# Patient Record
Sex: FEMALE | Race: BLACK OR AFRICAN AMERICAN | NOT HISPANIC OR LATINO | URBAN - METROPOLITAN AREA
[De-identification: names, ages, dates, MRNs, and addresses within clinical notes are randomized per-mention and may not be internally consistent; named-entity substitution may affect disease eponyms.]

---

## 2017-04-04 ENCOUNTER — EMERGENCY (EMERGENCY)
Facility: HOSPITAL | Age: 32
LOS: 1 days | Discharge: HOME | End: 2017-04-04
Attending: EMERGENCY MEDICINE | Admitting: EMERGENCY MEDICINE
Payer: COMMERCIAL

## 2017-04-04 VITALS
HEART RATE: 65 BPM | OXYGEN SATURATION: 100 % | TEMPERATURE: 98 F | RESPIRATION RATE: 16 BRPM | DIASTOLIC BLOOD PRESSURE: 73 MMHG | SYSTOLIC BLOOD PRESSURE: 116 MMHG

## 2017-04-04 VITALS
OXYGEN SATURATION: 99 % | RESPIRATION RATE: 20 BRPM | SYSTOLIC BLOOD PRESSURE: 112 MMHG | TEMPERATURE: 99 F | DIASTOLIC BLOOD PRESSURE: 78 MMHG | HEART RATE: 92 BPM

## 2017-04-04 DIAGNOSIS — K59.00 CONSTIPATION, UNSPECIFIED: ICD-10-CM

## 2017-04-04 LAB
ALBUMIN SERPL ELPH-MCNC: 4.5 G/DL — SIGNIFICANT CHANGE UP (ref 3.3–5)
ALP SERPL-CCNC: 51 U/L — SIGNIFICANT CHANGE UP (ref 40–120)
ALT FLD-CCNC: 13 U/L RC — SIGNIFICANT CHANGE UP (ref 10–45)
ANION GAP SERPL CALC-SCNC: 16 MMOL/L — SIGNIFICANT CHANGE UP (ref 5–17)
ANISOCYTOSIS BLD QL: SLIGHT — SIGNIFICANT CHANGE UP
APPEARANCE UR: ABNORMAL
APTT BLD: 94.5 SEC — HIGH (ref 27.5–37.4)
AST SERPL-CCNC: 20 U/L — SIGNIFICANT CHANGE UP (ref 10–40)
BASOPHILS # BLD AUTO: 0.1 K/UL — SIGNIFICANT CHANGE UP (ref 0–0.2)
BASOPHILS NFR BLD AUTO: 1 % — SIGNIFICANT CHANGE UP (ref 0–2)
BILIRUB SERPL-MCNC: 0.3 MG/DL — SIGNIFICANT CHANGE UP (ref 0.2–1.2)
BILIRUB UR-MCNC: ABNORMAL
BUN SERPL-MCNC: 7 MG/DL — SIGNIFICANT CHANGE UP (ref 7–23)
CALCIUM SERPL-MCNC: 9.8 MG/DL — SIGNIFICANT CHANGE UP (ref 8.4–10.5)
CHLORIDE SERPL-SCNC: 103 MMOL/L — SIGNIFICANT CHANGE UP (ref 96–108)
CO2 SERPL-SCNC: 23 MMOL/L — SIGNIFICANT CHANGE UP (ref 22–31)
COLOR SPEC: YELLOW — SIGNIFICANT CHANGE UP
CREAT SERPL-MCNC: 0.73 MG/DL — SIGNIFICANT CHANGE UP (ref 0.5–1.3)
DIFF PNL FLD: ABNORMAL
ELLIPTOCYTES BLD QL SMEAR: SLIGHT — SIGNIFICANT CHANGE UP
EOSINOPHIL # BLD AUTO: 0.4 K/UL — SIGNIFICANT CHANGE UP (ref 0–0.5)
EOSINOPHIL NFR BLD AUTO: 3 % — SIGNIFICANT CHANGE UP (ref 0–6)
EPI CELLS # UR: SIGNIFICANT CHANGE UP /HPF
GLUCOSE SERPL-MCNC: 112 MG/DL — HIGH (ref 70–99)
GLUCOSE UR QL: NEGATIVE — SIGNIFICANT CHANGE UP
HCT VFR BLD CALC: 38.6 % — SIGNIFICANT CHANGE UP (ref 34.5–45)
HGB BLD-MCNC: 12.8 G/DL — SIGNIFICANT CHANGE UP (ref 11.5–15.5)
INR BLD: 1.1 RATIO — SIGNIFICANT CHANGE UP (ref 0.88–1.16)
KETONES UR-MCNC: ABNORMAL
LEUKOCYTE ESTERASE UR-ACNC: ABNORMAL
LIDOCAIN IGE QN: 17 U/L — SIGNIFICANT CHANGE UP (ref 7–60)
LYMPHOCYTES # BLD AUTO: 3.6 K/UL — HIGH (ref 1–3.3)
LYMPHOCYTES # BLD AUTO: 48 % — HIGH (ref 13–44)
MCHC RBC-ENTMCNC: 26.8 PG — LOW (ref 27–34)
MCHC RBC-ENTMCNC: 33.2 GM/DL — SIGNIFICANT CHANGE UP (ref 32–36)
MCV RBC AUTO: 80.9 FL — SIGNIFICANT CHANGE UP (ref 80–100)
MONOCYTES # BLD AUTO: 0.6 K/UL — SIGNIFICANT CHANGE UP (ref 0–0.9)
MONOCYTES NFR BLD AUTO: 5 % — SIGNIFICANT CHANGE UP (ref 2–14)
NEUTROPHILS # BLD AUTO: 3.2 K/UL — SIGNIFICANT CHANGE UP (ref 1.8–7.4)
NEUTROPHILS NFR BLD AUTO: 43 % — SIGNIFICANT CHANGE UP (ref 43–77)
NITRITE UR-MCNC: NEGATIVE — SIGNIFICANT CHANGE UP
PH UR: 6.5 — SIGNIFICANT CHANGE UP (ref 4.8–8)
PLAT MORPH BLD: NORMAL — SIGNIFICANT CHANGE UP
PLATELET # BLD AUTO: 211 K/UL — SIGNIFICANT CHANGE UP (ref 150–400)
POIKILOCYTOSIS BLD QL AUTO: SLIGHT — SIGNIFICANT CHANGE UP
POLYCHROMASIA BLD QL SMEAR: SLIGHT — SIGNIFICANT CHANGE UP
POTASSIUM SERPL-MCNC: 3.8 MMOL/L — SIGNIFICANT CHANGE UP (ref 3.5–5.3)
POTASSIUM SERPL-SCNC: 3.8 MMOL/L — SIGNIFICANT CHANGE UP (ref 3.5–5.3)
PROT SERPL-MCNC: 7.8 G/DL — SIGNIFICANT CHANGE UP (ref 6–8.3)
PROT UR-MCNC: 100 MG/DL
PROTHROM AB SERPL-ACNC: 12 SEC — SIGNIFICANT CHANGE UP (ref 9.8–12.7)
RBC # BLD: 4.77 M/UL — SIGNIFICANT CHANGE UP (ref 3.8–5.2)
RBC # FLD: 16.2 % — HIGH (ref 10.3–14.5)
RBC BLD AUTO: ABNORMAL
RBC CASTS # UR COMP ASSIST: ABNORMAL /HPF (ref 0–2)
SCHISTOCYTES BLD QL AUTO: SLIGHT — SIGNIFICANT CHANGE UP
SODIUM SERPL-SCNC: 142 MMOL/L — SIGNIFICANT CHANGE UP (ref 135–145)
SP GR SPEC: >1.03 — HIGH (ref 1.01–1.02)
TARGETS BLD QL SMEAR: SLIGHT — SIGNIFICANT CHANGE UP
UROBILINOGEN FLD QL: 4
WBC # BLD: 7.9 K/UL — SIGNIFICANT CHANGE UP (ref 3.8–10.5)
WBC # FLD AUTO: 7.9 K/UL — SIGNIFICANT CHANGE UP (ref 3.8–10.5)

## 2017-04-04 PROCEDURE — 71046 X-RAY EXAM CHEST 2 VIEWS: CPT

## 2017-04-04 PROCEDURE — 85610 PROTHROMBIN TIME: CPT

## 2017-04-04 PROCEDURE — 99284 EMERGENCY DEPT VISIT MOD MDM: CPT

## 2017-04-04 PROCEDURE — 80053 COMPREHEN METABOLIC PANEL: CPT

## 2017-04-04 PROCEDURE — 96374 THER/PROPH/DIAG INJ IV PUSH: CPT

## 2017-04-04 PROCEDURE — 81001 URINALYSIS AUTO W/SCOPE: CPT

## 2017-04-04 PROCEDURE — 83690 ASSAY OF LIPASE: CPT

## 2017-04-04 PROCEDURE — 96375 TX/PRO/DX INJ NEW DRUG ADDON: CPT

## 2017-04-04 PROCEDURE — 85027 COMPLETE CBC AUTOMATED: CPT

## 2017-04-04 PROCEDURE — 71020: CPT | Mod: 26

## 2017-04-04 PROCEDURE — 99284 EMERGENCY DEPT VISIT MOD MDM: CPT | Mod: 25

## 2017-04-04 PROCEDURE — 85730 THROMBOPLASTIN TIME PARTIAL: CPT

## 2017-04-04 RX ORDER — ONDANSETRON 8 MG/1
4 TABLET, FILM COATED ORAL ONCE
Qty: 0 | Refills: 0 | Status: COMPLETED | OUTPATIENT
Start: 2017-04-04 | End: 2017-04-04

## 2017-04-04 RX ORDER — FAMOTIDINE 10 MG/ML
1 INJECTION INTRAVENOUS
Qty: 20 | Refills: 0 | OUTPATIENT
Start: 2017-04-04

## 2017-04-04 RX ORDER — KETOROLAC TROMETHAMINE 30 MG/ML
30 SYRINGE (ML) INJECTION ONCE
Qty: 0 | Refills: 0 | Status: DISCONTINUED | OUTPATIENT
Start: 2017-04-04 | End: 2017-04-04

## 2017-04-04 RX ORDER — MORPHINE SULFATE 50 MG/1
6 CAPSULE, EXTENDED RELEASE ORAL ONCE
Qty: 0 | Refills: 0 | Status: DISCONTINUED | OUTPATIENT
Start: 2017-04-04 | End: 2017-04-04

## 2017-04-04 RX ORDER — SODIUM CHLORIDE 9 MG/ML
1000 INJECTION INTRAMUSCULAR; INTRAVENOUS; SUBCUTANEOUS ONCE
Qty: 0 | Refills: 0 | Status: COMPLETED | OUTPATIENT
Start: 2017-04-04 | End: 2017-04-04

## 2017-04-04 RX ORDER — TRAMADOL HYDROCHLORIDE 50 MG/1
1 TABLET ORAL
Qty: 16 | Refills: 0 | OUTPATIENT
Start: 2017-04-04

## 2017-04-04 RX ADMIN — MORPHINE SULFATE 6 MILLIGRAM(S): 50 CAPSULE, EXTENDED RELEASE ORAL at 14:00

## 2017-04-04 RX ADMIN — MORPHINE SULFATE 6 MILLIGRAM(S): 50 CAPSULE, EXTENDED RELEASE ORAL at 13:44

## 2017-04-04 RX ADMIN — ONDANSETRON 4 MILLIGRAM(S): 8 TABLET, FILM COATED ORAL at 12:45

## 2017-04-04 RX ADMIN — SODIUM CHLORIDE 1000 MILLILITER(S): 9 INJECTION INTRAMUSCULAR; INTRAVENOUS; SUBCUTANEOUS at 12:27

## 2017-04-04 RX ADMIN — Medication 30 MILLIGRAM(S): at 16:37

## 2017-04-04 NOTE — ED PROVIDER NOTE - MEDICAL DECISION MAKING DETAILS
31 year old female presents with right sided pain. Given history of pancreatitis, pancreatitis is likely, although the lack of abdominal pain and tenderness makes it less likely. Plan: IVF, Zofran for nausea, evaluate for causes in the lung/abdomen/urine. Reassess.

## 2017-04-04 NOTE — ED ADULT NURSE REASSESSMENT NOTE - NS ED NURSE REASSESS COMMENT FT1
MD at bedside discussing urine pregnancy results with patient.  MD said patient may receive morphine despite urine pregnancy results.  Xray called and made aware that patient can come for xray.  MD said he needs the xray and patient needs to go for xray.

## 2017-04-04 NOTE — ED ADULT NURSE NOTE - OBJECTIVE STATEMENT
31 year old female alert and oriented x 4 came to the ED c/o right sided back/flank pain for 2 weeks.  Patient came to the ED today because the pain is not getting better and is interfering with her being able to work.  Patient said she has not been able to eat or drink without vomiting since yesterday and has been having trouble sleeping.  Patient c/o not having a BM for 3 days and says she has tried heat on her back, Tylenol and Motrin, but they have not relieved her pain.  Last dose of medications was last night.  Patient reports pain is 10/10 when it comes in waves and said its a throbbing sensation.  Patient denies; chest pain, shortness of breath, abdominal pain, fevers, chills, dysuria, frequency or burning on urination, blood in urine, stool or vomit.  L/s diminished b/l, S1, S2 heard, abdomen is soft non tender, right sided CVA tenderness.  LMP 3/4/17.  IV established in the right hand #20 and safety ensured. 31 year old female alert and oriented x 4 came to the ED c/o right sided back/flank pain for 2 weeks.  Patient came to the ED today because the pain is not getting better and is interfering with her being able to work.  Patient reports a history of Pancreatitis in November.  Patient said she has not been able to eat or drink without vomiting since yesterday and has been having trouble sleeping.  Patient c/o not having a BM for 3 days and says she has tried heat on her back, Tylenol and Motrin, but they have not relieved her pain.  Last dose of medications was last night.  Patient reports pain is 10/10 when it comes in waves and said its a throbbing sensation.  Patient denies; chest pain, shortness of breath, abdominal pain, fevers, chills, dysuria, frequency or burning on urination, blood in urine, stool or vomit.  L/s diminished b/l, S1, S2 heard, abdomen is soft non tender, right sided CVA tenderness.  LMP 3/4/17.  IV established in the right hand #20 and safety ensured.

## 2017-04-04 NOTE — ED PROVIDER NOTE - OBJECTIVE STATEMENT
31 year old female with past medical history of Tuberculosis, Anemia, Pancreatitis in 11/2016 presents to the Emergency Department complaining of intermittent right sided pain associated with constipation, nausea, nonbilious/nonbloody vomiting x2 weeks. Pain worsens when lying onto her right side. No relief with Motrin or Tylenol. Daily smoker, but does not drink alcohol. Patient states current symptoms are similar to when she was diagnosed with Pancreatitis this past November. Patient is unsure why she was diagnosed with Pancreatitis or what caused it. Currently menstruating.   Denies fever, chills, urinary symptoms, or any other complaints.

## 2017-04-04 NOTE — ED PROVIDER NOTE - NS ED MD SCRIBE ATTENDING SCRIBE SECTIONS
HISTORY OF PRESENT ILLNESS/VITAL SIGNS( Pullset)/PHYSICAL EXAM/DISPOSITION/HIV/PAST MEDICAL/SURGICAL/SOCIAL HISTORY/REVIEW OF SYSTEMS

## 2017-04-04 NOTE — ED PROVIDER NOTE - CARE PLAN
Principal Discharge DX:	Acute gastritis without hemorrhage, unspecified gastritis type  Secondary Diagnosis:	Chest wall pain

## 2017-05-24 ENCOUNTER — EMERGENCY (EMERGENCY)
Facility: HOSPITAL | Age: 32
LOS: 1 days | Discharge: HOME | End: 2017-05-24
Attending: EMERGENCY MEDICINE | Admitting: EMERGENCY MEDICINE
Payer: MEDICAID

## 2017-05-24 VITALS
SYSTOLIC BLOOD PRESSURE: 129 MMHG | RESPIRATION RATE: 17 BRPM | OXYGEN SATURATION: 100 % | TEMPERATURE: 98 F | DIASTOLIC BLOOD PRESSURE: 77 MMHG | HEART RATE: 90 BPM

## 2017-05-24 VITALS
TEMPERATURE: 98 F | RESPIRATION RATE: 19 BRPM | OXYGEN SATURATION: 98 % | HEART RATE: 111 BPM | SYSTOLIC BLOOD PRESSURE: 112 MMHG | DIASTOLIC BLOOD PRESSURE: 83 MMHG

## 2017-05-24 DIAGNOSIS — R06.02 SHORTNESS OF BREATH: ICD-10-CM

## 2017-05-24 DIAGNOSIS — R63.4 ABNORMAL WEIGHT LOSS: ICD-10-CM

## 2017-05-24 DIAGNOSIS — R61 GENERALIZED HYPERHIDROSIS: ICD-10-CM

## 2017-05-24 DIAGNOSIS — Z79.899 OTHER LONG TERM (CURRENT) DRUG THERAPY: ICD-10-CM

## 2017-05-24 DIAGNOSIS — R50.9 FEVER, UNSPECIFIED: ICD-10-CM

## 2017-05-24 DIAGNOSIS — F17.200 NICOTINE DEPENDENCE, UNSPECIFIED, UNCOMPLICATED: ICD-10-CM

## 2017-05-24 DIAGNOSIS — R53.1 WEAKNESS: ICD-10-CM

## 2017-05-24 DIAGNOSIS — R10.84 GENERALIZED ABDOMINAL PAIN: ICD-10-CM

## 2017-05-24 DIAGNOSIS — Z79.891 LONG TERM (CURRENT) USE OF OPIATE ANALGESIC: ICD-10-CM

## 2017-05-24 LAB
ALBUMIN SERPL ELPH-MCNC: 4.9 G/DL — SIGNIFICANT CHANGE UP (ref 3.3–5)
ALP SERPL-CCNC: 61 U/L — SIGNIFICANT CHANGE UP (ref 40–120)
ALT FLD-CCNC: 19 U/L RC — SIGNIFICANT CHANGE UP (ref 10–45)
ANION GAP SERPL CALC-SCNC: 17 MMOL/L — SIGNIFICANT CHANGE UP (ref 5–17)
AST SERPL-CCNC: 21 U/L — SIGNIFICANT CHANGE UP (ref 10–40)
BASE EXCESS BLDV CALC-SCNC: 5 MMOL/L — HIGH (ref -2–2)
BILIRUB SERPL-MCNC: 0.5 MG/DL — SIGNIFICANT CHANGE UP (ref 0.2–1.2)
BUN SERPL-MCNC: 12 MG/DL — SIGNIFICANT CHANGE UP (ref 7–23)
CA-I SERPL-SCNC: 1.26 MMOL/L — SIGNIFICANT CHANGE UP (ref 1.12–1.3)
CALCIUM SERPL-MCNC: 10.4 MG/DL — SIGNIFICANT CHANGE UP (ref 8.4–10.5)
CHLORIDE BLDV-SCNC: 93 MMOL/L — LOW (ref 96–108)
CHLORIDE SERPL-SCNC: 92 MMOL/L — LOW (ref 96–108)
CO2 BLDV-SCNC: 31 MMOL/L — HIGH (ref 22–30)
CO2 SERPL-SCNC: 26 MMOL/L — SIGNIFICANT CHANGE UP (ref 22–31)
CREAT SERPL-MCNC: 0.86 MG/DL — SIGNIFICANT CHANGE UP (ref 0.5–1.3)
GAS PNL BLDV: 135 MMOL/L — LOW (ref 136–145)
GAS PNL BLDV: SIGNIFICANT CHANGE UP
GLUCOSE BLDV-MCNC: 113 MG/DL — HIGH (ref 70–99)
GLUCOSE SERPL-MCNC: 113 MG/DL — HIGH (ref 70–99)
HCG SERPL-ACNC: <2 MIU/ML — SIGNIFICANT CHANGE UP
HCO3 BLDV-SCNC: 30 MMOL/L — HIGH (ref 21–29)
HCT VFR BLD CALC: 42.5 % — SIGNIFICANT CHANGE UP (ref 34.5–45)
HCT VFR BLDA CALC: 42 % — SIGNIFICANT CHANGE UP (ref 39–50)
HGB BLD CALC-MCNC: 13.7 G/DL — SIGNIFICANT CHANGE UP (ref 11.5–15.5)
HGB BLD-MCNC: 13.6 G/DL — SIGNIFICANT CHANGE UP (ref 11.5–15.5)
LACTATE BLDV-MCNC: 1.4 MMOL/L — SIGNIFICANT CHANGE UP (ref 0.7–2)
LIDOCAIN IGE QN: 80 U/L — HIGH (ref 7–60)
MCHC RBC-ENTMCNC: 26.2 PG — LOW (ref 27–34)
MCHC RBC-ENTMCNC: 32.1 GM/DL — SIGNIFICANT CHANGE UP (ref 32–36)
MCV RBC AUTO: 81.5 FL — SIGNIFICANT CHANGE UP (ref 80–100)
PCO2 BLDV: 47 MMHG — SIGNIFICANT CHANGE UP (ref 35–50)
PH BLDV: 7.42 — SIGNIFICANT CHANGE UP (ref 7.35–7.45)
PLATELET # BLD AUTO: 458 K/UL — HIGH (ref 150–400)
PO2 BLDV: 22 MMHG — LOW (ref 25–45)
POTASSIUM BLDV-SCNC: 2.7 MMOL/L — CRITICAL LOW (ref 3.5–5)
POTASSIUM SERPL-MCNC: 3 MMOL/L — LOW (ref 3.5–5.3)
POTASSIUM SERPL-SCNC: 3 MMOL/L — LOW (ref 3.5–5.3)
PROT SERPL-MCNC: 8.6 G/DL — HIGH (ref 6–8.3)
RBC # BLD: 5.21 M/UL — HIGH (ref 3.8–5.2)
RBC # FLD: 14.1 % — SIGNIFICANT CHANGE UP (ref 10.3–14.5)
SAO2 % BLDV: 28 % — LOW (ref 67–88)
SODIUM SERPL-SCNC: 135 MMOL/L — SIGNIFICANT CHANGE UP (ref 135–145)
WBC # BLD: 11.5 K/UL — HIGH (ref 3.8–10.5)
WBC # FLD AUTO: 11.5 K/UL — HIGH (ref 3.8–10.5)

## 2017-05-24 PROCEDURE — 76705 ECHO EXAM OF ABDOMEN: CPT | Mod: 26,RT

## 2017-05-24 PROCEDURE — 71010: CPT | Mod: 26

## 2017-05-24 PROCEDURE — 99285 EMERGENCY DEPT VISIT HI MDM: CPT | Mod: 25

## 2017-05-24 PROCEDURE — 74177 CT ABD & PELVIS W/CONTRAST: CPT | Mod: 26

## 2017-05-24 RX ORDER — SODIUM CHLORIDE 9 MG/ML
1000 INJECTION INTRAMUSCULAR; INTRAVENOUS; SUBCUTANEOUS ONCE
Qty: 0 | Refills: 0 | Status: COMPLETED | OUTPATIENT
Start: 2017-05-24 | End: 2017-05-24

## 2017-05-24 RX ORDER — ONDANSETRON 8 MG/1
4 TABLET, FILM COATED ORAL ONCE
Qty: 0 | Refills: 0 | Status: COMPLETED | OUTPATIENT
Start: 2017-05-24 | End: 2017-05-24

## 2017-05-24 RX ORDER — POTASSIUM CHLORIDE 20 MEQ
60 PACKET (EA) ORAL ONCE
Qty: 0 | Refills: 0 | Status: COMPLETED | OUTPATIENT
Start: 2017-05-24 | End: 2017-05-24

## 2017-05-24 RX ORDER — MORPHINE SULFATE 50 MG/1
4 CAPSULE, EXTENDED RELEASE ORAL ONCE
Qty: 0 | Refills: 0 | Status: DISCONTINUED | OUTPATIENT
Start: 2017-05-24 | End: 2017-05-24

## 2017-05-24 RX ORDER — POTASSIUM CHLORIDE 20 MEQ
60 PACKET (EA) ORAL ONCE
Qty: 0 | Refills: 0 | Status: DISCONTINUED | OUTPATIENT
Start: 2017-05-24 | End: 2017-05-24

## 2017-05-24 RX ADMIN — MORPHINE SULFATE 4 MILLIGRAM(S): 50 CAPSULE, EXTENDED RELEASE ORAL at 20:30

## 2017-05-24 RX ADMIN — SODIUM CHLORIDE 1000 MILLILITER(S): 9 INJECTION INTRAMUSCULAR; INTRAVENOUS; SUBCUTANEOUS at 20:30

## 2017-05-24 RX ADMIN — ONDANSETRON 4 MILLIGRAM(S): 8 TABLET, FILM COATED ORAL at 20:31

## 2017-05-24 RX ADMIN — SODIUM CHLORIDE 1000 MILLILITER(S): 9 INJECTION INTRAMUSCULAR; INTRAVENOUS; SUBCUTANEOUS at 22:39

## 2017-05-24 NOTE — ED ADULT NURSE NOTE - OBJECTIVE STATEMENT
31 y/o female patient presents ambulatory to ED with complaint of abdominal pain x 1 week. Patient has had increased anxiety and worsening stress in personal life. Per patient symptoms started with upper abdominal pain with radiation to the back, SOB, decreased PO intake with weight loss, intermittent nausea and vomiting, generalized weakness. Patient states she had an  1 month ago. Patient denies CP, no diarrhea, afebrile in ED, no headache, no lightheadedness/dizziness, no numbness or tingling, no syncope, no cough, no URI. Patient has HX of TB - as per patient last treatment 13 yrs ago. 33 y/o female patient presents ambulatory to ED with complaint of abdominal pain x 1 week. Patient has had increased anxiety and worsening stress in personal life. Per patient symptoms started with upper abdominal pain with radiation to the back, SOB, decreased PO intake with weight loss, intermittent nausea and vomiting, generalized weakness. Per patient fever and chills at home. Patient states she had an  1 month ago. Patient denies CP, no diarrhea, afebrile in ED, no headache, no lightheadedness/dizziness, no numbness or tingling, no syncope, no cough, no URI. Patient has HX of TB - as per patient last treatment 13 yrs ago.

## 2017-05-25 PROCEDURE — 82330 ASSAY OF CALCIUM: CPT

## 2017-05-25 PROCEDURE — 85014 HEMATOCRIT: CPT

## 2017-05-25 PROCEDURE — 84132 ASSAY OF SERUM POTASSIUM: CPT

## 2017-05-25 PROCEDURE — 85027 COMPLETE CBC AUTOMATED: CPT

## 2017-05-25 PROCEDURE — 82803 BLOOD GASES ANY COMBINATION: CPT

## 2017-05-25 PROCEDURE — 76705 ECHO EXAM OF ABDOMEN: CPT

## 2017-05-25 PROCEDURE — 84702 CHORIONIC GONADOTROPIN TEST: CPT

## 2017-05-25 PROCEDURE — 82435 ASSAY OF BLOOD CHLORIDE: CPT

## 2017-05-25 PROCEDURE — 96375 TX/PRO/DX INJ NEW DRUG ADDON: CPT

## 2017-05-25 PROCEDURE — 82947 ASSAY GLUCOSE BLOOD QUANT: CPT

## 2017-05-25 PROCEDURE — 80053 COMPREHEN METABOLIC PANEL: CPT

## 2017-05-25 PROCEDURE — 83605 ASSAY OF LACTIC ACID: CPT

## 2017-05-25 PROCEDURE — 74177 CT ABD & PELVIS W/CONTRAST: CPT

## 2017-05-25 PROCEDURE — 71045 X-RAY EXAM CHEST 1 VIEW: CPT

## 2017-05-25 PROCEDURE — 84295 ASSAY OF SERUM SODIUM: CPT

## 2017-05-25 PROCEDURE — 99284 EMERGENCY DEPT VISIT MOD MDM: CPT | Mod: 25

## 2017-05-25 PROCEDURE — 83690 ASSAY OF LIPASE: CPT

## 2017-05-25 PROCEDURE — 96374 THER/PROPH/DIAG INJ IV PUSH: CPT | Mod: XU

## 2017-05-25 RX ORDER — OXYCODONE HYDROCHLORIDE 5 MG/1
5 TABLET ORAL ONCE
Qty: 0 | Refills: 0 | Status: DISCONTINUED | OUTPATIENT
Start: 2017-05-25 | End: 2017-05-25

## 2017-05-25 RX ORDER — ACETAMINOPHEN 500 MG
650 TABLET ORAL ONCE
Qty: 0 | Refills: 0 | Status: COMPLETED | OUTPATIENT
Start: 2017-05-25 | End: 2017-05-25

## 2017-05-25 RX ADMIN — Medication 60 MILLIEQUIVALENT(S): at 00:20

## 2017-05-25 RX ADMIN — OXYCODONE HYDROCHLORIDE 5 MILLIGRAM(S): 5 TABLET ORAL at 00:20

## 2017-05-25 RX ADMIN — Medication 650 MILLIGRAM(S): at 00:20

## 2017-05-25 NOTE — ED PROVIDER NOTE - ATTENDING CONTRIBUTION TO CARE
31 yo female with PMH of tb (treated 13 years ago with no further issues), pancreatitis, anemia with no transfusions with abdominal pain, n/v. No fevers. Mild sweats and chills. No  sx. No GI bleeding or heavy vaginal bleeding. NO URI SX at all. She claims no BM or flatus for weeks. No hx of SBO. On exam, AVSS, cta bl, s1, s,2 rrr, osft nd abdomen,+ BS,  + ttp to epigastrum and ruq with no Turner's no cvat no r/g/r, no le edema, no rash. No pallor, MMM, no icterus.

## 2017-05-25 NOTE — ED PROVIDER NOTE - PLAN OF CARE
Your imaging and lab work did not show any sign of infection. Please follow up with your PMD within a few days of discharge. If your symptoms worsen seek immediate medical care.

## 2017-05-25 NOTE — ED PROVIDER NOTE - OBJECTIVE STATEMENT
31 year old female with past medical history of Tuberculosis, Anemia, Pancreatitis in 11/2016 presents with multiple complaints. Pt states that she hasn't had a bowel movement in a month and has decreased PO intake. She also has pain all over including worsening abdominal pain. She states she has fevers and night sweats with associated weight loss. She denies hemoptysis or cough. She has RUQ pain with pain radiating to the back.

## 2017-05-25 NOTE — ED PROVIDER NOTE - PROGRESS NOTE DETAILS
RUQ and CT A/P negative for pathology. Pt able to take po fluids and medications. Pt to follow up with PMD.

## 2017-05-25 NOTE — ED PROVIDER NOTE - CARE PLAN
Principal Discharge DX:	Abdominal pain  Secondary Diagnosis:	Dehydration Principal Discharge DX:	Abdominal pain  Instructions for follow-up, activity and diet:	Your imaging and lab work did not show any sign of infection. Please follow up with your PMD within a few days of discharge. If your symptoms worsen seek immediate medical care.  Secondary Diagnosis:	Dehydration

## 2017-10-11 ENCOUNTER — EMERGENCY (EMERGENCY)
Facility: HOSPITAL | Age: 32
LOS: 1 days | Discharge: HOME | End: 2017-10-11
Attending: EMERGENCY MEDICINE | Admitting: EMERGENCY MEDICINE
Payer: MEDICAID

## 2017-10-11 VITALS
OXYGEN SATURATION: 100 % | SYSTOLIC BLOOD PRESSURE: 108 MMHG | DIASTOLIC BLOOD PRESSURE: 76 MMHG | RESPIRATION RATE: 16 BRPM | HEART RATE: 79 BPM | TEMPERATURE: 98 F

## 2017-10-11 VITALS
HEART RATE: 94 BPM | DIASTOLIC BLOOD PRESSURE: 66 MMHG | RESPIRATION RATE: 18 BRPM | OXYGEN SATURATION: 100 % | SYSTOLIC BLOOD PRESSURE: 124 MMHG

## 2017-10-11 LAB
ALBUMIN SERPL ELPH-MCNC: 5.5 G/DL — HIGH (ref 3.3–5)
ALP SERPL-CCNC: 69 U/L — SIGNIFICANT CHANGE UP (ref 40–120)
ALT FLD-CCNC: 11 U/L RC — SIGNIFICANT CHANGE UP (ref 10–45)
ANION GAP SERPL CALC-SCNC: 17 MMOL/L — SIGNIFICANT CHANGE UP (ref 5–17)
ANISOCYTOSIS BLD QL: SIGNIFICANT CHANGE UP
APPEARANCE UR: CLEAR — SIGNIFICANT CHANGE UP
AST SERPL-CCNC: 19 U/L — SIGNIFICANT CHANGE UP (ref 10–40)
BACTERIA # UR AUTO: ABNORMAL /HPF
BASE EXCESS BLDV CALC-SCNC: 1.7 MMOL/L — SIGNIFICANT CHANGE UP (ref -2–2)
BASOPHILS # BLD AUTO: 0.1 K/UL — SIGNIFICANT CHANGE UP (ref 0–0.2)
BASOPHILS NFR BLD AUTO: 1.2 % — SIGNIFICANT CHANGE UP (ref 0–2)
BILIRUB SERPL-MCNC: 0.5 MG/DL — SIGNIFICANT CHANGE UP (ref 0.2–1.2)
BILIRUB UR-MCNC: NEGATIVE — SIGNIFICANT CHANGE UP
BUN SERPL-MCNC: 10 MG/DL — SIGNIFICANT CHANGE UP (ref 7–23)
CA-I SERPL-SCNC: 1.29 MMOL/L — SIGNIFICANT CHANGE UP (ref 1.12–1.3)
CALCIUM SERPL-MCNC: 10.9 MG/DL — HIGH (ref 8.4–10.5)
CHLORIDE BLDV-SCNC: 101 MMOL/L — SIGNIFICANT CHANGE UP (ref 96–108)
CHLORIDE SERPL-SCNC: 98 MMOL/L — SIGNIFICANT CHANGE UP (ref 96–108)
CO2 BLDV-SCNC: 28 MMOL/L — SIGNIFICANT CHANGE UP (ref 22–30)
CO2 SERPL-SCNC: 24 MMOL/L — SIGNIFICANT CHANGE UP (ref 22–31)
COLOR SPEC: YELLOW — SIGNIFICANT CHANGE UP
CREAT SERPL-MCNC: 0.84 MG/DL — SIGNIFICANT CHANGE UP (ref 0.5–1.3)
DIFF PNL FLD: ABNORMAL
ELLIPTOCYTES BLD QL SMEAR: SLIGHT — SIGNIFICANT CHANGE UP
EOSINOPHIL # BLD AUTO: 0 K/UL — SIGNIFICANT CHANGE UP (ref 0–0.5)
EOSINOPHIL NFR BLD AUTO: 0.5 % — SIGNIFICANT CHANGE UP (ref 0–6)
EPI CELLS # UR: SIGNIFICANT CHANGE UP /HPF
GAS PNL BLDV: 139 MMOL/L — SIGNIFICANT CHANGE UP (ref 136–145)
GAS PNL BLDV: SIGNIFICANT CHANGE UP
GLUCOSE BLDV-MCNC: 115 MG/DL — HIGH (ref 70–99)
GLUCOSE SERPL-MCNC: 110 MG/DL — HIGH (ref 70–99)
GLUCOSE UR QL: NEGATIVE — SIGNIFICANT CHANGE UP
HCO3 BLDV-SCNC: 26 MMOL/L — SIGNIFICANT CHANGE UP (ref 21–29)
HCT VFR BLD CALC: 38.4 % — SIGNIFICANT CHANGE UP (ref 34.5–45)
HCT VFR BLDA CALC: 38 % — LOW (ref 39–50)
HGB BLD CALC-MCNC: 12.3 G/DL — SIGNIFICANT CHANGE UP (ref 11.5–15.5)
HGB BLD-MCNC: 12.1 G/DL — SIGNIFICANT CHANGE UP (ref 11.5–15.5)
KETONES UR-MCNC: NEGATIVE — SIGNIFICANT CHANGE UP
LACTATE BLDV-MCNC: 1.5 MMOL/L — SIGNIFICANT CHANGE UP (ref 0.7–2)
LEUKOCYTE ESTERASE UR-ACNC: NEGATIVE — SIGNIFICANT CHANGE UP
LIDOCAIN IGE QN: 90 U/L — HIGH (ref 7–60)
LYMPHOCYTES # BLD AUTO: 2.8 K/UL — SIGNIFICANT CHANGE UP (ref 1–3.3)
LYMPHOCYTES # BLD AUTO: 34.4 % — SIGNIFICANT CHANGE UP (ref 13–44)
MCHC RBC-ENTMCNC: 22.4 PG — LOW (ref 27–34)
MCHC RBC-ENTMCNC: 31.6 GM/DL — LOW (ref 32–36)
MCV RBC AUTO: 70.9 FL — LOW (ref 80–100)
MONOCYTES # BLD AUTO: 0.8 K/UL — SIGNIFICANT CHANGE UP (ref 0–0.9)
MONOCYTES NFR BLD AUTO: 9.8 % — SIGNIFICANT CHANGE UP (ref 2–14)
NEUTROPHILS # BLD AUTO: 4.4 K/UL — SIGNIFICANT CHANGE UP (ref 1.8–7.4)
NEUTROPHILS NFR BLD AUTO: 54.1 % — SIGNIFICANT CHANGE UP (ref 43–77)
NITRITE UR-MCNC: NEGATIVE — SIGNIFICANT CHANGE UP
PCO2 BLDV: 44 MMHG — SIGNIFICANT CHANGE UP (ref 35–50)
PH BLDV: 7.4 — SIGNIFICANT CHANGE UP (ref 7.35–7.45)
PH UR: 6 — SIGNIFICANT CHANGE UP (ref 5–8)
PLAT MORPH BLD: NORMAL — SIGNIFICANT CHANGE UP
PLATELET # BLD AUTO: 472 K/UL — HIGH (ref 150–400)
PO2 BLDV: 43 MMHG — SIGNIFICANT CHANGE UP (ref 25–45)
POIKILOCYTOSIS BLD QL AUTO: SLIGHT — SIGNIFICANT CHANGE UP
POTASSIUM BLDV-SCNC: 3.6 MMOL/L — SIGNIFICANT CHANGE UP (ref 3.5–5)
POTASSIUM SERPL-MCNC: 3.4 MMOL/L — LOW (ref 3.5–5.3)
POTASSIUM SERPL-SCNC: 3.4 MMOL/L — LOW (ref 3.5–5.3)
PROT SERPL-MCNC: 9.4 G/DL — HIGH (ref 6–8.3)
PROT UR-MCNC: 100 MG/DL
RBC # BLD: 5.42 M/UL — HIGH (ref 3.8–5.2)
RBC # FLD: 19 % — HIGH (ref 10.3–14.5)
RBC BLD AUTO: ABNORMAL
RBC CASTS # UR COMP ASSIST: ABNORMAL /HPF (ref 0–2)
SAO2 % BLDV: 72 % — SIGNIFICANT CHANGE UP (ref 67–88)
SCHISTOCYTES BLD QL AUTO: SLIGHT — SIGNIFICANT CHANGE UP
SODIUM SERPL-SCNC: 139 MMOL/L — SIGNIFICANT CHANGE UP (ref 135–145)
SP GR SPEC: 1.02 — SIGNIFICANT CHANGE UP (ref 1.01–1.02)
UROBILINOGEN FLD QL: NEGATIVE — SIGNIFICANT CHANGE UP
WBC # BLD: 8.1 K/UL — SIGNIFICANT CHANGE UP (ref 3.8–10.5)
WBC # FLD AUTO: 8.1 K/UL — SIGNIFICANT CHANGE UP (ref 3.8–10.5)
WBC UR QL: SIGNIFICANT CHANGE UP /HPF (ref 0–5)

## 2017-10-11 PROCEDURE — 85014 HEMATOCRIT: CPT

## 2017-10-11 PROCEDURE — 96374 THER/PROPH/DIAG INJ IV PUSH: CPT

## 2017-10-11 PROCEDURE — 85027 COMPLETE CBC AUTOMATED: CPT

## 2017-10-11 PROCEDURE — 99285 EMERGENCY DEPT VISIT HI MDM: CPT

## 2017-10-11 PROCEDURE — 80053 COMPREHEN METABOLIC PANEL: CPT

## 2017-10-11 PROCEDURE — 84295 ASSAY OF SERUM SODIUM: CPT

## 2017-10-11 PROCEDURE — 82803 BLOOD GASES ANY COMBINATION: CPT

## 2017-10-11 PROCEDURE — 82947 ASSAY GLUCOSE BLOOD QUANT: CPT

## 2017-10-11 PROCEDURE — 83605 ASSAY OF LACTIC ACID: CPT

## 2017-10-11 PROCEDURE — 99284 EMERGENCY DEPT VISIT MOD MDM: CPT | Mod: 25

## 2017-10-11 PROCEDURE — 96375 TX/PRO/DX INJ NEW DRUG ADDON: CPT

## 2017-10-11 PROCEDURE — 84132 ASSAY OF SERUM POTASSIUM: CPT

## 2017-10-11 PROCEDURE — 76705 ECHO EXAM OF ABDOMEN: CPT | Mod: 26

## 2017-10-11 PROCEDURE — 81001 URINALYSIS AUTO W/SCOPE: CPT

## 2017-10-11 PROCEDURE — 76705 ECHO EXAM OF ABDOMEN: CPT

## 2017-10-11 PROCEDURE — 83690 ASSAY OF LIPASE: CPT

## 2017-10-11 PROCEDURE — 87086 URINE CULTURE/COLONY COUNT: CPT

## 2017-10-11 PROCEDURE — 82330 ASSAY OF CALCIUM: CPT

## 2017-10-11 PROCEDURE — 82435 ASSAY OF BLOOD CHLORIDE: CPT

## 2017-10-11 RX ORDER — FAMOTIDINE 10 MG/ML
20 INJECTION INTRAVENOUS ONCE
Qty: 0 | Refills: 0 | Status: COMPLETED | OUTPATIENT
Start: 2017-10-11 | End: 2017-10-11

## 2017-10-11 RX ORDER — ACETAMINOPHEN 500 MG
975 TABLET ORAL ONCE
Qty: 0 | Refills: 0 | Status: COMPLETED | OUTPATIENT
Start: 2017-10-11 | End: 2017-10-11

## 2017-10-11 RX ORDER — ONDANSETRON 8 MG/1
4 TABLET, FILM COATED ORAL ONCE
Qty: 0 | Refills: 0 | Status: COMPLETED | OUTPATIENT
Start: 2017-10-11 | End: 2017-10-11

## 2017-10-11 RX ORDER — POTASSIUM CHLORIDE 20 MEQ
40 PACKET (EA) ORAL ONCE
Qty: 0 | Refills: 0 | Status: COMPLETED | OUTPATIENT
Start: 2017-10-11 | End: 2017-10-11

## 2017-10-11 RX ORDER — SODIUM CHLORIDE 9 MG/ML
1000 INJECTION INTRAMUSCULAR; INTRAVENOUS; SUBCUTANEOUS ONCE
Qty: 0 | Refills: 0 | Status: COMPLETED | OUTPATIENT
Start: 2017-10-11 | End: 2017-10-11

## 2017-10-11 RX ORDER — MORPHINE SULFATE 50 MG/1
4 CAPSULE, EXTENDED RELEASE ORAL ONCE
Qty: 0 | Refills: 0 | Status: DISCONTINUED | OUTPATIENT
Start: 2017-10-11 | End: 2017-10-11

## 2017-10-11 RX ORDER — METOCLOPRAMIDE HCL 10 MG
10 TABLET ORAL ONCE
Qty: 0 | Refills: 0 | Status: COMPLETED | OUTPATIENT
Start: 2017-10-11 | End: 2017-10-11

## 2017-10-11 RX ADMIN — FAMOTIDINE 20 MILLIGRAM(S): 10 INJECTION INTRAVENOUS at 13:50

## 2017-10-11 RX ADMIN — ONDANSETRON 4 MILLIGRAM(S): 8 TABLET, FILM COATED ORAL at 12:03

## 2017-10-11 RX ADMIN — MORPHINE SULFATE 4 MILLIGRAM(S): 50 CAPSULE, EXTENDED RELEASE ORAL at 12:18

## 2017-10-11 RX ADMIN — SODIUM CHLORIDE 1000 MILLILITER(S): 9 INJECTION INTRAMUSCULAR; INTRAVENOUS; SUBCUTANEOUS at 12:02

## 2017-10-11 RX ADMIN — Medication 40 MILLIEQUIVALENT(S): at 12:12

## 2017-10-11 RX ADMIN — Medication 975 MILLIGRAM(S): at 14:57

## 2017-10-11 RX ADMIN — SODIUM CHLORIDE 1000 MILLILITER(S): 9 INJECTION INTRAMUSCULAR; INTRAVENOUS; SUBCUTANEOUS at 13:50

## 2017-10-11 RX ADMIN — Medication 10 MILLIGRAM(S): at 12:12

## 2017-10-11 RX ADMIN — MORPHINE SULFATE 4 MILLIGRAM(S): 50 CAPSULE, EXTENDED RELEASE ORAL at 12:03

## 2017-10-11 NOTE — ED PROVIDER NOTE - OBJECTIVE STATEMENT
33 yo F with pmhx TB, Anemia, and pancreatitis BIBA presenting with nv and abdominal pain x three days. patient states it began after her children got sick. Patient states she started with n/v. Patient states she hasn't been able to tolerate PO for the past three days. patient states shortly after she began with epigastric pain that radiates to the right side of her back. Pain is 10/10 and constant. Patient denies cp, sob, tingling, numbness, dysuria, hematuria, vaginal discharge, vaginal bleeding, fever, cough, ha, and neck pain    lmp about three weeks ago  patient requesting chlamydia testing because friend recently diagnosed with chlamydia.

## 2017-10-11 NOTE — ED PROVIDER NOTE - PROGRESS NOTE DETAILS
patient states feeling slightly better. Abd is soft ND NT. Discussed following up with primary doctor and taking zofran. patient hasn't vomited since arrival

## 2017-10-11 NOTE — ED PROVIDER NOTE - PLAN OF CARE
rest and hydration. zofran 4mg every 8 hours for nausea. follow up with primary doctor in 1-2 days. return to the ER immediately for worsening pain, fever, vomiting, or weakness

## 2017-10-11 NOTE — ED PROVIDER NOTE - CARE PLAN
Principal Discharge DX:	Gastroenteritis  Instructions for follow-up, activity and diet:	rest and hydration. zofran 4mg every 8 hours for nausea. follow up with primary doctor in 1-2 days. return to the ER immediately for worsening pain, fever, vomiting, or weakness

## 2017-10-11 NOTE — ED PROVIDER NOTE - MEDICAL DECISION MAKING DETAILS
33 yo F with pmhx TB, Anemia, and pancreatitis BIBA presenting with nv and abdominal pain x three days. IVF/BW/UA/urinepregnancy/morphine/zofran/US/reasses

## 2017-10-11 NOTE — ED PROVIDER NOTE - ATTENDING CONTRIBUTION TO CARE
I have seen and evaluated this patient with the [facility] practice clinician.   I agree with the findings  unless other wise stated. I have amended notes where needed.  After my face to face bedside evaluation, I am noting: I had a detailed discussion with the patient and/or guardian regarding the historical points, exam findings, and any diagnostic results supporting the discharge/admit diagnosis.

## 2017-10-11 NOTE — ED ADULT NURSE NOTE - OBJECTIVE STATEMENT
31 yo female presents to the ED with complaints of N/V and abdominal pain with no po intake x3 days. Patient is A&O x3. Patient denies complains of chest pain. Lungs are clear and equal bilaterally. Patient denies any SOB. Abdomen is soft, non-distended, non tender. Abdominal pain is exacerbated by laying down and relieved when sitting up as per patient. Skin is warm and dry. VSS/ NAD. Safety measures implemented. Will continue to monitor.

## 2017-10-12 LAB
C TRACH RRNA SPEC QL NAA+PROBE: SIGNIFICANT CHANGE UP
CULTURE RESULTS: SIGNIFICANT CHANGE UP
N GONORRHOEA RRNA SPEC QL NAA+PROBE: SIGNIFICANT CHANGE UP
SPECIMEN SOURCE: SIGNIFICANT CHANGE UP
SPECIMEN SOURCE: SIGNIFICANT CHANGE UP

## 2017-10-13 NOTE — ED POST DISCHARGE NOTE - RESULT SUMMARY
Contaminated UCx, spoke with patient about results and need for repeat culture. Patient will f/u with her PMD. Mainor Turner PA-C.

## 2017-12-31 ENCOUNTER — EMERGENCY (EMERGENCY)
Facility: HOSPITAL | Age: 32
LOS: 1 days | Discharge: HOME | End: 2017-12-31
Attending: EMERGENCY MEDICINE | Admitting: EMERGENCY MEDICINE
Payer: MEDICAID

## 2017-12-31 VITALS
OXYGEN SATURATION: 100 % | TEMPERATURE: 99 F | DIASTOLIC BLOOD PRESSURE: 90 MMHG | RESPIRATION RATE: 16 BRPM | SYSTOLIC BLOOD PRESSURE: 150 MMHG | HEART RATE: 90 BPM

## 2017-12-31 VITALS
HEART RATE: 63 BPM | OXYGEN SATURATION: 100 % | SYSTOLIC BLOOD PRESSURE: 120 MMHG | RESPIRATION RATE: 17 BRPM | TEMPERATURE: 99 F | DIASTOLIC BLOOD PRESSURE: 84 MMHG

## 2017-12-31 LAB
ALBUMIN SERPL ELPH-MCNC: 5.1 G/DL — HIGH (ref 3.3–5)
ALP SERPL-CCNC: 60 U/L — SIGNIFICANT CHANGE UP (ref 40–120)
ALT FLD-CCNC: 10 U/L RC — SIGNIFICANT CHANGE UP (ref 10–45)
ANION GAP SERPL CALC-SCNC: 15 MMOL/L — SIGNIFICANT CHANGE UP (ref 5–17)
ANISOCYTOSIS BLD QL: SLIGHT — SIGNIFICANT CHANGE UP
APPEARANCE UR: CLEAR — SIGNIFICANT CHANGE UP
AST SERPL-CCNC: 13 U/L — SIGNIFICANT CHANGE UP (ref 10–40)
BACTERIA # UR AUTO: ABNORMAL /HPF
BASE EXCESS BLDV CALC-SCNC: 7 MMOL/L — HIGH (ref -2–2)
BASOPHILS # BLD AUTO: 0.1 K/UL — SIGNIFICANT CHANGE UP (ref 0–0.2)
BASOPHILS NFR BLD AUTO: 0.9 % — SIGNIFICANT CHANGE UP (ref 0–2)
BILIRUB SERPL-MCNC: 0.5 MG/DL — SIGNIFICANT CHANGE UP (ref 0.2–1.2)
BILIRUB UR-MCNC: NEGATIVE — SIGNIFICANT CHANGE UP
BUN SERPL-MCNC: 15 MG/DL — SIGNIFICANT CHANGE UP (ref 7–23)
CA-I SERPL-SCNC: 1.17 MMOL/L — SIGNIFICANT CHANGE UP (ref 1.12–1.3)
CALCIUM SERPL-MCNC: 10.6 MG/DL — HIGH (ref 8.4–10.5)
CHLORIDE BLDV-SCNC: 94 MMOL/L — LOW (ref 96–108)
CHLORIDE SERPL-SCNC: 93 MMOL/L — LOW (ref 96–108)
CO2 BLDV-SCNC: 34 MMOL/L — HIGH (ref 22–30)
CO2 SERPL-SCNC: 29 MMOL/L — SIGNIFICANT CHANGE UP (ref 22–31)
COLOR SPEC: ABNORMAL
COMMENT - URINE: SIGNIFICANT CHANGE UP
CREAT SERPL-MCNC: 0.75 MG/DL — SIGNIFICANT CHANGE UP (ref 0.5–1.3)
DIFF PNL FLD: ABNORMAL
ELLIPTOCYTES BLD QL SMEAR: SLIGHT — SIGNIFICANT CHANGE UP
EOSINOPHIL # BLD AUTO: 0 K/UL — SIGNIFICANT CHANGE UP (ref 0–0.5)
EOSINOPHIL NFR BLD AUTO: 0.2 % — SIGNIFICANT CHANGE UP (ref 0–6)
EPI CELLS # UR: SIGNIFICANT CHANGE UP /HPF
GAS PNL BLDV: 135 MMOL/L — LOW (ref 136–145)
GAS PNL BLDV: SIGNIFICANT CHANGE UP
GLUCOSE BLDV-MCNC: 107 MG/DL — HIGH (ref 70–99)
GLUCOSE SERPL-MCNC: 103 MG/DL — HIGH (ref 70–99)
GLUCOSE UR QL: NEGATIVE — SIGNIFICANT CHANGE UP
HCG SERPL-ACNC: <2 MIU/ML — LOW (ref 5–24)
HCG UR QL: NEGATIVE — SIGNIFICANT CHANGE UP
HCO3 BLDV-SCNC: 32 MMOL/L — HIGH (ref 21–29)
HCT VFR BLD CALC: 37 % — SIGNIFICANT CHANGE UP (ref 34.5–45)
HCT VFR BLDA CALC: 37 % — LOW (ref 39–50)
HGB BLD CALC-MCNC: 12 G/DL — SIGNIFICANT CHANGE UP (ref 11.5–15.5)
HGB BLD-MCNC: 12 G/DL — SIGNIFICANT CHANGE UP (ref 11.5–15.5)
HYPOCHROMIA BLD QL: SLIGHT — SIGNIFICANT CHANGE UP
KETONES UR-MCNC: ABNORMAL
LACTATE BLDV-MCNC: 2 MMOL/L — SIGNIFICANT CHANGE UP (ref 0.7–2)
LEUKOCYTE ESTERASE UR-ACNC: NEGATIVE — SIGNIFICANT CHANGE UP
LYMPHOCYTES # BLD AUTO: 3 K/UL — SIGNIFICANT CHANGE UP (ref 1–3.3)
LYMPHOCYTES # BLD AUTO: 30.5 % — SIGNIFICANT CHANGE UP (ref 13–44)
MACROCYTES BLD QL: SLIGHT — SIGNIFICANT CHANGE UP
MCHC RBC-ENTMCNC: 23.6 PG — LOW (ref 27–34)
MCHC RBC-ENTMCNC: 32.4 GM/DL — SIGNIFICANT CHANGE UP (ref 32–36)
MCV RBC AUTO: 72.9 FL — LOW (ref 80–100)
MICROCYTES BLD QL: SIGNIFICANT CHANGE UP
MONOCYTES # BLD AUTO: 1 K/UL — HIGH (ref 0–0.9)
MONOCYTES NFR BLD AUTO: 10.3 % — SIGNIFICANT CHANGE UP (ref 2–14)
NEUTROPHILS # BLD AUTO: 5.7 K/UL — SIGNIFICANT CHANGE UP (ref 1.8–7.4)
NEUTROPHILS NFR BLD AUTO: 58.1 % — SIGNIFICANT CHANGE UP (ref 43–77)
NITRITE UR-MCNC: NEGATIVE — SIGNIFICANT CHANGE UP
OVALOCYTES BLD QL SMEAR: SLIGHT — SIGNIFICANT CHANGE UP
PCO2 BLDV: 49 MMHG — SIGNIFICANT CHANGE UP (ref 35–50)
PH BLDV: 7.43 — SIGNIFICANT CHANGE UP (ref 7.35–7.45)
PH UR: 6 — SIGNIFICANT CHANGE UP (ref 5–8)
PLAT MORPH BLD: NORMAL — SIGNIFICANT CHANGE UP
PLATELET # BLD AUTO: 567 K/UL — HIGH (ref 150–400)
PO2 BLDV: 28 MMHG — SIGNIFICANT CHANGE UP (ref 25–45)
POIKILOCYTOSIS BLD QL AUTO: SLIGHT — SIGNIFICANT CHANGE UP
POLYCHROMASIA BLD QL SMEAR: SLIGHT — SIGNIFICANT CHANGE UP
POTASSIUM BLDV-SCNC: 5.6 MMOL/L — HIGH (ref 3.5–5)
POTASSIUM SERPL-MCNC: 3.7 MMOL/L — SIGNIFICANT CHANGE UP (ref 3.5–5.3)
POTASSIUM SERPL-SCNC: 3.7 MMOL/L — SIGNIFICANT CHANGE UP (ref 3.5–5.3)
PROT SERPL-MCNC: 9.1 G/DL — HIGH (ref 6–8.3)
PROT UR-MCNC: 150 MG/DL
RBC # BLD: 5.07 M/UL — SIGNIFICANT CHANGE UP (ref 3.8–5.2)
RBC # FLD: 17.4 % — HIGH (ref 10.3–14.5)
RBC BLD AUTO: ABNORMAL
RBC CASTS # UR COMP ASSIST: ABNORMAL /HPF (ref 0–2)
SAO2 % BLDV: 41 % — LOW (ref 67–88)
SCHISTOCYTES BLD QL AUTO: SLIGHT — SIGNIFICANT CHANGE UP
SODIUM SERPL-SCNC: 137 MMOL/L — SIGNIFICANT CHANGE UP (ref 135–145)
SP GR SPEC: >1.03 — HIGH (ref 1.01–1.02)
TARGETS BLD QL SMEAR: SLIGHT — SIGNIFICANT CHANGE UP
UROBILINOGEN FLD QL: NEGATIVE — SIGNIFICANT CHANGE UP
WBC # BLD: 9.7 K/UL — SIGNIFICANT CHANGE UP (ref 3.8–10.5)
WBC # FLD AUTO: 9.7 K/UL — SIGNIFICANT CHANGE UP (ref 3.8–10.5)
WBC UR QL: SIGNIFICANT CHANGE UP /HPF (ref 0–5)

## 2017-12-31 PROCEDURE — 74177 CT ABD & PELVIS W/CONTRAST: CPT

## 2017-12-31 PROCEDURE — 80053 COMPREHEN METABOLIC PANEL: CPT

## 2017-12-31 PROCEDURE — 85014 HEMATOCRIT: CPT

## 2017-12-31 PROCEDURE — 82435 ASSAY OF BLOOD CHLORIDE: CPT

## 2017-12-31 PROCEDURE — 82803 BLOOD GASES ANY COMBINATION: CPT

## 2017-12-31 PROCEDURE — 96375 TX/PRO/DX INJ NEW DRUG ADDON: CPT

## 2017-12-31 PROCEDURE — 82330 ASSAY OF CALCIUM: CPT

## 2017-12-31 PROCEDURE — 87086 URINE CULTURE/COLONY COUNT: CPT

## 2017-12-31 PROCEDURE — 85027 COMPLETE CBC AUTOMATED: CPT

## 2017-12-31 PROCEDURE — 84702 CHORIONIC GONADOTROPIN TEST: CPT

## 2017-12-31 PROCEDURE — 84295 ASSAY OF SERUM SODIUM: CPT

## 2017-12-31 PROCEDURE — 76705 ECHO EXAM OF ABDOMEN: CPT | Mod: 26,RT

## 2017-12-31 PROCEDURE — 81001 URINALYSIS AUTO W/SCOPE: CPT

## 2017-12-31 PROCEDURE — 81025 URINE PREGNANCY TEST: CPT

## 2017-12-31 PROCEDURE — 99284 EMERGENCY DEPT VISIT MOD MDM: CPT | Mod: 25

## 2017-12-31 PROCEDURE — 83690 ASSAY OF LIPASE: CPT

## 2017-12-31 PROCEDURE — 84132 ASSAY OF SERUM POTASSIUM: CPT

## 2017-12-31 PROCEDURE — 83605 ASSAY OF LACTIC ACID: CPT

## 2017-12-31 PROCEDURE — 82947 ASSAY GLUCOSE BLOOD QUANT: CPT

## 2017-12-31 PROCEDURE — 99285 EMERGENCY DEPT VISIT HI MDM: CPT

## 2017-12-31 PROCEDURE — 74177 CT ABD & PELVIS W/CONTRAST: CPT | Mod: 26

## 2017-12-31 PROCEDURE — 96374 THER/PROPH/DIAG INJ IV PUSH: CPT | Mod: XU

## 2017-12-31 PROCEDURE — 76705 ECHO EXAM OF ABDOMEN: CPT

## 2017-12-31 PROCEDURE — 96376 TX/PRO/DX INJ SAME DRUG ADON: CPT

## 2017-12-31 RX ORDER — FAMOTIDINE 10 MG/ML
1 INJECTION INTRAVENOUS
Qty: 18 | Refills: 0 | OUTPATIENT
Start: 2017-12-31 | End: 2018-01-08

## 2017-12-31 RX ORDER — ACETAMINOPHEN 500 MG
1000 TABLET ORAL ONCE
Qty: 0 | Refills: 0 | Status: COMPLETED | OUTPATIENT
Start: 2017-12-31 | End: 2017-12-31

## 2017-12-31 RX ORDER — ONDANSETRON 8 MG/1
4 TABLET, FILM COATED ORAL ONCE
Qty: 0 | Refills: 0 | Status: COMPLETED | OUTPATIENT
Start: 2017-12-31 | End: 2017-12-31

## 2017-12-31 RX ORDER — FAMOTIDINE 10 MG/ML
20 INJECTION INTRAVENOUS ONCE
Qty: 0 | Refills: 0 | Status: COMPLETED | OUTPATIENT
Start: 2017-12-31 | End: 2017-12-31

## 2017-12-31 RX ORDER — SODIUM CHLORIDE 9 MG/ML
1000 INJECTION INTRAMUSCULAR; INTRAVENOUS; SUBCUTANEOUS ONCE
Qty: 0 | Refills: 0 | Status: COMPLETED | OUTPATIENT
Start: 2017-12-31 | End: 2017-12-31

## 2017-12-31 RX ORDER — METOCLOPRAMIDE HCL 10 MG
1 TABLET ORAL
Qty: 120 | Refills: 0
Start: 2017-12-31 | End: 2018-01-29

## 2017-12-31 RX ORDER — KETOROLAC TROMETHAMINE 30 MG/ML
15 SYRINGE (ML) INJECTION ONCE
Qty: 0 | Refills: 0 | Status: DISCONTINUED | OUTPATIENT
Start: 2017-12-31 | End: 2017-12-31

## 2017-12-31 RX ORDER — METOCLOPRAMIDE HCL 10 MG
10 TABLET ORAL ONCE
Qty: 0 | Refills: 0 | Status: COMPLETED | OUTPATIENT
Start: 2017-12-31 | End: 2017-12-31

## 2017-12-31 RX ORDER — PANTOPRAZOLE SODIUM 20 MG/1
1 TABLET, DELAYED RELEASE ORAL
Qty: 30 | Refills: 0 | OUTPATIENT
Start: 2017-12-31 | End: 2018-01-29

## 2017-12-31 RX ADMIN — Medication 30 MILLILITER(S): at 21:50

## 2017-12-31 RX ADMIN — SODIUM CHLORIDE 1000 MILLILITER(S): 9 INJECTION INTRAMUSCULAR; INTRAVENOUS; SUBCUTANEOUS at 20:41

## 2017-12-31 RX ADMIN — Medication 1000 MILLIGRAM(S): at 21:50

## 2017-12-31 RX ADMIN — ONDANSETRON 4 MILLIGRAM(S): 8 TABLET, FILM COATED ORAL at 14:58

## 2017-12-31 RX ADMIN — Medication 400 MILLIGRAM(S): at 20:41

## 2017-12-31 RX ADMIN — Medication 10 MILLIGRAM(S): at 16:43

## 2017-12-31 RX ADMIN — Medication 400 MILLIGRAM(S): at 14:57

## 2017-12-31 RX ADMIN — SODIUM CHLORIDE 2000 MILLILITER(S): 9 INJECTION INTRAMUSCULAR; INTRAVENOUS; SUBCUTANEOUS at 14:58

## 2017-12-31 RX ADMIN — Medication 1000 MILLIGRAM(S): at 14:58

## 2017-12-31 RX ADMIN — Medication 15 MILLIGRAM(S): at 16:43

## 2017-12-31 RX ADMIN — FAMOTIDINE 20 MILLIGRAM(S): 10 INJECTION INTRAVENOUS at 14:57

## 2017-12-31 NOTE — ED ADULT NURSE REASSESSMENT NOTE - NS ED NURSE REASSESS COMMENT FT1
Report received from BILLY Yadav. Patient resting in bed c/o abdominal pain. MD Rausch made aware. Patient pending CT scan. VSS. Safety and comfort maintained.

## 2017-12-31 NOTE — ED PROVIDER NOTE - PROGRESS NOTE DETAILS
Attending Shalom: pt signed out to me. CT scan negative for acute pathology. abd soft on exam. no rebound or guarding. pt described some burning to abdomen. does not have a GI doctor. will give GI information. plan to d/c

## 2017-12-31 NOTE — ED ADULT NURSE NOTE - OBJECTIVE STATEMENT
32 yr old female came in with abd pain. on assessment a and o x 3 lungs clear abd soft non tender no swelling in extremities no n/v/d/ no fevers no urinary symptoms. no other complaints.

## 2017-12-31 NOTE — ED PROVIDER NOTE - MEDICAL DECISION MAKING DETAILS
Abdominal pain, nausea, vomiting.  Will treat symptoms, give IVF.  Will get RUQ US to evaluate gallbladder.  will reassess.

## 2017-12-31 NOTE — ED PROVIDER NOTE - OBJECTIVE STATEMENT
Attendinyo female presents with diffuse abdominal pain, nausea, and vomiting for a few days.  Nothing seems to make symptoms better or worse.  no diarrhea.  No fever.  No chills.

## 2018-01-01 LAB
CULTURE RESULTS: NO GROWTH — SIGNIFICANT CHANGE UP
SPECIMEN SOURCE: SIGNIFICANT CHANGE UP

## 2018-05-02 PROBLEM — Z00.00 ENCOUNTER FOR PREVENTIVE HEALTH EXAMINATION: Status: ACTIVE | Noted: 2018-05-02

## 2018-05-04 ENCOUNTER — APPOINTMENT (OUTPATIENT)
Dept: ULTRASOUND IMAGING | Facility: CLINIC | Age: 33
End: 2018-05-04
Payer: MEDICAID

## 2018-05-04 ENCOUNTER — OUTPATIENT (OUTPATIENT)
Dept: OUTPATIENT SERVICES | Facility: HOSPITAL | Age: 33
LOS: 1 days | End: 2018-05-04
Payer: MEDICAID

## 2018-05-04 DIAGNOSIS — Z00.8 ENCOUNTER FOR OTHER GENERAL EXAMINATION: ICD-10-CM

## 2018-05-04 PROCEDURE — 76856 US EXAM PELVIC COMPLETE: CPT | Mod: 26

## 2018-05-04 PROCEDURE — 76856 US EXAM PELVIC COMPLETE: CPT

## 2018-05-04 PROCEDURE — 76830 TRANSVAGINAL US NON-OB: CPT

## 2018-05-04 PROCEDURE — 76830 TRANSVAGINAL US NON-OB: CPT | Mod: 26

## 2018-05-22 ENCOUNTER — INPATIENT (INPATIENT)
Facility: HOSPITAL | Age: 33
LOS: 3 days | Discharge: HOME | DRG: 384 | End: 2018-05-26
Attending: INTERNAL MEDICINE | Admitting: INTERNAL MEDICINE
Payer: MEDICAID

## 2018-05-22 VITALS
TEMPERATURE: 99 F | SYSTOLIC BLOOD PRESSURE: 131 MMHG | RESPIRATION RATE: 22 BRPM | HEART RATE: 83 BPM | OXYGEN SATURATION: 98 % | DIASTOLIC BLOOD PRESSURE: 85 MMHG

## 2018-05-22 PROCEDURE — 99285 EMERGENCY DEPT VISIT HI MDM: CPT | Mod: 25

## 2018-05-22 RX ORDER — SODIUM CHLORIDE 9 MG/ML
1000 INJECTION INTRAMUSCULAR; INTRAVENOUS; SUBCUTANEOUS
Qty: 0 | Refills: 0 | Status: DISCONTINUED | OUTPATIENT
Start: 2018-05-22 | End: 2018-05-26

## 2018-05-22 RX ORDER — SODIUM CHLORIDE 9 MG/ML
1000 INJECTION INTRAMUSCULAR; INTRAVENOUS; SUBCUTANEOUS ONCE
Qty: 0 | Refills: 0 | Status: COMPLETED | OUTPATIENT
Start: 2018-05-22 | End: 2018-05-22

## 2018-05-22 RX ORDER — SODIUM CHLORIDE 9 MG/ML
3 INJECTION INTRAMUSCULAR; INTRAVENOUS; SUBCUTANEOUS ONCE
Qty: 0 | Refills: 0 | Status: COMPLETED | OUTPATIENT
Start: 2018-05-22 | End: 2018-05-22

## 2018-05-22 RX ORDER — PANTOPRAZOLE SODIUM 20 MG/1
40 TABLET, DELAYED RELEASE ORAL ONCE
Qty: 0 | Refills: 0 | Status: COMPLETED | OUTPATIENT
Start: 2018-05-22 | End: 2018-05-22

## 2018-05-22 RX ORDER — MORPHINE SULFATE 50 MG/1
4 CAPSULE, EXTENDED RELEASE ORAL ONCE
Qty: 0 | Refills: 0 | Status: DISCONTINUED | OUTPATIENT
Start: 2018-05-22 | End: 2018-05-22

## 2018-05-22 RX ORDER — ONDANSETRON 8 MG/1
4 TABLET, FILM COATED ORAL ONCE
Qty: 0 | Refills: 0 | Status: COMPLETED | OUTPATIENT
Start: 2018-05-22 | End: 2018-05-22

## 2018-05-22 RX ADMIN — ONDANSETRON 4 MILLIGRAM(S): 8 TABLET, FILM COATED ORAL at 23:50

## 2018-05-22 RX ADMIN — SODIUM CHLORIDE 1000 MILLILITER(S): 9 INJECTION INTRAMUSCULAR; INTRAVENOUS; SUBCUTANEOUS at 23:45

## 2018-05-22 RX ADMIN — PANTOPRAZOLE SODIUM 40 MILLIGRAM(S): 20 TABLET, DELAYED RELEASE ORAL at 23:51

## 2018-05-22 RX ADMIN — MORPHINE SULFATE 4 MILLIGRAM(S): 50 CAPSULE, EXTENDED RELEASE ORAL at 23:52

## 2018-05-22 RX ADMIN — SODIUM CHLORIDE 3 MILLILITER(S): 9 INJECTION INTRAMUSCULAR; INTRAVENOUS; SUBCUTANEOUS at 23:45

## 2018-05-22 NOTE — ED PROVIDER NOTE - PROGRESS NOTE DETAILS
patient still with intractable pain and unable to tolerate PO. Discussed with PMD dr Willard who will admit the patient.  Bernadette Dunn, PGY3

## 2018-05-22 NOTE — ED ADULT NURSE NOTE - OBJECTIVE STATEMENT
34 YO female with PMH anemia, chronic pancreatitis, via EMS presenting with complaints of abdominal pain, nausea, and vomiting. Pt reports worsening 10/10 abdominal pain, described as diffuse through the abdomen, and radiating to the back, described as aching, and burning, worsened with palpation, relieved with heat and medication. Pt denies chest pain, shortness of breath, visual disturbances, numbness/tingling, fever, chills, diaphoresis, headache, diarrhea, or urinary symptoms.   Pt Axox4, gross neuro intact, PERRL 3 mm. Lungs clear throughout bilateral. S1S2 heard. Abdomen soft, non-tender, non-distended. Skin warm, dry, and intact. Safety and comfort measures maintained

## 2018-05-22 NOTE — ED PROVIDER NOTE - OBJECTIVE STATEMENT
Nausea started 2 weeks ago, associated with epigastric pain radiating to back.  Associated with vomiting, was vomiting about once per hour but now hasn't been eating so not vomited for 24 hours.  Says has been constipated for past few weeks, last BM 3 days ago, liquid.  No fever, +chills.  Has been to doctor multiple times this week for "gastritis problems."  Had US about 2 weeks ago, negative.  Is scheduled for endoscopy tomorrow.  Was prescribed anxiety medication, and taking tylenol and motrin, pepcid, and zofran.  Was given anxiety medication as father recently passed away.  Denies similar previous symptoms.  Says has had a lot of weight loss, about 20-30 pounds Nausea started 2 weeks ago, associated with epigastric pain radiating to back.  Associated with vomiting, was vomiting about once per hour but now hasn't been eating so not vomited for 24 hours.  Says has been constipated for past few weeks, last BM 3 days ago, liquid.  No fever, +chills.  Has been to doctor multiple times this week for "gastritis problems."  Had US about 2 weeks ago, negative.  Is scheduled for endoscopy tomorrow.  Taking tylenol, was told to stop motrin, taking pepcid, and zofran.  Was given anxiety medication as father recently passed away.  Denies similar previous symptoms.  Says has had a lot of recent weight loss, about 20-30 pounds

## 2018-05-22 NOTE — ED PROVIDER NOTE - ATTENDING CONTRIBUTION TO CARE
****ATTENDING**** 32yo f hx TB in past, anxiety presents with nausea and vomiting x 2 weeks. States she has constant n/v nbnb for 2 weeks, saw Dr. Willard and Dr. Anaya and scheduled to get endoscopy. Now patient reports diffuse abd pain, worse on the right side. No fever or chills. Decreased flatulence. Unable to tolerate po. Denies dysuria, vaginal discharge. sexually active uses condoms. No recent travel or sick contact. No abd surgery.   On exam, Patient is awake,alert,oriented x 3.  Patient's chest is clear to ausculation, +s1s2. Abdomen is soft nd/+RUQ and RLQ tenderness +BS. Extremity with no swelling or calf tenderness.   Check Labs, CT A/P to eval for gallstone pancreatitis, sbo. Denies alcohol intake.

## 2018-05-22 NOTE — ED PROVIDER NOTE - CARE PLAN
Principal Discharge DX:	Abdominal pain  Secondary Diagnosis:	Vomiting  Secondary Diagnosis:	Hypokalemia

## 2018-05-23 DIAGNOSIS — R10.13 EPIGASTRIC PAIN: ICD-10-CM

## 2018-05-23 DIAGNOSIS — F39 UNSPECIFIED MOOD [AFFECTIVE] DISORDER: ICD-10-CM

## 2018-05-23 DIAGNOSIS — E87.6 HYPOKALEMIA: ICD-10-CM

## 2018-05-23 DIAGNOSIS — D50.9 IRON DEFICIENCY ANEMIA, UNSPECIFIED: ICD-10-CM

## 2018-05-23 DIAGNOSIS — G43.A1 CYCLICAL VOMITING, IN MIGRAINE, INTRACTABLE: ICD-10-CM

## 2018-05-23 DIAGNOSIS — R11.10 VOMITING, UNSPECIFIED: ICD-10-CM

## 2018-05-23 DIAGNOSIS — R10.9 UNSPECIFIED ABDOMINAL PAIN: ICD-10-CM

## 2018-05-23 LAB
ALBUMIN SERPL ELPH-MCNC: 4.9 G/DL — SIGNIFICANT CHANGE UP (ref 3.3–5)
ALP SERPL-CCNC: 57 U/L — SIGNIFICANT CHANGE UP (ref 40–120)
ALT FLD-CCNC: 15 U/L — SIGNIFICANT CHANGE UP (ref 10–45)
ANION GAP SERPL CALC-SCNC: 10 MMOL/L — SIGNIFICANT CHANGE UP (ref 5–17)
ANION GAP SERPL CALC-SCNC: 17 MMOL/L — SIGNIFICANT CHANGE UP (ref 5–17)
ANISOCYTOSIS BLD QL: SLIGHT — SIGNIFICANT CHANGE UP
APPEARANCE UR: CLEAR — SIGNIFICANT CHANGE UP
AST SERPL-CCNC: 23 U/L — SIGNIFICANT CHANGE UP (ref 10–40)
BASOPHILS # BLD AUTO: 0.1 K/UL — SIGNIFICANT CHANGE UP (ref 0–0.2)
BILIRUB SERPL-MCNC: 0.5 MG/DL — SIGNIFICANT CHANGE UP (ref 0.2–1.2)
BILIRUB UR-MCNC: NEGATIVE — SIGNIFICANT CHANGE UP
BUN SERPL-MCNC: 7 MG/DL — SIGNIFICANT CHANGE UP (ref 7–23)
BUN SERPL-MCNC: 9 MG/DL — SIGNIFICANT CHANGE UP (ref 7–23)
CALCIUM SERPL-MCNC: 10.2 MG/DL — SIGNIFICANT CHANGE UP (ref 8.4–10.5)
CALCIUM SERPL-MCNC: 8.6 MG/DL — SIGNIFICANT CHANGE UP (ref 8.4–10.5)
CHLORIDE SERPL-SCNC: 104 MMOL/L — SIGNIFICANT CHANGE UP (ref 96–108)
CHLORIDE SERPL-SCNC: 94 MMOL/L — LOW (ref 96–108)
CO2 SERPL-SCNC: 25 MMOL/L — SIGNIFICANT CHANGE UP (ref 22–31)
CO2 SERPL-SCNC: 27 MMOL/L — SIGNIFICANT CHANGE UP (ref 22–31)
COLOR SPEC: YELLOW — SIGNIFICANT CHANGE UP
COMMENT - URINE: SIGNIFICANT CHANGE UP
CREAT SERPL-MCNC: 0.79 MG/DL — SIGNIFICANT CHANGE UP (ref 0.5–1.3)
CREAT SERPL-MCNC: 0.83 MG/DL — SIGNIFICANT CHANGE UP (ref 0.5–1.3)
DIFF PNL FLD: ABNORMAL
ELLIPTOCYTES BLD QL SMEAR: SLIGHT — SIGNIFICANT CHANGE UP
EOSINOPHIL # BLD AUTO: 0 K/UL — SIGNIFICANT CHANGE UP (ref 0–0.5)
EPI CELLS # UR: SIGNIFICANT CHANGE UP /HPF
GLUCOSE SERPL-MCNC: 102 MG/DL — HIGH (ref 70–99)
GLUCOSE SERPL-MCNC: 82 MG/DL — SIGNIFICANT CHANGE UP (ref 70–99)
GLUCOSE UR QL: NEGATIVE — SIGNIFICANT CHANGE UP
HCG SERPL-ACNC: <2 MIU/ML — LOW (ref 5–24)
HCT VFR BLD CALC: 36.2 % — SIGNIFICANT CHANGE UP (ref 34.5–45)
HGB BLD-MCNC: 11.3 G/DL — LOW (ref 11.5–15.5)
HYPOCHROMIA BLD QL: SLIGHT — SIGNIFICANT CHANGE UP
KETONES UR-MCNC: ABNORMAL
LEUKOCYTE ESTERASE UR-ACNC: NEGATIVE — SIGNIFICANT CHANGE UP
LG PLATELETS BLD QL AUTO: SLIGHT — SIGNIFICANT CHANGE UP
LIDOCAIN IGE QN: 28 U/L — SIGNIFICANT CHANGE UP (ref 7–60)
LYMPHOCYTES # BLD AUTO: 3.4 K/UL — HIGH (ref 1–3.3)
LYMPHOCYTES # BLD AUTO: 35 % — SIGNIFICANT CHANGE UP (ref 13–44)
MACROCYTES BLD QL: SLIGHT — SIGNIFICANT CHANGE UP
MCHC RBC-ENTMCNC: 22.4 PG — LOW (ref 27–34)
MCHC RBC-ENTMCNC: 31.2 GM/DL — LOW (ref 32–36)
MCV RBC AUTO: 71.8 FL — LOW (ref 80–100)
MICROCYTES BLD QL: SIGNIFICANT CHANGE UP
MONOCYTES # BLD AUTO: 1 K/UL — HIGH (ref 0–0.9)
MONOCYTES NFR BLD AUTO: 7 % — SIGNIFICANT CHANGE UP (ref 2–14)
NEUTROPHILS # BLD AUTO: 5 K/UL — SIGNIFICANT CHANGE UP (ref 1.8–7.4)
NEUTROPHILS NFR BLD AUTO: 58 % — SIGNIFICANT CHANGE UP (ref 43–77)
NITRITE UR-MCNC: NEGATIVE — SIGNIFICANT CHANGE UP
PH UR: 7 — SIGNIFICANT CHANGE UP (ref 5–8)
PLAT MORPH BLD: NORMAL — SIGNIFICANT CHANGE UP
PLATELET # BLD AUTO: 446 K/UL — HIGH (ref 150–400)
POTASSIUM SERPL-MCNC: 2.6 MMOL/L — CRITICAL LOW (ref 3.5–5.3)
POTASSIUM SERPL-MCNC: 3.3 MMOL/L — LOW (ref 3.5–5.3)
POTASSIUM SERPL-SCNC: 2.6 MMOL/L — CRITICAL LOW (ref 3.5–5.3)
POTASSIUM SERPL-SCNC: 3.3 MMOL/L — LOW (ref 3.5–5.3)
PROT SERPL-MCNC: 8.4 G/DL — HIGH (ref 6–8.3)
PROT UR-MCNC: 150 MG/DL
RBC # BLD: 5.04 M/UL — SIGNIFICANT CHANGE UP (ref 3.8–5.2)
RBC # FLD: 18.6 % — HIGH (ref 10.3–14.5)
RBC BLD AUTO: ABNORMAL
RBC CASTS # UR COMP ASSIST: ABNORMAL /HPF (ref 0–2)
SCHISTOCYTES BLD QL AUTO: SLIGHT — SIGNIFICANT CHANGE UP
SODIUM SERPL-SCNC: 138 MMOL/L — SIGNIFICANT CHANGE UP (ref 135–145)
SODIUM SERPL-SCNC: 139 MMOL/L — SIGNIFICANT CHANGE UP (ref 135–145)
SP GR SPEC: >1.03 — HIGH (ref 1.01–1.02)
TARGETS BLD QL SMEAR: SLIGHT — SIGNIFICANT CHANGE UP
UROBILINOGEN FLD QL: NEGATIVE — SIGNIFICANT CHANGE UP
WBC # BLD: 9.5 K/UL — SIGNIFICANT CHANGE UP (ref 3.8–10.5)
WBC # FLD AUTO: 9.5 K/UL — SIGNIFICANT CHANGE UP (ref 3.8–10.5)
WBC UR QL: SIGNIFICANT CHANGE UP /HPF (ref 0–5)

## 2018-05-23 PROCEDURE — 99223 1ST HOSP IP/OBS HIGH 75: CPT

## 2018-05-23 PROCEDURE — 74177 CT ABD & PELVIS W/CONTRAST: CPT | Mod: 26

## 2018-05-23 RX ORDER — POTASSIUM CHLORIDE 20 MEQ
10 PACKET (EA) ORAL
Qty: 0 | Refills: 0 | Status: COMPLETED | OUTPATIENT
Start: 2018-05-23 | End: 2018-05-23

## 2018-05-23 RX ORDER — AMITRIPTYLINE HCL 25 MG
10 TABLET ORAL AT BEDTIME
Qty: 0 | Refills: 0 | Status: DISCONTINUED | OUTPATIENT
Start: 2018-05-23 | End: 2018-05-26

## 2018-05-23 RX ORDER — MORPHINE SULFATE 50 MG/1
2 CAPSULE, EXTENDED RELEASE ORAL EVERY 4 HOURS
Qty: 0 | Refills: 0 | Status: DISCONTINUED | OUTPATIENT
Start: 2018-05-23 | End: 2018-05-26

## 2018-05-23 RX ORDER — MORPHINE SULFATE 50 MG/1
4 CAPSULE, EXTENDED RELEASE ORAL ONCE
Qty: 0 | Refills: 0 | Status: DISCONTINUED | OUTPATIENT
Start: 2018-05-23 | End: 2018-05-23

## 2018-05-23 RX ORDER — POTASSIUM CHLORIDE 20 MEQ
10 PACKET (EA) ORAL ONCE
Qty: 0 | Refills: 0 | Status: COMPLETED | OUTPATIENT
Start: 2018-05-23 | End: 2018-05-23

## 2018-05-23 RX ORDER — CLONAZEPAM 1 MG
0.5 TABLET ORAL DAILY
Qty: 0 | Refills: 0 | Status: DISCONTINUED | OUTPATIENT
Start: 2018-05-23 | End: 2018-05-26

## 2018-05-23 RX ORDER — PANTOPRAZOLE SODIUM 20 MG/1
40 TABLET, DELAYED RELEASE ORAL DAILY
Qty: 0 | Refills: 0 | Status: DISCONTINUED | OUTPATIENT
Start: 2018-05-23 | End: 2018-05-24

## 2018-05-23 RX ORDER — ONDANSETRON 8 MG/1
4 TABLET, FILM COATED ORAL EVERY 6 HOURS
Qty: 0 | Refills: 0 | Status: DISCONTINUED | OUTPATIENT
Start: 2018-05-23 | End: 2018-05-26

## 2018-05-23 RX ORDER — POTASSIUM CHLORIDE 20 MEQ
40 PACKET (EA) ORAL ONCE
Qty: 0 | Refills: 0 | Status: COMPLETED | OUTPATIENT
Start: 2018-05-23 | End: 2018-05-23

## 2018-05-23 RX ORDER — SODIUM CHLORIDE 9 MG/ML
2000 INJECTION, SOLUTION INTRAVENOUS ONCE
Qty: 0 | Refills: 0 | Status: COMPLETED | OUTPATIENT
Start: 2018-05-23 | End: 2018-05-23

## 2018-05-23 RX ORDER — INFLUENZA VIRUS VACCINE 15; 15; 15; 15 UG/.5ML; UG/.5ML; UG/.5ML; UG/.5ML
0.5 SUSPENSION INTRAMUSCULAR ONCE
Qty: 0 | Refills: 0 | Status: COMPLETED | OUTPATIENT
Start: 2018-05-23 | End: 2018-05-23

## 2018-05-23 RX ADMIN — SODIUM CHLORIDE 2000 MILLILITER(S): 9 INJECTION, SOLUTION INTRAVENOUS at 00:40

## 2018-05-23 RX ADMIN — MORPHINE SULFATE 2 MILLIGRAM(S): 50 CAPSULE, EXTENDED RELEASE ORAL at 23:41

## 2018-05-23 RX ADMIN — MORPHINE SULFATE 4 MILLIGRAM(S): 50 CAPSULE, EXTENDED RELEASE ORAL at 00:22

## 2018-05-23 RX ADMIN — ONDANSETRON 4 MILLIGRAM(S): 8 TABLET, FILM COATED ORAL at 18:07

## 2018-05-23 RX ADMIN — MORPHINE SULFATE 4 MILLIGRAM(S): 50 CAPSULE, EXTENDED RELEASE ORAL at 08:57

## 2018-05-23 RX ADMIN — Medication 100 MILLIEQUIVALENT(S): at 08:57

## 2018-05-23 RX ADMIN — MORPHINE SULFATE 2 MILLIGRAM(S): 50 CAPSULE, EXTENDED RELEASE ORAL at 14:22

## 2018-05-23 RX ADMIN — Medication 10 MILLIGRAM(S): at 21:07

## 2018-05-23 RX ADMIN — MORPHINE SULFATE 2 MILLIGRAM(S): 50 CAPSULE, EXTENDED RELEASE ORAL at 23:32

## 2018-05-23 RX ADMIN — MORPHINE SULFATE 2 MILLIGRAM(S): 50 CAPSULE, EXTENDED RELEASE ORAL at 20:00

## 2018-05-23 RX ADMIN — Medication 40 MILLIEQUIVALENT(S): at 01:37

## 2018-05-23 RX ADMIN — SODIUM CHLORIDE 125 MILLILITER(S): 9 INJECTION INTRAMUSCULAR; INTRAVENOUS; SUBCUTANEOUS at 08:15

## 2018-05-23 RX ADMIN — Medication 100 MILLIEQUIVALENT(S): at 10:28

## 2018-05-23 RX ADMIN — SODIUM CHLORIDE 125 MILLILITER(S): 9 INJECTION INTRAMUSCULAR; INTRAVENOUS; SUBCUTANEOUS at 19:32

## 2018-05-23 RX ADMIN — MORPHINE SULFATE 2 MILLIGRAM(S): 50 CAPSULE, EXTENDED RELEASE ORAL at 19:32

## 2018-05-23 RX ADMIN — Medication 0.5 MILLIGRAM(S): at 14:24

## 2018-05-23 RX ADMIN — ONDANSETRON 4 MILLIGRAM(S): 8 TABLET, FILM COATED ORAL at 23:07

## 2018-05-23 RX ADMIN — Medication 100 MILLIEQUIVALENT(S): at 11:59

## 2018-05-23 RX ADMIN — MORPHINE SULFATE 4 MILLIGRAM(S): 50 CAPSULE, EXTENDED RELEASE ORAL at 09:13

## 2018-05-23 RX ADMIN — MORPHINE SULFATE 4 MILLIGRAM(S): 50 CAPSULE, EXTENDED RELEASE ORAL at 05:21

## 2018-05-23 RX ADMIN — SODIUM CHLORIDE 125 MILLILITER(S): 9 INJECTION INTRAMUSCULAR; INTRAVENOUS; SUBCUTANEOUS at 04:52

## 2018-05-23 RX ADMIN — MORPHINE SULFATE 4 MILLIGRAM(S): 50 CAPSULE, EXTENDED RELEASE ORAL at 04:51

## 2018-05-23 NOTE — ED ADULT NURSE REASSESSMENT NOTE - NS ED NURSE REASSESS COMMENT FT1
Received report from Jose Armando CERVANTES. Pt. A&Ox3, presents to ED c/o of ABD pain and N/V. Pt reports has had N/V for 72 hrs, reports unable to keep anything down and has not had a BM in a couple of days due to decreased PO intake. Pt. rates ABD pain and back pain at this time a 6/10. ABD appears non-distended and soft upon palpation. Pt. received potassium as per MD order for low potassium level. Repeat potassium drawn and sent to lab as per MD order. EKG not formally done - tech at bedside performing one currently. Pt. placed on CM- NSR to the 80s. Pt. awaiting bed assignment. Safety and comfort provided. Received report from Jose Armando CERVANTES. Pt. A&Ox3, presents to ED c/o of ABD pain and N/V. Pt reports has had N/V for 72 hrs, reports unable to keep anything down and has not had a BM in a couple of days due to decreased PO intake. Pt. rates ABD pain and back pain at this time a 6/10. ABD appears non-distended and soft upon palpation. Pt. received potassium as per MD order for low potassium level. Repeat potassium drawn and sent to lab as per MD order. EKG not formally done - tech at bedside performing one currently. Pt. placed on CM- NSR to the 80s. LMP ~3 weeks ago. Pt. awaiting bed assignment. Safety and comfort provided.

## 2018-05-23 NOTE — H&P ADULT - HISTORY OF PRESENT ILLNESS
32yo Female with PMH of TB as a child, chronic pancreatitis, anemia (microcytic), current smoker, presents from home with severe abdominal pain, and vomiting. The patient states that she's had milder issues with her bowels in the past, but since her father passed away in late January, her levels of anxiety and stomach issues worsened. Over the last month she said she lost 20 lbs of weight, unable to tolerate anything besides some liquids, has not had a BM in 2.5 weeks (not eating solids). Because of the extensive vomiting, the patient states that she's developed severe abdominal pain that radiates to her back. Her PMD recently who started her on amitriptyline and clonazepam, and she was due to an outpatient EGD today. In the ED, vitals were: T 98.9, HR 83, /85, RR 22, 98% RA. She was given 3 liter of IVF (LR), morphine 4mg IV x3, KCl IV and PO supplementation, zofran 4mg IV, protonix 40mg IV.  CT abd was normal Admitted to medicine for further care.

## 2018-05-23 NOTE — H&P ADULT - ASSESSMENT
34yo Female with PMH of TB as a child, chronic pancreatitis, anemia (microcytic), current smoker, presents from home with severe abdominal pain, and vomiting.

## 2018-05-23 NOTE — H&P ADULT - PROBLEM SELECTOR PLAN 3
Low K on admission due to low PO intake  s/p IV and PO supplementation  Check BMP in AM - supplement as needed

## 2018-05-23 NOTE — H&P ADULT - NSHPLABSRESULTS_GEN_ALL_CORE
Labs reviewed: No leukocytosis, microcytic anemia Hg 11.3 represents hemoconcentrated sample (MCV 71.8, RDW elevated 18.6).     CT Abd with IV contrast personally reviewed: No acute findings

## 2018-05-23 NOTE — H&P ADULT - NSHPSOCIALHISTORY_GEN_ALL_CORE
Not , has 4 children (older is 15 yo). Mother helps with babysitting. Mother does not live with them.   Current Smoker (not ready to quit) - 0.25 ppd for 8 years.   Denies alcohol use.   Drug use: admits to using marijuana.

## 2018-05-23 NOTE — H&P ADULT - PROBLEM SELECTOR PLAN 1
Unclear etiology. She did state that she uses marijuana - consider cannabinoid hyperemesis syndrome vs worsening GI symptoms due ot her father's passing recently.   GI consult - needs inpatient EGD.   Zofran IV for nausea.   PPI IV  Diet: clears for now.  IV hydration ordered - NS at 125 cc/hr

## 2018-05-23 NOTE — ED ADULT NURSE REASSESSMENT NOTE - NS ED NURSE REASSESS COMMENT FT1
Patient reported 10/10 abdominal pain after receiving first dose of IV morphine. Pt received second dosage of 4mg IV morphine, pt now reporting 8/10 pain with movement and 6/10 pain with rest. Pt provided hot packets and blankets.

## 2018-05-24 ENCOUNTER — TRANSCRIPTION ENCOUNTER (OUTPATIENT)
Age: 33
End: 2018-05-24

## 2018-05-24 ENCOUNTER — RESULT REVIEW (OUTPATIENT)
Age: 33
End: 2018-05-24

## 2018-05-24 LAB
AMPHET UR-MCNC: NEGATIVE — SIGNIFICANT CHANGE UP
ANION GAP SERPL CALC-SCNC: 6 MMOL/L — SIGNIFICANT CHANGE UP (ref 5–17)
ANISOCYTOSIS BLD QL: SIGNIFICANT CHANGE UP
BARBITURATES UR SCN-MCNC: NEGATIVE — SIGNIFICANT CHANGE UP
BASOPHILS # BLD AUTO: 0 K/UL — SIGNIFICANT CHANGE UP (ref 0–0.2)
BASOPHILS NFR BLD AUTO: 0 % — SIGNIFICANT CHANGE UP (ref 0–2)
BENZODIAZ UR-MCNC: NEGATIVE — SIGNIFICANT CHANGE UP
BLD GP AB SCN SERPL QL: NEGATIVE — SIGNIFICANT CHANGE UP
BUN SERPL-MCNC: 4 MG/DL — LOW (ref 7–23)
CALCIUM SERPL-MCNC: 8.3 MG/DL — LOW (ref 8.4–10.5)
CHLORIDE SERPL-SCNC: 107 MMOL/L — SIGNIFICANT CHANGE UP (ref 96–108)
CO2 SERPL-SCNC: 26 MMOL/L — SIGNIFICANT CHANGE UP (ref 22–31)
COCAINE METAB.OTHER UR-MCNC: NEGATIVE — SIGNIFICANT CHANGE UP
CREAT SERPL-MCNC: 0.73 MG/DL — SIGNIFICANT CHANGE UP (ref 0.5–1.3)
ELLIPTOCYTES BLD QL SMEAR: SLIGHT — SIGNIFICANT CHANGE UP
EOSINOPHIL # BLD AUTO: 0.11 K/UL — SIGNIFICANT CHANGE UP (ref 0–0.5)
EOSINOPHIL NFR BLD AUTO: 1.7 % — SIGNIFICANT CHANGE UP (ref 0–6)
FERRITIN SERPL-MCNC: 10 NG/ML — LOW (ref 15–150)
GIANT PLATELETS BLD QL SMEAR: PRESENT — SIGNIFICANT CHANGE UP
GLUCOSE SERPL-MCNC: 82 MG/DL — SIGNIFICANT CHANGE UP (ref 70–99)
HCT VFR BLD CALC: 26.4 % — LOW (ref 34.5–45)
HCT VFR BLD CALC: 30.5 % — LOW (ref 34.5–45)
HGB BLD-MCNC: 8.2 G/DL — LOW (ref 11.5–15.5)
HGB BLD-MCNC: 9.5 G/DL — LOW (ref 11.5–15.5)
HIV 1+2 AB+HIV1 P24 AG SERPL QL IA: SIGNIFICANT CHANGE UP
HYPOCHROMIA BLD QL: SIGNIFICANT CHANGE UP
IRON SATN MFR SERPL: 29 % — SIGNIFICANT CHANGE UP (ref 14–50)
IRON SATN MFR SERPL: 66 UG/DL — SIGNIFICANT CHANGE UP (ref 30–160)
LYMPHOCYTES # BLD AUTO: 3.65 K/UL — HIGH (ref 1–3.3)
LYMPHOCYTES # BLD AUTO: 55.7 % — HIGH (ref 13–44)
MACROCYTES BLD QL: SLIGHT — SIGNIFICANT CHANGE UP
MAGNESIUM SERPL-MCNC: 1.6 MG/DL — SIGNIFICANT CHANGE UP (ref 1.6–2.6)
MANUAL SMEAR VERIFICATION: SIGNIFICANT CHANGE UP
MCHC RBC-ENTMCNC: 21.9 PG — LOW (ref 27–34)
MCHC RBC-ENTMCNC: 22.9 PG — LOW (ref 27–34)
MCHC RBC-ENTMCNC: 31.1 GM/DL — LOW (ref 32–36)
MCHC RBC-ENTMCNC: 31.2 GM/DL — LOW (ref 32–36)
MCV RBC AUTO: 70.4 FL — LOW (ref 80–100)
MCV RBC AUTO: 73.3 FL — LOW (ref 80–100)
METHADONE UR-MCNC: NEGATIVE — SIGNIFICANT CHANGE UP
MICROCYTES BLD QL: SIGNIFICANT CHANGE UP
MONOCYTES # BLD AUTO: 0.63 K/UL — SIGNIFICANT CHANGE UP (ref 0–0.9)
MONOCYTES NFR BLD AUTO: 9.6 % — SIGNIFICANT CHANGE UP (ref 2–14)
NEUTROPHILS # BLD AUTO: 2.05 K/UL — SIGNIFICANT CHANGE UP (ref 1.8–7.4)
NEUTROPHILS NFR BLD AUTO: 31.3 % — LOW (ref 43–77)
OPIATES UR-MCNC: POSITIVE
OXYCODONE UR-MCNC: NEGATIVE — SIGNIFICANT CHANGE UP
PCP SPEC-MCNC: SIGNIFICANT CHANGE UP
PCP UR-MCNC: NEGATIVE — SIGNIFICANT CHANGE UP
PHOSPHATE SERPL-MCNC: 2.3 MG/DL — LOW (ref 2.5–4.5)
PLAT MORPH BLD: NORMAL — SIGNIFICANT CHANGE UP
PLATELET # BLD AUTO: 320 K/UL — SIGNIFICANT CHANGE UP (ref 150–400)
PLATELET # BLD AUTO: 355 K/UL — SIGNIFICANT CHANGE UP (ref 150–400)
POIKILOCYTOSIS BLD QL AUTO: SLIGHT — SIGNIFICANT CHANGE UP
POTASSIUM SERPL-MCNC: 3.4 MMOL/L — LOW (ref 3.5–5.3)
POTASSIUM SERPL-SCNC: 3.4 MMOL/L — LOW (ref 3.5–5.3)
RBC # BLD: 3.75 M/UL — LOW (ref 3.8–5.2)
RBC # BLD: 4.16 M/UL — SIGNIFICANT CHANGE UP (ref 3.8–5.2)
RBC # FLD: 18.7 % — HIGH (ref 10.3–14.5)
RBC # FLD: 19.3 % — HIGH (ref 10.3–14.5)
RBC BLD AUTO: ABNORMAL
RH IG SCN BLD-IMP: POSITIVE — SIGNIFICANT CHANGE UP
SMUDGE CELLS # BLD: PRESENT — SIGNIFICANT CHANGE UP
SODIUM SERPL-SCNC: 139 MMOL/L — SIGNIFICANT CHANGE UP (ref 135–145)
THC UR QL: POSITIVE
TIBC SERPL-MCNC: 224 UG/DL — SIGNIFICANT CHANGE UP (ref 220–430)
TSH SERPL-MCNC: 0.6 UIU/ML — SIGNIFICANT CHANGE UP (ref 0.27–4.2)
UIBC SERPL-MCNC: 158 UG/DL — SIGNIFICANT CHANGE UP (ref 110–370)
VARIANT LYMPHS # BLD: 1.7 % — SIGNIFICANT CHANGE UP (ref 0–6)
WBC # BLD: 6.56 K/UL — SIGNIFICANT CHANGE UP (ref 3.8–10.5)
WBC # BLD: 7.9 K/UL — SIGNIFICANT CHANGE UP (ref 3.8–10.5)
WBC # FLD AUTO: 6.56 K/UL — SIGNIFICANT CHANGE UP (ref 3.8–10.5)
WBC # FLD AUTO: 7.9 K/UL — SIGNIFICANT CHANGE UP (ref 3.8–10.5)

## 2018-05-24 PROCEDURE — 88305 TISSUE EXAM BY PATHOLOGIST: CPT | Mod: 26

## 2018-05-24 PROCEDURE — 88312 SPECIAL STAINS GROUP 1: CPT | Mod: 26

## 2018-05-24 RX ORDER — PANTOPRAZOLE SODIUM 20 MG/1
40 TABLET, DELAYED RELEASE ORAL EVERY 12 HOURS
Qty: 0 | Refills: 0 | Status: DISCONTINUED | OUTPATIENT
Start: 2018-05-24 | End: 2018-05-26

## 2018-05-24 RX ORDER — SODIUM,POTASSIUM PHOSPHATES 278-250MG
1 POWDER IN PACKET (EA) ORAL EVERY 4 HOURS
Qty: 0 | Refills: 0 | Status: COMPLETED | OUTPATIENT
Start: 2018-05-24 | End: 2018-05-24

## 2018-05-24 RX ORDER — MAGNESIUM OXIDE 400 MG ORAL TABLET 241.3 MG
400 TABLET ORAL EVERY 4 HOURS
Qty: 0 | Refills: 0 | Status: COMPLETED | OUTPATIENT
Start: 2018-05-24 | End: 2018-05-24

## 2018-05-24 RX ORDER — POTASSIUM CHLORIDE 20 MEQ
20 PACKET (EA) ORAL ONCE
Qty: 0 | Refills: 0 | Status: COMPLETED | OUTPATIENT
Start: 2018-05-24 | End: 2018-05-24

## 2018-05-24 RX ADMIN — MORPHINE SULFATE 2 MILLIGRAM(S): 50 CAPSULE, EXTENDED RELEASE ORAL at 14:22

## 2018-05-24 RX ADMIN — SODIUM CHLORIDE 125 MILLILITER(S): 9 INJECTION INTRAMUSCULAR; INTRAVENOUS; SUBCUTANEOUS at 08:43

## 2018-05-24 RX ADMIN — ONDANSETRON 4 MILLIGRAM(S): 8 TABLET, FILM COATED ORAL at 11:40

## 2018-05-24 RX ADMIN — MORPHINE SULFATE 2 MILLIGRAM(S): 50 CAPSULE, EXTENDED RELEASE ORAL at 04:36

## 2018-05-24 RX ADMIN — Medication 0.5 MILLIGRAM(S): at 11:39

## 2018-05-24 RX ADMIN — MORPHINE SULFATE 2 MILLIGRAM(S): 50 CAPSULE, EXTENDED RELEASE ORAL at 13:41

## 2018-05-24 RX ADMIN — Medication 1 TABLET(S): at 10:34

## 2018-05-24 RX ADMIN — Medication 10 MILLIGRAM(S): at 21:27

## 2018-05-24 RX ADMIN — ONDANSETRON 4 MILLIGRAM(S): 8 TABLET, FILM COATED ORAL at 04:36

## 2018-05-24 RX ADMIN — Medication 20 MILLIEQUIVALENT(S): at 08:41

## 2018-05-24 RX ADMIN — MORPHINE SULFATE 2 MILLIGRAM(S): 50 CAPSULE, EXTENDED RELEASE ORAL at 09:15

## 2018-05-24 RX ADMIN — MAGNESIUM OXIDE 400 MG ORAL TABLET 400 MILLIGRAM(S): 241.3 TABLET ORAL at 14:49

## 2018-05-24 RX ADMIN — MORPHINE SULFATE 2 MILLIGRAM(S): 50 CAPSULE, EXTENDED RELEASE ORAL at 04:43

## 2018-05-24 RX ADMIN — ONDANSETRON 4 MILLIGRAM(S): 8 TABLET, FILM COATED ORAL at 23:10

## 2018-05-24 RX ADMIN — MAGNESIUM OXIDE 400 MG ORAL TABLET 400 MILLIGRAM(S): 241.3 TABLET ORAL at 10:34

## 2018-05-24 RX ADMIN — MORPHINE SULFATE 2 MILLIGRAM(S): 50 CAPSULE, EXTENDED RELEASE ORAL at 08:41

## 2018-05-24 RX ADMIN — MORPHINE SULFATE 2 MILLIGRAM(S): 50 CAPSULE, EXTENDED RELEASE ORAL at 20:49

## 2018-05-24 RX ADMIN — PANTOPRAZOLE SODIUM 40 MILLIGRAM(S): 20 TABLET, DELAYED RELEASE ORAL at 18:53

## 2018-05-24 RX ADMIN — MORPHINE SULFATE 2 MILLIGRAM(S): 50 CAPSULE, EXTENDED RELEASE ORAL at 20:19

## 2018-05-24 RX ADMIN — Medication 1 TABLET(S): at 14:49

## 2018-05-24 NOTE — DISCHARGE NOTE ADULT - NS AS DC FOLLOWUP STROKE INST
Smoking Cessation/Influenza vaccination (VIS Pub Date: August 7, 2015) Influenza vaccination (VIS Pub Date: August 7, 2015)/Smoking Cessation

## 2018-05-24 NOTE — CONSULT NOTE ADULT - SUBJECTIVE AND OBJECTIVE BOX
SUBJECTIVE:  33yFemale admitted 2 nights ago for persistent vomiting ("can't hold anything down") leading to small amounts of hematemesis ("after there was nothing left in the stomach") and abdominal pain. The patient was seen at the office for her initial consultation on May 1. She has been having GI issues for the past 3 years, but especially over the past 6 months, and particularly since January when her father  (she had been his primary caretaker while he was ill). She has several episodes per week of diffuse abdominal pain which radiates around to her back, acid reflux, then vomiting. These symptoms occur soon after eating, even after small amounts of bland foods, and despite her use of a PPI and ondansetron. She has sitophobia and has lost around 25-30 pounds. She suspects she has lactose intolerance because milk and ice cream lead to abdominal pain, vomiting, and diarrhea. Her BMs lately have been small volume and loose, ranging in color from green to black, but not accompanied by BRB. Prior to this admission, she had presented to the [facility] ED 8 other times for her GI issues; she only was admitted once, in 2016, for minimal pancreatitis (lipase 182). Of 5 sonograms since , 4 were normal and 1 revealed GB sludge. Of 3 CTs prior to this admission, one (2017) revealed an ovarian cyst (documented resolution found on 18 pelvic sonogram) and one (2016) revealed the minimal pancreatitis. The day after her office consultation, she presented for an upper endoscopy and colonoscopy, but she was noncompliant with the preparation instructions. The EGD was attempted but terminated when she was found to have solid food and liquid in the stomach; the colonoscopy was canceled. She was re-scheduled for the EGD/colonoscopy at our office yesterday, but she did not come for the appointment. When reached by phone, she was found to be in the [facility] ED. Since receiving IV opiates and Zofran, her pain has been controlled and she has not had any additional vomiting.    ______________________________________________________________________  PMH/PSH:  PAST MEDICAL & SURGICAL HISTORY:  Iron Deficiency Anemia ("life-long")--did not show up for her first scheduled IV Injectafer infusion on   History of acute pancreatitis 2016  TB (pulmonary tuberculosis)--PPD+ after exposure to family member with TB, was treated for 9 months  B12 deficiency treated during her last pregnancy  Depression/anxiety    No significant past surgical history    ______________________________________________________________________  MEDS:  MEDICATIONS  (STANDING):  amitriptyline 10 milliGRAM(s) Oral at bedtime  clonazePAM Tablet 0.5 milliGRAM(s) Oral daily  influenza   Vaccine 0.5 milliLiter(s) IntraMuscular once  magnesium oxide 400 milliGRAM(s) Oral every 4 hours  ondansetron Injectable 4 milliGRAM(s) IV Push every 6 hours  pantoprazole  Injectable 40 milliGRAM(s) IV Push daily  potassium acid phosphate/sodium acid phosphate tablet (K-PHOS No. 2) 1 Tablet(s) Oral every 4 hours  potassium chloride    Tablet ER 20 milliEquivalent(s) Oral once  sodium chloride 0.9%. 1000 milliLiter(s) (125 mL/Hr) IV Continuous <Continuous>    MEDICATIONS  (PRN):  morphine  - Injectable 2 milliGRAM(s) IV Push every 4 hours PRN Severe Pain (7 - 10)    ______________________________________________________________________  ALL:   Allergies    No Known Allergies      ______________________________________________________________________  SH: Single, 4 children, part-time  at [employer], smokes tobacco daily, uses marijuana, no significant alcohol use  ______________________________________________________________________  FH:  FAMILY HISTORY:  Father: father  , alcohol related issues; also had CAD (stents placed), CHF, PPM/AICD, CVA, renal and liver failure  Mother: alcohol and substance abuse, depression  Also: DM, seizures    ______________________________________________________________________  ROS:  REVIEW OF SYSTEMS      General: Generalized body pains with vomiting	    Ophthalmologic: Wears glasses  	  ______________________________________________________________________  PHYSICAL EXAM:  T(C): 37 (18 @ 06:03), Max: 37 (18 @ 13:03)  HR: 69 (18 @ 06:03)  BP: 131/82 (18 @ 06:03)  RR: 18 (18 @ 06:03)  SpO2: 99% (18 @ 06:03)  Wt(kg): --  PHYSICAL EXAM:      Constitutional: Comfortable-appearing    HEENT: Anicteric    Neck: Supple    Respiratory: Clear to A    Cardiovascular: RRR, 1/6 systolic murmur    Gastrointestinal: Nondistended, soft, tender in all quadrants but especially RUQ/epigastrium    Rectal: Deferred    Extremities: No C/C/E    Neurological: Grossly non-focal    ______________________________________________________________________  LABS:                        11.3   9.5   )-----------( 446      ( 23 May 2018 00:21 )             36.2     05-24    139  |  107  |  4<L>  ----------------------------<  82  3.4<L>   |  26  |  0.73    Ca    8.3<L>      24 May 2018 07:03  Phos  2.3     -  Mg     1.6         TPro  8.4<H>  /  Alb  4.9  /  TBili  0.5  /  DBili  x   /  AST  23  /  ALT  15  /  AlkPhos  57      LIVER FUNCTIONS - ( 23 May 2018 00:21 )  Alb: 4.9 g/dL / Pro: 8.4 g/dL / ALK PHOS: 57 U/L / ALT: 15 U/L / AST: 23 U/L / GGT: x             ______________________________________________________________________  IMAGING:  EXAM:  CT ABDOMEN AND PELVIS IC                            PROCEDURE DATE:  2018            INTERPRETATION:  CLINICAL INFORMATION: Nausea and vomiting x2 weeks.   Abdominal pain.    COMPARISON: CT abdomen/pelvis of 2017.    PROCEDURE:   CT of the Abdomen and Pelvis was performed with intravenous contrast.   Intravenous contrast: 90 ml Omnipaque 350. 10 ml discarded.  Oral contrast: positive contrast was administered.  Sagittal and coronal reformats were performed.    FINDINGS:    LOWER CHEST: Within normal limits.    LIVER: Low-attenuation lesion within the right hepatic lobe too small to   accurately characterize, stable since 2016.  BILE DUCTS: Normal caliber.  GALLBLADDER: Within normal limits.  SPLEEN: Within normal limits.  PANCREAS: Within normal limits.  ADRENALS: Within normal limits.  KIDNEYS/URETERS: Within normal limits.    BLADDER: Within normal limits.  REPRODUCTIVE ORGANS: The uterus and adnexa are within normal limits.   Suggestion of a left corpus luteum.    BOWEL: No bowel obstruction. Appendix normal.  PERITONEUM: No ascites.  VESSELS:  Within normal limits.  RETROPERITONEUM: No lymphadenopathy.    ABDOMINAL WALL: Within normal limits.  BONES: Within normal limits.    IMPRESSION: No bowel obstruction or evidence of acute inflammation.                        DO KNOTT M.D., ATTENDING RADIOLOGIST  This document has been electronically signed. May 23 2018  2:25AM            ______________________________________________________________________  ASSESSMENT: Exacerbation of chronic abdominal pain and vomiting of unclear etiology, given the predominantly normal imaging studies over the past few years, but she has never been adequately endoscoped (see above regarding terminated EGD on 18) to check for gastritis, PUD, and neoplasia. Other possible causes include cannabinoid hyperemesis syndrome and gastroparesis (though these conditions are generally not associated with as much abdominal pain as the patient has been having). Will defer the planned colonoscopy for now because it is unlikely the patient would be able to tolerate the bowel preparation.    PLAN:  EGD late this afternoon (patient already NPO since MN except for meds)  Continue IVF, IV pantoprazole, and prn IV Zofran and analgesic          Rory Anaya M.D.  (O) [phone]  (C) [phone]

## 2018-05-24 NOTE — DISCHARGE NOTE ADULT - MEDICATION SUMMARY - MEDICATIONS TO TAKE
I will START or STAY ON the medications listed below when I get home from the hospital:    clonazePAM 0.5 mg oral tablet  -- 0.5 tab(s) by mouth once a day (in the morning)  -- Indication: For Depression    amitriptyline 10 mg oral tablet  -- 1 tab(s) by mouth once a day (at bedtime)  -- Indication: For Depression    Zofran ODT 4 mg oral tablet, disintegrating  -- 1 tab(s) by mouth 3 times a day, As Needed -for nausea   -- Indication: For nausea    Fish Oil oral capsule  -- 1 cap(s) by mouth once a day  -- Indication: For supplement    multivitamin  -- 1 tab(s) by mouth once a day  -- Indication: For supplement I will START or STAY ON the medications listed below when I get home from the hospital:    clonazePAM 0.5 mg oral tablet  -- 0.5 tab(s) by mouth once a day (in the morning) MDD:.5  -- Indication: For Depression    amitriptyline 10 mg oral tablet  -- 1 tab(s) by mouth once a day (at bedtime) MDD:1  -- Indication: For Depression    Zofran ODT 4 mg oral tablet, disintegrating  -- 1 tab(s) by mouth 3 times a day, As Needed -for nausea   -- Indication: For nausea    Fish Oil oral capsule  -- 1 cap(s) by mouth once a day  -- Indication: For supplement    multivitamin  -- 1 tab(s) by mouth once a day  -- Indication: For supplement

## 2018-05-24 NOTE — DISCHARGE NOTE ADULT - PATIENT PORTAL LINK FT
You can access the [facility] Patient Portal, offered by [facility], by registering with the following website: [email or web address]

## 2018-05-24 NOTE — DISCHARGE NOTE ADULT - CARE PLAN
Principal Discharge DX:	Abdominal pain  Goal:	Less or free of pain  Assessment and plan of treatment:	Continue with pantoprazole as prescribed by your doctor.  Avoid spicy and fried foods.  Follow-up with your primary care physician within 1 to 2 weeks. Call for appointment.  Secondary Diagnosis:	Intractable cyclical vomiting with nausea  Assessment and plan of treatment:	Follow-up with your primary care physician within 1 to 2 weeks.  Secondary Diagnosis:	Duodenal ulcer  Assessment and plan of treatment:	Continue with pantoprazole as prescribed by your doctor.  Avoid spicy and fried foods.  Follow-up with your primary care physician and gastroenterologist within 1 to 2 weeks. Call for appointment.  Secondary Diagnosis:	Microcytic anemia  Assessment and plan of treatment:	Symptoms to report, bleeding, palpitations, fatigue, pale skin, cold skin, dizziness. Take medications as ordered by PCP  Secondary Diagnosis:	Hypokalemia  Assessment and plan of treatment:	Follow-up with your primary care physician within 1 week.  Secondary Diagnosis:	Depression  Assessment and plan of treatment:	Continue with medications as prescribed by your doctor.   Follow-up with your primary care physician within 1 to 2 weeks. Call for appointment.

## 2018-05-24 NOTE — DISCHARGE NOTE ADULT - PLAN OF CARE
Less or free of pain Continue with pantoprazole as prescribed by your doctor.  Avoid spicy and fried foods.  Follow-up with your primary care physician within 1 to 2 weeks. Call for appointment. Follow-up with your primary care physician within 1 to 2 weeks. Continue with pantoprazole as prescribed by your doctor.  Avoid spicy and fried foods.  Follow-up with your primary care physician and gastroenterologist within 1 to 2 weeks. Call for appointment. Symptoms to report, bleeding, palpitations, fatigue, pale skin, cold skin, dizziness. Take medications as ordered by PCP Follow-up with your primary care physician within 1 week. Continue with medications as prescribed by your doctor.   Follow-up with your primary care physician within 1 to 2 weeks. Call for appointment.

## 2018-05-24 NOTE — DISCHARGE NOTE ADULT - CARE PROVIDER_API CALL
Casa Willard), Medicine  [address]  [address]  [place] [address]  Phone: [phone]  Fax: [phone]    Rory Anaya), Gastroenterology; Internal Medicine  [address]  [place] [address]  Phone: [phone]  Fax: [phone]

## 2018-05-24 NOTE — DISCHARGE NOTE ADULT - ADDITIONAL INSTRUCTIONS
Follow-up with your primary care physician and gastroenterologist within 1 to 2 weeks. Call for appointment.

## 2018-05-24 NOTE — DISCHARGE NOTE ADULT - MEDICATION SUMMARY - MEDICATIONS TO STOP TAKING
I will STOP taking the medications listed below when I get home from the hospital:    Aleve 220 mg oral tablet  -- as needed

## 2018-05-24 NOTE — DISCHARGE NOTE ADULT - HOSPITAL COURSE
32yo Female with PMH of TB as a child, chronic pancreatitis, anemia (microcytic), current smoker, presents from home with severe abdominal pain, and vomiting. 32yo Female with PMH of TB as a child, chronic pancreatitis, anemia (microcytic), current smoker, depression who presented  from home with severe abdominal pain, and vomiting, 20 pound weight loss. Pt improved after IVF, pain control, antiemetics. GI consulted . EGD showed duodenal ulcer. Pt will continue with Protonix . Hospital course complicated by hypokalemia which improved after repletion and resolution of vomiting. Psyche consulted per pt request for depression. 34yo Female with PMH of TB as a child, chronic pancreatitis, anemia (microcytic), current smoker, depression who presented  from home with severe abdominal pain, and vomiting, 20 pound weight loss. Pt improved after IVF, pain control, antiemetics. GI consulted . EGD showed duodenal ulcer. Pt will continue with Protonix . Hospital course complicated by hypokalemia which improved after repletion and resolution of vomiting. Psych consulted per pt request for depression.

## 2018-05-25 DIAGNOSIS — F32.9 MAJOR DEPRESSIVE DISORDER, SINGLE EPISODE, UNSPECIFIED: ICD-10-CM

## 2018-05-25 DIAGNOSIS — Z63.6 DEPENDENT RELATIVE NEEDING CARE AT HOME: ICD-10-CM

## 2018-05-25 DIAGNOSIS — F43.20 ADJUSTMENT DISORDER, UNSPECIFIED: ICD-10-CM

## 2018-05-25 DIAGNOSIS — K26.9 DUODENAL ULCER, UNSPECIFIED AS ACUTE OR CHRONIC, WITHOUT HEMORRHAGE OR PERFORATION: ICD-10-CM

## 2018-05-25 LAB
ANION GAP SERPL CALC-SCNC: 11 MMOL/L — SIGNIFICANT CHANGE UP (ref 5–17)
BUN SERPL-MCNC: <4 MG/DL — LOW (ref 7–23)
CALCIUM SERPL-MCNC: 8.5 MG/DL — SIGNIFICANT CHANGE UP (ref 8.4–10.5)
CHLORIDE SERPL-SCNC: 103 MMOL/L — SIGNIFICANT CHANGE UP (ref 96–108)
CO2 SERPL-SCNC: 25 MMOL/L — SIGNIFICANT CHANGE UP (ref 22–31)
CREAT SERPL-MCNC: 0.73 MG/DL — SIGNIFICANT CHANGE UP (ref 0.5–1.3)
GLUCOSE SERPL-MCNC: 78 MG/DL — SIGNIFICANT CHANGE UP (ref 70–99)
HCT VFR BLD CALC: 29.3 % — LOW (ref 34.5–45)
HGB BLD-MCNC: 9 G/DL — LOW (ref 11.5–15.5)
MAGNESIUM SERPL-MCNC: 1.8 MG/DL — SIGNIFICANT CHANGE UP (ref 1.6–2.6)
MCHC RBC-ENTMCNC: 22.3 PG — LOW (ref 27–34)
MCHC RBC-ENTMCNC: 30.6 GM/DL — LOW (ref 32–36)
MCV RBC AUTO: 73 FL — LOW (ref 80–100)
PLATELET # BLD AUTO: 318 K/UL — SIGNIFICANT CHANGE UP (ref 150–400)
POTASSIUM SERPL-MCNC: 3.3 MMOL/L — LOW (ref 3.5–5.3)
POTASSIUM SERPL-SCNC: 3.3 MMOL/L — LOW (ref 3.5–5.3)
RBC # BLD: 4.02 M/UL — SIGNIFICANT CHANGE UP (ref 3.8–5.2)
RBC # FLD: 18.8 % — HIGH (ref 10.3–14.5)
SODIUM SERPL-SCNC: 139 MMOL/L — SIGNIFICANT CHANGE UP (ref 135–145)
SURGICAL PATHOLOGY STUDY: SIGNIFICANT CHANGE UP
WBC # BLD: 7.6 K/UL — SIGNIFICANT CHANGE UP (ref 3.8–10.5)
WBC # FLD AUTO: 7.6 K/UL — SIGNIFICANT CHANGE UP (ref 3.8–10.5)

## 2018-05-25 PROCEDURE — 99222 1ST HOSP IP/OBS MODERATE 55: CPT

## 2018-05-25 RX ORDER — IRON SUCROSE 20 MG/ML
200 INJECTION, SOLUTION INTRAVENOUS ONCE
Qty: 0 | Refills: 0 | Status: COMPLETED | OUTPATIENT
Start: 2018-05-25 | End: 2018-05-25

## 2018-05-25 RX ORDER — SENNA PLUS 8.6 MG/1
2 TABLET ORAL AT BEDTIME
Qty: 0 | Refills: 0 | Status: DISCONTINUED | OUTPATIENT
Start: 2018-05-25 | End: 2018-05-26

## 2018-05-25 RX ORDER — POLYETHYLENE GLYCOL 3350 17 G/17G
17 POWDER, FOR SOLUTION ORAL DAILY
Qty: 0 | Refills: 0 | Status: DISCONTINUED | OUTPATIENT
Start: 2018-05-25 | End: 2018-05-26

## 2018-05-25 RX ORDER — POTASSIUM CHLORIDE 20 MEQ
40 PACKET (EA) ORAL ONCE
Qty: 0 | Refills: 0 | Status: COMPLETED | OUTPATIENT
Start: 2018-05-25 | End: 2018-05-25

## 2018-05-25 RX ORDER — DOCUSATE SODIUM 100 MG
100 CAPSULE ORAL
Qty: 0 | Refills: 0 | Status: DISCONTINUED | OUTPATIENT
Start: 2018-05-25 | End: 2018-05-26

## 2018-05-25 RX ADMIN — MORPHINE SULFATE 2 MILLIGRAM(S): 50 CAPSULE, EXTENDED RELEASE ORAL at 23:30

## 2018-05-25 RX ADMIN — Medication 100 MILLIGRAM(S): at 17:29

## 2018-05-25 RX ADMIN — MORPHINE SULFATE 2 MILLIGRAM(S): 50 CAPSULE, EXTENDED RELEASE ORAL at 05:31

## 2018-05-25 RX ADMIN — PANTOPRAZOLE SODIUM 40 MILLIGRAM(S): 20 TABLET, DELAYED RELEASE ORAL at 05:01

## 2018-05-25 RX ADMIN — SODIUM CHLORIDE 75 MILLILITER(S): 9 INJECTION INTRAMUSCULAR; INTRAVENOUS; SUBCUTANEOUS at 19:26

## 2018-05-25 RX ADMIN — Medication 40 MILLIEQUIVALENT(S): at 10:50

## 2018-05-25 RX ADMIN — MORPHINE SULFATE 2 MILLIGRAM(S): 50 CAPSULE, EXTENDED RELEASE ORAL at 05:01

## 2018-05-25 RX ADMIN — MORPHINE SULFATE 2 MILLIGRAM(S): 50 CAPSULE, EXTENDED RELEASE ORAL at 09:47

## 2018-05-25 RX ADMIN — SODIUM CHLORIDE 125 MILLILITER(S): 9 INJECTION INTRAMUSCULAR; INTRAVENOUS; SUBCUTANEOUS at 09:19

## 2018-05-25 RX ADMIN — MORPHINE SULFATE 2 MILLIGRAM(S): 50 CAPSULE, EXTENDED RELEASE ORAL at 14:44

## 2018-05-25 RX ADMIN — MORPHINE SULFATE 2 MILLIGRAM(S): 50 CAPSULE, EXTENDED RELEASE ORAL at 01:22

## 2018-05-25 RX ADMIN — MORPHINE SULFATE 2 MILLIGRAM(S): 50 CAPSULE, EXTENDED RELEASE ORAL at 09:17

## 2018-05-25 RX ADMIN — MORPHINE SULFATE 2 MILLIGRAM(S): 50 CAPSULE, EXTENDED RELEASE ORAL at 19:26

## 2018-05-25 RX ADMIN — Medication 10 MILLIGRAM(S): at 21:15

## 2018-05-25 RX ADMIN — ONDANSETRON 4 MILLIGRAM(S): 8 TABLET, FILM COATED ORAL at 17:30

## 2018-05-25 RX ADMIN — ONDANSETRON 4 MILLIGRAM(S): 8 TABLET, FILM COATED ORAL at 23:29

## 2018-05-25 RX ADMIN — SENNA PLUS 2 TABLET(S): 8.6 TABLET ORAL at 21:15

## 2018-05-25 RX ADMIN — MORPHINE SULFATE 2 MILLIGRAM(S): 50 CAPSULE, EXTENDED RELEASE ORAL at 15:14

## 2018-05-25 RX ADMIN — Medication 0.5 MILLIGRAM(S): at 12:18

## 2018-05-25 RX ADMIN — ONDANSETRON 4 MILLIGRAM(S): 8 TABLET, FILM COATED ORAL at 12:20

## 2018-05-25 RX ADMIN — POLYETHYLENE GLYCOL 3350 17 GRAM(S): 17 POWDER, FOR SOLUTION ORAL at 14:55

## 2018-05-25 RX ADMIN — MORPHINE SULFATE 2 MILLIGRAM(S): 50 CAPSULE, EXTENDED RELEASE ORAL at 19:51

## 2018-05-25 RX ADMIN — SODIUM CHLORIDE 75 MILLILITER(S): 9 INJECTION INTRAMUSCULAR; INTRAVENOUS; SUBCUTANEOUS at 10:50

## 2018-05-25 RX ADMIN — ONDANSETRON 4 MILLIGRAM(S): 8 TABLET, FILM COATED ORAL at 05:01

## 2018-05-25 RX ADMIN — PANTOPRAZOLE SODIUM 40 MILLIGRAM(S): 20 TABLET, DELAYED RELEASE ORAL at 17:30

## 2018-05-25 RX ADMIN — MORPHINE SULFATE 2 MILLIGRAM(S): 50 CAPSULE, EXTENDED RELEASE ORAL at 23:45

## 2018-05-25 RX ADMIN — IRON SUCROSE 110 MILLIGRAM(S): 20 INJECTION, SOLUTION INTRAVENOUS at 09:19

## 2018-05-25 RX ADMIN — MORPHINE SULFATE 2 MILLIGRAM(S): 50 CAPSULE, EXTENDED RELEASE ORAL at 00:59

## 2018-05-25 SDOH — SOCIAL STABILITY - SOCIAL INSECURITY: DEPENDENT RELATIVE NEEDING CARE AT HOME: Z63.6
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
## 2018-05-25 NOTE — BEHAVIORAL HEALTH ASSESSMENT NOTE - NSBHCHARTREVIEWLAB_PSY_A_CORE FT
CBC Full  -  ( 25 May 2018 06:22 )  WBC Count : 7.6 K/uL  Hemoglobin : 9.0 g/dL  Hematocrit : 29.3 %  Platelet Count - Automated : 318 K/uL  Mean Cell Volume : 73.0 fl  Mean Cell Hemoglobin : 22.3 pg  Mean Cell Hemoglobin Concentration : 30.6 gm/dL    05-25    139  |  103  |  <4<L>  ----------------------------<  78  3.3<L>   |  25  |  0.73    Ca    8.5      25 May 2018 06:22  Phos  2.3     05-24  Mg     1.8     05-25    Thyroid Stimulating Hormone, Serum: 0.60 uIU/mL (05.24.18 @ 09:49)    THC, Urine Qualitative: Positive: TEST REPEATED. (05.24.18 @ 06:14)  Opiate, Urine: Positive: TEST REPEATED. (05.24.18 @ 06:14)

## 2018-05-25 NOTE — BEHAVIORAL HEALTH ASSESSMENT NOTE - CASE SUMMARY
34 y/o [facility] woman, hx of depression, no psychiatric hospitalizations, PMHx of TB as a child, chronic pancreatitis, microcytic anemia admitted for cyclical vomiting and abdominal pain found to have duodenal ulcer. Psychiatry consulted to address depression at pt's request. pt recently lost her father, pt was mostly taking care of him at home. Pt reports depressed mood, fair sleep, apppetite, no si/hi, no blade, psychosis, anxiety. pt started on elavil and klonopin by pmd. pt interested in therapy. pt given list of clinics for med mgt, therapy. cont current meds. no 1:1 neeeded.

## 2018-05-25 NOTE — PROGRESS NOTE ADULT - PROBLEM SELECTOR PLAN 1
pt  tolrating  po liquids . upper endoscopy  with  esophagitis  and  non bleeding  DU  Hb  9.0  IN  iron infusion  was  given. will continue  on protonix  40  mg  bid  x 3 months , with GI  follow up .

## 2018-05-25 NOTE — BEHAVIORAL HEALTH ASSESSMENT NOTE - NSBHCHARTREVIEWVS_PSY_A_CORE FT
Vital Signs Last 24 Hrs  T(C): 37.2 (25 May 2018 12:29), Max: 37.3 (24 May 2018 20:40)  T(F): 98.9 (25 May 2018 12:29), Max: 99.1 (24 May 2018 20:40)  HR: 84 (25 May 2018 12:29) (67 - 85)  BP: 111/75 (25 May 2018 12:29) (111/71 - 135/71)  BP(mean): --  RR: 18 (25 May 2018 12:29) (18 - 18)  SpO2: 100% (25 May 2018 12:29) (98% - 100%)

## 2018-05-25 NOTE — PROGRESS NOTE ADULT - PROBLEM SELECTOR PLAN 2
pt  tolerating  po liquids . upper endoscopy  with  esophagitis  and  non bleeding  DU  Hb  9.0  IN  iron infusion  was  given will  continue on iron supplementation  as out patient
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
## 2018-05-25 NOTE — BEHAVIORAL HEALTH ASSESSMENT NOTE - NSBHCONSULTRECOMMENDOTHER_PSY_A_CORE FT
-Continue with Amitriptyline 10mg and Klonopin 0.5mg BID  -Smoking cessation counseling, nicotine patch or gum   - No inpatient admission warranted as pt is not currently risk to herself  - Will provide number for where pt can follow up after discharge

## 2018-05-26 VITALS
SYSTOLIC BLOOD PRESSURE: 109 MMHG | RESPIRATION RATE: 18 BRPM | DIASTOLIC BLOOD PRESSURE: 76 MMHG | TEMPERATURE: 98 F | OXYGEN SATURATION: 97 % | HEART RATE: 66 BPM

## 2018-05-26 LAB
ANION GAP SERPL CALC-SCNC: 10 MMOL/L — SIGNIFICANT CHANGE UP (ref 5–17)
BUN SERPL-MCNC: 5 MG/DL — LOW (ref 7–23)
CALCIUM SERPL-MCNC: 9 MG/DL — SIGNIFICANT CHANGE UP (ref 8.4–10.5)
CHLORIDE SERPL-SCNC: 103 MMOL/L — SIGNIFICANT CHANGE UP (ref 96–108)
CO2 SERPL-SCNC: 25 MMOL/L — SIGNIFICANT CHANGE UP (ref 22–31)
CREAT SERPL-MCNC: 0.75 MG/DL — SIGNIFICANT CHANGE UP (ref 0.5–1.3)
GLUCOSE SERPL-MCNC: 81 MG/DL — SIGNIFICANT CHANGE UP (ref 70–99)
HCT VFR BLD CALC: 29 % — LOW (ref 34.5–45)
HGB BLD-MCNC: 8.7 G/DL — LOW (ref 11.5–15.5)
MCHC RBC-ENTMCNC: 21 PG — LOW (ref 27–34)
MCHC RBC-ENTMCNC: 30 GM/DL — LOW (ref 32–36)
MCV RBC AUTO: 70 FL — LOW (ref 80–100)
PLATELET # BLD AUTO: 334 K/UL — SIGNIFICANT CHANGE UP (ref 150–400)
POTASSIUM SERPL-MCNC: 4.1 MMOL/L — SIGNIFICANT CHANGE UP (ref 3.5–5.3)
POTASSIUM SERPL-SCNC: 4.1 MMOL/L — SIGNIFICANT CHANGE UP (ref 3.5–5.3)
RBC # BLD: 4.14 M/UL — SIGNIFICANT CHANGE UP (ref 3.8–5.2)
RBC # FLD: 19.7 % — HIGH (ref 10.3–14.5)
SODIUM SERPL-SCNC: 138 MMOL/L — SIGNIFICANT CHANGE UP (ref 135–145)
WBC # BLD: 7.62 K/UL — SIGNIFICANT CHANGE UP (ref 3.8–10.5)
WBC # FLD AUTO: 7.62 K/UL — SIGNIFICANT CHANGE UP (ref 3.8–10.5)

## 2018-05-26 PROCEDURE — 99285 EMERGENCY DEPT VISIT HI MDM: CPT | Mod: 25

## 2018-05-26 PROCEDURE — 87389 HIV-1 AG W/HIV-1&-2 AB AG IA: CPT

## 2018-05-26 PROCEDURE — 84100 ASSAY OF PHOSPHORUS: CPT

## 2018-05-26 PROCEDURE — 74177 CT ABD & PELVIS W/CONTRAST: CPT

## 2018-05-26 PROCEDURE — 88305 TISSUE EXAM BY PATHOLOGIST: CPT

## 2018-05-26 PROCEDURE — 80048 BASIC METABOLIC PNL TOTAL CA: CPT

## 2018-05-26 PROCEDURE — 83735 ASSAY OF MAGNESIUM: CPT

## 2018-05-26 PROCEDURE — 96374 THER/PROPH/DIAG INJ IV PUSH: CPT | Mod: XU

## 2018-05-26 PROCEDURE — 83690 ASSAY OF LIPASE: CPT

## 2018-05-26 PROCEDURE — 81001 URINALYSIS AUTO W/SCOPE: CPT

## 2018-05-26 PROCEDURE — 86900 BLOOD TYPING SEROLOGIC ABO: CPT

## 2018-05-26 PROCEDURE — 84702 CHORIONIC GONADOTROPIN TEST: CPT

## 2018-05-26 PROCEDURE — 80307 DRUG TEST PRSMV CHEM ANLYZR: CPT

## 2018-05-26 PROCEDURE — 86850 RBC ANTIBODY SCREEN: CPT

## 2018-05-26 PROCEDURE — 83550 IRON BINDING TEST: CPT

## 2018-05-26 PROCEDURE — 96375 TX/PRO/DX INJ NEW DRUG ADDON: CPT

## 2018-05-26 PROCEDURE — 84443 ASSAY THYROID STIM HORMONE: CPT

## 2018-05-26 PROCEDURE — 80053 COMPREHEN METABOLIC PANEL: CPT

## 2018-05-26 PROCEDURE — 82728 ASSAY OF FERRITIN: CPT

## 2018-05-26 PROCEDURE — 86901 BLOOD TYPING SEROLOGIC RH(D): CPT

## 2018-05-26 PROCEDURE — 88312 SPECIAL STAINS GROUP 1: CPT

## 2018-05-26 PROCEDURE — 85027 COMPLETE CBC AUTOMATED: CPT

## 2018-05-26 RX ORDER — CLONAZEPAM 1 MG
1 TABLET ORAL
Qty: 7 | Refills: 0 | OUTPATIENT
Start: 2018-05-26 | End: 2018-06-01

## 2018-05-26 RX ORDER — CLONAZEPAM 1 MG
0.5 TABLET ORAL
Qty: 0 | Refills: 0 | COMMUNITY

## 2018-05-26 RX ORDER — AMITRIPTYLINE HCL 25 MG
1 TABLET ORAL
Qty: 0 | Refills: 0 | COMMUNITY

## 2018-05-26 RX ORDER — CLONAZEPAM 1 MG
0.5 TABLET ORAL
Qty: 3.5 | Refills: 0 | OUTPATIENT
Start: 2018-05-26 | End: 2018-06-01

## 2018-05-26 RX ORDER — AMITRIPTYLINE HCL 25 MG
1 TABLET ORAL
Qty: 7 | Refills: 0 | OUTPATIENT
Start: 2018-05-26 | End: 2018-06-01

## 2018-05-26 RX ADMIN — ONDANSETRON 4 MILLIGRAM(S): 8 TABLET, FILM COATED ORAL at 05:44

## 2018-05-26 RX ADMIN — Medication 100 MILLIGRAM(S): at 05:44

## 2018-05-26 RX ADMIN — PANTOPRAZOLE SODIUM 40 MILLIGRAM(S): 20 TABLET, DELAYED RELEASE ORAL at 05:44

## 2018-05-26 NOTE — PROGRESS NOTE ADULT - PROBLEM SELECTOR PLAN 1
positive  Hpilory  , will be    Bioxin 500 mg bid  x 10  days  with  , amoxal 500 mg b id  and  prevacid 40 mg  bid   x 10 days .discharged  on

## 2018-09-01 ENCOUNTER — OUTPATIENT (OUTPATIENT)
Dept: OUTPATIENT SERVICES | Facility: HOSPITAL | Age: 33
LOS: 1 days | End: 2018-09-01
Payer: MEDICAID

## 2018-09-01 PROCEDURE — G9001: CPT

## 2018-09-17 ENCOUNTER — EMERGENCY (EMERGENCY)
Facility: HOSPITAL | Age: 33
LOS: 1 days | Discharge: HOME | End: 2018-09-17
Attending: EMERGENCY MEDICINE
Payer: MEDICAID

## 2018-09-17 VITALS
OXYGEN SATURATION: 100 % | HEART RATE: 80 BPM | RESPIRATION RATE: 18 BRPM | DIASTOLIC BLOOD PRESSURE: 80 MMHG | SYSTOLIC BLOOD PRESSURE: 150 MMHG

## 2018-09-17 PROCEDURE — 99284 EMERGENCY DEPT VISIT MOD MDM: CPT | Mod: 25

## 2018-09-17 NOTE — ED ADULT NURSE NOTE - NSIMPLEMENTINTERV_GEN_ALL_ED
Implemented All Universal Safety Interventions:  [place] to call system. Call bell, personal items and telephone within reach. Instruct patient to call for assistance. Room bathroom lighting operational. Non-slip footwear when patient is off stretcher. Physically safe environment: no spills, clutter or unnecessary equipment. Stretcher in lowest position, wheels locked, appropriate side rails in place.

## 2018-09-17 NOTE — ED ADULT NURSE NOTE - OBJECTIVE STATEMENT
32 y/o F patient presents to ED from home via EMS c/o generalized constant abdominal pain and N/V x1 day. Patient reports she had multiple episodes of vomiting today. As per patient she was not able to tolerate food and only drank gatorade and ginger ale. Patient A&Ox3. lungs CTA. skin warm and intact. abdominal tenderness noted in epigastric area. ambulatory. Patient denies HA, dizziness, SOB, chest pain, bowel/bladder changes, fevers/chills, numbness/tingling. VSS. Safety and comfort measures provided and maintained. MD bedside.

## 2018-09-18 ENCOUNTER — INPATIENT (INPATIENT)
Facility: HOSPITAL | Age: 33
LOS: 3 days | Discharge: HOME | DRG: 392 | End: 2018-09-22
Attending: INTERNAL MEDICINE | Admitting: STUDENT IN AN ORGANIZED HEALTH CARE EDUCATION/TRAINING PROGRAM
Payer: MEDICAID

## 2018-09-18 VITALS
OXYGEN SATURATION: 97 % | SYSTOLIC BLOOD PRESSURE: 144 MMHG | DIASTOLIC BLOOD PRESSURE: 88 MMHG | RESPIRATION RATE: 16 BRPM | HEART RATE: 82 BPM | TEMPERATURE: 99 F

## 2018-09-18 VITALS
RESPIRATION RATE: 18 BRPM | HEART RATE: 84 BPM | DIASTOLIC BLOOD PRESSURE: 71 MMHG | SYSTOLIC BLOOD PRESSURE: 106 MMHG | TEMPERATURE: 100 F | OXYGEN SATURATION: 96 %

## 2018-09-18 DIAGNOSIS — K29.70 GASTRITIS, UNSPECIFIED, WITHOUT BLEEDING: ICD-10-CM

## 2018-09-18 PROBLEM — D64.9 ANEMIA, UNSPECIFIED: Chronic | Status: ACTIVE | Noted: 2017-04-04

## 2018-09-18 PROBLEM — K86.1 OTHER CHRONIC PANCREATITIS: Chronic | Status: ACTIVE | Noted: 2017-04-04

## 2018-09-18 LAB
ALBUMIN SERPL ELPH-MCNC: 4.6 G/DL — SIGNIFICANT CHANGE UP (ref 3.3–5)
ALBUMIN SERPL ELPH-MCNC: 5.2 G/DL — HIGH (ref 3.3–5)
ALP SERPL-CCNC: 54 U/L — SIGNIFICANT CHANGE UP (ref 40–120)
ALP SERPL-CCNC: 62 U/L — SIGNIFICANT CHANGE UP (ref 40–120)
ALT FLD-CCNC: 12 U/L — SIGNIFICANT CHANGE UP (ref 10–45)
ALT FLD-CCNC: 12 U/L — SIGNIFICANT CHANGE UP (ref 10–45)
ANION GAP SERPL CALC-SCNC: 15 MMOL/L — SIGNIFICANT CHANGE UP (ref 5–17)
ANION GAP SERPL CALC-SCNC: 19 MMOL/L — HIGH (ref 5–17)
APPEARANCE UR: CLEAR — SIGNIFICANT CHANGE UP
AST SERPL-CCNC: 16 U/L — SIGNIFICANT CHANGE UP (ref 10–40)
AST SERPL-CCNC: 20 U/L — SIGNIFICANT CHANGE UP (ref 10–40)
BACTERIA # UR AUTO: NEGATIVE — SIGNIFICANT CHANGE UP
BASOPHILS # BLD AUTO: 0.1 K/UL — SIGNIFICANT CHANGE UP (ref 0–0.2)
BASOPHILS # BLD AUTO: 0.1 K/UL — SIGNIFICANT CHANGE UP (ref 0–0.2)
BASOPHILS NFR BLD AUTO: 0.8 % — SIGNIFICANT CHANGE UP (ref 0–2)
BASOPHILS NFR BLD AUTO: 1.2 % — SIGNIFICANT CHANGE UP (ref 0–2)
BILIRUB SERPL-MCNC: 0.4 MG/DL — SIGNIFICANT CHANGE UP (ref 0.2–1.2)
BILIRUB SERPL-MCNC: 0.6 MG/DL — SIGNIFICANT CHANGE UP (ref 0.2–1.2)
BILIRUB UR-MCNC: ABNORMAL
BUN SERPL-MCNC: 13 MG/DL — SIGNIFICANT CHANGE UP (ref 7–23)
BUN SERPL-MCNC: 9 MG/DL — SIGNIFICANT CHANGE UP (ref 7–23)
CALCIUM SERPL-MCNC: 10.5 MG/DL — SIGNIFICANT CHANGE UP (ref 8.4–10.5)
CALCIUM SERPL-MCNC: 9.9 MG/DL — SIGNIFICANT CHANGE UP (ref 8.4–10.5)
CHLORIDE SERPL-SCNC: 100 MMOL/L — SIGNIFICANT CHANGE UP (ref 96–108)
CHLORIDE SERPL-SCNC: 98 MMOL/L — SIGNIFICANT CHANGE UP (ref 96–108)
CO2 SERPL-SCNC: 21 MMOL/L — LOW (ref 22–31)
CO2 SERPL-SCNC: 23 MMOL/L — SIGNIFICANT CHANGE UP (ref 22–31)
COLOR SPEC: YELLOW — SIGNIFICANT CHANGE UP
CREAT SERPL-MCNC: 0.74 MG/DL — SIGNIFICANT CHANGE UP (ref 0.5–1.3)
CREAT SERPL-MCNC: 0.77 MG/DL — SIGNIFICANT CHANGE UP (ref 0.5–1.3)
DIFF PNL FLD: ABNORMAL
EOSINOPHIL # BLD AUTO: 0 K/UL — SIGNIFICANT CHANGE UP (ref 0–0.5)
EOSINOPHIL # BLD AUTO: 0 K/UL — SIGNIFICANT CHANGE UP (ref 0–0.5)
EOSINOPHIL NFR BLD AUTO: 0.1 % — SIGNIFICANT CHANGE UP (ref 0–6)
EOSINOPHIL NFR BLD AUTO: 0.3 % — SIGNIFICANT CHANGE UP (ref 0–6)
EPI CELLS # UR: 4 /HPF — SIGNIFICANT CHANGE UP
GAS PNL BLDV: SIGNIFICANT CHANGE UP
GAS PNL BLDV: SIGNIFICANT CHANGE UP
GLUCOSE SERPL-MCNC: 100 MG/DL — HIGH (ref 70–99)
GLUCOSE SERPL-MCNC: 132 MG/DL — HIGH (ref 70–99)
GLUCOSE UR QL: NEGATIVE — SIGNIFICANT CHANGE UP
HCG UR QL: NEGATIVE — SIGNIFICANT CHANGE UP
HCT VFR BLD CALC: 35.9 % — SIGNIFICANT CHANGE UP (ref 34.5–45)
HCT VFR BLD CALC: 39.3 % — SIGNIFICANT CHANGE UP (ref 34.5–45)
HGB BLD-MCNC: 11.5 G/DL — SIGNIFICANT CHANGE UP (ref 11.5–15.5)
HGB BLD-MCNC: 12.5 G/DL — SIGNIFICANT CHANGE UP (ref 11.5–15.5)
HYALINE CASTS # UR AUTO: 10 /LPF — HIGH (ref 0–2)
KETONES UR-MCNC: ABNORMAL
LEUKOCYTE ESTERASE UR-ACNC: NEGATIVE — SIGNIFICANT CHANGE UP
LIDOCAIN IGE QN: 25 U/L — SIGNIFICANT CHANGE UP (ref 7–60)
LIDOCAIN IGE QN: 31 U/L — SIGNIFICANT CHANGE UP (ref 7–60)
LYMPHOCYTES # BLD AUTO: 1.8 K/UL — SIGNIFICANT CHANGE UP (ref 1–3.3)
LYMPHOCYTES # BLD AUTO: 19.2 % — SIGNIFICANT CHANGE UP (ref 13–44)
LYMPHOCYTES # BLD AUTO: 3.5 K/UL — HIGH (ref 1–3.3)
LYMPHOCYTES # BLD AUTO: 38.9 % — SIGNIFICANT CHANGE UP (ref 13–44)
MCHC RBC-ENTMCNC: 23.8 PG — LOW (ref 27–34)
MCHC RBC-ENTMCNC: 24.2 PG — LOW (ref 27–34)
MCHC RBC-ENTMCNC: 31.7 GM/DL — LOW (ref 32–36)
MCHC RBC-ENTMCNC: 32.1 GM/DL — SIGNIFICANT CHANGE UP (ref 32–36)
MCV RBC AUTO: 75.2 FL — LOW (ref 80–100)
MCV RBC AUTO: 75.2 FL — LOW (ref 80–100)
MONOCYTES # BLD AUTO: 0.8 K/UL — SIGNIFICANT CHANGE UP (ref 0–0.9)
MONOCYTES # BLD AUTO: 0.9 K/UL — SIGNIFICANT CHANGE UP (ref 0–0.9)
MONOCYTES NFR BLD AUTO: 8.1 % — SIGNIFICANT CHANGE UP (ref 2–14)
MONOCYTES NFR BLD AUTO: 9.7 % — SIGNIFICANT CHANGE UP (ref 2–14)
NEUTROPHILS # BLD AUTO: 4.5 K/UL — SIGNIFICANT CHANGE UP (ref 1.8–7.4)
NEUTROPHILS # BLD AUTO: 6.9 K/UL — SIGNIFICANT CHANGE UP (ref 1.8–7.4)
NEUTROPHILS NFR BLD AUTO: 49.9 % — SIGNIFICANT CHANGE UP (ref 43–77)
NEUTROPHILS NFR BLD AUTO: 71.8 % — SIGNIFICANT CHANGE UP (ref 43–77)
NITRITE UR-MCNC: NEGATIVE — SIGNIFICANT CHANGE UP
PH UR: 6.5 — SIGNIFICANT CHANGE UP (ref 5–8)
PLATELET # BLD AUTO: 417 K/UL — HIGH (ref 150–400)
PLATELET # BLD AUTO: 455 K/UL — HIGH (ref 150–400)
POTASSIUM SERPL-MCNC: 3.2 MMOL/L — LOW (ref 3.5–5.3)
POTASSIUM SERPL-MCNC: 3.4 MMOL/L — LOW (ref 3.5–5.3)
POTASSIUM SERPL-SCNC: 3.2 MMOL/L — LOW (ref 3.5–5.3)
POTASSIUM SERPL-SCNC: 3.4 MMOL/L — LOW (ref 3.5–5.3)
PROT SERPL-MCNC: 7.8 G/DL — SIGNIFICANT CHANGE UP (ref 6–8.3)
PROT SERPL-MCNC: 9 G/DL — HIGH (ref 6–8.3)
PROT UR-MCNC: ABNORMAL
RBC # BLD: 4.77 M/UL — SIGNIFICANT CHANGE UP (ref 3.8–5.2)
RBC # BLD: 5.23 M/UL — HIGH (ref 3.8–5.2)
RBC # FLD: 16.6 % — HIGH (ref 10.3–14.5)
RBC # FLD: 17.2 % — HIGH (ref 10.3–14.5)
RBC CASTS # UR COMP ASSIST: 116 /HPF — HIGH (ref 0–4)
SODIUM SERPL-SCNC: 138 MMOL/L — SIGNIFICANT CHANGE UP (ref 135–145)
SODIUM SERPL-SCNC: 138 MMOL/L — SIGNIFICANT CHANGE UP (ref 135–145)
SP GR SPEC: 1.04 — SIGNIFICANT CHANGE UP
UROBILINOGEN FLD QL: ABNORMAL
WBC # BLD: 9 K/UL — SIGNIFICANT CHANGE UP (ref 3.8–10.5)
WBC # BLD: 9.6 K/UL — SIGNIFICANT CHANGE UP (ref 3.8–10.5)
WBC # FLD AUTO: 9 K/UL — SIGNIFICANT CHANGE UP (ref 3.8–10.5)
WBC # FLD AUTO: 9.6 K/UL — SIGNIFICANT CHANGE UP (ref 3.8–10.5)
WBC UR QL: 3 /HPF — SIGNIFICANT CHANGE UP (ref 0–5)

## 2018-09-18 PROCEDURE — 82803 BLOOD GASES ANY COMBINATION: CPT

## 2018-09-18 PROCEDURE — 80053 COMPREHEN METABOLIC PANEL: CPT

## 2018-09-18 PROCEDURE — 84295 ASSAY OF SERUM SODIUM: CPT

## 2018-09-18 PROCEDURE — 85014 HEMATOCRIT: CPT

## 2018-09-18 PROCEDURE — 99223 1ST HOSP IP/OBS HIGH 75: CPT

## 2018-09-18 PROCEDURE — 83605 ASSAY OF LACTIC ACID: CPT

## 2018-09-18 PROCEDURE — 83690 ASSAY OF LIPASE: CPT

## 2018-09-18 PROCEDURE — 96375 TX/PRO/DX INJ NEW DRUG ADDON: CPT

## 2018-09-18 PROCEDURE — 85027 COMPLETE CBC AUTOMATED: CPT

## 2018-09-18 PROCEDURE — 82435 ASSAY OF BLOOD CHLORIDE: CPT

## 2018-09-18 PROCEDURE — 84132 ASSAY OF SERUM POTASSIUM: CPT

## 2018-09-18 PROCEDURE — 96374 THER/PROPH/DIAG INJ IV PUSH: CPT

## 2018-09-18 PROCEDURE — 96361 HYDRATE IV INFUSION ADD-ON: CPT

## 2018-09-18 PROCEDURE — 99285 EMERGENCY DEPT VISIT HI MDM: CPT

## 2018-09-18 PROCEDURE — 99284 EMERGENCY DEPT VISIT MOD MDM: CPT | Mod: 25

## 2018-09-18 PROCEDURE — 82330 ASSAY OF CALCIUM: CPT

## 2018-09-18 PROCEDURE — 82947 ASSAY GLUCOSE BLOOD QUANT: CPT

## 2018-09-18 RX ORDER — SUCRALFATE 1 G
1 TABLET ORAL ONCE
Qty: 0 | Refills: 0 | Status: COMPLETED | OUTPATIENT
Start: 2018-09-18 | End: 2018-09-18

## 2018-09-18 RX ORDER — ACETAMINOPHEN 500 MG
650 TABLET ORAL EVERY 6 HOURS
Qty: 0 | Refills: 0 | Status: DISCONTINUED | OUTPATIENT
Start: 2018-09-18 | End: 2018-09-22

## 2018-09-18 RX ORDER — FAMOTIDINE 10 MG/ML
20 INJECTION INTRAVENOUS ONCE
Qty: 0 | Refills: 0 | Status: COMPLETED | OUTPATIENT
Start: 2018-09-18 | End: 2018-09-18

## 2018-09-18 RX ORDER — NICOTINE POLACRILEX 2 MG
1 GUM BUCCAL DAILY
Qty: 0 | Refills: 0 | Status: DISCONTINUED | OUTPATIENT
Start: 2018-09-18 | End: 2018-09-22

## 2018-09-18 RX ORDER — MORPHINE SULFATE 50 MG/1
4 CAPSULE, EXTENDED RELEASE ORAL ONCE
Qty: 0 | Refills: 0 | Status: DISCONTINUED | OUTPATIENT
Start: 2018-09-18 | End: 2018-09-18

## 2018-09-18 RX ORDER — ONDANSETRON 8 MG/1
4 TABLET, FILM COATED ORAL ONCE
Qty: 0 | Refills: 0 | Status: COMPLETED | OUTPATIENT
Start: 2018-09-18 | End: 2018-09-18

## 2018-09-18 RX ORDER — PANTOPRAZOLE SODIUM 20 MG/1
40 TABLET, DELAYED RELEASE ORAL ONCE
Qty: 0 | Refills: 0 | Status: COMPLETED | OUTPATIENT
Start: 2018-09-18 | End: 2018-09-18

## 2018-09-18 RX ORDER — MORPHINE SULFATE 50 MG/1
2 CAPSULE, EXTENDED RELEASE ORAL EVERY 4 HOURS
Qty: 0 | Refills: 0 | Status: DISCONTINUED | OUTPATIENT
Start: 2018-09-18 | End: 2018-09-21

## 2018-09-18 RX ORDER — LIDOCAINE 4 G/100G
10 CREAM TOPICAL ONCE
Qty: 0 | Refills: 0 | Status: COMPLETED | OUTPATIENT
Start: 2018-09-18 | End: 2018-09-18

## 2018-09-18 RX ORDER — ONDANSETRON 8 MG/1
4 TABLET, FILM COATED ORAL EVERY 12 HOURS
Qty: 0 | Refills: 0 | Status: DISCONTINUED | OUTPATIENT
Start: 2018-09-18 | End: 2018-09-19

## 2018-09-18 RX ORDER — SODIUM CHLORIDE 9 MG/ML
2000 INJECTION, SOLUTION INTRAVENOUS ONCE
Qty: 0 | Refills: 0 | Status: COMPLETED | OUTPATIENT
Start: 2018-09-18 | End: 2018-09-18

## 2018-09-18 RX ORDER — SODIUM CHLORIDE 9 MG/ML
1000 INJECTION INTRAMUSCULAR; INTRAVENOUS; SUBCUTANEOUS ONCE
Qty: 0 | Refills: 0 | Status: COMPLETED | OUTPATIENT
Start: 2018-09-18 | End: 2018-09-18

## 2018-09-18 RX ORDER — PANTOPRAZOLE SODIUM 20 MG/1
40 TABLET, DELAYED RELEASE ORAL EVERY 12 HOURS
Qty: 0 | Refills: 0 | Status: DISCONTINUED | OUTPATIENT
Start: 2018-09-18 | End: 2018-09-22

## 2018-09-18 RX ORDER — POTASSIUM CHLORIDE 20 MEQ
20 PACKET (EA) ORAL
Qty: 0 | Refills: 0 | Status: COMPLETED | OUTPATIENT
Start: 2018-09-18 | End: 2018-09-19

## 2018-09-18 RX ADMIN — LIDOCAINE 10 MILLILITER(S): 4 CREAM TOPICAL at 17:43

## 2018-09-18 RX ADMIN — MORPHINE SULFATE 4 MILLIGRAM(S): 50 CAPSULE, EXTENDED RELEASE ORAL at 00:56

## 2018-09-18 RX ADMIN — ONDANSETRON 4 MILLIGRAM(S): 8 TABLET, FILM COATED ORAL at 18:28

## 2018-09-18 RX ADMIN — FAMOTIDINE 20 MILLIGRAM(S): 10 INJECTION INTRAVENOUS at 17:43

## 2018-09-18 RX ADMIN — SODIUM CHLORIDE 1000 MILLILITER(S): 9 INJECTION INTRAMUSCULAR; INTRAVENOUS; SUBCUTANEOUS at 22:30

## 2018-09-18 RX ADMIN — SODIUM CHLORIDE 2000 MILLILITER(S): 9 INJECTION, SOLUTION INTRAVENOUS at 02:13

## 2018-09-18 RX ADMIN — LIDOCAINE 10 MILLILITER(S): 4 CREAM TOPICAL at 00:34

## 2018-09-18 RX ADMIN — Medication 30 MILLILITER(S): at 00:12

## 2018-09-18 RX ADMIN — FAMOTIDINE 20 MILLIGRAM(S): 10 INJECTION INTRAVENOUS at 00:12

## 2018-09-18 RX ADMIN — MORPHINE SULFATE 4 MILLIGRAM(S): 50 CAPSULE, EXTENDED RELEASE ORAL at 21:18

## 2018-09-18 RX ADMIN — ONDANSETRON 4 MILLIGRAM(S): 8 TABLET, FILM COATED ORAL at 00:12

## 2018-09-18 RX ADMIN — Medication 1 GRAM(S): at 18:28

## 2018-09-18 RX ADMIN — SODIUM CHLORIDE 1000 MILLILITER(S): 9 INJECTION INTRAMUSCULAR; INTRAVENOUS; SUBCUTANEOUS at 18:28

## 2018-09-18 RX ADMIN — MORPHINE SULFATE 4 MILLIGRAM(S): 50 CAPSULE, EXTENDED RELEASE ORAL at 20:35

## 2018-09-18 RX ADMIN — MORPHINE SULFATE 4 MILLIGRAM(S): 50 CAPSULE, EXTENDED RELEASE ORAL at 00:12

## 2018-09-18 RX ADMIN — PANTOPRAZOLE SODIUM 40 MILLIGRAM(S): 20 TABLET, DELAYED RELEASE ORAL at 17:43

## 2018-09-18 RX ADMIN — Medication 30 MILLILITER(S): at 17:43

## 2018-09-18 RX ADMIN — SODIUM CHLORIDE 2000 MILLILITER(S): 9 INJECTION, SOLUTION INTRAVENOUS at 00:17

## 2018-09-18 NOTE — ED PROVIDER NOTE - MEDICAL DECISION MAKING DETAILS
32 y/o female with Hx of gastritis and PUD presenting to ED due to epigastric pain. She came yesterday due to similar symptoms and was discharged home after being medically cleared and treated with symptomatic medications. Symptoms are the same as previous episodes of acute gastritis and PUD. Will do basic labs, symptomatic treatment and reassess.  ATTG: Dr. Jacob

## 2018-09-18 NOTE — ED ADULT NURSE NOTE - OBJECTIVE STATEMENT
33y Female PMH gastritis, anemia presents to the ED c/o abdominal pain. Pt states that 3 days ago she had sudden onset of diffuse upper abdominal pain associated with nausea and vomiting. Pt was seen in ED yesterday and states she should not have been sent home. Pt was sent home on Pantoprazole with no relief. Pt called her GI doctor, MD Jennings today and was told to come in because her pain is not getting better. 33y Female PMH gastritis, anemia presents to the ED c/o abdominal pain. Pt states that 3 days ago she had sudden onset of diffuse upper abdominal pain associated with nausea and vomiting. Pt was seen in ED yesterday and states she should not have been sent home. Pt was sent home on Pantoprazole with no relief. Pt called her GI doctor, MD Anaya today and was told to come in because her pain is not getting better and he wants her admitted. Pt reporting burning epigastric pain with radiation to the back. Pt reports the pain is constant and does not improve with Tylenol. Pt states it is similar pain to when she has gastritis and states she has only been able to tolerate liquids for the last three days. Pt was annoyed that she was discharged yesterday, stating she should have gotten an MRI or an endoscopy. Pt states she has 3 ulcers and states they are from stress and that her father passed away recently after she took care of him herself for 3 years. Pt presents a&oX4, crying, hunched over in pain, airway intact, breathing spontaneously and unlabored, abd soft, nondistended, tender to palpation in upper quadrants, +bowel sounds, skin warm dry and intact, +peripheral pulses, cap refill <2 seconds, denies CP, SOB, fever/chills, diarrhea, melena, dysuria, hematuria. MD at bedside for eval. safety maintained

## 2018-09-18 NOTE — ED PROVIDER NOTE - CARE PLAN
Principal Discharge DX:	Gastritis  Assessment and plan of treatment:	Please follow up with a gastroenterologist in regards to your symptoms.  The number for the gastroenterology clinic is [phone].   Take Tylenol 1g every six hours as needed for pain.  Please also take pepcid 40 mg once daily at night and maalox 20 mL 4x a day as needed for your symptoms, both of which can be bought over the counter.    Drink at least 2 Liters or 64 Ounces of water each day.  Return for any persistent, worsening symptoms, or ANY concerns at all.

## 2018-09-18 NOTE — ED ADULT NURSE REASSESSMENT NOTE - NS ED NURSE REASSESS COMMENT FT1
Pt is starting to calm down, resting comfortably in bed in no acute distress. Pt trying to sleep at this time. Fluids running. safety maintained

## 2018-09-18 NOTE — H&P ADULT - ATTENDING COMMENTS
NIGHT HOSPITALIST:   Patient aware of course and agrees with plan/care as above.   Emotional support provided to patient.   Care reviewed with covering NP.  Care assumed by the DAY HOSPITALIST.    Trenton Conner MD  [phone]

## 2018-09-18 NOTE — ED ADULT NURSE NOTE - NSIMPLEMENTINTERV_GEN_ALL_ED
Implemented All Universal Safety Interventions:  [place] to call system. Call bell, personal items and telephone within reach. Instruct patient to call for assistance. Room bathroom lighting operational. Non-slip footwear when patient is off stretcher. Physically safe environment: no spills, clutter or unnecessary equipment. Stretcher in lowest position, wheels locked, appropriate side rails in place.

## 2018-09-18 NOTE — ED PROVIDER NOTE - OBJECTIVE STATEMENT
32 yo female presenting with 2 day hx of severe worsening burning epigastric pain with radiation to the back.  worse with movement and vomiting, not improved with tylenol.  endorses vomiting 10 times since the onset of symptoms.  hx of multiple gastric ulcers.    pcp- janice walter

## 2018-09-18 NOTE — ED PROVIDER NOTE - ATTENDING CONTRIBUTION TO CARE
Patient with history of gastric ulcers presenting c/o 2 days of epigastric pains radiating to the back with associated nausea.  Not taking any home antiemetics.  Denying alcohol use.  No radiation of pain into chest.  On exam patient uncomfortable but non toxic, RRR S1/S2, lungs clear to ascultation bilaterally, abdomen soft, tenderness to palpation epigastrum without rebound/guarding.  Possible gastritis exacerbation also concerned for possible pancreatitis - plan for labs, GI cocktail, intravenous fluids reassess.

## 2018-09-18 NOTE — ED PROVIDER NOTE - PLAN OF CARE
Please follow up with a gastroenterologist in regards to your symptoms.  The number for the gastroenterology clinic is [phone].   Take Tylenol 1g every six hours as needed for pain.  Please also take pepcid 40 mg once daily at night and maalox 20 mL 4x a day as needed for your symptoms, both of which can be bought over the counter.    Drink at least 2 Liters or 64 Ounces of water each day.  Return for any persistent, worsening symptoms, or ANY concerns at all.

## 2018-09-18 NOTE — H&P ADULT - ASSESSMENT
NIGHT HOSPITALIST: NIGHT HOSPITALIST:  Presentation of suspected PUD exacerbation--appreciate GI evaluation by Dr. Anaya.  For planned EGD as above.   Will request social work evaluation.   Patient agrees to psychiatry evaluation in the AM.  Emotional support provided to patient.   Patient agrees to remain abstinent from tobacco and agrees to the low dose Nicoderm patch.

## 2018-09-18 NOTE — H&P ADULT - HISTORY OF PRESENT ILLNESS
NIGHT HOSPITALIST:  Patient UNKNOWN to me previously, assigned to me at this point via the ER and by Dr. Willard to admit this 34 y/o F--patient followed by Dr. Willard and by Dr. Anaya in the office--patient with a history of known distal oesophagitis, non bleeding DU with no stigmata of bleeding following EGD in May 2018--referred to the ER by GI following 3 days of persistent epigastric pain with burning, nausea.    Patient reports that her symptoms have worsened since his father recent passing away, with patient with resentment toward her two siblings with patient predominantly caring for her father (who apparently succumbed to ethanol related issues) by herself  for the past 3 years.    Patient is the sole support of her 4 children and is currently estranged from her significant other but denies domestic violence issues.   No chest pain/pressure.   No dyspnea.   No red blood per rectum or melena.  No back pain, no tearing back pain.   No fever, no chills, no rigors.  Patient with unspecified weight loss.  NO HA, no focal weakness.  No dysuria, no haematuria.  Denies suicidal or homicidal ideation.  Remaining review of systems not contributory.

## 2018-09-18 NOTE — ED PROVIDER NOTE - NS ED ROS FT
Constitutional: no fever  Eyes: no conjunctivitis  Ears: no ear pain   Nose: no nasal congestion, Mouth/Throat: no throat pain, Neck: no stiffness  Cardiovascular: no chest pain  Chest: no cough  Gastrointestinal: + abdominal pain, + vomiting and no diarrhea  MSK: no joint pain  : no dysuria  Skin: no rash  Neuro: no LOC

## 2018-09-18 NOTE — ED PROVIDER NOTE - MEDICAL DECISION MAKING DETAILS
32 yo female with epigastric pain; rule out pancreatitis vs gastritis; will obtain labs fluids pain control --> re eval

## 2018-09-18 NOTE — H&P ADULT - NSHPPHYSICALEXAM_GEN_ALL_CORE
Physical exam with a middle aged, chronically ill appearing F, mild diffuse sarcopenia. Physical exam with a middle aged, chronically ill appearing F, mild diffuse sarcopenia.  TM 99.3F.  HR  66   RR 14.   BP  102/60  100% on RA.   HEENT< PERRL< EOMI, neck supple, chest clear, cor s1 s2, declined breast exam.   Abdomen soft normal bowel sounds, mild epigastric tenderness but no rebound.  Ext with mild sarcopenia but no oedema.   NO cyanosis.  Skin dry.   Affect mildly anhedonic, occasional tearful.   No SI/HI.   Neurologic exam AXOx3.  Speech fluent.   Cognition intact.   UE/LE 5/5.

## 2018-09-18 NOTE — ED PROVIDER NOTE - OBJECTIVE STATEMENT
34 yo female presenting with 3 day hx of severe worsening burning epigastric pain with radiation to the back.  pain is constant and not improved with tylenol.  hx of multiple gastric ulcers. patient was seen by her GI doctor Dr Anaya earlier today and sent in for possible MRI. patient reports symptoms are the same as her typical gastritis symptoms. no fever/chills. no diarrhea. no dark stools or brbpr. pain is constant and she states she has not been taking her ppi's because she is too uncomfortable. one episode of vomiting since yesterday.

## 2018-09-18 NOTE — H&P ADULT - PROBLEM SELECTOR PLAN 2
Chest radiograph ordered.    Would clarify patient's reported (?)LTBI but clinically patient with no acute pulmonary symptoms--would clarify history with Dr. Willard.   Agrees to Barbara cary.

## 2018-09-18 NOTE — ED PROVIDER NOTE - PHYSICAL EXAMINATION
gen: uncomfortable  Mentation: AAO x 3  psych: mood appropriate  ENT: airway patent  Eyes: conjunctivae clear bilaterally  Cardio: RRR, no m/r/g  Resp: normal BS b/l  GI: s/epigastric tenderness/nd   Neuro: AAO x 3, sensation and motor function intact, CN 2-12 intact  Skin: No evidence of rash  MSK: normal movement of all extremities

## 2018-09-18 NOTE — H&P ADULT - NSHPLABSRESULTS_GEN_ALL_CORE
WBC 9.0.  Hgb 11.5.   Platelets of 417K.  K+ 3.2>>supplementation ordered (not done in the ER).  Cr 0.7.  Random glucose of 100.  U/A with 30 protein.  urine pregnancy negative.  EKG tracing reviewed with NSR at 65 with QTc 432.  Chest radiograph ordered.    EGD from May 2018 with distal oesophagitis.  One nonbleeding DU with no bleeding stigmata. WBC 9.0.  Hgb 11.5.   Platelets of 417K.  K+ 3.2>>supplementation ordered (not done in the ER).  Cr 0.7.  Random glucose of 100.  U/A with 30 protein.  urine pregnancy negative.  EKG tracing reviewed with NSR at 65 with QTc 432.  Chest radiograph ordered>>and reviewed with  no infiltrate or effusion.    EGD from May 2018 with distal oesophagitis.  One nonbleeding DU with no bleeding stigmata.

## 2018-09-19 DIAGNOSIS — F43.22 ADJUSTMENT DISORDER WITH ANXIETY: ICD-10-CM

## 2018-09-19 DIAGNOSIS — F43.20 ADJUSTMENT DISORDER, UNSPECIFIED: ICD-10-CM

## 2018-09-19 DIAGNOSIS — E87.6 HYPOKALEMIA: ICD-10-CM

## 2018-09-19 DIAGNOSIS — K26.9 DUODENAL ULCER, UNSPECIFIED AS ACUTE OR CHRONIC, WITHOUT HEMORRHAGE OR PERFORATION: ICD-10-CM

## 2018-09-19 DIAGNOSIS — F17.200 NICOTINE DEPENDENCE, UNSPECIFIED, UNCOMPLICATED: ICD-10-CM

## 2018-09-19 LAB
ANION GAP SERPL CALC-SCNC: 11 MMOL/L — SIGNIFICANT CHANGE UP (ref 5–17)
APTT BLD: 67.5 SEC — HIGH (ref 27.5–37.4)
BASOPHILS # BLD AUTO: 0.03 K/UL — SIGNIFICANT CHANGE UP (ref 0–0.2)
BASOPHILS NFR BLD AUTO: 0.5 % — SIGNIFICANT CHANGE UP (ref 0–2)
BLD GP AB SCN SERPL QL: NEGATIVE — SIGNIFICANT CHANGE UP
BUN SERPL-MCNC: 8 MG/DL — SIGNIFICANT CHANGE UP (ref 7–23)
CALCIUM SERPL-MCNC: 9.4 MG/DL — SIGNIFICANT CHANGE UP (ref 8.4–10.5)
CHLORIDE SERPL-SCNC: 101 MMOL/L — SIGNIFICANT CHANGE UP (ref 96–108)
CO2 SERPL-SCNC: 24 MMOL/L — SIGNIFICANT CHANGE UP (ref 22–31)
CREAT ?TM UR-MCNC: 529 MG/DL — SIGNIFICANT CHANGE UP
CREAT SERPL-MCNC: 0.84 MG/DL — SIGNIFICANT CHANGE UP (ref 0.5–1.3)
EOSINOPHIL # BLD AUTO: 0.04 K/UL — SIGNIFICANT CHANGE UP (ref 0–0.5)
EOSINOPHIL NFR BLD AUTO: 0.7 % — SIGNIFICANT CHANGE UP (ref 0–6)
GLUCOSE SERPL-MCNC: 92 MG/DL — SIGNIFICANT CHANGE UP (ref 70–99)
HCT VFR BLD CALC: 30.1 % — LOW (ref 34.5–45)
HGB BLD-MCNC: 9.7 G/DL — LOW (ref 11.5–15.5)
IMM GRANULOCYTES NFR BLD AUTO: 0.2 % — SIGNIFICANT CHANGE UP (ref 0–1.5)
INR BLD: 1.06 RATIO — SIGNIFICANT CHANGE UP (ref 0.88–1.16)
LYMPHOCYTES # BLD AUTO: 2.57 K/UL — SIGNIFICANT CHANGE UP (ref 1–3.3)
LYMPHOCYTES # BLD AUTO: 41.9 % — SIGNIFICANT CHANGE UP (ref 13–44)
MCHC RBC-ENTMCNC: 24.1 PG — LOW (ref 27–34)
MCHC RBC-ENTMCNC: 32.2 GM/DL — SIGNIFICANT CHANGE UP (ref 32–36)
MCV RBC AUTO: 74.7 FL — LOW (ref 80–100)
MONOCYTES # BLD AUTO: 0.56 K/UL — SIGNIFICANT CHANGE UP (ref 0–0.9)
MONOCYTES NFR BLD AUTO: 9.1 % — SIGNIFICANT CHANGE UP (ref 2–14)
NEUTROPHILS # BLD AUTO: 2.93 K/UL — SIGNIFICANT CHANGE UP (ref 1.8–7.4)
NEUTROPHILS NFR BLD AUTO: 47.6 % — SIGNIFICANT CHANGE UP (ref 43–77)
PLATELET # BLD AUTO: 352 K/UL — SIGNIFICANT CHANGE UP (ref 150–400)
POTASSIUM SERPL-MCNC: 3.4 MMOL/L — LOW (ref 3.5–5.3)
POTASSIUM SERPL-SCNC: 3.4 MMOL/L — LOW (ref 3.5–5.3)
PROT ?TM UR-MCNC: 71 MG/DL — HIGH (ref 0–12)
PROT/CREAT UR-RTO: 0.1 RATIO — SIGNIFICANT CHANGE UP (ref 0–0.2)
PROTHROM AB SERPL-ACNC: 12 SEC — SIGNIFICANT CHANGE UP (ref 10–13.1)
RBC # BLD: 4.03 M/UL — SIGNIFICANT CHANGE UP (ref 3.8–5.2)
RBC # FLD: 17 % — HIGH (ref 10.3–14.5)
RH IG SCN BLD-IMP: POSITIVE — SIGNIFICANT CHANGE UP
SODIUM SERPL-SCNC: 136 MMOL/L — SIGNIFICANT CHANGE UP (ref 135–145)
WBC # BLD: 6.14 K/UL — SIGNIFICANT CHANGE UP (ref 3.8–10.5)
WBC # FLD AUTO: 6.14 K/UL — SIGNIFICANT CHANGE UP (ref 3.8–10.5)

## 2018-09-19 PROCEDURE — 99233 SBSQ HOSP IP/OBS HIGH 50: CPT

## 2018-09-19 PROCEDURE — 99222 1ST HOSP IP/OBS MODERATE 55: CPT

## 2018-09-19 PROCEDURE — 93010 ELECTROCARDIOGRAM REPORT: CPT

## 2018-09-19 PROCEDURE — 71045 X-RAY EXAM CHEST 1 VIEW: CPT | Mod: 26

## 2018-09-19 RX ORDER — ONDANSETRON 8 MG/1
4 TABLET, FILM COATED ORAL EVERY 6 HOURS
Qty: 0 | Refills: 0 | Status: DISCONTINUED | OUTPATIENT
Start: 2018-09-19 | End: 2018-09-21

## 2018-09-19 RX ORDER — SERTRALINE 25 MG/1
50 TABLET, FILM COATED ORAL DAILY
Qty: 0 | Refills: 0 | Status: DISCONTINUED | OUTPATIENT
Start: 2018-09-19 | End: 2018-09-22

## 2018-09-19 RX ORDER — DEXTROSE MONOHYDRATE, SODIUM CHLORIDE, AND POTASSIUM CHLORIDE 50; .745; 4.5 G/1000ML; G/1000ML; G/1000ML
1000 INJECTION, SOLUTION INTRAVENOUS
Qty: 0 | Refills: 0 | Status: DISCONTINUED | OUTPATIENT
Start: 2018-09-19 | End: 2018-09-21

## 2018-09-19 RX ADMIN — SERTRALINE 50 MILLIGRAM(S): 25 TABLET, FILM COATED ORAL at 16:57

## 2018-09-19 RX ADMIN — MORPHINE SULFATE 2 MILLIGRAM(S): 50 CAPSULE, EXTENDED RELEASE ORAL at 16:57

## 2018-09-19 RX ADMIN — Medication 650 MILLIGRAM(S): at 03:45

## 2018-09-19 RX ADMIN — Medication 650 MILLIGRAM(S): at 02:46

## 2018-09-19 RX ADMIN — Medication 20 MILLIEQUIVALENT(S): at 02:46

## 2018-09-19 RX ADMIN — PANTOPRAZOLE SODIUM 40 MILLIGRAM(S): 20 TABLET, DELAYED RELEASE ORAL at 05:22

## 2018-09-19 RX ADMIN — Medication 0.5 MILLIGRAM(S): at 20:15

## 2018-09-19 RX ADMIN — PANTOPRAZOLE SODIUM 40 MILLIGRAM(S): 20 TABLET, DELAYED RELEASE ORAL at 16:57

## 2018-09-19 RX ADMIN — MORPHINE SULFATE 2 MILLIGRAM(S): 50 CAPSULE, EXTENDED RELEASE ORAL at 21:50

## 2018-09-19 RX ADMIN — MORPHINE SULFATE 2 MILLIGRAM(S): 50 CAPSULE, EXTENDED RELEASE ORAL at 10:16

## 2018-09-19 RX ADMIN — Medication 1 PATCH: at 12:06

## 2018-09-19 RX ADMIN — MORPHINE SULFATE 2 MILLIGRAM(S): 50 CAPSULE, EXTENDED RELEASE ORAL at 04:20

## 2018-09-19 RX ADMIN — MORPHINE SULFATE 2 MILLIGRAM(S): 50 CAPSULE, EXTENDED RELEASE ORAL at 17:15

## 2018-09-19 RX ADMIN — Medication 20 MILLIEQUIVALENT(S): at 00:07

## 2018-09-19 RX ADMIN — MORPHINE SULFATE 2 MILLIGRAM(S): 50 CAPSULE, EXTENDED RELEASE ORAL at 10:00

## 2018-09-19 RX ADMIN — Medication 1 TABLET(S): at 16:47

## 2018-09-19 RX ADMIN — MORPHINE SULFATE 2 MILLIGRAM(S): 50 CAPSULE, EXTENDED RELEASE ORAL at 00:07

## 2018-09-19 RX ADMIN — ONDANSETRON 4 MILLIGRAM(S): 8 TABLET, FILM COATED ORAL at 16:48

## 2018-09-19 RX ADMIN — MORPHINE SULFATE 2 MILLIGRAM(S): 50 CAPSULE, EXTENDED RELEASE ORAL at 21:35

## 2018-09-19 RX ADMIN — Medication 30 MILLILITER(S): at 03:59

## 2018-09-19 RX ADMIN — MORPHINE SULFATE 2 MILLIGRAM(S): 50 CAPSULE, EXTENDED RELEASE ORAL at 04:04

## 2018-09-19 RX ADMIN — ONDANSETRON 4 MILLIGRAM(S): 8 TABLET, FILM COATED ORAL at 02:52

## 2018-09-19 RX ADMIN — DEXTROSE MONOHYDRATE, SODIUM CHLORIDE, AND POTASSIUM CHLORIDE 75 MILLILITER(S): 50; .745; 4.5 INJECTION, SOLUTION INTRAVENOUS at 12:09

## 2018-09-19 NOTE — BEHAVIORAL HEALTH ASSESSMENT NOTE - RISK ASSESSMENT
Risk factors:  recent loss, not receiving treatment, acute and chronic medical conditions    Protective factors: no current SIIP/HIIP, no h/o SA/SIB, no h/o psych admissions, no access to weapons, no active substance abuse, good physical health, no psychosis, engaged in work or school, dependent children, domiciled, social supports, positive therapeutic relationship, engaged in treatment, help-seeking behaviors    Overall, pt is a low risk of harm to self/others and does not require psychiatric admission for safety and stabilization.

## 2018-09-19 NOTE — DIETITIAN INITIAL EVALUATION ADULT. - ENERGY NEEDS
Pt seen for nutrition consult for peptic ulcer disease. Pt with PMH including distal esophagitis, history of duodenal ulcer now admitted with 3 days abdominal pain and nausea, plan for EGD tomorrow.     Ht: 5'4" , Wt: 145 lbs, BMI: 24.9 kg/m2, IBW: 120 lbs +/- 10%, %IBW: 121%  No edema, Skin intact

## 2018-09-19 NOTE — CONSULT NOTE ADULT - SUBJECTIVE AND OBJECTIVE BOX
SUBJECTIVE:  33yFemale, patient of my partner Dr. Rory Anaya.  She called him complaining that she was nauseated, vomiting, could not eat (stated she was only drinking liquids) and was losing weight.  History of duodenal ulcer.  The patient was advised to come to the hospital for further management and for a follow up EGD.  The patient came in last night.  Per patient, she drank some liquids this morning, and she threw it up.  Not complaining of pain right now.  Denies melena, BRBPR, diarrhea, constipation.    ______________________________________________________________________  PMH/PSH:  PAST MEDICAL & SURGICAL HISTORY:  Anemia  Pancreatitis, chronic  TB (pulmonary tuberculosis)  No significant past surgical history    ______________________________________________________________________  MEDS:  MEDICATIONS  (STANDING):  nicotine -   7 mG/24Hr(s) Patch 1 patch Transdermal daily  pantoprazole  Injectable 40 milliGRAM(s) IV Push every 12 hours    MEDICATIONS  (PRN):  acetaminophen   Tablet .. 650 milliGRAM(s) Oral every 6 hours PRN Mild Pain (1 - 3)  morphine  - Injectable 2 milliGRAM(s) IV Push every 4 hours PRN Moderate Pain (4 - 6)  ondansetron Injectable 4 milliGRAM(s) IV Push every 12 hours PRN Nausea    ______________________________________________________________________  ALL: No Known Allergies  ______________________________________________________________________  SH:  Estranged from her , has 4 children.  ______________________________________________________________________  FH:  Family history of alcohol abuse (Father): father  , alcohol related issues    ______________________________________________________________________  ROS:  REVIEW OF SYSTEMS  General:	Weight loss.  States that she used to be a size 16, but recently had to buy size 10 clothes.  Ophthalmologic:  No eye problems.  Respiratory and Thorax:  Denies cough, SOB  Cardiovascular:	 No CP  Gastrointestinal:	  Above  Genitourinary:	No urinary complaints  Musculoskeletal:	  No joint complaints.  States that her leg muscles are "cramping up", and thinks that the pneumatic venodynes help her.  Neurological:	No weakness, no HA.  Psychiatric:	No complaints.  ______________________________________________________________________  PHYSICAL EXAM:  T(C): 36.9 (18 @ 07:41), Max: 37.4 (18 @ 22:56)  HR: 76 (18 @ 07:41)  BP: 98/68 (18 @ 07:41)  RR: 18 (18 @ 07:41)  SpO2: 100% (18 @ 07:41)  Wt(kg): --  PHYSICAL EXAM:  Constitutional:  Sleeping but easily aroused.  No complaints.  HEENT:  Anicteric.  Respiratory:  Clear  Cardiovascular:  Regular  Gastrointestinal:  Soft and NT  Extremities:  Wearing venodynes  Neurological:  Non-focal.    ______________________________________________________________________  LABS:                        11.5   9.0   )-----------( 417      ( 18 Sep 2018 17:47 )             35.9     -    136  |  101  |  8   ----------------------------<  92  3.4<L>   |  24  |  0.84    Ca    9.4      19 Sep 2018 06:57    TPro  7.8  /  Alb  4.6  /  TBili  0.6  /  DBili  x   /  AST  20  /  ALT  12  /  AlkPhos  54      LIVER FUNCTIONS - ( 18 Sep 2018 17:47 )  Alb: 4.6 g/dL / Pro: 7.8 g/dL / ALK PHOS: 54 U/L / ALT: 12 U/L / AST: 20 U/L / GGT: x             ______________________________________________________________________  IMAGING:    ______________________________________________________________________  ASSESSMENT:  34 yo woman with history of DU, admitted with epig pain, N/V, poor PO intake and weight loss.      PLAN:  - EGD tomorrow at 9:30 am with Dr. Anaya to reassess the DU, check for other pathology in the esophagus, stomach, duodenum.  - Continue PPI treatment.  - Continue clear liquid diet today, as tolerated.  NPO after midnight for the EGD.        Fiorella Suarez MD  (O) [phone]

## 2018-09-19 NOTE — DIETITIAN INITIAL EVALUATION ADULT. - NS AS NUTRI INTERV ED CONTENT
Reviewed peptic ulcer nutrition therapy specifically low fat, avoidance of gastric irritants and caffeine, smaller/more frequent meals

## 2018-09-19 NOTE — DIETITIAN INITIAL EVALUATION ADULT. - PHYSICAL APPEARANCE
Nutrition focused physical exam conducted with verbal consent from patient, pt appears well developed with no overt muscle mass or body fat depletion at this time, pt has noticed more loose skin at the arms since initial weight loss
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
## 2018-09-19 NOTE — BEHAVIORAL HEALTH ASSESSMENT NOTE - NSBHSUICPROTECTFACT_PSY_A_CORE
Identifies reasons for living/Future oriented/Supportive social network or family/Responsibility to family and others/High spirituality/Positive therapeutic relationships

## 2018-09-19 NOTE — DIETITIAN INITIAL EVALUATION ADULT. - OTHER INFO
Pt reports history of 20 lb wt loss just prior to diagnosis of duodenal ulcer from UBW range of 165-170 lbs, pt stated her weight has been holding steady ~145 lbs since ~5/2018  (noted previous admission weight 144.6 lbs 5/23/2018) and stated she feels she may have gained weight recently, no objective weight available at this time, pt with stated weight of 145 lbs-obtain weight as able. Pt with no food allergies, takes fish oil and multivitamin at home. Pt with N+V, last vomited 6am this morning, pt with no complaints of nausea at time of visit, pt with last documented BM 9/15. Pt with partial upper dentures which fit her well, no difficulty chewing/swallowing. In house pt was able to tolerate fruit ice this morning as well as sips of ginger ale, pt looking forward to fruit ice for lunch, pt accepted ensure clear.

## 2018-09-19 NOTE — BEHAVIORAL HEALTH ASSESSMENT NOTE - NSBHCHARTREVIEWLAB_PSY_A_CORE FT
9.7    6.14  )-----------( 352      ( 19 Sep 2018 07:59 )             30.1         136  |  101  |  8   ----------------------------<  92  3.4<L>   |  24  |  0.84    Ca    9.4      19 Sep 2018 06:57    TPro  7.8  /  Alb  4.6  /  TBili  0.6  /  DBili  x   /  AST  20  /  ALT  12  /  AlkPhos  54      Urinalysis Basic - ( 18 Sep 2018 19:30 )    Color: Yellow / Appearance: Clear / S.038 / pH: x  Gluc: x / Ketone: Moderate  / Bili: Small / Urobili: 2 mg/dL   Blood: x / Protein: 30 mg/dL / Nitrite: Negative   Leuk Esterase: Negative / RBC: 116 /hpf / WBC 3 /hpf   Sq Epi: x / Non Sq Epi: 4 /hpf / Bacteria: Negative

## 2018-09-19 NOTE — PROVIDER CONTACT NOTE (OTHER) - ACTION/TREATMENT ORDERED:
VIK Roper made aware, pt not receiving any IV medications except IVP zofran and morphine and already has one working IV.

## 2018-09-19 NOTE — DIETITIAN INITIAL EVALUATION ADULT. - ADHERENCE
Pt diligently monitors her intake of fat, salt, and gastric irritants since onset of ulcers, stated she will consume mostly bland foods low in fat and sodium  with no excessively acidic foods including tomato sauce and orange juice, pt will also avoid caffeine

## 2018-09-19 NOTE — DIETITIAN INITIAL EVALUATION ADULT. - NS AS NUTRI INTERV MEALS SNACK
1. Advance diet as tolerated to goal of regular diet, 2. While pt remains on clears consider adding ensure clear 2 x daily (240 Kcal, 8g protein per 8 oz serving), 3. Consider adding multivitamin 1 daily, 4. Continue to encourage po intake and obtain/honor food preferences as able, 5. monitor diet advancement, tolerance, wt trends, labs, skin integrity, 6. RD to remain available as needed

## 2018-09-19 NOTE — BEHAVIORAL HEALTH ASSESSMENT NOTE - NSBHCHARTREVIEWVS_PSY_A_CORE FT
Vital Signs Last 24 Hrs  T(C): 36.9 (19 Sep 2018 07:41), Max: 37.4 (18 Sep 2018 22:56)  T(F): 98.5 (19 Sep 2018 07:41), Max: 99.3 (18 Sep 2018 22:56)  HR: 78 (19 Sep 2018 16:12) (50 - 82)  BP: 127/80 (19 Sep 2018 16:12) (98/68 - 144/88)  BP(mean): --  RR: 18 (19 Sep 2018 16:12) (16 - 18)  SpO2: 99% (19 Sep 2018 16:12) (97% - 100%)
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
## 2018-09-19 NOTE — BEHAVIORAL HEALTH ASSESSMENT NOTE - SUMMARY
32 y/o AA female single, with a boyfriend, employed as a  at [employer], with 4 dependents (currently being cared for by patient's mother), domiciled in an apt in [place], with no past psychiatric history, inpatient hospitalizations, substance abuse, suicide attempts or SIB. with PMHx known distal oesophagitis, non bleeding DU with no stigmata of bleeding following EGD in May 2018--referred to the ER by GI following 3 days of persistent epigastric pain with burning, nausea.  Patient reports that her symptoms have worsened since his father recent passing away, with patient with resentment toward her two siblings with patient predominantly caring for her father (who apparently succumbed to ethanol related issues) by herself  for the past 3 years.  Patient is the sole support of her 4 children and is currently estranged from her significant other but denies domestic violence issues.   Psychiatry consulted to assess patient for anxiety.  Patient seen and evaluated, reports anxiety related to her medical conditions and grieving over the death of her father whom she was close to.  Reports family psychosocial stressors.  Reports resentment towards her siblings for not helping with the care of her father.  Reports poor sleep and poor appetite.  Denies S/H/I/I/P, A/V/H, or thoughts of paranoia.  Reports she has her children and spirituality to live for.  Recommend: Discontinuing amitriptyline and start Zoloft 50mg po daily.  PRN: Ativan 0.5mg po prn q8 for anxiety and melatonin 3mg po prn qhs insomnia.

## 2018-09-19 NOTE — BEHAVIORAL HEALTH ASSESSMENT NOTE - CASE SUMMARY
32 y/o AA female single, with a boyfriend, employed as a  at [employer], with 4 dependents (currently being cared for by patient's mother), domiciled in an apt in [place], with no past psychiatric history, inpatient hospitalizations, substance abuse, suicide attempts or SIB. with PMHx known distal oesophagitis, non bleeding DU with no stigmata of bleeding following EGD in May 2018--referred to the ER by GI following 3 days of persistent epigastric pain with burning, nausea.  Patient reports that her symptoms have worsened since his father recent passing away, with patient with resentment toward her two siblings with patient predominantly caring for her father (who apparently succumbed to ethanol related issues) by herself  for the past 3 years.  Patient is the sole support of her 4 children and is currently estranged from her significant other but denies domestic violence issues.   Psychiatry consulted to assess patient for anxiety.  Patient seen and evaluated, awake and alert, reports feeling more anxious, experiencing panic attacks especially at night with symptoms of SOB, sweating, and nervousness which lasts a few minutes. pt denies depressed mood, no si/hi, c/o poor sleep, no psychosis, or blade. pt was taking amitriptyline 10mg prn. rec d/c amitryptiline, start zoloft 50mg po daily, pt can f/u at [facility]

## 2018-09-20 ENCOUNTER — RESULT REVIEW (OUTPATIENT)
Age: 33
End: 2018-09-20

## 2018-09-20 LAB
ANION GAP SERPL CALC-SCNC: 10 MMOL/L — SIGNIFICANT CHANGE UP (ref 5–17)
BUN SERPL-MCNC: 6 MG/DL — LOW (ref 7–23)
CALCIUM SERPL-MCNC: 9.4 MG/DL — SIGNIFICANT CHANGE UP (ref 8.4–10.5)
CHLORIDE SERPL-SCNC: 103 MMOL/L — SIGNIFICANT CHANGE UP (ref 96–108)
CO2 SERPL-SCNC: 24 MMOL/L — SIGNIFICANT CHANGE UP (ref 22–31)
CREAT SERPL-MCNC: 0.77 MG/DL — SIGNIFICANT CHANGE UP (ref 0.5–1.3)
GLUCOSE SERPL-MCNC: 89 MG/DL — SIGNIFICANT CHANGE UP (ref 70–99)
HCT VFR BLD CALC: 32.2 % — LOW (ref 34.5–45)
HGB BLD-MCNC: 10.2 G/DL — LOW (ref 11.5–15.5)
MCHC RBC-ENTMCNC: 24.1 PG — LOW (ref 27–34)
MCHC RBC-ENTMCNC: 31.7 GM/DL — LOW (ref 32–36)
MCV RBC AUTO: 75.9 FL — LOW (ref 80–100)
PLATELET # BLD AUTO: 382 K/UL — SIGNIFICANT CHANGE UP (ref 150–400)
POTASSIUM SERPL-MCNC: 3.9 MMOL/L — SIGNIFICANT CHANGE UP (ref 3.5–5.3)
POTASSIUM SERPL-SCNC: 3.9 MMOL/L — SIGNIFICANT CHANGE UP (ref 3.5–5.3)
RBC # BLD: 4.24 M/UL — SIGNIFICANT CHANGE UP (ref 3.8–5.2)
RBC # FLD: 17.1 % — HIGH (ref 10.3–14.5)
SODIUM SERPL-SCNC: 137 MMOL/L — SIGNIFICANT CHANGE UP (ref 135–145)
WBC # BLD: 10.39 K/UL — SIGNIFICANT CHANGE UP (ref 3.8–10.5)
WBC # FLD AUTO: 10.39 K/UL — SIGNIFICANT CHANGE UP (ref 3.8–10.5)

## 2018-09-20 PROCEDURE — 99233 SBSQ HOSP IP/OBS HIGH 50: CPT

## 2018-09-20 PROCEDURE — 88305 TISSUE EXAM BY PATHOLOGIST: CPT | Mod: 26

## 2018-09-20 PROCEDURE — 76770 US EXAM ABDO BACK WALL COMP: CPT | Mod: 26

## 2018-09-20 PROCEDURE — 88312 SPECIAL STAINS GROUP 1: CPT | Mod: 26

## 2018-09-20 RX ORDER — POLYETHYLENE GLYCOL 3350 17 G/17G
17 POWDER, FOR SOLUTION ORAL DAILY
Qty: 0 | Refills: 0 | Status: DISCONTINUED | OUTPATIENT
Start: 2018-09-20 | End: 2018-09-22

## 2018-09-20 RX ORDER — CHLORHEXIDINE GLUCONATE 213 G/1000ML
1 SOLUTION TOPICAL
Qty: 0 | Refills: 0 | Status: DISCONTINUED | OUTPATIENT
Start: 2018-09-20 | End: 2018-09-22

## 2018-09-20 RX ADMIN — MORPHINE SULFATE 2 MILLIGRAM(S): 50 CAPSULE, EXTENDED RELEASE ORAL at 12:10

## 2018-09-20 RX ADMIN — MORPHINE SULFATE 2 MILLIGRAM(S): 50 CAPSULE, EXTENDED RELEASE ORAL at 01:55

## 2018-09-20 RX ADMIN — MORPHINE SULFATE 2 MILLIGRAM(S): 50 CAPSULE, EXTENDED RELEASE ORAL at 17:51

## 2018-09-20 RX ADMIN — MORPHINE SULFATE 2 MILLIGRAM(S): 50 CAPSULE, EXTENDED RELEASE ORAL at 22:40

## 2018-09-20 RX ADMIN — Medication 1 PATCH: at 11:55

## 2018-09-20 RX ADMIN — POLYETHYLENE GLYCOL 3350 17 GRAM(S): 17 POWDER, FOR SOLUTION ORAL at 11:55

## 2018-09-20 RX ADMIN — CHLORHEXIDINE GLUCONATE 1 APPLICATION(S): 213 SOLUTION TOPICAL at 11:56

## 2018-09-20 RX ADMIN — PANTOPRAZOLE SODIUM 40 MILLIGRAM(S): 20 TABLET, DELAYED RELEASE ORAL at 06:16

## 2018-09-20 RX ADMIN — SERTRALINE 50 MILLIGRAM(S): 25 TABLET, FILM COATED ORAL at 11:55

## 2018-09-20 RX ADMIN — MORPHINE SULFATE 2 MILLIGRAM(S): 50 CAPSULE, EXTENDED RELEASE ORAL at 18:10

## 2018-09-20 RX ADMIN — PANTOPRAZOLE SODIUM 40 MILLIGRAM(S): 20 TABLET, DELAYED RELEASE ORAL at 17:51

## 2018-09-20 RX ADMIN — Medication 1 TABLET(S): at 11:55

## 2018-09-20 RX ADMIN — Medication 1 PATCH: at 12:00

## 2018-09-20 RX ADMIN — MORPHINE SULFATE 2 MILLIGRAM(S): 50 CAPSULE, EXTENDED RELEASE ORAL at 02:10

## 2018-09-20 RX ADMIN — MORPHINE SULFATE 2 MILLIGRAM(S): 50 CAPSULE, EXTENDED RELEASE ORAL at 11:55

## 2018-09-20 RX ADMIN — MORPHINE SULFATE 2 MILLIGRAM(S): 50 CAPSULE, EXTENDED RELEASE ORAL at 22:55

## 2018-09-21 LAB
ANION GAP SERPL CALC-SCNC: 10 MMOL/L — SIGNIFICANT CHANGE UP (ref 5–17)
BUN SERPL-MCNC: 5 MG/DL — LOW (ref 7–23)
CALCIUM SERPL-MCNC: 9 MG/DL — SIGNIFICANT CHANGE UP (ref 8.4–10.5)
CHLORIDE SERPL-SCNC: 104 MMOL/L — SIGNIFICANT CHANGE UP (ref 96–108)
CO2 SERPL-SCNC: 23 MMOL/L — SIGNIFICANT CHANGE UP (ref 22–31)
CREAT SERPL-MCNC: 0.74 MG/DL — SIGNIFICANT CHANGE UP (ref 0.5–1.3)
GLUCOSE SERPL-MCNC: 78 MG/DL — SIGNIFICANT CHANGE UP (ref 70–99)
HCT VFR BLD CALC: 29.9 % — LOW (ref 34.5–45)
HGB BLD-MCNC: 9.8 G/DL — LOW (ref 11.5–15.5)
MCHC RBC-ENTMCNC: 24.3 PG — LOW (ref 27–34)
MCHC RBC-ENTMCNC: 32.8 GM/DL — SIGNIFICANT CHANGE UP (ref 32–36)
MCV RBC AUTO: 74.2 FL — LOW (ref 80–100)
PLATELET # BLD AUTO: 330 K/UL — SIGNIFICANT CHANGE UP (ref 150–400)
POTASSIUM SERPL-MCNC: 3.9 MMOL/L — SIGNIFICANT CHANGE UP (ref 3.5–5.3)
POTASSIUM SERPL-SCNC: 3.9 MMOL/L — SIGNIFICANT CHANGE UP (ref 3.5–5.3)
RBC # BLD: 4.03 M/UL — SIGNIFICANT CHANGE UP (ref 3.8–5.2)
RBC # FLD: 17.2 % — HIGH (ref 10.3–14.5)
SODIUM SERPL-SCNC: 137 MMOL/L — SIGNIFICANT CHANGE UP (ref 135–145)
WBC # BLD: 8.76 K/UL — SIGNIFICANT CHANGE UP (ref 3.8–10.5)
WBC # FLD AUTO: 8.76 K/UL — SIGNIFICANT CHANGE UP (ref 3.8–10.5)

## 2018-09-21 PROCEDURE — 99232 SBSQ HOSP IP/OBS MODERATE 35: CPT

## 2018-09-21 RX ORDER — OXYCODONE AND ACETAMINOPHEN 5; 325 MG/1; MG/1
1 TABLET ORAL EVERY 6 HOURS
Qty: 0 | Refills: 0 | Status: DISCONTINUED | OUTPATIENT
Start: 2018-09-21 | End: 2018-09-22

## 2018-09-21 RX ORDER — MULTIVIT WITH MIN/MFOLATE/K2 340-15/3 G
150 POWDER (GRAM) ORAL ONCE
Qty: 0 | Refills: 0 | Status: COMPLETED | OUTPATIENT
Start: 2018-09-21 | End: 2018-09-21

## 2018-09-21 RX ORDER — SODIUM CHLORIDE 9 MG/ML
1000 INJECTION INTRAMUSCULAR; INTRAVENOUS; SUBCUTANEOUS
Qty: 0 | Refills: 0 | Status: DISCONTINUED | OUTPATIENT
Start: 2018-09-21 | End: 2018-09-22

## 2018-09-21 RX ORDER — ONDANSETRON 8 MG/1
4 TABLET, FILM COATED ORAL EVERY 8 HOURS
Qty: 0 | Refills: 0 | Status: DISCONTINUED | OUTPATIENT
Start: 2018-09-21 | End: 2018-09-22

## 2018-09-21 RX ADMIN — OXYCODONE AND ACETAMINOPHEN 1 TABLET(S): 5; 325 TABLET ORAL at 19:58

## 2018-09-21 RX ADMIN — SERTRALINE 50 MILLIGRAM(S): 25 TABLET, FILM COATED ORAL at 12:06

## 2018-09-21 RX ADMIN — MORPHINE SULFATE 2 MILLIGRAM(S): 50 CAPSULE, EXTENDED RELEASE ORAL at 03:05

## 2018-09-21 RX ADMIN — MORPHINE SULFATE 2 MILLIGRAM(S): 50 CAPSULE, EXTENDED RELEASE ORAL at 10:46

## 2018-09-21 RX ADMIN — CHLORHEXIDINE GLUCONATE 1 APPLICATION(S): 213 SOLUTION TOPICAL at 12:12

## 2018-09-21 RX ADMIN — Medication 150 MILLILITER(S): at 08:55

## 2018-09-21 RX ADMIN — PANTOPRAZOLE SODIUM 40 MILLIGRAM(S): 20 TABLET, DELAYED RELEASE ORAL at 05:30

## 2018-09-21 RX ADMIN — Medication 1 PATCH: at 12:05

## 2018-09-21 RX ADMIN — ONDANSETRON 4 MILLIGRAM(S): 8 TABLET, FILM COATED ORAL at 22:53

## 2018-09-21 RX ADMIN — DEXTROSE MONOHYDRATE, SODIUM CHLORIDE, AND POTASSIUM CHLORIDE 75 MILLILITER(S): 50; .745; 4.5 INJECTION, SOLUTION INTRAVENOUS at 12:10

## 2018-09-21 RX ADMIN — ONDANSETRON 4 MILLIGRAM(S): 8 TABLET, FILM COATED ORAL at 15:42

## 2018-09-21 RX ADMIN — DEXTROSE MONOHYDRATE, SODIUM CHLORIDE, AND POTASSIUM CHLORIDE 75 MILLILITER(S): 50; .745; 4.5 INJECTION, SOLUTION INTRAVENOUS at 05:32

## 2018-09-21 RX ADMIN — OXYCODONE AND ACETAMINOPHEN 1 TABLET(S): 5; 325 TABLET ORAL at 19:02

## 2018-09-21 RX ADMIN — MORPHINE SULFATE 2 MILLIGRAM(S): 50 CAPSULE, EXTENDED RELEASE ORAL at 02:50

## 2018-09-21 RX ADMIN — MORPHINE SULFATE 2 MILLIGRAM(S): 50 CAPSULE, EXTENDED RELEASE ORAL at 10:31

## 2018-09-21 RX ADMIN — PANTOPRAZOLE SODIUM 40 MILLIGRAM(S): 20 TABLET, DELAYED RELEASE ORAL at 18:11

## 2018-09-21 RX ADMIN — Medication 1 PATCH: at 12:08

## 2018-09-21 RX ADMIN — Medication 1 TABLET(S): at 12:05

## 2018-09-21 RX ADMIN — POLYETHYLENE GLYCOL 3350 17 GRAM(S): 17 POWDER, FOR SOLUTION ORAL at 12:06

## 2018-09-21 NOTE — PROGRESS NOTE BEHAVIORAL HEALTH - NSBHFUPINTERVALHXFT_PSY_A_CORE
Pt reported some anxiety yesterday, panic attacks. Pt denies depressed mood, no si/hi. Pt denies psychosis, blade. pt reported fair sleep, appetite.

## 2018-09-21 NOTE — PROGRESS NOTE ADULT - ASSESSMENT
33F w h/o duodenal ulcer, chronic pancreatitis aw abd pain/n/v.    1. Abdominal pain- h/o duodenal ulcer as above. Seen by GI, for EGD tomorrow. Continue clears today. Pain control.    2. N/vomiting- Continue antiemetics, fluids.     2. Depression- Pt requesting psych eval- to be called.
33F w h/o duodenal ulcer, chronic pancreatitis aw abd pain/n/v. EGD today with duodenal inflammation.     1. Abdominal pain- Duodenal inflammation as above. Continue IV protonix. Pain control.     2. N/vomiting- Continue antiemetics, fluids. Advance diet before dinner if symptoms improve.     2. Depression- Seen by psych, started on Zoloft.    Suspect underlying psychosocial issues likely exacerbating symptoms. Will attempt to wean off iv pain meds.
33F w h/o duodenal ulcer, chronic pancreatitis aw abd pain/n/v. EGD with duodenal inflammation.     1. Abdominal pain- Duodenal inflammation as above. On IV protonix- transition to po today if diet tolerated.     Transition pain meds to po.     2. N/vomiting- Improved.    3. Constipation- Dulcolax suppository ordered.    2. Depression- Continue Zoloft.    D/c planning.

## 2018-09-21 NOTE — PROGRESS NOTE BEHAVIORAL HEALTH - SUMMARY
34 y/o AA female single, with a boyfriend, employed as a  at [employer], with 4 dependents (currently being cared for by patient's mother), domiciled in an apt in [place], with no past psychiatric history, inpatient hospitalizations, substance abuse, suicide attempts or SIB. with PMHx known distal oesophagitis, non bleeding DU with no stigmata of bleeding following EGD in May 2018--referred to the ER by GI following 3 days of persistent epigastric pain with burning, nausea.  Patient reports that her symptoms have worsened since his father recent passing away, with patient with resentment toward her two siblings with patient predominantly caring for her father (who apparently succumbed to ethanol related issues) by herself  for the past 3 years.  Patient is the sole support of her 4 children and is currently estranged from her significant other but denies domestic violence issues.   Psychiatry consulted to assess patient for anxiety.  Patient seen and evaluated, reports anxiety related to her medical conditions and grieving over the death of her father whom she was close to.  Reports family psychosocial stressors.  Reports resentment towards her siblings for not helping with the care of her father.  Reports poor sleep and poor appetite.  Denies S/H/I/I/P, A/V/H, or thoughts of paranoia.  Reports she has her children and spirituality to live for.  Recommend: Discontinuing amitriptyline and start Zoloft 50mg po daily.  PRN: Ativan 0.5mg po prn q8 for anxiety and melatonin 3mg po prn qhs insomnia.

## 2018-09-21 NOTE — PROGRESS NOTE BEHAVIORAL HEALTH - NSBHCHARTREVIEWLAB_PSY_A_CORE FT
9.8    8.76  )-----------( 330      ( 21 Sep 2018 09:17 )             29.9     09-21    137  |  104  |  5<L>  ----------------------------<  78  3.9   |  23  |  0.74    Ca    9.0      21 Sep 2018 07:32

## 2018-09-22 ENCOUNTER — TRANSCRIPTION ENCOUNTER (OUTPATIENT)
Age: 33
End: 2018-09-22

## 2018-09-22 VITALS
SYSTOLIC BLOOD PRESSURE: 116 MMHG | OXYGEN SATURATION: 100 % | HEART RATE: 68 BPM | DIASTOLIC BLOOD PRESSURE: 77 MMHG | TEMPERATURE: 98 F | RESPIRATION RATE: 18 BRPM

## 2018-09-22 LAB
ANION GAP SERPL CALC-SCNC: 8 MMOL/L — SIGNIFICANT CHANGE UP (ref 5–17)
BUN SERPL-MCNC: 4 MG/DL — LOW (ref 7–23)
CALCIUM SERPL-MCNC: 9 MG/DL — SIGNIFICANT CHANGE UP (ref 8.4–10.5)
CHLORIDE SERPL-SCNC: 102 MMOL/L — SIGNIFICANT CHANGE UP (ref 96–108)
CO2 SERPL-SCNC: 25 MMOL/L — SIGNIFICANT CHANGE UP (ref 22–31)
CREAT SERPL-MCNC: 0.84 MG/DL — SIGNIFICANT CHANGE UP (ref 0.5–1.3)
GLUCOSE SERPL-MCNC: 88 MG/DL — SIGNIFICANT CHANGE UP (ref 70–99)
HCT VFR BLD CALC: 30.4 % — LOW (ref 34.5–45)
HGB BLD-MCNC: 9.9 G/DL — LOW (ref 11.5–15.5)
MCHC RBC-ENTMCNC: 24.6 PG — LOW (ref 27–34)
MCHC RBC-ENTMCNC: 32.4 GM/DL — SIGNIFICANT CHANGE UP (ref 32–36)
MCV RBC AUTO: 76 FL — LOW (ref 80–100)
PLATELET # BLD AUTO: 344 K/UL — SIGNIFICANT CHANGE UP (ref 150–400)
POTASSIUM SERPL-MCNC: 3.8 MMOL/L — SIGNIFICANT CHANGE UP (ref 3.5–5.3)
POTASSIUM SERPL-SCNC: 3.8 MMOL/L — SIGNIFICANT CHANGE UP (ref 3.5–5.3)
RBC # BLD: 4.01 M/UL — SIGNIFICANT CHANGE UP (ref 3.8–5.2)
RBC # FLD: 16.6 % — HIGH (ref 10.3–14.5)
SODIUM SERPL-SCNC: 135 MMOL/L — SIGNIFICANT CHANGE UP (ref 135–145)
WBC # BLD: 7 K/UL — SIGNIFICANT CHANGE UP (ref 3.8–10.5)
WBC # FLD AUTO: 7 K/UL — SIGNIFICANT CHANGE UP (ref 3.8–10.5)

## 2018-09-22 PROCEDURE — 85610 PROTHROMBIN TIME: CPT

## 2018-09-22 PROCEDURE — 86901 BLOOD TYPING SEROLOGIC RH(D): CPT

## 2018-09-22 PROCEDURE — 71045 X-RAY EXAM CHEST 1 VIEW: CPT

## 2018-09-22 PROCEDURE — 99285 EMERGENCY DEPT VISIT HI MDM: CPT | Mod: 25

## 2018-09-22 PROCEDURE — 85730 THROMBOPLASTIN TIME PARTIAL: CPT

## 2018-09-22 PROCEDURE — 81025 URINE PREGNANCY TEST: CPT

## 2018-09-22 PROCEDURE — 76770 US EXAM ABDO BACK WALL COMP: CPT

## 2018-09-22 PROCEDURE — 86850 RBC ANTIBODY SCREEN: CPT

## 2018-09-22 PROCEDURE — 96375 TX/PRO/DX INJ NEW DRUG ADDON: CPT

## 2018-09-22 PROCEDURE — 83690 ASSAY OF LIPASE: CPT

## 2018-09-22 PROCEDURE — 80048 BASIC METABOLIC PNL TOTAL CA: CPT

## 2018-09-22 PROCEDURE — 99239 HOSP IP/OBS DSCHRG MGMT >30: CPT

## 2018-09-22 PROCEDURE — 88312 SPECIAL STAINS GROUP 1: CPT

## 2018-09-22 PROCEDURE — 80053 COMPREHEN METABOLIC PANEL: CPT

## 2018-09-22 PROCEDURE — 96374 THER/PROPH/DIAG INJ IV PUSH: CPT

## 2018-09-22 PROCEDURE — 86900 BLOOD TYPING SEROLOGIC ABO: CPT

## 2018-09-22 PROCEDURE — 88305 TISSUE EXAM BY PATHOLOGIST: CPT

## 2018-09-22 PROCEDURE — 85027 COMPLETE CBC AUTOMATED: CPT

## 2018-09-22 PROCEDURE — 81001 URINALYSIS AUTO W/SCOPE: CPT

## 2018-09-22 PROCEDURE — 93005 ELECTROCARDIOGRAM TRACING: CPT

## 2018-09-22 PROCEDURE — 84156 ASSAY OF PROTEIN URINE: CPT

## 2018-09-22 RX ORDER — NICOTINE POLACRILEX 2 MG
1 GUM BUCCAL
Qty: 30 | Refills: 0
Start: 2018-09-22 | End: 2018-10-21

## 2018-09-22 RX ORDER — SERTRALINE 25 MG/1
1 TABLET, FILM COATED ORAL
Qty: 30 | Refills: 0
Start: 2018-09-22 | End: 2018-10-21

## 2018-09-22 RX ORDER — SERTRALINE 25 MG/1
1 TABLET, FILM COATED ORAL
Qty: 0 | Refills: 0 | COMMUNITY
Start: 2018-09-22

## 2018-09-22 RX ORDER — POLYETHYLENE GLYCOL 3350 17 G/17G
17 POWDER, FOR SOLUTION ORAL
Qty: 0 | Refills: 0 | COMMUNITY
Start: 2018-09-22

## 2018-09-22 RX ORDER — ONDANSETRON 8 MG/1
1 TABLET, FILM COATED ORAL
Qty: 21 | Refills: 0
Start: 2018-09-22 | End: 2018-09-28

## 2018-09-22 RX ORDER — PANTOPRAZOLE SODIUM 20 MG/1
1 TABLET, DELAYED RELEASE ORAL
Qty: 30 | Refills: 0
Start: 2018-09-22 | End: 2018-10-21

## 2018-09-22 RX ORDER — ONDANSETRON 8 MG/1
1 TABLET, FILM COATED ORAL
Qty: 12 | Refills: 0 | OUTPATIENT

## 2018-09-22 RX ORDER — POLYETHYLENE GLYCOL 3350 17 G/17G
17 POWDER, FOR SOLUTION ORAL
Qty: 510 | Refills: 0
Start: 2018-09-22 | End: 2018-10-21

## 2018-09-22 RX ORDER — PANTOPRAZOLE SODIUM 20 MG/1
40 TABLET, DELAYED RELEASE ORAL
Qty: 0 | Refills: 0 | Status: DISCONTINUED | OUTPATIENT
Start: 2018-09-22 | End: 2018-09-22

## 2018-09-22 RX ADMIN — OXYCODONE AND ACETAMINOPHEN 1 TABLET(S): 5; 325 TABLET ORAL at 03:10

## 2018-09-22 RX ADMIN — Medication 1 TABLET(S): at 12:12

## 2018-09-22 RX ADMIN — SERTRALINE 50 MILLIGRAM(S): 25 TABLET, FILM COATED ORAL at 12:12

## 2018-09-22 RX ADMIN — ONDANSETRON 4 MILLIGRAM(S): 8 TABLET, FILM COATED ORAL at 06:25

## 2018-09-22 RX ADMIN — Medication 1 PATCH: at 12:12

## 2018-09-22 RX ADMIN — OXYCODONE AND ACETAMINOPHEN 1 TABLET(S): 5; 325 TABLET ORAL at 02:33

## 2018-09-22 RX ADMIN — PANTOPRAZOLE SODIUM 40 MILLIGRAM(S): 20 TABLET, DELAYED RELEASE ORAL at 06:25

## 2018-09-22 NOTE — DISCHARGE NOTE ADULT - CARE PLAN
Principal Discharge DX:	Duodenitis  Goal:	resolution of inflammation  Assessment and plan of treatment:	Please follow-up with Gastroenterology in 1-2 weeks---call for appointment  Follow-up with your PCP next week -call for appointment  Secondary Diagnosis:	H. pylori infection  Assessment and plan of treatment:	continue medication as prescribed  Will need H. pylori breath test at least 1 month after completing the antibiotics  Please follow-up with Gastroenterology in 1-2 weeks---call for appointment  Secondary Diagnosis:	Constipation  Assessment and plan of treatment:	continue medication as prescribed

## 2018-09-22 NOTE — DISCHARGE NOTE ADULT - PATIENT PORTAL LINK FT
You can access the [facility] Patient Portal, offered by [facility], by registering with the following website: [email or web address]

## 2018-09-22 NOTE — DISCHARGE NOTE ADULT - MEDICATION SUMMARY - MEDICATIONS TO TAKE
I will START or STAY ON the medications listed below when I get home from the hospital:    oxyCODONE-acetaminophen 5 mg-325 mg oral tablet  -- 1 tab(s) by mouth every 6 hours, As needed, Severe Pain (7 - 10) MDD:4 tabs daily ( 1 tab q6hrs)  -- Indication: For pain     sertraline 50 mg oral tablet  -- 1 tab(s) by mouth once a day  -- Indication: For Depression     Zofran ODT 4 mg oral tablet, disintegrating  -- 1 tab(s) by mouth 3 times a day, As Needed -for nausea   -- Indication: For n/v    polyethylene glycol 3350 oral powder for reconstitution  -- 17 gram(s) by mouth once a day, As Needed for constipation  -- Indication: For constipation     Fish Oil oral capsule  -- 1 cap(s) by mouth once a day  -- Indication: For supplement     nicotine 7 mg/24 hr transdermal film, extended release  -- 1 patch by transdermal patch once a day   -- Indication: For smoker     multivitamin  -- 1 tab(s) by mouth once a day  -- Indication: For supplement I will START or STAY ON the medications listed below when I get home from the hospital:    oxyCODONE-acetaminophen 5 mg-325 mg oral tablet  -- 1 tab(s) by mouth every 6 hours, As needed, Severe Pain (7 - 10) MDD:4 tabs daily ( 1 tab q6hrs)  -- Indication: For pain     sertraline 50 mg oral tablet  -- 1 tab(s) by mouth once a day  -- Indication: For Depression     Zofran ODT 4 mg oral tablet, disintegrating  -- 1 tab(s) by mouth 3 times a day, As Needed -for nausea   -- Indication: For n/v    polyethylene glycol 3350 oral powder for reconstitution  -- 17 gram(s) by mouth once a day, As Needed for constipation  -- Indication: For constipation     Fish Oil oral capsule  -- 1 cap(s) by mouth once a day  -- Indication: For supplement     pantoprazole 40 mg oral delayed release tablet  -- 1 tab(s) by mouth once a day (before a meal)  -- Indication: For Duodenitis    nicotine 7 mg/24 hr transdermal film, extended release  -- 1 patch by transdermal patch once a day   -- Indication: For smoker     multivitamin  -- 1 tab(s) by mouth once a day  -- Indication: For supplement

## 2018-09-22 NOTE — DISCHARGE NOTE ADULT - PLAN OF CARE
resolution of inflammation Please follow-up with Gastroenterology in 1-2 weeks---call for appointment  Follow-up with your PCP next week -call for appointment continue medication as prescribed  Will need H. pylori breath test at least 1 month after completing the antibiotics  Please follow-up with Gastroenterology in 1-2 weeks---call for appointment continue medication as prescribed

## 2018-09-22 NOTE — PROGRESS NOTE ADULT - PROVIDER SPECIALTY LIST ADULT
Gastroenterology
Hospitalist
Hospitalist
Gastroenterology
Hospitalist

## 2018-09-22 NOTE — DISCHARGE NOTE ADULT - HOSPITAL COURSE
33yFemale with duodenitis associated with persistent H. pylori despite treatment earlier this year with PPI/amox/Biaxin. symptoms improved.  Patient now medically cleared for discharge home with outpatient GI follow.    Gastroenterology will send prescription to patient's pharmacy for: tetracycline 500 mg 4x day, Flagyl 500 mg 4x day, Pepto Bismol 2 tabs 4x day, in addition to her existing pantoprazole 40 mg qAM and Prilosec OTC 20 mg q PM, x 14 days  Miralax 17 gm daily at bedtime to prevent recurrent constipation 32 yo f w h/o duodenal ulcer, H pylori with questionable compliance to treatment pw n/vomiting, severe abdominal pain.     Started on pain control, antiemetics, iv fluids, iv protonix.    Seen by GI Dr Anaya and Bear. EGD revealed duodenitis. Bx with active H pylori gastritis.     Symptoms improved, weaned off iv pain meds. Severe constipation treated.    H. Pylori regimen sent to pharmacy by GI.     Counseled extensively on smoking cessation, nicotine patch placed.    Discharged on above meds with f/u.     Diagnoses: Acute duodenitis; H Pylori gastritis; Nausea and vomiting; Abdominal pain; Hypokalemia; Dehydration; Tobacco abuse; Depression 34 yo f w h/o duodenal ulcer, H pylori with questionable compliance to treatment pw n/vomiting, severe abdominal pain.     Started on pain control, antiemetics, iv fluids, iv protonix.    Seen by GI Dr Anaya. EGD revealed duodenitis. Bx with active H pylori gastritis.     Symptoms improved, weaned off iv pain meds. Severe constipation treated.    H. Pylori regimen sent to pharmacy by GI.     Counseled extensively on smoking cessation, nicotine patch placed.    Discharged on above meds with f/u.     Diagnoses: Acute duodenitis; H Pylori gastritis; Nausea and vomiting; Abdominal pain; Hypokalemia; Dehydration; Tobacco abuse; Depression

## 2018-09-22 NOTE — CHART NOTE - NSCHARTNOTEFT_GEN_A_CORE
Pt feels well. Tolerating diet.     D/c home today.    1. Duodenitis- H Pylori positive. Meds sent to pharmacy to Dr Rory Anaya.    2. N/vomiting- improved    3. Abdominal pain- improved- prn percocet.    4. Tobacco abuse- requesting Nicotine patch prescription    5. Anxiety/depression- Zoloft.    D/c time 40 mins.

## 2018-09-22 NOTE — PROGRESS NOTE ADULT - SUBJECTIVE AND OBJECTIVE BOX
SUBJECTIVE:  33yFemale presents with severe abdominal pain and vomiting. The patient is known to me from a May 2018 office consultation for pain, vomiting, constipation, and weight loss. She had been evaluated at various EDs and had non-diagnostic abdominal CT scans and sonograms. She was subsequently hospitalized and underwent an EGD 18 which revealed a 1 cm clean-based duodenal bulb ulcer just distal to the pylorus. Pathology revealed H. pylori gastritis. She was prescribed a 10 day course of amoxicillin and Biaxin in addition to her PPI (pantoprazole in the AM, Prilosec OTC in the PM because her insurance does not cover BID Rx PPI). She never returned to the office for follow-up so H. pylori eradication has not been documented yet. Since she was last seen, she says she had been feeling reasonably well on an bland diet and BID PPI. However, 3 days ago, she began having constant epigastric pain radiating to the back. She denies using any NSAIDs. Two days ago, she began vomiting--at times either mucus, dark material, liquids which she tried to drink, and then, yesterday at the ED, red liquid which she is sure was blood because she "knows the taste." (ED staff felt that this was simply the red Gatorade which she drank, and she was discharged home.) She vomited at home earlier today, though without blood or coffee grounds. She has been unable to hold down anything orally aside from some water. She has not had a BM since 3 days ago, but the last BM she did have was darker in color than usual. After losing 25-30 pounds prior to her initial consultation in May, she says her weight has stabilized.   ______________________________________________________________________  PMH/PSH:  PAST MEDICAL & SURGICAL HISTORY:  Anemia (iron deficiency throughout her life and B12 deficiency treated during pregnancy)  TB (pulmonary tuberculosis) c.  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; she was treated with 12 pills daily for 9 months  Anxiety/depression      No significant past surgical history    ______________________________________________________________________  MEDS:  MEDICATIONS  (STANDING): Protonix 40 mg qAM, Prilosec OTC 20 mg qPM, amitriptyline daily        ______________________________________________________________________  ALL:   Allergies    No Known Allergies    _____________________________________________________________________  SH: Single, 4 children, former part-time  at [employer] (did not go back to work after her May hospitalization)  ______________________________________________________________________  FH:  FAMILY HISTORY:  Family history of alcohol abuse (Father, Mother): father  , alcohol related issues, also had CAD with stents, CHF, AICD/PPM, liver and kidney failure  Diabetes, seizure disorder (Brother)    ______________________________________________________________________  ROS:  REVIEW OF SYSTEMS    Psychiatric: Upset because 3 days ago was her father's birthday (but he  in January of this year)	      ______________________________________________________________________  PHYSICAL EXAM:  T(C): 37 (18 @ 16:59), Max: 37.5 (18 @ 02:55)  HR: 78 (18 @ 19:18)  BP: 131/81 (18 @ 19:18)  RR: 17 (18 @ 19:18)  SpO2: 100% (18 @ 19:18)  Wt(kg): --  PHYSICAL EXAM:      Constitutional: Very uncomfortable, distraught, awaiting dose of analgesic    HEENT: Anicteric    Respiratory: Clear to A    Cardiovascular: RRR, no m/g/r    Gastrointestinal: Nondistended, NABS, focal epigastric tenderness, no palpable HSM or mass    Rectal: Deferred (patient is ED hallway)    Extremities: No c/c/e    Neurological: Grossly nonfocal    ______________________________________________________________________  LABS:                        11.5   9.0   )-----------( 417      ( 18 Sep 2018 17:47 )             35.9         138  |  100  |  9   ----------------------------<  100<H>  3.2<L>   |  23  |  0.77    Ca    9.9      18 Sep 2018 17:47    TPro  7.8  /  Alb  4.6  /  TBili  0.6  /  DBili  x   /  AST  20  /  ALT  12  /  AlkPhos  54      LIVER FUNCTIONS - ( 18 Sep 2018 17:47 )  Alb: 4.6 g/dL / Pro: 7.8 g/dL / ALK PHOS: 54 U/L / ALT: 12 U/L / AST: 20 U/L / GGT: x       Lipase 31          ______________________________________________________________________  IMAGING: N/A    ______________________________________________________________________  ASSESSMENT: Recurrence of severe epigastric pain and vomiting which had characterized her post-pyloric duodenal ulcer in May. Clearly cannot function at home due to pain and inability to tolerate oral intake except water, thus leading to her return to the ED today. Unclear if patient actually had hematemesis yesterday; it is unlikely she had much, if any, given the normal BUN and decent H/H despite her chronic anemia.    PLAN:  Should admit to [facility] medical service  Pantoprazole 40 mg IV q12h  Clear liquid diet  NPO after MN tomorrow for EGD Thursday at 9:30 am  Consider CT if EGD is non-diagnostic      Rory Anaya M.D.  (O) [phone]  (C) [phone]
GI Procedure Note:    After obtaining informed consent from the patient, and upper endoscopy was performed    Findings = see report for details.    The patient tolerated the procedure well.    Imp: Focal duodenitis but the prior duodenal bulb ulcer has healed.  Plan: Check path to ensure eradication of H. pylori          Check CT enterography next to rule out a more distal source of obstruction causing pain and vomiting          Continue clears pending above          Continue IV pantoprazole BID          Add Miralax given ongoing constipation
Patient is a 33y old  Female who presents with a chief complaint of Epigastric pain, N/V, poor PO for the past 3 days (20 Sep 2018 09:31)    HPI: S/p EGD today- duodenal inflammation. C/o persistent pain and nausea.    Vital Signs Last 24 Hrs  T(C): 37.1 (20 Sep 2018 13:00), Max: 37.2 (19 Sep 2018 22:04)  T(F): 98.7 (20 Sep 2018 13:00), Max: 98.9 (19 Sep 2018 22:04)  HR: 69 (20 Sep 2018 13:00) (69 - 80)  BP: 104/70 (20 Sep 2018 13:00) (104/70 - 123/79)  BP(mean): --  RR: 16 (20 Sep 2018 13:00) (16 - 18)  SpO2: 99% (20 Sep 2018 13:00) (98% - 99%)                          10.2   10.39 )-----------( 382      ( 20 Sep 2018 16:34 )             32.2     09-20    137  |  103  |  6<L>  ----------------------------<  89  3.9   |  24  |  0.77    Ca    9.4      20 Sep 2018 13:53    TPro  7.8  /  Alb  4.6  /  TBili  0.6  /  DBili  x   /  AST  20  /  ALT  12  /  AlkPhos  54  09-18    MEDICATIONS  (STANDING):  chlorhexidine 4% Liquid 1 Application(s) Topical <User Schedule>  multivitamin 1 Tablet(s) Oral daily  nicotine -   7 mG/24Hr(s) Patch 1 patch Transdermal daily  pantoprazole  Injectable 40 milliGRAM(s) IV Push every 12 hours  polyethylene glycol 3350 17 Gram(s) Oral daily  sertraline 50 milliGRAM(s) Oral daily  sodium chloride 0.9% with potassium chloride 20 mEq/L 1000 milliLiter(s) (75 mL/Hr) IV Continuous <Continuous>    MEDICATIONS  (PRN):  acetaminophen   Tablet .. 650 milliGRAM(s) Oral every 6 hours PRN Mild Pain (1 - 3)  LORazepam     Tablet 0.5 milliGRAM(s) Oral every 8 hours PRN Anxiety  morphine  - Injectable 2 milliGRAM(s) IV Push every 4 hours PRN Moderate Pain (4 - 6)  ondansetron Injectable 4 milliGRAM(s) IV Push every 6 hours PRN Nausea and/or Vomiting
Patient is a 33y old  Female who presents with a chief complaint of Epigastric pain, N/V, poor PO for the past 3 days (21 Sep 2018 07:41)    HPI: Ate a small amount of soft food today. No BM for 6 days. Mild nausea.    Vital Signs Last 24 Hrs  T(C): 36.6 (21 Sep 2018 08:20), Max: 37.1 (20 Sep 2018 19:08)  T(F): 97.9 (21 Sep 2018 08:20), Max: 98.8 (20 Sep 2018 22:05)  HR: 77 (21 Sep 2018 05:27) (76 - 79)  BP: 113/74 (21 Sep 2018 05:27) (113/74 - 129/77)  BP(mean): --  RR: 18 (21 Sep 2018 08:20) (18 - 18)  SpO2: 100% (21 Sep 2018 08:20) (98% - 100%)                          9.8    8.76  )-----------( 330      ( 21 Sep 2018 09:17 )             29.9     09-21    137  |  104  |  5<L>  ----------------------------<  78  3.9   |  23  |  0.74    Ca    9.0      21 Sep 2018 07:32      MEDICATIONS  (STANDING):  chlorhexidine 4% Liquid 1 Application(s) Topical <User Schedule>  multivitamin 1 Tablet(s) Oral daily  nicotine -   7 mG/24Hr(s) Patch 1 patch Transdermal daily  pantoprazole  Injectable 40 milliGRAM(s) IV Push every 12 hours  polyethylene glycol 3350 17 Gram(s) Oral daily  sertraline 50 milliGRAM(s) Oral daily  sodium chloride 0.9% with potassium chloride 20 mEq/L 1000 milliLiter(s) (75 mL/Hr) IV Continuous <Continuous>    MEDICATIONS  (PRN):  acetaminophen   Tablet .. 650 milliGRAM(s) Oral every 6 hours PRN Mild Pain (1 - 3)  LORazepam     Tablet 0.5 milliGRAM(s) Oral every 8 hours PRN Anxiety  morphine  - Injectable 2 milliGRAM(s) IV Push every 4 hours PRN Moderate Pain (4 - 6)  ondansetron Injectable 4 milliGRAM(s) IV Push every 6 hours PRN Nausea and/or Vomiting
SUBJECTIVE:  Feels much better after having a BM yesterday after the mag citrate. Able to eat now and would like to go home.  _____________________________________________________  OBJECTIVE:    T(C): 36.7 (09-22-18 @ 07:37), Max: 36.9 (09-21-18 @ 15:25)  HR: 68 (09-22-18 @ 07:37)  BP: 116/77 (09-22-18 @ 07:37)  RR: 18 (09-22-18 @ 07:37)  SpO2: 100% (09-22-18 @ 07:37)  Wt(kg): --    General = Comfortable-appearing, no acute distress  Abdomen = Non-distended, normal active bowel sounds, soft, nontender, no palpable mass or organomegaly, no bruit  _____________________________________________________  LABS:                        9.9    7.0   )-----------( 344      ( 22 Sep 2018 09:28 )             30.4     09-22    135  |  102  |  4<L>  ----------------------------<  88  3.8   |  25  |  0.84    Ca    9.0      22 Sep 2018 09:28    Surgical Pathology Report (09.20.18 @ 09:15)    Surgical Pathology Report:   ACCESSION No:  [id number]    MARC TOWNSEND                      2        Surgical Final Report          Final Diagnosis    1. Duodenum, descending, biopsy  - Duodenal mucosa with no diagnostic histopathologic changes    2. Stomach, antrum, biopsy  - Active chronic H. pylori associated gastritis (positive on  Warthin-Starry stain)    3. Stomach, body, biopsy  - Active chronic H. pylori associated gastritis (positive on  Warthin-Starry stain)    Verified by: Shaista Cervantes M.D.  (Electronic Signature)  Reported on: 09/21/18 14:55 EDT, [address] [place] [address]  [id number]  _________________________________________________________________    Clinical History  history of H. Pylori, duodenal ulcer in 5/2018  s/p H. Pylori........    Specimen(s) Submitted  1     Desending duodenum  2     Antrum biopsy  3     Body biopsy    Gross Description  1.   The specimen is received in formalin and the specimen  container is labeled: Descending duodenum biopsy.  It consists of  two soft, tan-pinkto tissue ranging from 0.2 cm to 0.3 cm in  greatest dimension.  The specimen is inked with eosin.   Entirely  submitted.  One cassette.    2.   The specimen is received in formalin and the specimen  container is labeled: Antrum biopsy.  It consists of two soft,  tan, fragments of tissue ranging from 0.3 cm to 0.7 cm in  greatest dimension.  Special stains are requested.  Entirely  submitted.  One cassette.    3.   The specimen is received in formalin and the specimen  container is labeled: Body biopsy.  It consists of one soft, tan,  fragment of tissue measuring 0.2 cm in greatest dimension.  Special stains are requested.  Entirely submitted.  One cassette.                MARC TOWNSEND                      2        Surgical Final Report          In addition to other data that may appear on the specimen  containers, all labels have been inspected to confirm the  presence of the patient's name and date of birth.  AVT09/20/18 14:32          _____________________________________________________  ACTIVE MEDS:  MEDICATIONS  (STANDING):  chlorhexidine 4% Liquid 1 Application(s) Topical <User Schedule>  multivitamin 1 Tablet(s) Oral daily  nicotine -   7 mG/24Hr(s) Patch 1 patch Transdermal daily  ondansetron Injectable 4 milliGRAM(s) IV Push every 8 hours  pantoprazole  Injectable 40 milliGRAM(s) IV Push every 12 hours  polyethylene glycol 3350 17 Gram(s) Oral daily  sertraline 50 milliGRAM(s) Oral daily  sodium chloride 0.9%. 1000 milliLiter(s) (60 mL/Hr) IV Continuous <Continuous>    MEDICATIONS  (PRN):  acetaminophen   Tablet .. 650 milliGRAM(s) Oral every 6 hours PRN Mild Pain (1 - 3)  LORazepam     Tablet 0.5 milliGRAM(s) Oral every 8 hours PRN Anxiety  oxyCODONE    5 mG/acetaminophen 325 mG 1 Tablet(s) Oral every 6 hours PRN Severe Pain (7 - 10)    _____________________________________________________  ASSESSMENT:  33yFemale with duodenitis associated with persistent H. pylori despite treatment earlier this year with PPI/amox/Biaxin. Therefore, will need alternative treatment. OK from GI standpoint for discharge home today.    PLAN:  Will send eRx to patient's pharmacy for: tetracycline 500 mg 4x day, Flagyl 500 mg 4x day, Pepto Bismol 2 tabs 4x day, in addition to her existing pantoprazole 40 mg qAM and Prilosec OTC 20 mg q PM, x 14 days  Miralax 17 gm daily at bedtime to prevent recurrent constipation  Patient advised to RTO in 1-2 weeks  Will need H. pylori breath test at least 1 month after completing the antibiotics    Rory Anaya M.D.  (O) [phone]  (C) [phone]
SUBJECTIVE:  Upper abdominal discomfort, but no severe pain and no vomiting. Tolerated full liquid diet. Willing to try to advance diet further. Main complaint is constipation; last BM was 6 days ago, despite receiving Miralax.  _____________________________________________________  OBJECTIVE:    T(C): 37.1 (09-21-18 @ 05:27), Max: 37.1 (09-20-18 @ 13:00)  HR: 77 (09-21-18 @ 05:27)  BP: 113/74 (09-21-18 @ 05:27)  RR: 18 (09-21-18 @ 05:27)  SpO2: 98% (09-21-18 @ 05:27)  Wt(kg): --    General = Comfortable-appearing, no acute distress  Abdomen = Non-distended, normal active bowel sounds, soft, mild epigastric tenderness, no palpable mass or organomegaly, no bruit  _____________________________________________________  LABS:                        10.2   10.39 )-----------( 382      ( 20 Sep 2018 16:34 )             32.2     09-20    137  |  103  |  6<L>  ----------------------------<  89  3.9   |  24  |  0.77    Ca    9.4      20 Sep 2018 13:53          _____________________________________________________  ACTIVE MEDS:  MEDICATIONS  (STANDING):  chlorhexidine 4% Liquid 1 Application(s) Topical <User Schedule>  magnesium citrate Solution 150 milliLiter(s) Oral once  multivitamin 1 Tablet(s) Oral daily  nicotine -   7 mG/24Hr(s) Patch 1 patch Transdermal daily  pantoprazole  Injectable 40 milliGRAM(s) IV Push every 12 hours  polyethylene glycol 3350 17 Gram(s) Oral daily  sertraline 50 milliGRAM(s) Oral daily  sodium chloride 0.9% with potassium chloride 20 mEq/L 1000 milliLiter(s) (75 mL/Hr) IV Continuous <Continuous>    MEDICATIONS  (PRN):  acetaminophen   Tablet .. 650 milliGRAM(s) Oral every 6 hours PRN Mild Pain (1 - 3)  LORazepam     Tablet 0.5 milliGRAM(s) Oral every 8 hours PRN Anxiety  morphine  - Injectable 2 milliGRAM(s) IV Push every 4 hours PRN Moderate Pain (4 - 6)  ondansetron Injectable 4 milliGRAM(s) IV Push every 6 hours PRN Nausea and/or Vomiting    _____________________________________________________  ASSESSMENT:  33yFemale with duodenitis (but healed ulcer) improving with regard to pain and vomiting, but still constipated.    PLAN:  Advance to soft diet  Mag citrate today  Continue pantoprazole  Reconsider CT enterography if diet not tolerated  Await path for H. pylori    Rory Anaya M.D.  (O) [phone]  (C) [phone]
Patient is a 33y old  Female who presents with a chief complaint of Epigastric pain, N/V, poor PO for the past 3 days (19 Sep 2018 08:16)    HPI: Pt c/o epigastric pain. Vomited twice since yesterday.     Vital Signs Last 24 Hrs  T(C): 36.9 (19 Sep 2018 07:41), Max: 37.4 (18 Sep 2018 22:56)  T(F): 98.5 (19 Sep 2018 07:41), Max: 99.3 (18 Sep 2018 22:56)  HR: 76 (19 Sep 2018 07:41) (50 - 82)  BP: 98/68 (19 Sep 2018 07:41) (98/68 - 144/88)  BP(mean): --  RR: 18 (19 Sep 2018 07:41) (16 - 18)  SpO2: 100% (19 Sep 2018 07:41) (97% - 100%)                          9.7    6.14  )-----------( 352      ( 19 Sep 2018 07:59 )             30.1     09-19    136  |  101  |  8   ----------------------------<  92  3.4<L>   |  24  |  0.84    Ca    9.4      19 Sep 2018 06:57    TPro  7.8  /  Alb  4.6  /  TBili  0.6  /  DBili  x   /  AST  20  /  ALT  12  /  AlkPhos  54  09-18

## 2018-09-22 NOTE — PROGRESS NOTE ADULT - REASON FOR ADMISSION
Epigastric pain, N/V, poor PO for the past 3 days
Severe abdominal pain
Epigastric pain, N/V, poor PO for the past 3 days

## 2018-09-22 NOTE — DISCHARGE NOTE ADULT - CARE PROVIDER_API CALL
Rory Anaya), Gastroenterology; Internal Medicine  [address] [address]  Phone: [phone]  Fax: [phone]

## 2018-10-04 DIAGNOSIS — Z71.89 OTHER SPECIFIED COUNSELING: ICD-10-CM

## 2019-02-20 ENCOUNTER — LABORATORY RESULT (OUTPATIENT)
Age: 34
End: 2019-02-20

## 2019-02-21 ENCOUNTER — LABORATORY RESULT (OUTPATIENT)
Age: 34
End: 2019-02-21

## 2019-02-21 ENCOUNTER — NON-APPOINTMENT (OUTPATIENT)
Age: 34
End: 2019-02-21

## 2019-02-21 ENCOUNTER — OUTPATIENT (OUTPATIENT)
Dept: OUTPATIENT SERVICES | Facility: HOSPITAL | Age: 34
LOS: 1 days | End: 2019-02-21
Payer: MEDICAID

## 2019-02-21 ENCOUNTER — APPOINTMENT (OUTPATIENT)
Dept: OBGYN | Facility: CLINIC | Age: 34
End: 2019-02-21
Payer: MEDICAID

## 2019-02-21 VITALS
WEIGHT: 139 LBS | BODY MASS INDEX: 23.16 KG/M2 | DIASTOLIC BLOOD PRESSURE: 76 MMHG | HEIGHT: 65 IN | SYSTOLIC BLOOD PRESSURE: 112 MMHG

## 2019-02-21 DIAGNOSIS — O09.32 SUPERVISION OF PREGNANCY WITH INSUFFICIENT ANTENATAL CARE, SECOND TRIMESTER: ICD-10-CM

## 2019-02-21 DIAGNOSIS — Z64.1 PROBLEMS RELATED TO MULTIPARITY: ICD-10-CM

## 2019-02-21 DIAGNOSIS — Z87.898 PERSONAL HISTORY OF OTHER SPECIFIED CONDITIONS: ICD-10-CM

## 2019-02-21 DIAGNOSIS — Z34.80 ENCOUNTER FOR SUPERVISION OF OTHER NORMAL PREGNANCY, UNSPECIFIED TRIMESTER: ICD-10-CM

## 2019-02-21 PROCEDURE — 36415 COLL VENOUS BLD VENIPUNCTURE: CPT | Mod: NC

## 2019-02-21 PROCEDURE — 81003 URINALYSIS AUTO W/O SCOPE: CPT | Mod: NC,QW

## 2019-02-21 PROCEDURE — 90686 IIV4 VACC NO PRSV 0.5 ML IM: CPT | Mod: NC

## 2019-02-21 PROCEDURE — 88141 CYTOPATH C/V INTERPRET: CPT

## 2019-02-21 PROCEDURE — 83020 HEMOGLOBIN ELECTROPHORESIS: CPT | Mod: 26

## 2019-02-21 PROCEDURE — 90471 IMMUNIZATION ADMIN: CPT | Mod: NC

## 2019-02-21 PROCEDURE — 99213 OFFICE O/P EST LOW 20 MIN: CPT | Mod: NC,TH

## 2019-02-22 ENCOUNTER — LABORATORY RESULT (OUTPATIENT)
Age: 34
End: 2019-02-22

## 2019-02-22 ENCOUNTER — APPOINTMENT (OUTPATIENT)
Dept: OBGYN | Facility: CLINIC | Age: 34
End: 2019-02-22

## 2019-02-22 LAB
C TRACH RRNA SPEC QL NAA+PROBE: SIGNIFICANT CHANGE UP
GLUCOSE 1H P MEAL SERPL-MCNC: 106 MG/DL — SIGNIFICANT CHANGE UP (ref 70–134)
HBV SURFACE AG SER-ACNC: SIGNIFICANT CHANGE UP
HCT VFR BLD CALC: 28.4 % — LOW (ref 34.5–45)
HGB BLD-MCNC: 8.4 G/DL — LOW (ref 11.5–15.5)
HIV 1+2 AB+HIV1 P24 AG SERPL QL IA: SIGNIFICANT CHANGE UP
HPV HIGH+LOW RISK DNA PNL CVX: DETECTED
MCHC RBC-ENTMCNC: 25.9 PG — LOW (ref 27–34)
MCHC RBC-ENTMCNC: 29.6 GM/DL — LOW (ref 32–36)
MCV RBC AUTO: 87.7 FL — SIGNIFICANT CHANGE UP (ref 80–100)
N GONORRHOEA RRNA SPEC QL NAA+PROBE: SIGNIFICANT CHANGE UP
PLATELET # BLD AUTO: 374 K/UL — SIGNIFICANT CHANGE UP (ref 150–400)
RBC # BLD: 3.24 M/UL — LOW (ref 3.8–5.2)
RBC # FLD: 16 % — HIGH (ref 10.3–14.5)
RUBV IGG SER-ACNC: 5.3 INDEX — SIGNIFICANT CHANGE UP
RUBV IGG SER-IMP: POSITIVE — SIGNIFICANT CHANGE UP
SPECIMEN SOURCE: SIGNIFICANT CHANGE UP
T PALLIDUM AB TITR SER: NEGATIVE — SIGNIFICANT CHANGE UP
TSH SERPL-MCNC: 0.74 UIU/ML — SIGNIFICANT CHANGE UP (ref 0.27–4.2)
WBC # BLD: 10.23 K/UL — SIGNIFICANT CHANGE UP (ref 3.8–10.5)
WBC # FLD AUTO: 10.23 K/UL — SIGNIFICANT CHANGE UP (ref 3.8–10.5)

## 2019-02-22 PROCEDURE — 87591 N.GONORRHOEAE DNA AMP PROB: CPT

## 2019-02-22 PROCEDURE — 87491 CHLMYD TRACH DNA AMP PROBE: CPT

## 2019-02-22 PROCEDURE — 81329 SMN1 GENE DOS/DELETION ALYS: CPT

## 2019-02-22 PROCEDURE — G0452: CPT | Mod: 26

## 2019-02-22 PROCEDURE — 87389 HIV-1 AG W/HIV-1&-2 AB AG IA: CPT

## 2019-02-22 PROCEDURE — 80307 DRUG TEST PRSMV CHEM ANLYZR: CPT

## 2019-02-22 PROCEDURE — 86780 TREPONEMA PALLIDUM: CPT

## 2019-02-22 PROCEDURE — 87086 URINE CULTURE/COLONY COUNT: CPT

## 2019-02-22 PROCEDURE — 87624 HPV HI-RISK TYP POOLED RSLT: CPT

## 2019-02-22 PROCEDURE — 90656 IIV3 VACC NO PRSV 0.5 ML IM: CPT

## 2019-02-22 PROCEDURE — G0463: CPT

## 2019-02-22 PROCEDURE — 36415 COLL VENOUS BLD VENIPUNCTURE: CPT

## 2019-02-22 PROCEDURE — 81220 CFTR GENE COM VARIANTS: CPT

## 2019-02-22 PROCEDURE — 83020 HEMOGLOBIN ELECTROPHORESIS: CPT

## 2019-02-22 PROCEDURE — 86900 BLOOD TYPING SEROLOGIC ABO: CPT

## 2019-02-22 PROCEDURE — 84443 ASSAY THYROID STIM HORMONE: CPT

## 2019-02-22 PROCEDURE — 86850 RBC ANTIBODY SCREEN: CPT

## 2019-02-22 PROCEDURE — 81003 URINALYSIS AUTO W/O SCOPE: CPT

## 2019-02-22 PROCEDURE — 86480 TB TEST CELL IMMUN MEASURE: CPT

## 2019-02-22 PROCEDURE — 90471 IMMUNIZATION ADMIN: CPT

## 2019-02-22 PROCEDURE — 83655 ASSAY OF LEAD: CPT

## 2019-02-22 PROCEDURE — 85027 COMPLETE CBC AUTOMATED: CPT

## 2019-02-22 PROCEDURE — 87625 HPV TYPES 16 & 18 ONLY: CPT

## 2019-02-22 PROCEDURE — 87340 HEPATITIS B SURFACE AG IA: CPT

## 2019-02-22 PROCEDURE — 81243 FMR1 GEN ALY DETC ABNL ALLEL: CPT

## 2019-02-22 PROCEDURE — 82950 GLUCOSE TEST: CPT

## 2019-02-22 PROCEDURE — 86762 RUBELLA ANTIBODY: CPT

## 2019-02-22 PROCEDURE — 88175 CYTOPATH C/V AUTO FLUID REDO: CPT

## 2019-02-23 LAB
CULTURE RESULTS: SIGNIFICANT CHANGE UP
SPECIMEN SOURCE: SIGNIFICANT CHANGE UP

## 2019-02-24 LAB — LEAD BLD-MCNC: <1 UG/DL — SIGNIFICANT CHANGE UP (ref 0–4)

## 2019-02-25 LAB
HEMOGLOBIN INTERPRETATION: SIGNIFICANT CHANGE UP
HGB A MFR BLD: 97.3 % — SIGNIFICANT CHANGE UP (ref 95.8–98)
HGB A2 MFR BLD: 2.7 % — SIGNIFICANT CHANGE UP (ref 2–3.2)

## 2019-02-26 ENCOUNTER — ASOB RESULT (OUTPATIENT)
Age: 34
End: 2019-02-26

## 2019-02-26 ENCOUNTER — APPOINTMENT (OUTPATIENT)
Dept: ANTEPARTUM | Facility: CLINIC | Age: 34
End: 2019-02-26
Payer: MEDICAID

## 2019-02-26 ENCOUNTER — APPOINTMENT (OUTPATIENT)
Dept: MATERNAL FETAL MEDICINE | Facility: CLINIC | Age: 34
End: 2019-02-26
Payer: MEDICAID

## 2019-02-26 LAB — CYTOLOGY SPEC DOC CYTO: SIGNIFICANT CHANGE UP

## 2019-02-26 PROCEDURE — 97802 MEDICAL NUTRITION INDIV IN: CPT

## 2019-02-26 PROCEDURE — 76817 TRANSVAGINAL US OBSTETRIC: CPT

## 2019-02-26 PROCEDURE — 76805 OB US >/= 14 WKS SNGL FETUS: CPT

## 2019-02-27 LAB
FRAGILE X PROTEIN (FMRP) PNL BLD: SIGNIFICANT CHANGE UP
GAMMA INTERFERON BACKGROUND BLD IA-ACNC: 0.06 IU/ML — SIGNIFICANT CHANGE UP
M TB IFN-G BLD-IMP: POSITIVE
M TB IFN-G CD4+ BCKGRND COR BLD-ACNC: 7.93 IU/ML — SIGNIFICANT CHANGE UP
M TB IFN-G CD4+CD8+ BCKGRND COR BLD-ACNC: 7.94 IU/ML — SIGNIFICANT CHANGE UP
QUANT TB PLUS MITOGEN MINUS NIL: 7.96 IU/ML — SIGNIFICANT CHANGE UP

## 2019-02-28 LAB
AMPHET UR-MCNC: NEGATIVE — SIGNIFICANT CHANGE UP
BARBITURATES, URINE.: NEGATIVE — SIGNIFICANT CHANGE UP
BENZODIAZ UR-MCNC: NEGATIVE — SIGNIFICANT CHANGE UP
COCAINE METAB.OTHER UR-MCNC: NEGATIVE — SIGNIFICANT CHANGE UP
CREATININE, URINE THERAPEUTIC: 189.5 MG/DL — SIGNIFICANT CHANGE UP
METHADONE UR-MCNC: NEGATIVE — SIGNIFICANT CHANGE UP
METHAQUALONE UR QL: NEGATIVE — SIGNIFICANT CHANGE UP
METHAQUALONE UR-MCNC: NEGATIVE — SIGNIFICANT CHANGE UP
OPIATES UR-MCNC: NEGATIVE — SIGNIFICANT CHANGE UP
PCP UR-MCNC: NEGATIVE — SIGNIFICANT CHANGE UP
PROPOXYPH UR QL: NEGATIVE — SIGNIFICANT CHANGE UP
THC UR QL: SIGNIFICANT CHANGE UP

## 2019-03-01 DIAGNOSIS — O09.32 SUPERVISION OF PREGNANCY WITH INSUFFICIENT ANTENATAL CARE, SECOND TRIMESTER: ICD-10-CM

## 2019-03-01 DIAGNOSIS — Z87.898 PERSONAL HISTORY OF OTHER SPECIFIED CONDITIONS: ICD-10-CM

## 2019-03-01 DIAGNOSIS — Z34.90 ENCOUNTER FOR SUPERVISION OF NORMAL PREGNANCY, UNSPECIFIED, UNSPECIFIED TRIMESTER: ICD-10-CM

## 2019-03-02 LAB
CYSTIC FIBROSIS EXPANDED PANEL: SIGNIFICANT CHANGE UP
SPINAL MUSCULAR ATROPHY: NEGATIVE — SIGNIFICANT CHANGE UP

## 2019-03-07 ENCOUNTER — APPOINTMENT (OUTPATIENT)
Dept: OBGYN | Facility: CLINIC | Age: 34
End: 2019-03-07
Payer: MEDICAID

## 2019-03-07 ENCOUNTER — OUTPATIENT (OUTPATIENT)
Dept: OUTPATIENT SERVICES | Facility: HOSPITAL | Age: 34
LOS: 1 days | End: 2019-03-07
Payer: MEDICAID

## 2019-03-07 VITALS
BODY MASS INDEX: 23.06 KG/M2 | DIASTOLIC BLOOD PRESSURE: 70 MMHG | WEIGHT: 138.38 LBS | SYSTOLIC BLOOD PRESSURE: 100 MMHG | HEIGHT: 65 IN

## 2019-03-07 DIAGNOSIS — Z34.80 ENCOUNTER FOR SUPERVISION OF OTHER NORMAL PREGNANCY, UNSPECIFIED TRIMESTER: ICD-10-CM

## 2019-03-07 PROCEDURE — 99213 OFFICE O/P EST LOW 20 MIN: CPT | Mod: NC,TH

## 2019-03-07 PROCEDURE — G0463: CPT

## 2019-03-07 PROCEDURE — 81003 URINALYSIS AUTO W/O SCOPE: CPT

## 2019-03-07 PROCEDURE — 81003 URINALYSIS AUTO W/O SCOPE: CPT | Mod: NC,QW

## 2019-03-08 ENCOUNTER — APPOINTMENT (OUTPATIENT)
Dept: OBGYN | Facility: CLINIC | Age: 34
End: 2019-03-08

## 2019-03-13 DIAGNOSIS — Z34.90 ENCOUNTER FOR SUPERVISION OF NORMAL PREGNANCY, UNSPECIFIED, UNSPECIFIED TRIMESTER: ICD-10-CM

## 2019-03-28 ENCOUNTER — APPOINTMENT (OUTPATIENT)
Dept: OBGYN | Facility: CLINIC | Age: 34
End: 2019-03-28

## 2019-04-09 ENCOUNTER — APPOINTMENT (OUTPATIENT)
Dept: OBGYN | Facility: CLINIC | Age: 34
End: 2019-04-09

## 2019-04-14 ENCOUNTER — LABORATORY RESULT (OUTPATIENT)
Age: 34
End: 2019-04-14

## 2019-04-15 ENCOUNTER — MED ADMIN CHARGE (OUTPATIENT)
Age: 34
End: 2019-04-15

## 2019-04-15 ENCOUNTER — NON-APPOINTMENT (OUTPATIENT)
Age: 34
End: 2019-04-15

## 2019-04-15 ENCOUNTER — APPOINTMENT (OUTPATIENT)
Dept: OBGYN | Facility: CLINIC | Age: 34
End: 2019-04-15
Payer: MEDICAID

## 2019-04-15 ENCOUNTER — OUTPATIENT (OUTPATIENT)
Dept: OUTPATIENT SERVICES | Facility: HOSPITAL | Age: 34
LOS: 1 days | End: 2019-04-15
Payer: MEDICAID

## 2019-04-15 VITALS — SYSTOLIC BLOOD PRESSURE: 110 MMHG | DIASTOLIC BLOOD PRESSURE: 72 MMHG | WEIGHT: 141 LBS | BODY MASS INDEX: 23.46 KG/M2

## 2019-04-15 DIAGNOSIS — O09.33 SUPERVISION OF PREGNANCY WITH INSUFFICIENT ANTENATAL CARE, THIRD TRIMESTER: ICD-10-CM

## 2019-04-15 DIAGNOSIS — Z34.80 ENCOUNTER FOR SUPERVISION OF OTHER NORMAL PREGNANCY, UNSPECIFIED TRIMESTER: ICD-10-CM

## 2019-04-15 PROCEDURE — 85027 COMPLETE CBC AUTOMATED: CPT

## 2019-04-15 PROCEDURE — 81002 URINALYSIS NONAUTO W/O SCOPE: CPT

## 2019-04-15 PROCEDURE — 81002 URINALYSIS NONAUTO W/O SCOPE: CPT | Mod: NC

## 2019-04-15 PROCEDURE — G0463: CPT | Mod: 25

## 2019-04-15 PROCEDURE — 90715 TDAP VACCINE 7 YRS/> IM: CPT | Mod: NC

## 2019-04-15 PROCEDURE — 90471 IMMUNIZATION ADMIN: CPT | Mod: NC

## 2019-04-15 PROCEDURE — 90471 IMMUNIZATION ADMIN: CPT

## 2019-04-15 PROCEDURE — 90715 TDAP VACCINE 7 YRS/> IM: CPT

## 2019-04-15 PROCEDURE — 99213 OFFICE O/P EST LOW 20 MIN: CPT | Mod: NC,25,TH

## 2019-04-15 PROCEDURE — 36415 COLL VENOUS BLD VENIPUNCTURE: CPT

## 2019-04-15 PROCEDURE — 87389 HIV-1 AG W/HIV-1&-2 AB AG IA: CPT

## 2019-04-16 DIAGNOSIS — O26.843 UTERINE SIZE-DATE DISCREPANCY, THIRD TRIMESTER: ICD-10-CM

## 2019-04-16 DIAGNOSIS — O09.523 SUPERVISION OF ELDERLY MULTIGRAVIDA, THIRD TRIMESTER: ICD-10-CM

## 2019-04-16 DIAGNOSIS — O09.33 SUPERVISION OF PREGNANCY WITH INSUFFICIENT ANTENATAL CARE, THIRD TRIMESTER: ICD-10-CM

## 2019-04-16 LAB
HCT VFR BLD CALC: 27.6 % — LOW (ref 34.5–45)
HGB BLD-MCNC: 8.4 G/DL — LOW (ref 11.5–15.5)
HIV 1+2 AB+HIV1 P24 AG SERPL QL IA: SIGNIFICANT CHANGE UP
MCHC RBC-ENTMCNC: 25.5 PG — LOW (ref 27–34)
MCHC RBC-ENTMCNC: 30.4 GM/DL — LOW (ref 32–36)
MCV RBC AUTO: 83.6 FL — SIGNIFICANT CHANGE UP (ref 80–100)
PLATELET # BLD AUTO: 454 K/UL — HIGH (ref 150–400)
RBC # BLD: 3.3 M/UL — LOW (ref 3.8–5.2)
RBC # FLD: 15.4 % — HIGH (ref 10.3–14.5)
WBC # BLD: 9.64 K/UL — SIGNIFICANT CHANGE UP (ref 3.8–10.5)
WBC # FLD AUTO: 9.64 K/UL — SIGNIFICANT CHANGE UP (ref 3.8–10.5)

## 2019-04-18 ENCOUNTER — ASOB RESULT (OUTPATIENT)
Age: 34
End: 2019-04-18

## 2019-04-18 ENCOUNTER — APPOINTMENT (OUTPATIENT)
Dept: ANTEPARTUM | Facility: CLINIC | Age: 34
End: 2019-04-18
Payer: MEDICAID

## 2019-04-18 PROCEDURE — 76816 OB US FOLLOW-UP PER FETUS: CPT

## 2019-04-24 ENCOUNTER — OUTPATIENT (OUTPATIENT)
Dept: OUTPATIENT SERVICES | Facility: HOSPITAL | Age: 34
LOS: 1 days | End: 2019-04-24
Payer: MEDICAID

## 2019-04-24 ENCOUNTER — APPOINTMENT (OUTPATIENT)
Dept: OBGYN | Facility: CLINIC | Age: 34
End: 2019-04-24
Payer: MEDICAID

## 2019-04-24 DIAGNOSIS — N76.0 ACUTE VAGINITIS: ICD-10-CM

## 2019-04-24 DIAGNOSIS — R87.613 HIGH GRADE SQUAMOUS INTRAEPITHELIAL LESION ON CYTOLOGIC SMEAR OF CERVIX (HGSIL): ICD-10-CM

## 2019-04-24 DIAGNOSIS — D06.9 CARCINOMA IN SITU OF CERVIX, UNSPECIFIED: ICD-10-CM

## 2019-04-24 PROCEDURE — 57452 EXAM OF CERVIX W/SCOPE: CPT

## 2019-04-24 PROCEDURE — 57452 EXAM OF CERVIX W/SCOPE: CPT | Mod: GC

## 2019-04-24 NOTE — PROCEDURE
[HPV high risk] : PCR positive for high risk HPV [HGSIL] : high grade squamous intraepithelial lesion [Colposcopy] : colposcopy [Benefits] : benefits [Risks] : risks [Patient] : patient [Infection] : infection [Bleeding] : bleeding [Consent Obtained] : written consent was obtained prior to the procedure [No Abnormalities] : no abnormalities [SCJ Fully Visulized] : the squamocolumnar junction was fully visualized [Tolerated Well] : the patient tolerated the procedure well [ECC Done] : Endocervical curettage was not performed. [Biopsies Taken: # ___] : no biopsies were taken of the cervix [FreeTextEntry9] : 32 weeks pregnant

## 2019-04-26 DIAGNOSIS — R87.613 HIGH GRADE SQUAMOUS INTRAEPITHELIAL LESION ON CYTOLOGIC SMEAR OF CERVIX (HGSIL): ICD-10-CM

## 2019-04-29 ENCOUNTER — APPOINTMENT (OUTPATIENT)
Dept: OBGYN | Facility: CLINIC | Age: 34
End: 2019-04-29

## 2019-05-01 ENCOUNTER — OUTPATIENT (OUTPATIENT)
Dept: OUTPATIENT SERVICES | Facility: HOSPITAL | Age: 34
LOS: 1 days | End: 2019-05-01
Payer: MEDICAID

## 2019-05-07 ENCOUNTER — OUTPATIENT (OUTPATIENT)
Dept: OUTPATIENT SERVICES | Facility: HOSPITAL | Age: 34
LOS: 1 days | End: 2019-05-07
Payer: MEDICAID

## 2019-05-07 ENCOUNTER — APPOINTMENT (OUTPATIENT)
Dept: RADIOLOGY | Facility: CLINIC | Age: 34
End: 2019-05-07
Payer: MEDICAID

## 2019-05-07 DIAGNOSIS — Z00.8 ENCOUNTER FOR OTHER GENERAL EXAMINATION: ICD-10-CM

## 2019-05-07 PROCEDURE — 71045 X-RAY EXAM CHEST 1 VIEW: CPT | Mod: 26

## 2019-05-07 PROCEDURE — 71045 X-RAY EXAM CHEST 1 VIEW: CPT

## 2019-05-09 ENCOUNTER — APPOINTMENT (OUTPATIENT)
Dept: OBGYN | Facility: CLINIC | Age: 34
End: 2019-05-09

## 2019-05-13 ENCOUNTER — LABORATORY RESULT (OUTPATIENT)
Age: 34
End: 2019-05-13

## 2019-05-14 ENCOUNTER — OUTPATIENT (OUTPATIENT)
Dept: OUTPATIENT SERVICES | Facility: HOSPITAL | Age: 34
LOS: 1 days | End: 2019-05-14
Payer: MEDICAID

## 2019-05-14 ENCOUNTER — APPOINTMENT (OUTPATIENT)
Dept: OBGYN | Facility: CLINIC | Age: 34
End: 2019-05-14
Payer: MEDICAID

## 2019-05-14 ENCOUNTER — NON-APPOINTMENT (OUTPATIENT)
Age: 34
End: 2019-05-14

## 2019-05-14 VITALS — BODY MASS INDEX: 23.96 KG/M2 | WEIGHT: 144 LBS | DIASTOLIC BLOOD PRESSURE: 72 MMHG | SYSTOLIC BLOOD PRESSURE: 110 MMHG

## 2019-05-14 DIAGNOSIS — O99.019 ANEMIA COMPLICATING PREGNANCY, UNSPECIFIED TRIMESTER: ICD-10-CM

## 2019-05-14 DIAGNOSIS — N76.0 ACUTE VAGINITIS: ICD-10-CM

## 2019-05-14 DIAGNOSIS — Z34.90 ENCOUNTER FOR SUPERVISION OF NORMAL PREGNANCY, UNSPECIFIED, UNSPECIFIED TRIMESTER: ICD-10-CM

## 2019-05-14 DIAGNOSIS — O99.013 ANEMIA COMPLICATING PREGNANCY, THIRD TRIMESTER: ICD-10-CM

## 2019-05-14 DIAGNOSIS — Z34.93 ENCOUNTER FOR SUPERVISION OF NORMAL PREGNANCY, UNSPECIFIED, THIRD TRIMESTER: ICD-10-CM

## 2019-05-14 PROCEDURE — 87086 URINE CULTURE/COLONY COUNT: CPT

## 2019-05-14 PROCEDURE — 81002 URINALYSIS NONAUTO W/O SCOPE: CPT | Mod: NC

## 2019-05-14 PROCEDURE — 80307 DRUG TEST PRSMV CHEM ANLYZR: CPT

## 2019-05-14 PROCEDURE — 99213 OFFICE O/P EST LOW 20 MIN: CPT | Mod: NC,TH

## 2019-05-14 PROCEDURE — 86780 TREPONEMA PALLIDUM: CPT

## 2019-05-14 PROCEDURE — G0463: CPT

## 2019-05-14 PROCEDURE — 36415 COLL VENOUS BLD VENIPUNCTURE: CPT

## 2019-05-14 PROCEDURE — 86765 RUBEOLA ANTIBODY: CPT

## 2019-05-14 PROCEDURE — 81002 URINALYSIS NONAUTO W/O SCOPE: CPT

## 2019-05-14 PROCEDURE — 85027 COMPLETE CBC AUTOMATED: CPT

## 2019-05-15 ENCOUNTER — LABORATORY RESULT (OUTPATIENT)
Age: 34
End: 2019-05-15

## 2019-05-15 LAB
HCT VFR BLD CALC: 27.2 % — LOW (ref 34.5–45)
HGB BLD-MCNC: 8.1 G/DL — LOW (ref 11.5–15.5)
MCHC RBC-ENTMCNC: 24.6 PG — LOW (ref 27–34)
MCHC RBC-ENTMCNC: 29.8 GM/DL — LOW (ref 32–36)
MCV RBC AUTO: 82.7 FL — SIGNIFICANT CHANGE UP (ref 80–100)
MEV IGG SER-ACNC: 81.9 AU/ML — SIGNIFICANT CHANGE UP
MEV IGG+IGM SER-IMP: POSITIVE — SIGNIFICANT CHANGE UP
PLATELET # BLD AUTO: 436 K/UL — HIGH (ref 150–400)
RBC # BLD: 3.29 M/UL — LOW (ref 3.8–5.2)
RBC # FLD: 15.2 % — HIGH (ref 10.3–14.5)
T PALLIDUM AB TITR SER: NEGATIVE — SIGNIFICANT CHANGE UP
WBC # BLD: 10.91 K/UL — HIGH (ref 3.8–10.5)
WBC # FLD AUTO: 10.91 K/UL — HIGH (ref 3.8–10.5)

## 2019-05-16 LAB
CULTURE RESULTS: SIGNIFICANT CHANGE UP
SPECIMEN SOURCE: SIGNIFICANT CHANGE UP

## 2019-05-19 ENCOUNTER — TRANSCRIPTION ENCOUNTER (OUTPATIENT)
Age: 34
End: 2019-05-19

## 2019-05-20 ENCOUNTER — INPATIENT (INPATIENT)
Facility: HOSPITAL | Age: 34
LOS: 1 days | Discharge: HOME | End: 2019-05-22
Attending: OBSTETRICS & GYNECOLOGY | Admitting: OBSTETRICS & GYNECOLOGY
Payer: MEDICAID

## 2019-05-20 ENCOUNTER — RESULT REVIEW (OUTPATIENT)
Age: 34
End: 2019-05-20

## 2019-05-20 VITALS
SYSTOLIC BLOOD PRESSURE: 136 MMHG | HEART RATE: 87 BPM | RESPIRATION RATE: 22 BRPM | DIASTOLIC BLOOD PRESSURE: 74 MMHG | WEIGHT: 141.1 LBS | TEMPERATURE: 97 F

## 2019-05-20 DIAGNOSIS — Z34.80 ENCOUNTER FOR SUPERVISION OF OTHER NORMAL PREGNANCY, UNSPECIFIED TRIMESTER: ICD-10-CM

## 2019-05-20 DIAGNOSIS — Z98.890 OTHER SPECIFIED POSTPROCEDURAL STATES: ICD-10-CM

## 2019-05-20 DIAGNOSIS — Z3A.00 WEEKS OF GESTATION OF PREGNANCY NOT SPECIFIED: ICD-10-CM

## 2019-05-20 DIAGNOSIS — O26.899 OTHER SPECIFIED PREGNANCY RELATED CONDITIONS, UNSPECIFIED TRIMESTER: ICD-10-CM

## 2019-05-20 LAB
AMPHET UR-MCNC: NEGATIVE — SIGNIFICANT CHANGE UP
BARBITURATES UR SCN-MCNC: NEGATIVE — SIGNIFICANT CHANGE UP
BASOPHILS # BLD AUTO: 0.1 K/UL — SIGNIFICANT CHANGE UP (ref 0–0.2)
BASOPHILS NFR BLD AUTO: 0.5 % — SIGNIFICANT CHANGE UP (ref 0–2)
BENZODIAZ UR-MCNC: NEGATIVE — SIGNIFICANT CHANGE UP
BLD GP AB SCN SERPL QL: NEGATIVE — SIGNIFICANT CHANGE UP
COCAINE METAB.OTHER UR-MCNC: NEGATIVE — SIGNIFICANT CHANGE UP
EOSINOPHIL # BLD AUTO: 0.2 K/UL — SIGNIFICANT CHANGE UP (ref 0–0.5)
EOSINOPHIL NFR BLD AUTO: 1.3 % — SIGNIFICANT CHANGE UP (ref 0–6)
HCT VFR BLD CALC: 26.1 % — LOW (ref 34.5–45)
HGB BLD-MCNC: 9.1 G/DL — LOW (ref 11.5–15.5)
LYMPHOCYTES # BLD AUTO: 3.5 K/UL — HIGH (ref 1–3.3)
LYMPHOCYTES # BLD AUTO: 30.4 % — SIGNIFICANT CHANGE UP (ref 13–44)
MCHC RBC-ENTMCNC: 26.8 PG — LOW (ref 27–34)
MCHC RBC-ENTMCNC: 34.7 GM/DL — SIGNIFICANT CHANGE UP (ref 32–36)
MCV RBC AUTO: 77.3 FL — LOW (ref 80–100)
METHADONE UR-MCNC: NEGATIVE — SIGNIFICANT CHANGE UP
MONOCYTES # BLD AUTO: 1 K/UL — HIGH (ref 0–0.9)
MONOCYTES NFR BLD AUTO: 9.1 % — SIGNIFICANT CHANGE UP (ref 2–14)
NEUTROPHILS # BLD AUTO: 6.7 K/UL — SIGNIFICANT CHANGE UP (ref 1.8–7.4)
NEUTROPHILS NFR BLD AUTO: 58.6 % — SIGNIFICANT CHANGE UP (ref 43–77)
OPIATES UR-MCNC: NEGATIVE — SIGNIFICANT CHANGE UP
OXYCODONE UR-MCNC: NEGATIVE — SIGNIFICANT CHANGE UP
PCP SPEC-MCNC: SIGNIFICANT CHANGE UP
PCP UR-MCNC: NEGATIVE — SIGNIFICANT CHANGE UP
PLATELET # BLD AUTO: 456 K/UL — HIGH (ref 150–400)
RBC # BLD: 3.38 M/UL — LOW (ref 3.8–5.2)
RBC # FLD: 13.8 % — SIGNIFICANT CHANGE UP (ref 10.3–14.5)
RH IG SCN BLD-IMP: POSITIVE — SIGNIFICANT CHANGE UP
RH IG SCN BLD-IMP: POSITIVE — SIGNIFICANT CHANGE UP
T PALLIDUM AB TITR SER: NEGATIVE — SIGNIFICANT CHANGE UP
THC UR QL: POSITIVE
WBC # BLD: 11.4 K/UL — HIGH (ref 3.8–10.5)
WBC # FLD AUTO: 11.4 K/UL — HIGH (ref 3.8–10.5)

## 2019-05-20 PROCEDURE — 59409 OBSTETRICAL CARE: CPT | Mod: U7

## 2019-05-20 PROCEDURE — 58600 DIVISION OF FALLOPIAN TUBE: CPT

## 2019-05-20 RX ORDER — POLYETHYLENE GLYCOL 3350 17 G/17G
17 POWDER, FOR SOLUTION ORAL DAILY
Refills: 0 | Status: DISCONTINUED | OUTPATIENT
Start: 2019-05-20 | End: 2019-05-22

## 2019-05-20 RX ORDER — DIPHENHYDRAMINE HCL 50 MG
25 CAPSULE ORAL EVERY 6 HOURS
Refills: 0 | Status: DISCONTINUED | OUTPATIENT
Start: 2019-05-20 | End: 2019-05-22

## 2019-05-20 RX ORDER — OXYCODONE HYDROCHLORIDE 5 MG/1
5 TABLET ORAL
Refills: 0 | Status: DISCONTINUED | OUTPATIENT
Start: 2019-05-20 | End: 2019-05-22

## 2019-05-20 RX ORDER — PRAMOXINE HYDROCHLORIDE 150 MG/15G
1 AEROSOL, FOAM RECTAL EVERY 4 HOURS
Refills: 0 | Status: DISCONTINUED | OUTPATIENT
Start: 2019-05-20 | End: 2019-05-22

## 2019-05-20 RX ORDER — IBUPROFEN 200 MG
600 TABLET ORAL EVERY 6 HOURS
Refills: 0 | Status: COMPLETED | OUTPATIENT
Start: 2019-05-20 | End: 2020-04-17

## 2019-05-20 RX ORDER — HYDROCORTISONE 1 %
1 OINTMENT (GRAM) TOPICAL EVERY 6 HOURS
Refills: 0 | Status: DISCONTINUED | OUTPATIENT
Start: 2019-05-20 | End: 2019-05-22

## 2019-05-20 RX ORDER — OXYCODONE HYDROCHLORIDE 5 MG/1
5 TABLET ORAL ONCE
Refills: 0 | Status: DISCONTINUED | OUTPATIENT
Start: 2019-05-20 | End: 2019-05-22

## 2019-05-20 RX ORDER — DOCUSATE SODIUM 100 MG
100 CAPSULE ORAL
Refills: 0 | Status: DISCONTINUED | OUTPATIENT
Start: 2019-05-20 | End: 2019-05-22

## 2019-05-20 RX ORDER — SODIUM CHLORIDE 9 MG/ML
1000 INJECTION, SOLUTION INTRAVENOUS
Refills: 0 | Status: DISCONTINUED | OUTPATIENT
Start: 2019-05-20 | End: 2019-05-20

## 2019-05-20 RX ORDER — SIMETHICONE 80 MG/1
80 TABLET, CHEWABLE ORAL EVERY 4 HOURS
Refills: 0 | Status: DISCONTINUED | OUTPATIENT
Start: 2019-05-20 | End: 2019-05-22

## 2019-05-20 RX ORDER — AMPICILLIN TRIHYDRATE 250 MG
2 CAPSULE ORAL ONCE
Refills: 0 | Status: COMPLETED | OUTPATIENT
Start: 2019-05-20 | End: 2019-05-20

## 2019-05-20 RX ORDER — MAGNESIUM HYDROXIDE 400 MG/1
30 TABLET, CHEWABLE ORAL
Refills: 0 | Status: DISCONTINUED | OUTPATIENT
Start: 2019-05-20 | End: 2019-05-22

## 2019-05-20 RX ORDER — AMPICILLIN TRIHYDRATE 250 MG
2 CAPSULE ORAL EVERY 4 HOURS
Refills: 0 | Status: DISCONTINUED | OUTPATIENT
Start: 2019-05-20 | End: 2019-05-20

## 2019-05-20 RX ORDER — CITRIC ACID/SODIUM CITRATE 300-500 MG
15 SOLUTION, ORAL ORAL EVERY 6 HOURS
Refills: 0 | Status: DISCONTINUED | OUTPATIENT
Start: 2019-05-20 | End: 2019-05-20

## 2019-05-20 RX ORDER — LANOLIN
1 OINTMENT (GRAM) TOPICAL EVERY 6 HOURS
Refills: 0 | Status: DISCONTINUED | OUTPATIENT
Start: 2019-05-20 | End: 2019-05-22

## 2019-05-20 RX ORDER — PANTOPRAZOLE SODIUM 20 MG/1
40 TABLET, DELAYED RELEASE ORAL
Refills: 0 | Status: DISCONTINUED | OUTPATIENT
Start: 2019-05-21 | End: 2019-05-22

## 2019-05-20 RX ORDER — FAMOTIDINE 10 MG/ML
20 INJECTION INTRAVENOUS ONCE
Refills: 0 | Status: COMPLETED | OUTPATIENT
Start: 2019-05-20 | End: 2019-05-20

## 2019-05-20 RX ORDER — KETOROLAC TROMETHAMINE 30 MG/ML
30 SYRINGE (ML) INJECTION ONCE
Refills: 0 | Status: DISCONTINUED | OUTPATIENT
Start: 2019-05-20 | End: 2019-05-20

## 2019-05-20 RX ORDER — DIBUCAINE 1 %
1 OINTMENT (GRAM) RECTAL EVERY 6 HOURS
Refills: 0 | Status: DISCONTINUED | OUTPATIENT
Start: 2019-05-20 | End: 2019-05-22

## 2019-05-20 RX ORDER — FERROUS SULFATE 325(65) MG
325 TABLET ORAL THREE TIMES A DAY
Refills: 0 | Status: DISCONTINUED | OUTPATIENT
Start: 2019-05-20 | End: 2019-05-22

## 2019-05-20 RX ORDER — OXYTOCIN 10 UNIT/ML
333.33 VIAL (ML) INJECTION
Qty: 20 | Refills: 0 | Status: DISCONTINUED | OUTPATIENT
Start: 2019-05-20 | End: 2019-05-22

## 2019-05-20 RX ORDER — AER TRAVELER 0.5 G/1
1 SOLUTION RECTAL; TOPICAL EVERY 4 HOURS
Refills: 0 | Status: DISCONTINUED | OUTPATIENT
Start: 2019-05-20 | End: 2019-05-22

## 2019-05-20 RX ORDER — ASCORBIC ACID 60 MG
250 TABLET,CHEWABLE ORAL THREE TIMES A DAY
Refills: 0 | Status: DISCONTINUED | OUTPATIENT
Start: 2019-05-20 | End: 2019-05-22

## 2019-05-20 RX ORDER — SODIUM CHLORIDE 9 MG/ML
3 INJECTION INTRAMUSCULAR; INTRAVENOUS; SUBCUTANEOUS EVERY 8 HOURS
Refills: 0 | Status: DISCONTINUED | OUTPATIENT
Start: 2019-05-20 | End: 2019-05-22

## 2019-05-20 RX ORDER — ACETAMINOPHEN 500 MG
975 TABLET ORAL EVERY 6 HOURS
Refills: 0 | Status: DISCONTINUED | OUTPATIENT
Start: 2019-05-20 | End: 2019-05-22

## 2019-05-20 RX ORDER — GLYCERIN ADULT
1 SUPPOSITORY, RECTAL RECTAL AT BEDTIME
Refills: 0 | Status: DISCONTINUED | OUTPATIENT
Start: 2019-05-20 | End: 2019-05-22

## 2019-05-20 RX ORDER — AMPICILLIN TRIHYDRATE 250 MG
CAPSULE ORAL
Refills: 0 | Status: DISCONTINUED | OUTPATIENT
Start: 2019-05-20 | End: 2019-05-20

## 2019-05-20 RX ORDER — BENZOCAINE 10 %
1 GEL (GRAM) MUCOUS MEMBRANE EVERY 6 HOURS
Refills: 0 | Status: DISCONTINUED | OUTPATIENT
Start: 2019-05-20 | End: 2019-05-22

## 2019-05-20 RX ORDER — TETANUS TOXOID, REDUCED DIPHTHERIA TOXOID AND ACELLULAR PERTUSSIS VACCINE, ADSORBED 5; 2.5; 8; 8; 2.5 [IU]/.5ML; [IU]/.5ML; UG/.5ML; UG/.5ML; UG/.5ML
0.5 SUSPENSION INTRAMUSCULAR ONCE
Refills: 0 | Status: COMPLETED | OUTPATIENT
Start: 2019-05-20

## 2019-05-20 RX ORDER — CITRIC ACID/SODIUM CITRATE 300-500 MG
30 SOLUTION, ORAL ORAL ONCE
Refills: 0 | Status: COMPLETED | OUTPATIENT
Start: 2019-05-20 | End: 2019-05-20

## 2019-05-20 RX ADMIN — Medication 30 MILLILITER(S): at 15:20

## 2019-05-20 RX ADMIN — Medication 25 MILLIGRAM(S): at 21:16

## 2019-05-20 RX ADMIN — FAMOTIDINE 20 MILLIGRAM(S): 10 INJECTION INTRAVENOUS at 15:23

## 2019-05-20 RX ADMIN — Medication 30 MILLIGRAM(S): at 23:44

## 2019-05-20 RX ADMIN — Medication 216 GRAM(S): at 09:15

## 2019-05-20 RX ADMIN — Medication 30 MILLIGRAM(S): at 23:14

## 2019-05-20 NOTE — PROGRESS NOTE ADULT - SUBJECTIVE AND OBJECTIVE BOX
R1 POST-OP CHECK    POD#0 s/p postpartum BTL    S: Patient seen and evaluated at bedside in recovery.  Pt awake and alert resting comfortably in bed. Patient reports pain controlled with analgesia. Pt denies N/V, SOB, CP, palpitations, fever/chills. Tolerating clears.  Not OOB yet.    O:   T(C): 36.8 (05-20-19 @ 20:15), Max: 37 (05-20-19 @ 18:48)  HR: 58 (05-20-19 @ 21:45) (58 - 78)  BP: 132/65 (05-20-19 @ 21:45) (115/72 - 135/73)  RR: 16 (05-20-19 @ 21:45) (16 - 21)  SpO2: 97% (05-20-19 @ 21:45) (97% - 99%)  Wt(kg): --  I&O's Summary    20 May 2019 07:01  -  20 May 2019 22:23  --------------------------------------------------------  IN: 2850 mL / OUT: 470 mL / NET: 2380 mL    Gen: Resting comfortably in bed, NAD  CV: S1S2, RRR  Lungs: CTA B/L  Abd: soft, appropriately tender, tympanic to percussion in RUQ, occasional BS x 4 quadrants.    Inc: Clean/dry/intact w/ bandage in place  Ext: SCD's in place and functional, non-tender b/l, no edema

## 2019-05-20 NOTE — PRE-ANESTHESIA EVALUATION ADULT - NSANTHOSAYNRD_GEN_A_CORE
No. VENICE screening performed.  STOP BANG Legend: 0-2 = LOW Risk; 3-4 = INTERMEDIATE Risk; 5-8 = HIGH Risk
No. VENICE screening performed.  STOP BANG Legend: 0-2 = LOW Risk; 3-4 = INTERMEDIATE Risk; 5-8 = HIGH Risk

## 2019-05-20 NOTE — BRIEF OPERATIVE NOTE - OPERATION/FINDINGS
Grossly postpartum normal tubes, uterus, ovaries. R and L tubes visualized, ligated, and cut.  Job #: [id number]

## 2019-05-20 NOTE — PROVIDER CONTACT NOTE (OTHER) - ASSESSMENT
pt cooperative, pt willingly gave urine sample for drug screen. pt was open about CPS's involvement with her other children. pt also disclosed that she has been seeing a  throughout her pregnancy.

## 2019-05-20 NOTE — PRE-ANESTHESIA EVALUATION ADULT - NSDENTALSD_ENT_ALL_CORE
appears normal and intact
missing teeth/Very poor dentition, missing and chipped teeth both upper and lower

## 2019-05-20 NOTE — PROGRESS NOTE ADULT - PROBLEM SELECTOR PLAN 1
1. Neuro: Analgesia PRN. acetaminophen   Tablet .. 975 milliGRAM(s) Oral every 6 hours  diphenhydrAMINE 25 milliGRAM(s) Oral every 6 hours PRN  ibuprofen  Tablet. 600 milliGRAM(s) Oral every 6 hours  ketorolac   Injectable 30 milliGRAM(s) IV Push once  oxyCODONE    IR 5 milliGRAM(s) Oral every 3 hours PRN  oxyCODONE    IR 5 milliGRAM(s) Oral once PRN     2. CV: Hemodynamically stable. H/H in AM.   3. Pulm: Saturating well on room air.  Encourage OOB and incentive spirometer use.   4. GI: Regular diet. Anti-emetics PRN.  5. : DTV by 4a  6. Electrolytes: LR@125cc/hr  7. DVT ppx w/ SCD's while in bed. Early ambulation, initially with assistance then as tolerated.    8. Discharge from PACU to floor

## 2019-05-21 ENCOUNTER — APPOINTMENT (OUTPATIENT)
Dept: OBGYN | Facility: CLINIC | Age: 34
End: 2019-05-21

## 2019-05-21 ENCOUNTER — OTHER (OUTPATIENT)
Age: 34
End: 2019-05-21

## 2019-05-21 ENCOUNTER — TRANSCRIPTION ENCOUNTER (OUTPATIENT)
Age: 34
End: 2019-05-21

## 2019-05-21 DIAGNOSIS — Z71.89 OTHER SPECIFIED COUNSELING: ICD-10-CM

## 2019-05-21 DIAGNOSIS — F43.20 ADJUSTMENT DISORDER, UNSPECIFIED: ICD-10-CM

## 2019-05-21 LAB
AMPHET UR-MCNC: NEGATIVE — SIGNIFICANT CHANGE UP
BARBITURATES, URINE.: NEGATIVE — SIGNIFICANT CHANGE UP
BENZODIAZ UR-MCNC: NEGATIVE — SIGNIFICANT CHANGE UP
COCAINE METAB.OTHER UR-MCNC: NEGATIVE — SIGNIFICANT CHANGE UP
CREATININE, URINE THERAPEUTIC: 92.7 MG/DL — SIGNIFICANT CHANGE UP
HCT VFR BLD CALC: 20.8 % — CRITICAL LOW (ref 34.5–45)
HCT VFR BLD CALC: 21.6 % — LOW (ref 34.5–45)
HGB BLD-MCNC: 6.8 G/DL — CRITICAL LOW (ref 11.5–15.5)
HGB BLD-MCNC: 6.9 G/DL — CRITICAL LOW (ref 11.5–15.5)
METHADONE UR-MCNC: NEGATIVE — SIGNIFICANT CHANGE UP
METHAQUALONE UR QL: NEGATIVE — SIGNIFICANT CHANGE UP
METHAQUALONE UR-MCNC: NEGATIVE — SIGNIFICANT CHANGE UP
OPIATES UR-MCNC: NEGATIVE — SIGNIFICANT CHANGE UP
PCP UR-MCNC: NEGATIVE — SIGNIFICANT CHANGE UP
PROPOXYPH UR QL: NEGATIVE — SIGNIFICANT CHANGE UP
THC UR QL: SIGNIFICANT CHANGE UP

## 2019-05-21 PROCEDURE — 99222 1ST HOSP IP/OBS MODERATE 55: CPT

## 2019-05-21 RX ORDER — HYDROXYZINE HCL 10 MG
50 TABLET ORAL EVERY 6 HOURS
Refills: 0 | Status: DISCONTINUED | OUTPATIENT
Start: 2019-05-21 | End: 2019-05-22

## 2019-05-21 RX ORDER — IBUPROFEN 200 MG
600 TABLET ORAL EVERY 6 HOURS
Refills: 0 | Status: DISCONTINUED | OUTPATIENT
Start: 2019-05-21 | End: 2019-05-22

## 2019-05-21 RX ADMIN — Medication 50 MILLIGRAM(S): at 20:38

## 2019-05-21 RX ADMIN — Medication 975 MILLIGRAM(S): at 01:55

## 2019-05-21 RX ADMIN — OXYCODONE HYDROCHLORIDE 5 MILLIGRAM(S): 5 TABLET ORAL at 09:02

## 2019-05-21 RX ADMIN — Medication 975 MILLIGRAM(S): at 14:56

## 2019-05-21 RX ADMIN — Medication 975 MILLIGRAM(S): at 09:02

## 2019-05-21 RX ADMIN — OXYCODONE HYDROCHLORIDE 5 MILLIGRAM(S): 5 TABLET ORAL at 09:30

## 2019-05-21 RX ADMIN — Medication 600 MILLIGRAM(S): at 05:53

## 2019-05-21 RX ADMIN — Medication 600 MILLIGRAM(S): at 23:32

## 2019-05-21 RX ADMIN — Medication 600 MILLIGRAM(S): at 18:20

## 2019-05-21 RX ADMIN — Medication 975 MILLIGRAM(S): at 01:25

## 2019-05-21 RX ADMIN — Medication 325 MILLIGRAM(S): at 20:35

## 2019-05-21 RX ADMIN — Medication 975 MILLIGRAM(S): at 15:30

## 2019-05-21 RX ADMIN — Medication 325 MILLIGRAM(S): at 09:02

## 2019-05-21 RX ADMIN — PANTOPRAZOLE SODIUM 40 MILLIGRAM(S): 20 TABLET, DELAYED RELEASE ORAL at 09:02

## 2019-05-21 RX ADMIN — Medication 250 MILLIGRAM(S): at 20:35

## 2019-05-21 RX ADMIN — Medication 50 MILLIGRAM(S): at 14:26

## 2019-05-21 RX ADMIN — Medication 600 MILLIGRAM(S): at 12:14

## 2019-05-21 RX ADMIN — Medication 600 MILLIGRAM(S): at 12:45

## 2019-05-21 RX ADMIN — Medication 250 MILLIGRAM(S): at 09:05

## 2019-05-21 RX ADMIN — Medication 975 MILLIGRAM(S): at 20:35

## 2019-05-21 RX ADMIN — Medication 975 MILLIGRAM(S): at 21:30

## 2019-05-21 RX ADMIN — Medication 600 MILLIGRAM(S): at 06:23

## 2019-05-21 RX ADMIN — OXYCODONE HYDROCHLORIDE 5 MILLIGRAM(S): 5 TABLET ORAL at 04:19

## 2019-05-21 RX ADMIN — Medication 325 MILLIGRAM(S): at 14:56

## 2019-05-21 RX ADMIN — MAGNESIUM HYDROXIDE 30 MILLILITER(S): 400 TABLET, CHEWABLE ORAL at 10:59

## 2019-05-21 RX ADMIN — SODIUM CHLORIDE 3 MILLILITER(S): 9 INJECTION INTRAMUSCULAR; INTRAVENOUS; SUBCUTANEOUS at 14:58

## 2019-05-21 RX ADMIN — Medication 975 MILLIGRAM(S): at 09:30

## 2019-05-21 RX ADMIN — Medication 250 MILLIGRAM(S): at 14:55

## 2019-05-21 RX ADMIN — Medication 600 MILLIGRAM(S): at 17:51

## 2019-05-21 RX ADMIN — OXYCODONE HYDROCHLORIDE 5 MILLIGRAM(S): 5 TABLET ORAL at 07:00

## 2019-05-21 RX ADMIN — Medication 1 TABLET(S): at 12:14

## 2019-05-21 RX ADMIN — SODIUM CHLORIDE 3 MILLILITER(S): 9 INJECTION INTRAMUSCULAR; INTRAVENOUS; SUBCUTANEOUS at 05:45

## 2019-05-21 RX ADMIN — Medication 100 MILLIGRAM(S): at 14:56

## 2019-05-21 NOTE — DISCHARGE NOTE OB - PROVIDER TOKENS
FREE:[LAST:[[facility]],PHONE:[phone]],FAX:[(   [phone]    -],ADDRESS:[[address] [place] [address], [address], [place], [place]]]

## 2019-05-21 NOTE — DISCHARGE NOTE OB - CARE PLAN
Principal Discharge DX:	Vaginal delivery  Goal:	routine recovery  Assessment and plan of treatment:	Make your follow-up appointment with your doctor in 6 weeks for a post-partum check. No heavy lifting, driving, or strenuous activity for 6 weeks. Nothing per vagina such as tampons, intercourse, douches, or tub baths for 6 weeks or until you see your doctor. Call your doctor with any signs and symptoms of infection such as fever, chills, nausea, or vomiting. Call your doctor if you're unable to tolerate food, increase in vaginal bleeding, or have difficulty urinating. Call your doctor if you have pain that is not relieved by your prescribed medications. Notify your doctor with any other concerns.   Call [phone] if you have any of these concerns in the next 6 weeks. Principal Discharge DX:	Vaginal delivery  Goal:	routine recovery  Assessment and plan of treatment:	Make your follow-up appointment with your doctor in 6 weeks for a post-partum check. No heavy lifting, driving, or strenuous activity for 6 weeks. Nothing per vagina such as tampons, intercourse, douches, or tub baths for 6 weeks or until you see your doctor. Call your doctor with any signs and symptoms of infection such as fever, chills, nausea, or vomiting. Call your doctor if you're unable to tolerate food, increase in vaginal bleeding, or have difficulty urinating. Call your doctor if you have pain that is not relieved by your prescribed medications. Notify your doctor with any other concerns.   Call [phone] if you have any of these concerns in the next 6 weeks.  Secondary Diagnosis:	Gastritis  Goal:	close follow up  Assessment and plan of treatment:	Follow up with Dr. Anaya's office (Gastroenterology) as an outpatient. Call [phone] to set up an appointment.  Secondary Diagnosis:	Anxiety  Goal:	close follow up  Assessment and plan of treatment:	Follow up with the psychiatry team at \[facility]\"" (call [phone] to set up an appointment) or [facility]  clinic which specializes in pregnant and postpartum patients (call [phone] to set up an appointment)

## 2019-05-21 NOTE — DISCHARGE NOTE OB - HOSPITAL COURSE
Patient had an /BTL at 36w3d secondary to pre-term labor. Labor and postpartum course complicated by severe anxiety and +THC on urine toxicology. , Hct stable. On postpartum day 2, after evaluation by social work and inpatient psychiatry patient was discharged home in stable condition, voiding spontaneously and with normal vital signs.

## 2019-05-21 NOTE — BEHAVIORAL HEALTH ASSESSMENT NOTE - SUMMARY
33 y/o AA female single, with a boyfriend, on medical disability since May 2018 for chronic gastritis sx, with 4 dependents (currently being cared for by patient's mother), domiciled in an apt in [place], with no past psychiatric history, inpatient hospitalizations, substance abuse significant for THC use (reportedly last use was 3 months ago, however Utox on 5/20 positive for THC), no suicide attempts or SIB. with PMHx known distal oesophagitis, non bleeding DU with no stigmata of bleeding following EGD in May 2018, on this admission, post partum 1 day via vaginal delivery and tubal ligation. Patient reports that her symptoms have worsened since his father recent passing away, with patient with resentment toward her two siblings with patient predominantly caring for her father (who apparently succumbed to ethanol related issues) by herself  for the past 3 years, reports has not been compliant with psychiatric medications since finding out that she is pregnant.  Psychiatry consulted to assess for management of anxiety. 35 y/o AA female single, with a boyfriend, on medical disability since May 2018 for chronic gastritis sx, with 4 dependents (currently being cared for by patient's mother), domiciled in an apt in [place], with no past psychiatric history, inpatient hospitalizations, substance abuse significant for THC use (reportedly last use was 3 months ago, however Utox on 5/20 positive for THC), no suicide attempts or SIB. with PMHx known distal oesophagitis, non bleeding DU with no stigmata of bleeding following EGD in May 2018, on this admission, post partum 1 day via vaginal delivery and tubal ligation. Patient reports that her symptoms have worsened since his father recent passing away, with patient with resentment toward her two siblings with patient predominantly caring for her father (who apparently succumbed to ethanol related issues) by herself  for the past 3 years, reports has not been compliant with psychiatric medications since finding out that she is pregnant.  Psychiatry consulted to assess for management of anxiety.  Patient seen and evaluated, awake and alert oriented, reports worsening anxiety with panic attacks since self discontinuing her psychiatric medications upon finding out that she was pregnant.  Unclear what medications she had self discontinued, at first states it was Klonopin and then later states it was amitriptyline.  Reports panic attacks at least once a week, reports anxiety around the passing of her father one year ago.  Denies worsening mood symptoms, denies S/H/I/I/P, A/V/H, denies violent thoughts to harm baby, reports she is not planning to breast feed.  Denies impairments in sleep. 33 y/o AA female single, with a boyfriend, on medical disability since May 2018 for chronic gastritis sx, with 4 dependents (currently being cared for by patient's mother), domiciled in an apt in [place], with no past psychiatric history, inpatient hospitalizations, substance abuse significant for THC use (reportedly last use was 3 months ago, however Utox on 5/20 positive for THC), no suicide attempts or SIB. with PMHx known distal oesophagitis, gastritis in May 2018, on this admission, post partum 1 day via vaginal delivery and tubal ligation. Patient reports that her symptoms have worsened since his father recent passing away, with patient with resentment toward her two siblings with patient predominantly caring for her father (who apparently succumbed to ethanol related issues) by herself  for the past 3 years, reports has not been compliant with psychiatric medications since finding out that she is pregnant.  Psychiatry consulted to assess for management of anxiety.  Patient seen and evaluated, awake and alert oriented, reports worsening anxiety with panic attacks since self discontinuing her psychiatric medications upon finding out that she was pregnant.  Unclear what medications she had self discontinued, at first states it was Klonopin and then later states it was amitriptyline.  Reports panic attacks at least once a week, reports anxiety around the passing of her father one year ago.  Denies worsening mood symptoms, denies S/H/I/I/P, A/V/H, denies violent thoughts to harm baby, reports she is not planning to breast feed.  Denies impairments in sleep.

## 2019-05-21 NOTE — DISCHARGE NOTE OB - PLAN OF CARE
routine recovery Make your follow-up appointment with your doctor in 6 weeks for a post-partum check. No heavy lifting, driving, or strenuous activity for 6 weeks. Nothing per vagina such as tampons, intercourse, douches, or tub baths for 6 weeks or until you see your doctor. Call your doctor with any signs and symptoms of infection such as fever, chills, nausea, or vomiting. Call your doctor if you're unable to tolerate food, increase in vaginal bleeding, or have difficulty urinating. Call your doctor if you have pain that is not relieved by your prescribed medications. Notify your doctor with any other concerns.   Call [phone] if you have any of these concerns in the next 6 weeks. close follow up Follow up with Dr. Anaya's office (Gastroenterology) as an outpatient. Call [phone] to set up an appointment. Follow up with the psychiatry team at [facility] (call [phone] to set up an appointment) or [facility]  clinic which specializes in pregnant and postpartum patients (call [phone] to set up an appointment)

## 2019-05-21 NOTE — BEHAVIORAL HEALTH ASSESSMENT NOTE - PAST PSYCHOTROPIC MEDICATION
reports taking Zoloft in Sept 2018 and experienced "night sweats and nightmares" and self discontinued it.  Also has a h/o of taking low dose of amitriptyline
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
## 2019-05-21 NOTE — DISCHARGE NOTE OB - COMMUNITY RESOURCES
[facility] substance abuse program/DAERS-main #-[phone];  if needed- Dr. Yvonne Cervantes, Director- [phone].  Intake appointment scheduled for Thursday May 30, 2019 at 1:30 p.m.    [facility](medicaid transporatation)-[phone]-A ride is set up for you to attend this appointment. Car service- [place]  [employer]. Confirmation #-[id number]. 1.  [facility] substance abuse program/DAERS-main #-[phone];  if needed- Dr. Yvonne Cervantes, Director- [phone].  Intake appointment scheduled for Thursday May 30, 2019 at 1:30 p.m.    2. [facility](medicaid transportation)-[phone]-A ride is set up for you to attend this appointment. Car service- [place]  [employer]. Confirmation #-[id number].  time from home- 12 p.m.,  time from appointment to go home- 4 p.m.  If you bring the baby, you must bring a car seat. (This is a curtesy ride until the 2015 form that has been sent in is reviewed).   3. [facility]- - main number- [phone]-Nick MccormickWvuthip-[phone] 1.  [facility] substance abuse program/DAERS-main #-[phone];  if needed- Dr. Yvonne Cervantes, Director- [phone].  Intake appointment scheduled for Thursday May 30, 2019 at 1:30 p.m.  (The appointment will go until 4 p.m.-be prepared/ bring food for yourself and the baby).  2. [facility](medicaid transportation)-[phone]-A ride is set up for you to attend this appointment. Car service- [place]  [employer]. Confirmation #-[id number].  time from home- 12 p.m.,  time from appointment to go home- 4 p.m.  If you bring the baby, you must bring a car seat. (This is a curtesy ride until the 2015 form that has been sent in is reviewed).   3. [facility]- - main number- [phone]-Nick MccormickMvqprse-[phone]  4.Child protective services- - [place]ie [place]-[phone]; [phone]

## 2019-05-21 NOTE — DISCHARGE NOTE OB - MEDICATION SUMMARY - MEDICATIONS TO STOP TAKING
I will STOP taking the medications listed below when I get home from the hospital:    oxyCODONE-acetaminophen 5 mg-325 mg oral tablet  -- 1 tab(s) by mouth every 6 hours, As needed, Severe Pain (7 - 10) MDD:4 tabs daily ( 1 tab q6hrs)

## 2019-05-21 NOTE — BEHAVIORAL HEALTH ASSESSMENT NOTE - NSBHCHARTREVIEWVS_PSY_A_CORE FT
Vital Signs Last 24 Hrs  T(C): 36.9 (21 May 2019 09:30), Max: 37 (20 May 2019 17:20)  T(F): 98.5 (21 May 2019 09:30), Max: 98.6 (20 May 2019 17:20)  HR: 66 (21 May 2019 09:30) (51 - 80)  BP: 110/73 (21 May 2019 09:30) (100/66 - 135/73)  BP(mean): 93 (20 May 2019 22:15) (85 - 99)  RR: 18 (21 May 2019 09:30) (16 - 21)  SpO2: 98% (21 May 2019 09:30) (95% - 100%)

## 2019-05-21 NOTE — DISCHARGE NOTE OB - PATIENT PORTAL LINK FT
You can access the [facility], offered by [facility], by registering with the following website: [email or web address]
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
## 2019-05-21 NOTE — DISCHARGE NOTE OB - MEDICATION SUMMARY - MEDICATIONS TO TAKE
I will START or STAY ON the medications listed below when I get home from the hospital:    acetaminophen 325 mg oral tablet  -- 3 tab(s) by mouth every 6 hours  -- Indication: For Pain control    ibuprofen 600 mg oral tablet  -- 1 tab(s) by mouth every 6 hours  -- Indication: For Pain control    sertraline 50 mg oral tablet  -- 1 tab(s) by mouth once a day  -- Indication: For Anxiety    Zofran ODT 4 mg oral tablet, disintegrating  -- 1 tab(s) by mouth 3 times a day, As Needed -for nausea   -- Indication: For nausea    Prenatal Multivitamins with Folic Acid 1 mg oral tablet  -- 1 tab(s) by mouth once a day  -- Indication: For breastfeeding/pumping    ferrous sulfate 325 mg (65 mg elemental iron) oral tablet  -- 1 tab(s) by mouth 3 times a day  -- Indication: For Anemia    polyethylene glycol 3350 oral powder for reconstitution  -- 17 gram(s) by mouth once a day, As Needed for constipation  -- Indication: For constipation    pantoprazole 40 mg oral delayed release tablet  -- 1 tab(s) by mouth once a day (before a meal)  -- Indication: For gastritis    multivitamin  -- 1 tab(s) by mouth once a day  -- Indication: For for continuation    ascorbic acid 250 mg oral tablet  -- 1 tab(s) by mouth 3 times a day  -- Indication: For improved iron absorption I will START or STAY ON the medications listed below when I get home from the hospital:    acetaminophen 325 mg oral tablet  -- 3 tab(s) by mouth every 6 hours  -- Indication: For Pain control    ibuprofen 600 mg oral tablet  -- 1 tab(s) by mouth every 6 hours  -- Indication: For Pain control    sertraline 50 mg oral tablet  -- 1 tab(s) by mouth once a day  -- Indication: For Anxiety    Zofran ODT 4 mg oral tablet, disintegrating  -- 1 tab(s) by mouth 3 times a day, As Needed -for nausea   -- Indication: For nausea    hydrOXYzine hydrochloride 50 mg oral tablet  -- 1 tab(s) by mouth every 6 hours, As needed, Anxiety MDD:4 tabs  -- Indication: For Anxiety    Prenatal Multivitamins with Folic Acid 1 mg oral tablet  -- 1 tab(s) by mouth once a day  -- Indication: For breastfeeding/pumping    ferrous sulfate 325 mg (65 mg elemental iron) oral tablet  -- 1 tab(s) by mouth 3 times a day  -- Indication: For Anemia    polyethylene glycol 3350 oral powder for reconstitution  -- 17 gram(s) by mouth once a day, As Needed for constipation  -- Indication: For constipation    pantoprazole 40 mg oral delayed release tablet  -- 1 tab(s) by mouth once a day (before a meal)  -- Indication: For gastritis    multivitamin  -- 1 tab(s) by mouth once a day  -- Indication: For for continuation    ascorbic acid 250 mg oral tablet  -- 1 tab(s) by mouth 3 times a day  -- Indication: For improved iron absorption

## 2019-05-21 NOTE — PROGRESS NOTE ADULT - PROBLEM SELECTOR PLAN 1
- Appreciate social work recs  - Psych consult this AM  - Continue Prilosec for gastritis  - Pain well controlled, continue current pain regimen  - Increase ambulation, SCDs when not ambulating  - Continue regular diet    Elly Martinez, PGY1  Pager #[phone]

## 2019-05-21 NOTE — BEHAVIORAL HEALTH ASSESSMENT NOTE - HPI (INCLUDE ILLNESS QUALITY, SEVERITY, DURATION, TIMING, CONTEXT, MODIFYING FACTORS, ASSOCIATED SIGNS AND SYMPTOMS)
35 y/o AA female single, with a boyfriend, on medical disability since May 2018 for chronic gastritis sx, with 4 dependents (currently being cared for by patient's mother), domiciled in an apt in [place], with no past psychiatric history, inpatient hospitalizations, substance abuse significant for THC use (reportedly last use was 3 months ago, however Utox on  positive for THC), no suicide attempts or SIB. with PMHx known distal oesophagitis, non bleeding DU with no stigmata of bleeding following EGD in May 2018, on this admission, post partum 1 day via vaginal delivery and tubal ligation. Patient reports that her symptoms have worsened since his father recent passing away, with patient with resentment toward her two siblings with patient predominantly caring for her father (who apparently succumbed to ethanol related issues) by herself  for the past 3 years, reports has not been compliant with psychiatric medications since finding out that she is pregnant.  Psychiatry consulted to assess for management of anxiety.     Patient seen and evaluated, awake and alert, reports feeling more anxious, experiencing worsening anxiety throughout her pregnancy, reports had self discontinued her psychiatric medications after having found out that she was pregnant 3 months ago.  States that following her Sept medical admission, had shortly become pregnant, however did not find out until 3 months ago and she was experiencing GI sx but had been attributing it to gastritis sx.  Reports at the time that she had found out she was pregnant had self discontinued her psychiatric medications "because I didn't want to take so many medications and it impact the baby."  Patient at first states she had self discontinued Klonopin and then later in interview states that it was amitriptyline that she had stopped.  States when she was last hospitalized in 2018, was started on Zoloft, however stated 3 days after was discharged from the hospital, had experienced "night sweats and night espana" and patient ultimately self discontinued it.  States that she is currently in therapy with a therapist at [facility] in [place], however denies seeing a psychiatrist there.  Reports in the last several months has been experiencing severe anxiety and panic attacks about once per week (with sx including increased sweating, SOB, nervousness, heart racing; states anxiety around the death of her father last year from alcohol related complications.  Denies worsening mood symptoms, denies S/H/I/I/P, A/V/H, or thoughts of paranoia, reports good lainez with her  daughter, denies having thoughts to harm her new baby.  She denies sx of blade, irritability, poor concentration, insomnia.  She is future oriented, stating that she is looking forward to attending her daughter's graduations, reports that her relationship with her boyfriend (father of  daughter) is positive and supportive.  Reports she has the help and assistance that she needs at home from both her boyfriend and her mother.  Denies financial concerns, states "I get section 8 from my mom", reports is currently on disability for medical issues and reports financial help from her boyfriend.  Denies having impaired sleep, reports poor appetite has been poor due to gastritis symptoms.    Istop reference #[id number] 33 y/o AA female single, with a boyfriend, on medical disability since May 2018 for chronic gastritis sx, with 4 dependents (currently being cared for by patient's mother), domiciled in an [facility] in [place], with no past psychiatric history, inpatient hospitalizations, substance abuse significant for THC use (reportedly last use was 3 months ago, however Utox on  positive for THC), no suicide attempts or SIB. with PMHx known distal oesophagitis, gastritis, on this admission, post partum 1 day via vaginal delivery and tubal ligation. Patient reports that her symptoms have worsened since his father recent passing away, with patient with resentment toward her two siblings with patient predominantly caring for her father (who apparently succumbed to ethanol related issues) by herself  for the past 3 years, reports has not been compliant with psychiatric medications since finding out that she is pregnant.  Psychiatry consulted to assess for management of anxiety.     Patient seen and evaluated, awake and alert, reports feeling more anxious, experiencing worsening anxiety throughout her pregnancy, reports had self discontinued her psychiatric medications after having found out that she was pregnant 3 months ago.  States that following her Sept medical admission, had shortly become pregnant, however did not find out until 3 months ago and she was experiencing GI sx but had been attributing it to gastritis sx.  Reports at the time that she had found out she was pregnant had self discontinued her psychiatric medications "because I didn't want to take so many medications and it impact the baby."  Patient at first states she had self discontinued Klonopin and then later in interview states that it was amitriptyline that she had stopped.  States when she was last hospitalized in 2018, was started on Zoloft, however stated 3 days after was discharged from the hospital, had experienced "night sweats and night espana" and patient ultimately self discontinued it.  States that she is currently in therapy with a therapist at [facility] in [place], however denies seeing a psychiatrist there.  Reports in the last several months has been experiencing severe anxiety and panic attacks about once per week (with sx including increased sweating, SOB, nervousness, heart racing; states anxiety around the death of her father last year from alcohol related complications.  Denies worsening mood symptoms, denies S/H/I/I/P, A/V/H, or thoughts of paranoia, reports good lainez with her  daughter, denies having thoughts to harm her new baby.  She denies sx of blade, irritability, poor concentration, insomnia.  She is future oriented, stating that she is looking forward to attending her daughter's graduations, reports that her relationship with her boyfriend (father of  daughter) is positive and supportive.  Reports she has the help and assistance that she needs at home from both her boyfriend and her mother.  Denies financial concerns, states "I get section 8 from my mom", reports is currently on disability for medical issues and reports financial help from her boyfriend.  Denies having impaired sleep, reports poor appetite has been poor due to gastritis symptoms.    Istop reference #[id number]

## 2019-05-21 NOTE — BEHAVIORAL HEALTH ASSESSMENT NOTE - DESCRIPTION (FIRST USE, LAST USE, QUANTITY, FREQUENCY, DURATION)
reports last smoked cigarettes 3 months reports last used cannabis 3 months ago to help with gastritis; Utox positive for THC reports last used cannabis 3 months ago to help with gastritis in order to stimulate appetite; Utox positive for THC on 5/20

## 2019-05-21 NOTE — BEHAVIORAL HEALTH ASSESSMENT NOTE - NSBHSUICPROTECTFACT_PSY_A_CORE
Identifies reasons for living/Future oriented/Responsibility to family and others/High spirituality/Positive therapeutic relationships

## 2019-05-21 NOTE — BEHAVIORAL HEALTH ASSESSMENT NOTE - NSBHCONSULTFOLLOWAFTERCARE_PSY_A_CORE FT
OP psych f/u at [facility]- [phone] OP psych f/u at [facility]- [phone] OP psych f/u at [facility]- [phone] and [facility]  clinic - [phone]

## 2019-05-21 NOTE — PROGRESS NOTE ADULT - ATTENDING COMMENTS
Patient doing well  She is complaining of GI issues as well as anxiety  She will be seen  by psych and GI  S/P BTL  Discharge planning

## 2019-05-21 NOTE — CHART NOTE - NSCHARTNOTEFT_GEN_A_CORE
TIMBO CHERY Postpartum    Day 1         Vital Signs Last 24 Hrs  T(C): 36.9 (21 May 2019 09:30), Max: 37 (20 May 2019 17:20)  T(F): 98.5 (21 May 2019 09:30), Max: 98.6 (20 May 2019 17:20)  HR: 66 (21 May 2019 09:30) (51 - 80)  BP: 110/73 (21 May 2019 09:30) (100/66 - 135/73)  BP(mean): 93 (20 May 2019 22:15) (85 - 99)  RR: 18 (21 May 2019 09:30) (16 - 21)  SpO2: 98% (21 May 2019 09:30) (95% - 100%)                          6.8    x     )-----------( x        ( 21 May 2019 08:51 )             21.6   baso x      eos x      imm gran x      lymph x      mono x      poly x                            9.1    11.4  )-----------( 456      ( 20 May 2019 09:36 )             26.1   baso 0.5    eos 1.3    imm gran x      lymph 30.4   mono 9.1    poly 58.6         Plan: Anemia secondary to acute blood loss due to delivery.   - Ferrous Sulfate, Colace, Vitamin C supplementation.  - Repeat H/H stat to confirm stability.  - Monitor for signs/symptoms of anemia.  No transfusion needed at this time.  VIK Smith

## 2019-05-21 NOTE — DISCHARGE NOTE OB - CARE PROVIDER_API CALL
[facility],   [address] [place] [address], [address] Floor, [place], [place]  Phone: [phone]  Fax: (   )    -  Follow Up Time:

## 2019-05-22 VITALS
RESPIRATION RATE: 18 BRPM | OXYGEN SATURATION: 98 % | DIASTOLIC BLOOD PRESSURE: 83 MMHG | HEART RATE: 66 BPM | SYSTOLIC BLOOD PRESSURE: 132 MMHG | TEMPERATURE: 98 F

## 2019-05-22 LAB
FOLATE SERPL-MCNC: 7.8 NG/ML — SIGNIFICANT CHANGE UP
HCT VFR BLD CALC: 21.5 % — LOW (ref 34.5–45)
HGB BLD-MCNC: 6.8 G/DL — CRITICAL LOW (ref 11.5–15.5)
MCHC RBC-ENTMCNC: 24.5 PG — LOW (ref 27–34)
MCHC RBC-ENTMCNC: 31.6 GM/DL — LOW (ref 32–36)
MCV RBC AUTO: 77.6 FL — LOW (ref 80–100)
PLATELET # BLD AUTO: 341 K/UL — SIGNIFICANT CHANGE UP (ref 150–400)
RBC # BLD: 2.77 M/UL — LOW (ref 3.8–5.2)
RBC # FLD: 14.8 % — HIGH (ref 10.3–14.5)
TSH SERPL-MCNC: 1.48 UIU/ML — SIGNIFICANT CHANGE UP (ref 0.27–4.2)
VIT B12 SERPL-MCNC: 351 PG/ML — SIGNIFICANT CHANGE UP (ref 232–1245)
WBC # BLD: 10.22 K/UL — SIGNIFICANT CHANGE UP (ref 3.8–10.5)
WBC # FLD AUTO: 10.22 K/UL — SIGNIFICANT CHANGE UP (ref 3.8–10.5)

## 2019-05-22 PROCEDURE — 99233 SBSQ HOSP IP/OBS HIGH 50: CPT

## 2019-05-22 RX ORDER — ACETAMINOPHEN 500 MG
3 TABLET ORAL
Qty: 0 | Refills: 0 | DISCHARGE
Start: 2019-05-22

## 2019-05-22 RX ORDER — HYDROXYZINE HCL 10 MG
1 TABLET ORAL
Qty: 28 | Refills: 0
Start: 2019-05-22 | End: 2019-05-28

## 2019-05-22 RX ORDER — ASCORBIC ACID 60 MG
1 TABLET,CHEWABLE ORAL
Qty: 0 | Refills: 0 | DISCHARGE
Start: 2019-05-22

## 2019-05-22 RX ORDER — OMEGA-3 ACID ETHYL ESTERS 1 G
1 CAPSULE ORAL
Qty: 0 | Refills: 0 | DISCHARGE

## 2019-05-22 RX ORDER — IBUPROFEN 200 MG
1 TABLET ORAL
Qty: 0 | Refills: 0 | DISCHARGE
Start: 2019-05-22

## 2019-05-22 RX ORDER — TETANUS TOXOID, REDUCED DIPHTHERIA TOXOID AND ACELLULAR PERTUSSIS VACCINE, ADSORBED 5; 2.5; 8; 8; 2.5 [IU]/.5ML; [IU]/.5ML; UG/.5ML; UG/.5ML; UG/.5ML
0.5 SUSPENSION INTRAMUSCULAR ONCE
Refills: 0 | Status: COMPLETED | OUTPATIENT
Start: 2019-05-22 | End: 2019-05-22

## 2019-05-22 RX ORDER — FERROUS SULFATE 325(65) MG
1 TABLET ORAL
Qty: 0 | Refills: 0 | DISCHARGE
Start: 2019-05-22

## 2019-05-22 RX ADMIN — Medication 250 MILLIGRAM(S): at 08:45

## 2019-05-22 RX ADMIN — Medication 600 MILLIGRAM(S): at 06:14

## 2019-05-22 RX ADMIN — TETANUS TOXOID, REDUCED DIPHTHERIA TOXOID AND ACELLULAR PERTUSSIS VACCINE, ADSORBED 0.5 MILLILITER(S): 5; 2.5; 8; 8; 2.5 SUSPENSION INTRAMUSCULAR at 15:13

## 2019-05-22 RX ADMIN — Medication 325 MILLIGRAM(S): at 15:16

## 2019-05-22 RX ADMIN — Medication 325 MILLIGRAM(S): at 08:47

## 2019-05-22 RX ADMIN — Medication 600 MILLIGRAM(S): at 01:21

## 2019-05-22 RX ADMIN — Medication 975 MILLIGRAM(S): at 16:00

## 2019-05-22 RX ADMIN — Medication 975 MILLIGRAM(S): at 08:45

## 2019-05-22 RX ADMIN — Medication 975 MILLIGRAM(S): at 15:15

## 2019-05-22 RX ADMIN — Medication 600 MILLIGRAM(S): at 12:30

## 2019-05-22 RX ADMIN — Medication 600 MILLIGRAM(S): at 07:15

## 2019-05-22 RX ADMIN — Medication 1 TABLET(S): at 11:56

## 2019-05-22 RX ADMIN — Medication 600 MILLIGRAM(S): at 11:56

## 2019-05-22 RX ADMIN — Medication 975 MILLIGRAM(S): at 09:30

## 2019-05-22 RX ADMIN — Medication 100 MILLIGRAM(S): at 08:47

## 2019-05-22 RX ADMIN — Medication 250 MILLIGRAM(S): at 15:16

## 2019-05-22 RX ADMIN — PANTOPRAZOLE SODIUM 40 MILLIGRAM(S): 20 TABLET, DELAYED RELEASE ORAL at 06:14
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
## 2019-05-22 NOTE — PROGRESS NOTE ADULT - PROBLEM SELECTOR PLAN 1
- Psych re-consulted to evaluation for possible inpatient admission  - Continue Atarax PRN  - Pain well controlled, continue current pain regimen  - Increase ambulation, SCDs when not ambulating  - Continue regular diet  - Discharge planning     Elly Martinez, PGY1  Pager #[phone]

## 2019-05-22 NOTE — PROGRESS NOTE ADULT - ASSESSMENT
A/P: 35yo w/ gastritis and anxiety PPD#2 s/p /BTL. Upon interview today, patient with flight of ideas, unable to have linear conversation, clearly in distress, reports she "may as well be dead." Highly concerned that patient may require inpatient admission to [facility].

## 2019-05-22 NOTE — PROGRESS NOTE ADULT - ATTENDING COMMENTS
Pt seen and evaluated. Also evaluated by psych team and psych Attg.  Obstetrically pt is stable for D/C home. She has chronic gastritis limiting PO intake and is chronically anemic, intermittently taking Iron, which is likely worsening her Gastritis. Being pregnant obviously exacerbated her inability to tolerate large meals. She is being followed by Dr Anaya of GI and has outpt followup with him. I advised her to wait for up to 6 weeks for pregnancy hormone to be eliminated before she resumes further GI w/u. Pt is also upset because she admittedly was using marijuana during her pregnancy in order to increase her appetite to eat and is now being reported to CPS secondary to (+)marijuana in the baby. She was evaluated by psych and cleared psychiatrically for D/C.  F/U [facility] 4-6 weeks  Motrin PRN  D/C instructions given

## 2019-05-22 NOTE — PROGRESS NOTE BEHAVIORAL HEALTH - NSBHCONSULTFOLLOWAFTERCARE_PSY_A_CORE FT
OP psych f/u at [facility]- [phone] and [facility]  clinic - [phone]

## 2019-05-22 NOTE — PROGRESS NOTE ADULT - SUBJECTIVE AND OBJECTIVE BOX
OB Progress Note:  PPD#2    S: 35yo w/ hx of gastritis, anxiety PPD#2 s/p /BTL. Patient seen by psychiatry team yesterday, recommended AM labs and Atarax PRN with outpatient follow up. Patient evaluated at bedside. Refusing AM labs because she is always anemic. Patient counseled that additional lab work has been ordered per the suggestion of psychiatry team yesterday, pt continuing to refuse. She is distressed that she will lose weight at home from her gastritis pain and is upset the Dr. Anaya has not called the patient personally. Patient made aware that our team spoke with Dr. Anaya of GI yesterday: Dr. Anaya recommended PPI and laxatives PRN constipation with outpatient follow up after delivery. He reports the patient was last seen 4 months ago and has not been compliant with follow up recommendations. Patient reports she refused endoscopy. She is perseverating on gastritis but not amenable to additional work up. Reports she "may as well be dead." Patient is also upset that social work is "threatening to take her child away". Reports family thinks she is a "crack head" but reports she loves her children. Patient with notable flight of ideas, discontinuous thought. At one point patient started talking about the devil and picked up her bible to read.     O:  Vitals:   Vital Signs Last 24 Hrs  T(C): 37.1 (21 May 2019 16:56), Max: 37.1 (21 May 2019 16:56)  T(F): 98.8 (21 May 2019 16:56), Max: 98.8 (21 May 2019 16:56)  HR: 76 (21 May 2019 16:56) (66 - 76)  BP: 111/71 (21 May 2019 16:56) (110/73 - 111/71)  BP(mean): --  RR: 18 (21 May 2019 16:56) (18 - 18)  SpO2: 99% (21 May 2019 16:56) (98% - 99%)    MEDICATIONS  (STANDING):  acetaminophen   Tablet .. 975 milliGRAM(s) Oral every 6 hours  ascorbic acid 250 milliGRAM(s) Oral three times a day  diphtheria/tetanus/pertussis (acellular) Vaccine (ADAcel) 0.5 milliLiter(s) IntraMuscular once  ferrous    sulfate 325 milliGRAM(s) Oral three times a day  ibuprofen  Tablet. 600 milliGRAM(s) Oral every 6 hours  oxytocin Infusion 333.333 milliUNIT(s)/Min (1000 mL/Hr) IV Continuous <Continuous>  oxytocin Infusion 333.333 milliUNIT(s)/Min (1000 mL/Hr) IV Continuous <Continuous>  pantoprazole    Tablet 40 milliGRAM(s) Oral before breakfast  prenatal multivitamin 1 Tablet(s) Oral daily  sodium chloride 0.9% lock flush 3 milliLiter(s) IV Push every 8 hours    MEDICATIONS  (PRN):  benzocaine 20%/menthol 0.5% Spray 1 Spray(s) Topical every 6 hours PRN for Perineal discomfort  dibucaine 1% Ointment 1 Application(s) Topical every 6 hours PRN Perineal discomfort  diphenhydrAMINE 25 milliGRAM(s) Oral every 6 hours PRN Pruritus  docusate sodium 100 milliGRAM(s) Oral two times a day PRN For stool softening  glycerin Suppository - Adult 1 Suppository(s) Rectal at bedtime PRN Constipation  hydrocortisone 1% Cream 1 Application(s) Topical every 6 hours PRN Moderate Pain (4-6)  hydrOXYzine hydrochloride 50 milliGRAM(s) Oral every 6 hours PRN Anxiety  lanolin Ointment 1 Application(s) Topical every 6 hours PRN nipple soreness  magnesium hydroxide Suspension 30 milliLiter(s) Oral two times a day PRN Constipation  oxyCODONE    IR 5 milliGRAM(s) Oral every 3 hours PRN Moderate Pain (4 - 6)  oxyCODONE    IR 5 milliGRAM(s) Oral once PRN Severe Pain (7 -10)  polyethylene glycol 3350 17 Gram(s) Oral daily PRN Constipation  pramoxine 1%/zinc 5% Cream 1 Application(s) Topical every 4 hours PRN Moderate Pain (4-6)  simethicone 80 milliGRAM(s) Chew every 4 hours PRN Gas  witch hazel Pads 1 Application(s) Topical every 4 hours PRN Perineal discomfort      Labs:  Blood type: O Positive  Rubella IgG: Positive ( @ 04:33)  RPR: Negative                          6.9<LL>   -- >-----------< --    (  @ 11:21 )             20.8<LL>                        6.8<LL>   -- >-----------< --    (  @ 08:51 )             21.6<L>                        9.1<L>   11.4<H> >-----------< 456<H>    (  @ 09:36 )             26.1<L>                  Physical Exam:  General: NAD  Psych: as above, concern for patient stability  Abdomen: soft, non-tender, non-distended, fundus firm  Vaginal: lochia wnl, exam deferred  Extremities: No erythema/calf tenderness

## 2019-05-22 NOTE — PROGRESS NOTE BEHAVIORAL HEALTH - CASE SUMMARY
Pt is a 33 y/o AAF post partem day 2, has hx of cannabis use, not used for many months, presents with anxiety and frustration over concern of losing her children. Pt seen today, no si/hi, denies depression or anxiety, and is looking forward to going home with her baby, and the father of the baby. pt is excited over having her , denies bad thoughts, no evidence of psychosis. pt also interested in seeking outpt mental health f/u at [facility]. SW on case and will help facilitate this outpt mental health f/u. Also, father of the baby, pt's BF was contacted, and he has no safety concerns regarding patient. he is looking forward to her d/c home. he does not feel pt is a danger to self or others.

## 2019-05-22 NOTE — PROGRESS NOTE BEHAVIORAL HEALTH - NSBHCHARTREVIEWVS_PSY_A_CORE FT
Vital Signs Last 24 Hrs  T(C): 37.1 (21 May 2019 16:56), Max: 37.1 (21 May 2019 16:56)  T(F): 98.8 (21 May 2019 16:56), Max: 98.8 (21 May 2019 16:56)  HR: 76 (21 May 2019 16:56) (76 - 76)  BP: 111/71 (21 May 2019 16:56) (111/71 - 111/71)  BP(mean): --  RR: 18 (21 May 2019 16:56) (18 - 18)  SpO2: 99% (21 May 2019 16:56) (99% - 99%)

## 2019-05-29 LAB — SURGICAL PATHOLOGY STUDY: SIGNIFICANT CHANGE UP

## 2019-05-30 ENCOUNTER — OUTPATIENT (OUTPATIENT)
Dept: OUTPATIENT SERVICES | Facility: HOSPITAL | Age: 34
LOS: 1 days | Discharge: HOME | End: 2019-05-30

## 2019-05-31 DIAGNOSIS — F12.10 CANNABIS ABUSE, UNCOMPLICATED: ICD-10-CM

## 2019-06-05 ENCOUNTER — APPOINTMENT (OUTPATIENT)
Dept: ANTEPARTUM | Facility: CLINIC | Age: 34
End: 2019-06-05

## 2019-07-01 NOTE — PROGRESS NOTE ADULT - CARDIOVASCULAR
Regular rate & rhythm, normal S1, S2; no murmurs, gallops or rubs; no S3, S4
Normal rate, regular rhythm, normal S1, S2 heart sounds heard.

## 2019-07-10 PROCEDURE — 86850 RBC ANTIBODY SCREEN: CPT

## 2019-07-10 PROCEDURE — 85018 HEMOGLOBIN: CPT

## 2019-07-10 PROCEDURE — 80307 DRUG TEST PRSMV CHEM ANLYZR: CPT

## 2019-07-10 PROCEDURE — 90715 TDAP VACCINE 7 YRS/> IM: CPT

## 2019-07-10 PROCEDURE — 82607 VITAMIN B-12: CPT

## 2019-07-10 PROCEDURE — 85014 HEMATOCRIT: CPT

## 2019-07-10 PROCEDURE — 59050 FETAL MONITOR W/REPORT: CPT

## 2019-07-10 PROCEDURE — 86901 BLOOD TYPING SEROLOGIC RH(D): CPT

## 2019-07-10 PROCEDURE — 85027 COMPLETE CBC AUTOMATED: CPT

## 2019-07-10 PROCEDURE — 86780 TREPONEMA PALLIDUM: CPT

## 2019-07-10 PROCEDURE — 59025 FETAL NON-STRESS TEST: CPT

## 2019-07-10 PROCEDURE — 86900 BLOOD TYPING SEROLOGIC ABO: CPT

## 2019-07-10 PROCEDURE — 82746 ASSAY OF FOLIC ACID SERUM: CPT

## 2019-07-10 PROCEDURE — 84443 ASSAY THYROID STIM HORMONE: CPT

## 2019-07-10 PROCEDURE — G0463: CPT

## 2020-03-13 ENCOUNTER — TRANSCRIPTION ENCOUNTER (OUTPATIENT)
Age: 35
End: 2020-03-13

## 2020-06-20 NOTE — ED ADULT TRIAGE NOTE - ESI TRIAGE ACUITY LEVEL, MLM
3
Implemented All Fall Risk Interventions:  Hazel Hurst to call system. Call bell, personal items and telephone within reach. Instruct patient to call for assistance. Room bathroom lighting operational. Non-slip footwear when patient is off stretcher. Physically safe environment: no spills, clutter or unnecessary equipment. Stretcher in lowest position, wheels locked, appropriate side rails in place. Provide visual cue, wrist band, yellow gown, etc. Monitor gait and stability. Monitor for mental status changes and reorient to person, place, and time. Review medications for side effects contributing to fall risk. Reinforce activity limits and safety measures with patient and family.

## 2020-09-06 ENCOUNTER — INPATIENT (INPATIENT)
Facility: HOSPITAL | Age: 35
LOS: 2 days | Discharge: HOME | DRG: 378 | End: 2020-09-09
Attending: INTERNAL MEDICINE | Admitting: INTERNAL MEDICINE
Payer: MEDICAID

## 2020-09-06 VITALS
DIASTOLIC BLOOD PRESSURE: 78 MMHG | TEMPERATURE: 98 F | RESPIRATION RATE: 16 BRPM | OXYGEN SATURATION: 98 % | HEART RATE: 86 BPM | HEIGHT: 64 IN | WEIGHT: 145.06 LBS | SYSTOLIC BLOOD PRESSURE: 117 MMHG

## 2020-09-06 DIAGNOSIS — K59.00 CONSTIPATION, UNSPECIFIED: ICD-10-CM

## 2020-09-06 DIAGNOSIS — D68.9 COAGULATION DEFECT, UNSPECIFIED: ICD-10-CM

## 2020-09-06 DIAGNOSIS — K92.2 GASTROINTESTINAL HEMORRHAGE, UNSPECIFIED: ICD-10-CM

## 2020-09-06 DIAGNOSIS — Z98.51 TUBAL LIGATION STATUS: Chronic | ICD-10-CM

## 2020-09-06 DIAGNOSIS — D64.9 ANEMIA, UNSPECIFIED: ICD-10-CM

## 2020-09-06 DIAGNOSIS — F17.200 NICOTINE DEPENDENCE, UNSPECIFIED, UNCOMPLICATED: ICD-10-CM

## 2020-09-06 DIAGNOSIS — Z29.9 ENCOUNTER FOR PROPHYLACTIC MEASURES, UNSPECIFIED: ICD-10-CM

## 2020-09-06 DIAGNOSIS — R93.5 ABNORMAL FINDINGS ON DIAGNOSTIC IMAGING OF OTHER ABDOMINAL REGIONS, INCLUDING RETROPERITONEUM: ICD-10-CM

## 2020-09-06 DIAGNOSIS — K92.0 HEMATEMESIS: ICD-10-CM

## 2020-09-06 LAB
ALBUMIN SERPL ELPH-MCNC: 4.1 G/DL — SIGNIFICANT CHANGE UP (ref 3.3–5)
ALBUMIN SERPL ELPH-MCNC: 5 G/DL — SIGNIFICANT CHANGE UP (ref 3.3–5)
ALP SERPL-CCNC: 63 U/L — SIGNIFICANT CHANGE UP (ref 40–120)
ALP SERPL-CCNC: 82 U/L — SIGNIFICANT CHANGE UP (ref 40–120)
ALT FLD-CCNC: 12 U/L — SIGNIFICANT CHANGE UP (ref 10–45)
ALT FLD-CCNC: 16 U/L — SIGNIFICANT CHANGE UP (ref 10–45)
ANION GAP SERPL CALC-SCNC: 11 MMOL/L — SIGNIFICANT CHANGE UP (ref 5–17)
ANION GAP SERPL CALC-SCNC: 16 MMOL/L — SIGNIFICANT CHANGE UP (ref 5–17)
APTT 50/50 2HOUR INCUB: 32.8 SEC — SIGNIFICANT CHANGE UP (ref 27.5–36.5)
APTT BLD: 180.4 SEC — CRITICAL HIGH (ref 27.5–35.5)
APTT BLD: 198.5 SEC — CRITICAL HIGH (ref 27.5–35.5)
APTT BLD: 198.5 SEC — CRITICAL HIGH (ref 27.5–35.5)
APTT BLD: 32.1 SEC — SIGNIFICANT CHANGE UP (ref 27.5–36.5)
APTT BLD: 83.9 SEC — HIGH (ref 27.5–35.5)
AST SERPL-CCNC: 13 U/L — SIGNIFICANT CHANGE UP (ref 10–40)
AST SERPL-CCNC: 18 U/L — SIGNIFICANT CHANGE UP (ref 10–40)
BASOPHILS # BLD AUTO: 0.04 K/UL — SIGNIFICANT CHANGE UP (ref 0–0.2)
BASOPHILS NFR BLD AUTO: 0.4 % — SIGNIFICANT CHANGE UP (ref 0–2)
BILIRUB SERPL-MCNC: 0.5 MG/DL — SIGNIFICANT CHANGE UP (ref 0.2–1.2)
BILIRUB SERPL-MCNC: 0.5 MG/DL — SIGNIFICANT CHANGE UP (ref 0.2–1.2)
BLD GP AB SCN SERPL QL: NEGATIVE — SIGNIFICANT CHANGE UP
BUN SERPL-MCNC: 6 MG/DL — LOW (ref 7–23)
BUN SERPL-MCNC: 8 MG/DL — SIGNIFICANT CHANGE UP (ref 7–23)
CALCIUM SERPL-MCNC: 10.8 MG/DL — HIGH (ref 8.4–10.5)
CALCIUM SERPL-MCNC: 9.2 MG/DL — SIGNIFICANT CHANGE UP (ref 8.4–10.5)
CHLORIDE SERPL-SCNC: 105 MMOL/L — SIGNIFICANT CHANGE UP (ref 96–108)
CHLORIDE SERPL-SCNC: 98 MMOL/L — SIGNIFICANT CHANGE UP (ref 96–108)
CO2 SERPL-SCNC: 24 MMOL/L — SIGNIFICANT CHANGE UP (ref 22–31)
CO2 SERPL-SCNC: 24 MMOL/L — SIGNIFICANT CHANGE UP (ref 22–31)
CREAT SERPL-MCNC: 0.77 MG/DL — SIGNIFICANT CHANGE UP (ref 0.5–1.3)
CREAT SERPL-MCNC: 0.77 MG/DL — SIGNIFICANT CHANGE UP (ref 0.5–1.3)
EOSINOPHIL # BLD AUTO: 0.02 K/UL — SIGNIFICANT CHANGE UP (ref 0–0.5)
EOSINOPHIL NFR BLD AUTO: 0.2 % — SIGNIFICANT CHANGE UP (ref 0–6)
GAS PNL BLDV: SIGNIFICANT CHANGE UP
GLUCOSE SERPL-MCNC: 119 MG/DL — HIGH (ref 70–99)
GLUCOSE SERPL-MCNC: 125 MG/DL — HIGH (ref 70–99)
HCT VFR BLD CALC: 33.9 % — LOW (ref 34.5–45)
HCT VFR BLD CALC: 40.6 % — SIGNIFICANT CHANGE UP (ref 34.5–45)
HGB BLD-MCNC: 10.7 G/DL — LOW (ref 11.5–15.5)
HGB BLD-MCNC: 13.1 G/DL — SIGNIFICANT CHANGE UP (ref 11.5–15.5)
IMM GRANULOCYTES NFR BLD AUTO: 0.3 % — SIGNIFICANT CHANGE UP (ref 0–1.5)
INR BLD: 1.16 RATIO — SIGNIFICANT CHANGE UP (ref 0.88–1.16)
INR BLD: 1.21 RATIO — HIGH (ref 0.88–1.16)
INR BLD: 1.21 RATIO — HIGH (ref 0.88–1.16)
LYMPHOCYTES # BLD AUTO: 2.59 K/UL — SIGNIFICANT CHANGE UP (ref 1–3.3)
LYMPHOCYTES # BLD AUTO: 27 % — SIGNIFICANT CHANGE UP (ref 13–44)
MCHC RBC-ENTMCNC: 25.1 PG — LOW (ref 27–34)
MCHC RBC-ENTMCNC: 25.2 PG — LOW (ref 27–34)
MCHC RBC-ENTMCNC: 31.6 GM/DL — LOW (ref 32–36)
MCHC RBC-ENTMCNC: 32.3 GM/DL — SIGNIFICANT CHANGE UP (ref 32–36)
MCV RBC AUTO: 77.9 FL — LOW (ref 80–100)
MCV RBC AUTO: 80 FL — SIGNIFICANT CHANGE UP (ref 80–100)
MONOCYTES # BLD AUTO: 0.82 K/UL — SIGNIFICANT CHANGE UP (ref 0–0.9)
MONOCYTES NFR BLD AUTO: 8.5 % — SIGNIFICANT CHANGE UP (ref 2–14)
NEUTROPHILS # BLD AUTO: 6.11 K/UL — SIGNIFICANT CHANGE UP (ref 1.8–7.4)
NEUTROPHILS NFR BLD AUTO: 63.6 % — SIGNIFICANT CHANGE UP (ref 43–77)
NRBC # BLD: 0 /100 WBCS — SIGNIFICANT CHANGE UP (ref 0–0)
NRBC # BLD: 0 /100 WBCS — SIGNIFICANT CHANGE UP (ref 0–0)
PAT CTL 2H: 32.9 SEC — SIGNIFICANT CHANGE UP (ref 27.5–36.5)
PLATELET # BLD AUTO: 342 K/UL — SIGNIFICANT CHANGE UP (ref 150–400)
PLATELET # BLD AUTO: 434 K/UL — HIGH (ref 150–400)
POTASSIUM SERPL-MCNC: 3.2 MMOL/L — LOW (ref 3.5–5.3)
POTASSIUM SERPL-MCNC: 3.4 MMOL/L — LOW (ref 3.5–5.3)
POTASSIUM SERPL-SCNC: 3.2 MMOL/L — LOW (ref 3.5–5.3)
POTASSIUM SERPL-SCNC: 3.4 MMOL/L — LOW (ref 3.5–5.3)
PROT SERPL-MCNC: 6.7 G/DL — SIGNIFICANT CHANGE UP (ref 6–8.3)
PROT SERPL-MCNC: 8.4 G/DL — HIGH (ref 6–8.3)
PROTHROM AB SERPL-ACNC: 13.7 SEC — HIGH (ref 10.6–13.6)
PROTHROM AB SERPL-ACNC: 14.3 SEC — HIGH (ref 10.6–13.6)
PROTHROM AB SERPL-ACNC: 14.3 SEC — HIGH (ref 10.6–13.6)
PT 100%: 13.7 SEC — HIGH (ref 10.6–13.6)
PT 50/50: 12.7 SEC — SIGNIFICANT CHANGE UP (ref 10.6–14.7)
RBC # BLD: 4.24 M/UL — SIGNIFICANT CHANGE UP (ref 3.8–5.2)
RBC # BLD: 5.21 M/UL — HIGH (ref 3.8–5.2)
RBC # FLD: 14 % — SIGNIFICANT CHANGE UP (ref 10.3–14.5)
RBC # FLD: 14.3 % — SIGNIFICANT CHANGE UP (ref 10.3–14.5)
RH IG SCN BLD-IMP: POSITIVE — SIGNIFICANT CHANGE UP
SARS-COV-2 RNA SPEC QL NAA+PROBE: SIGNIFICANT CHANGE UP
SODIUM SERPL-SCNC: 138 MMOL/L — SIGNIFICANT CHANGE UP (ref 135–145)
SODIUM SERPL-SCNC: 140 MMOL/L — SIGNIFICANT CHANGE UP (ref 135–145)
THROMBIN TIME: 21.2 SEC — SIGNIFICANT CHANGE UP (ref 16–25)
WBC # BLD: 9.4 K/UL — SIGNIFICANT CHANGE UP (ref 3.8–10.5)
WBC # BLD: 9.61 K/UL — SIGNIFICANT CHANGE UP (ref 3.8–10.5)
WBC # FLD AUTO: 9.4 K/UL — SIGNIFICANT CHANGE UP (ref 3.8–10.5)
WBC # FLD AUTO: 9.61 K/UL — SIGNIFICANT CHANGE UP (ref 3.8–10.5)

## 2020-09-06 PROCEDURE — 93010 ELECTROCARDIOGRAM REPORT: CPT

## 2020-09-06 PROCEDURE — 99285 EMERGENCY DEPT VISIT HI MDM: CPT

## 2020-09-06 PROCEDURE — 99223 1ST HOSP IP/OBS HIGH 75: CPT

## 2020-09-06 PROCEDURE — 74177 CT ABD & PELVIS W/CONTRAST: CPT | Mod: 26

## 2020-09-06 RX ORDER — POTASSIUM CHLORIDE 20 MEQ
10 PACKET (EA) ORAL
Refills: 0 | Status: COMPLETED | OUTPATIENT
Start: 2020-09-06 | End: 2020-09-06

## 2020-09-06 RX ORDER — PHYTONADIONE (VIT K1) 5 MG
2.5 TABLET ORAL ONCE
Refills: 0 | Status: COMPLETED | OUTPATIENT
Start: 2020-09-07 | End: 2020-09-07

## 2020-09-06 RX ORDER — INFLUENZA VIRUS VACCINE 15; 15; 15; 15 UG/.5ML; UG/.5ML; UG/.5ML; UG/.5ML
0.5 SUSPENSION INTRAMUSCULAR ONCE
Refills: 0 | Status: DISCONTINUED | OUTPATIENT
Start: 2020-09-06 | End: 2020-09-09

## 2020-09-06 RX ORDER — POLYETHYLENE GLYCOL 3350 17 G/17G
17 POWDER, FOR SOLUTION ORAL
Refills: 0 | Status: DISCONTINUED | OUTPATIENT
Start: 2020-09-06 | End: 2020-09-09

## 2020-09-06 RX ORDER — PANTOPRAZOLE SODIUM 20 MG/1
80 TABLET, DELAYED RELEASE ORAL ONCE
Refills: 0 | Status: COMPLETED | OUTPATIENT
Start: 2020-09-06 | End: 2020-09-06

## 2020-09-06 RX ORDER — PANTOPRAZOLE SODIUM 20 MG/1
8 TABLET, DELAYED RELEASE ORAL
Qty: 80 | Refills: 0 | Status: DISCONTINUED | OUTPATIENT
Start: 2020-09-06 | End: 2020-09-08

## 2020-09-06 RX ORDER — SODIUM CHLORIDE 9 MG/ML
1000 INJECTION, SOLUTION INTRAVENOUS ONCE
Refills: 0 | Status: COMPLETED | OUTPATIENT
Start: 2020-09-06 | End: 2020-09-06

## 2020-09-06 RX ORDER — ACETAMINOPHEN 500 MG
975 TABLET ORAL EVERY 8 HOURS
Refills: 0 | Status: DISCONTINUED | OUTPATIENT
Start: 2020-09-06 | End: 2020-09-07

## 2020-09-06 RX ORDER — SODIUM CHLORIDE 9 MG/ML
1000 INJECTION, SOLUTION INTRAVENOUS
Refills: 0 | Status: DISCONTINUED | OUTPATIENT
Start: 2020-09-06 | End: 2020-09-07

## 2020-09-06 RX ORDER — ONDANSETRON 8 MG/1
4 TABLET, FILM COATED ORAL EVERY 6 HOURS
Refills: 0 | Status: DISCONTINUED | OUTPATIENT
Start: 2020-09-06 | End: 2020-09-09

## 2020-09-06 RX ORDER — MORPHINE SULFATE 50 MG/1
2 CAPSULE, EXTENDED RELEASE ORAL ONCE
Refills: 0 | Status: DISCONTINUED | OUTPATIENT
Start: 2020-09-06 | End: 2020-09-06

## 2020-09-06 RX ORDER — SODIUM CHLORIDE 9 MG/ML
1000 INJECTION INTRAMUSCULAR; INTRAVENOUS; SUBCUTANEOUS ONCE
Refills: 0 | Status: COMPLETED | OUTPATIENT
Start: 2020-09-06 | End: 2020-09-06

## 2020-09-06 RX ORDER — HYDROMORPHONE HYDROCHLORIDE 2 MG/ML
0.5 INJECTION INTRAMUSCULAR; INTRAVENOUS; SUBCUTANEOUS ONCE
Refills: 0 | Status: DISCONTINUED | OUTPATIENT
Start: 2020-09-06 | End: 2020-09-06

## 2020-09-06 RX ORDER — HYDROMORPHONE HYDROCHLORIDE 2 MG/ML
0.5 INJECTION INTRAMUSCULAR; INTRAVENOUS; SUBCUTANEOUS EVERY 8 HOURS
Refills: 0 | Status: DISCONTINUED | OUTPATIENT
Start: 2020-09-06 | End: 2020-09-07

## 2020-09-06 RX ORDER — ONDANSETRON 8 MG/1
4 TABLET, FILM COATED ORAL ONCE
Refills: 0 | Status: COMPLETED | OUTPATIENT
Start: 2020-09-06 | End: 2020-09-06

## 2020-09-06 RX ORDER — LANOLIN ALCOHOL/MO/W.PET/CERES
3 CREAM (GRAM) TOPICAL ONCE
Refills: 0 | Status: COMPLETED | OUTPATIENT
Start: 2020-09-06 | End: 2020-09-06

## 2020-09-06 RX ORDER — PANTOPRAZOLE SODIUM 20 MG/1
40 TABLET, DELAYED RELEASE ORAL EVERY 12 HOURS
Refills: 0 | Status: DISCONTINUED | OUTPATIENT
Start: 2020-09-06 | End: 2020-09-06

## 2020-09-06 RX ADMIN — PANTOPRAZOLE SODIUM 40 MILLIGRAM(S): 20 TABLET, DELAYED RELEASE ORAL at 08:54

## 2020-09-06 RX ADMIN — SODIUM CHLORIDE 1000 MILLILITER(S): 9 INJECTION, SOLUTION INTRAVENOUS at 05:05

## 2020-09-06 RX ADMIN — Medication 100 MILLIEQUIVALENT(S): at 02:43

## 2020-09-06 RX ADMIN — Medication 975 MILLIGRAM(S): at 21:56

## 2020-09-06 RX ADMIN — HYDROMORPHONE HYDROCHLORIDE 0.5 MILLIGRAM(S): 2 INJECTION INTRAMUSCULAR; INTRAVENOUS; SUBCUTANEOUS at 01:18

## 2020-09-06 RX ADMIN — HYDROMORPHONE HYDROCHLORIDE 0.5 MILLIGRAM(S): 2 INJECTION INTRAMUSCULAR; INTRAVENOUS; SUBCUTANEOUS at 10:18

## 2020-09-06 RX ADMIN — Medication 100 MILLIEQUIVALENT(S): at 03:37

## 2020-09-06 RX ADMIN — HYDROMORPHONE HYDROCHLORIDE 0.5 MILLIGRAM(S): 2 INJECTION INTRAMUSCULAR; INTRAVENOUS; SUBCUTANEOUS at 02:19

## 2020-09-06 RX ADMIN — HYDROMORPHONE HYDROCHLORIDE 0.5 MILLIGRAM(S): 2 INJECTION INTRAMUSCULAR; INTRAVENOUS; SUBCUTANEOUS at 05:05

## 2020-09-06 RX ADMIN — Medication 975 MILLIGRAM(S): at 13:55

## 2020-09-06 RX ADMIN — SODIUM CHLORIDE 1000 MILLILITER(S): 9 INJECTION INTRAMUSCULAR; INTRAVENOUS; SUBCUTANEOUS at 02:43

## 2020-09-06 RX ADMIN — Medication 30 MILLILITER(S): at 13:30

## 2020-09-06 RX ADMIN — POLYETHYLENE GLYCOL 3350 17 GRAM(S): 17 POWDER, FOR SOLUTION ORAL at 17:12

## 2020-09-06 RX ADMIN — HYDROMORPHONE HYDROCHLORIDE 0.5 MILLIGRAM(S): 2 INJECTION INTRAMUSCULAR; INTRAVENOUS; SUBCUTANEOUS at 09:09

## 2020-09-06 RX ADMIN — PANTOPRAZOLE SODIUM 10 MG/HR: 20 TABLET, DELAYED RELEASE ORAL at 17:12

## 2020-09-06 RX ADMIN — Medication 3 MILLIGRAM(S): at 20:55

## 2020-09-06 RX ADMIN — Medication 975 MILLIGRAM(S): at 20:55

## 2020-09-06 RX ADMIN — ONDANSETRON 4 MILLIGRAM(S): 8 TABLET, FILM COATED ORAL at 01:18

## 2020-09-06 RX ADMIN — SODIUM CHLORIDE 75 MILLILITER(S): 9 INJECTION, SOLUTION INTRAVENOUS at 09:10

## 2020-09-06 RX ADMIN — Medication 975 MILLIGRAM(S): at 13:30

## 2020-09-06 RX ADMIN — Medication 100 MILLIEQUIVALENT(S): at 05:41

## 2020-09-06 RX ADMIN — PANTOPRAZOLE SODIUM 80 MILLIGRAM(S): 20 TABLET, DELAYED RELEASE ORAL at 01:17

## 2020-09-06 NOTE — CONSULT NOTE ADULT - ATTENDING COMMENTS
Differential diagnosis and plan of care discussed with patient after the evaluation  75 Minutes spent on total encounter of which more than fifty percent of the encounter was spent counseling and/or coordinating care by the attending physician.  Advanced care planning was discussed with the patient and/or surrogate decision makers. Advanced care planning forms were discussed. The risks benefits and alternatives to pertinent gastrointestinal procedures and interventions were discussed in detail and all questions were answered. Duration: 30 Minutes.      Greg Swanson M.D.   Gastroenterology and Hepatology  Cell: [phone]

## 2020-09-06 NOTE — H&P ADULT - NSHPPHYSICALEXAM_GEN_ALL_CORE
Vital Signs Last 24 Hrs  T(C): 36.5 (06 Sep 2020 08:10), Max: 36.5 (06 Sep 2020 08:10)  T(F): 97.7 (06 Sep 2020 08:10), Max: 97.7 (06 Sep 2020 08:10)  HR: 55 (06 Sep 2020 08:10) (55 - 86)  BP: 135/84 (06 Sep 2020 08:10) (117/78 - 149/91)  BP(mean): --  RR: 18 (06 Sep 2020 08:10) (16 - 18)  SpO2: 100% (06 Sep 2020 08:10) (98% - 100%)

## 2020-09-06 NOTE — CHART NOTE - NSCHARTNOTEFT_GEN_A_CORE
Patient mixing study revealed that her aPTT is actually corrected with the test.    Recommendation:  -please check factor 8, 9, 11  -please give vit K LIV Ramos-in MD Riky  case d/w Dr. Cabrera, attending Patient mixing study revealed that the aPTT  was corrected; likely secondary to factor deficiency    Recommendation:  -please check factor VIII, IX, XI  -please give vit K  10 mg PO x 3 days.       Corrine Lan MD  case d/w Dr. Cabrera, attending

## 2020-09-06 NOTE — ED ADULT NURSE NOTE - OBJECTIVE STATEMENT
pt reports she has had nausea and vomiting that started yesterday afternoon. pt denies any fevers, pt reports normal bowel movements and no dysuria.  pt reports that there was blood in her vomit, pt did vomit upon arriving to ed and it did not appear to have any blood in the vomit.  pt reports she has a known gastric ulcer.  pt is alert and oriented x3, speaking full clear sentences, respirations non-labored, skin warm dry and intact, no edema noted, strong pulses throughout, abd is soft and non-distended,  pt is moving all extremities spontaneously.

## 2020-09-06 NOTE — CONSULT NOTE ADULT - SUBJECTIVE AND OBJECTIVE BOX
MARC TOWNSEND  MRN-[id number]    Reason for Consult: elevated aPTT    HPI:  35 F with PMH of refractory Hyplori with PUD, who p/w worsening abdominal pain and hematemesis. Hematology was consulted for elevated aPTT.   Patient lost follow up with GI due to COVID season and also mentions 10~15 lbs of weight loss.     In ER, afeb, HR 80, -140, saturating well on RA. Hb 13 and CTAP showing distal stomach thickening, no bowel obstruction. Given protonix 80 IV, dilaudid 0.5 mg x 2, and zofran. (06 Sep 2020 08:51)      FAMILY HISTORY:  Family history of alcohol abuse: father  , alcohol related issues    Social History:  1/2 PPD smoker  Occasional alcohol use  Weed 2x month socially  Lives at home with children (06 Sep 2020 08:51)      Home Medications:  acetaminophen 325 mg oral tablet: 3 tab(s) orally every 6 hours (22 May 2019 13:33)  ascorbic acid 250 mg oral tablet: 1 tab(s) orally 3 times a day (22 May 2019 13:33)  ferrous sulfate 325 mg (65 mg elemental iron) oral tablet: 1 tab(s) orally 3 times a day (22 May 2019 13:33)  ibuprofen 600 mg oral tablet: 1 tab(s) orally every 6 hours (22 May 2019 13:33)  multivitamin: 1 tab(s) orally once a day (23 May 2018 12:06)  Prenatal Multivitamins with Folic Acid 1 mg oral tablet: 1 tab(s) orally once a day (22 May 2019 13:33)      MEDICATIONS  (STANDING):  influenza   Vaccine 0.5 milliLiter(s) IntraMuscular once  lactated ringers. 1000 milliLiter(s) (75 mL/Hr) IV Continuous <Continuous>  pantoprazole  Injectable 40 milliGRAM(s) IV Push every 12 hours  polyethylene glycol 3350 17 Gram(s) Oral two times a day    MEDICATIONS  (PRN):  acetaminophen   Tablet .. 975 milliGRAM(s) Oral every 8 hours PRN Temp greater or equal to 38C (100.4F), Mild Pain (1 - 3)  aluminum hydroxide/magnesium hydroxide/simethicone Suspension 30 milliLiter(s) Oral every 6 hours PRN Dyspepsia  HYDROmorphone  Injectable 0.5 milliGRAM(s) IV Push every 8 hours PRN Severe Pain (7 - 10)  ondansetron Injectable 4 milliGRAM(s) IV Push every 6 hours PRN Nausea and/or Vomiting      Allergies    No Known Allergies    Intolerances        Objectives:  Vital Signs Last 24 Hrs  T(C): 36.6 (06 Sep 2020 12:19), Max: 36.6 (06 Sep 2020 12:19)  T(F): 97.9 (06 Sep 2020 12:19), Max: 97.9 (06 Sep 2020 12:)  HR: 70 (06 Sep 2020 12:) (55 - 86)  BP: 118/69 (06 Sep 2020 12:) (117/78 - 149/91)  BP(mean): --  RR: 18 (06 Sep 2020 12:) (16 - 18)  SpO2: 100% (06 Sep 2020 12:) (98% - 100%)    PHYSICAL EXAM:  Constitutional: tired, weak  Eyes: wnl  ENMT: moist mucosa  Neck: on LAD  Resp: CTAB  Cardiovascular: RRR  Gastrointestinal: ND, NT  no bruises or petechiae    Labs:    CBC Full  -  ( 06 Sep 2020 11:22 )  WBC Count : 9.40 K/uL  RBC Count : 4.24 M/uL  Hemoglobin : 10.7 g/dL  Hematocrit : 33.9 %  Platelet Count - Automated : 342 K/uL  Mean Cell Volume : 80.0 fl  Mean Cell Hemoglobin : 25.2 pg  Mean Cell Hemoglobin Concentration : 31.6 gm/dL  Auto Neutrophil # : x  Auto Lymphocyte # : x  Auto Monocyte # : x  Auto Eosinophil # : x  Auto Basophil # : x  Auto Neutrophil % : x  Auto Lymphocyte % : x  Auto Monocyte % : x  Auto Eosinophil % : x  Auto Basophil % : x    PT/INR - ( 06 Sep 2020 11:22 )   PT: 13.7 sec;   INR: 1.16 ratio         PTT - ( 06 Sep 2020 11:22 )  PTT:198.5 sec        140  |  105  |  6<L>  ----------------------------<  119<H>  3.4<L>   |  24  |  0.77    Ca    9.2      06 Sep 2020 11:    TPro  6.7  /  Alb  4.1  /  TBili  0.5  /  DBili  x   /  AST  13  /  ALT  12  /  AlkPhos  63  09-06    LIVER FUNCTIONS - ( 06 Sep 2020 11:22 )  Alb: 4.1 g/dL / Pro: 6.7 g/dL / ALK PHOS: 63 U/L / ALT: 12 U/L / AST: 13 U/L / GGT: x             Imagings:  CT A/P  Mild wall thickening of distal portion of the stomach, difficult to assess secondary to inadequate distention. Consider nonemergent upper endoscopy evaluation to exclude to exclude underlying lesion provided history of ulcer and hematemesis.

## 2020-09-06 NOTE — CONSULT NOTE ADULT - PROBLEM SELECTOR RECOMMENDATION 9
Likely Shira Ellis tear based on history.  -PPI BID  -monitor for recurrent episodes  -if no recurrence or melena or drop in hgb can likely pursue endoscopy as an outpatient by Dr. Anaya. Likely Shira Ellis tear based on history.  -PPI BID  -monitor for recurrent episodes  -if no recurrence or melena or drop in hgb can likely pursue endoscopy as an outpatient by Dr. Anaya.  -advance diet as tolerated

## 2020-09-06 NOTE — ED PROVIDER NOTE - OBJECTIVE STATEMENT
34 yo F with pmhx of gastric ulcer presents with 4ds of decreased appetite, 1d of vomiting, epigastric pain, few hrs of bloody vomit. States she had endoscopy and colonoscopy few months and was informed she has an ulcer. no f/c/cough/cp. Denies drinking alcohol. SMokes every day.

## 2020-09-06 NOTE — PROGRESS NOTE ADULT - PROBLEM SELECTOR PLAN 3
Constipation for 5 days. CTAP not showing bowel obstruction or heavy stool burden  - start miralax BID  - No opiods

## 2020-09-06 NOTE — ED PROVIDER NOTE - PHYSICAL EXAMINATION
Gen: well developed and well nourished, vomiting - no blood noted  CV: RRR, +S1/S2, no M/R/G  Resp: CTAB, symmetric breath sounds, no W/R/R  GI:  abdomen soft non-distended, +severe epigastric TTP  Back: no CVA tenderness  Extremities -  no edema  Skin: no rashes, colors changes or bruising  Neuro: A&Ox3, following commands, speech clear, moving all four extremities spontaneously  Psych: appropriate mood, normal insight

## 2020-09-06 NOTE — H&P ADULT - HISTORY OF PRESENT ILLNESS
35 F with PMH of Hyplori related PUD who p/w 3 days of abdominal pain and poor appetite. Patient endorses she has had a hx of PUD, was last scoped by in 2018 showing persistent hpylori and was recommended to complete therapy and follow up with Dr. Anaya. She had an appointment to follow up with Dr. Anaya however was not able to go due to the pandemic. She endorses being stressed with work and taking care of children at home. She has had baseline abdominal pain especially between meals as well as 10-15 lb weight loss. She has been drinking theo herbal teas with milk to alleviate pain/hunger. Over the last 3 days, she has had worsening abdominal pain and inability to tolerate fluids due to epigastric abdominal pain and nausea. She had some retching and episodes of vomiting last episodes associated with small amount of blood, prompting evaluation in ED. She endorses constipation for last 5 days and unsure if passing gas. No fevers, chills, cough, melena/hematochezia, dyspnea. She feels some burning into the chest as well.     In ER, afeb, HR 80, -140, saturating well on RA. Hb 13 and CTAP showing distal stomach thickening, no bowel obstruction. Given protonix 80 IV, dilaudid 0.5 mg x 2, and zofran.

## 2020-09-06 NOTE — H&P ADULT - PROBLEM SELECTOR PLAN 3
Constipation for 5 days. CTAP not showing bowel obstruction or heavy stool burden  - start miralax BID  - limit opioids as possible

## 2020-09-06 NOTE — H&P ADULT - PROBLEM SELECTOR PLAN 1
Patient with known hx of PUD 2/2 hyplori unclear if eradicated who p/w epigastric pain, nausea/vomiting, retching with small volume hematemesis. CTAP showing distal stomach thickening and no pancreatitis at this time  - start clears as tolerated and maintenance IVF  - protonix 40 BID  - maalox q6 PRN  - zofran PRN q6   - tylenol 975 q8 PRN, dilaudid PRN for refractory pain  - GI consulted

## 2020-09-06 NOTE — ED PROVIDER NOTE - PROGRESS NOTE DETAILS
Resident Hersimran: stable. States pain and nausea has improved with meds. Resident Snadie: discussed case with Dr. Gerald Garcia - agrees with admission.

## 2020-09-06 NOTE — PROGRESS NOTE ADULT - SUBJECTIVE AND OBJECTIVE BOX
Chief complaint: blood in stool/abd pain    HPI:  35 F with PMH of Hyplori related PUD who p/w 3 days of abdominal pain and poor appetite. Patient endorses she has had a hx of PUD, was last scoped by in 2018 showing persistent hpylori and was recommended to complete therapy and follow up with Dr. Anaya. She had an appointment to follow up with Dr. Anaya however was not able to go due to the pandemic. She endorses being stressed with work and taking care of children at home. She has had baseline abdominal pain especially between meals as well as 10-15 lb weight loss. She has been drinking theo herbal teas with milk to alleviate pain/hunger. Over the last 3 days, she has had worsening abdominal pain and inability to tolerate fluids due to epigastric abdominal pain and nausea. She had some retching and episodes of vomiting last episodes associated with small amount of blood, prompting evaluation in ED. She endorses constipation for last 5 days and unsure if passing gas. No fevers, chills, cough, melena/hematochezia, dyspnea. She feels some burning into the chest as well. states she is more stress at home as of late.     In ER, afeb, HR 80, -140, saturating well on RA. Hb 13 and CTAP showing distal stomach thickening, no bowel obstruction. Given protonix 80 IV, dilaudid 0.5 mg x 2, and zofran. (06 Sep 2020 08:51)      PAST MEDICAL & SURGICAL HISTORY:  Anemia  Pancreatitis, chronic  TB (pulmonary tuberculosis)  H/O tubal ligation      REVIEW OF SYSTEMS:    CONSTITUTIONAL: No fever, weight loss, or fatigue  NECK: No pain or stiffness  RESPIRATORY: No cough, wheezing, chills or hemoptysis; No shortness of breath  CARDIOVASCULAR: No chest pain, palpitations, dizziness, or leg swelling  GASTROINTESTINAL: per HPI  GENITOURINARY: No dysuria, frequency, hematuria, or incontinence  NEUROLOGICAL: No headaches, memory loss, loss of strength, numbness, or tremors  SKIN: No itching, burning, rashes, or lesions   LYMPH NODES: No enlarged glands  MUSCULOSKELETAL: No joint pain or swelling; No muscle, back, or extremity pain  HEME/LYMPH: No easy bruising, or bleeding gums    MEDICATIONS  (STANDING):  influenza   Vaccine 0.5 milliLiter(s) IntraMuscular once  lactated ringers. 1000 milliLiter(s) (75 mL/Hr) IV Continuous <Continuous>  pantoprazole  Injectable 40 milliGRAM(s) IV Push every 12 hours  polyethylene glycol 3350 17 Gram(s) Oral two times a day    MEDICATIONS  (PRN):  acetaminophen   Tablet .. 975 milliGRAM(s) Oral every 8 hours PRN Temp greater or equal to 38C (100.4F), Mild Pain (1 - 3)  aluminum hydroxide/magnesium hydroxide/simethicone Suspension 30 milliLiter(s) Oral every 6 hours PRN Dyspepsia  HYDROmorphone  Injectable 0.5 milliGRAM(s) IV Push every 8 hours PRN Severe Pain (7 - 10)  ondansetron Injectable 4 milliGRAM(s) IV Push every 6 hours PRN Nausea and/or Vomiting      Allergies    No Known Allergies    Intolerances        SOCIAL HISTORY: no smoker , no ETOH , No drug use     FAMILY HISTORY:  Family history of alcohol abuse: father  , alcohol related issues      Vital Signs Last 24 Hrs  T(C): 36.5 (06 Sep 2020 08:10), Max: 36.5 (06 Sep 2020 08:10)  T(F): 97.7 (06 Sep 2020 08:10), Max: 97.7 (06 Sep 2020 08:10)  HR: 55 (06 Sep 2020 08:10) (55 - 86)  BP: 135/84 (06 Sep 2020 08:10) (117/78 - 149/91)  BP(mean): --  RR: 18 (06 Sep 2020 08:10) (16 - 18)  SpO2: 100% (06 Sep 2020 08:10) (98% - 100%)    PHYSICAL EXAM:    GENERAL: NAD, well-groomed, well-developed  HEAD:  Atraumatic, Normocephalic  EYES: EOMI, PERRLA, conjunctiva and sclera clear  ENMT: No tonsillar erythema, exudates, or enlargement; Moist mucous membranes, Good dentition, No lesions  NECK: Supple, No JV  NERVOUS SYSTEM:  Alert & Oriented X3, Good concentration; Motor Strength 5/5 B/L upper and lower extremitie  CHEST/LUNG: Clear to auscultation bilaterally; No rales, rhonchi, wheezing, or rubs  HEART: Regular rate and rhythm; No murmurs, rubs, or gallops  ABDOMEN: Soft, mild epigastric tenderness, Nondistended; Bowel sounds present  EXTREMITIES:  2+ Peripheral Pulses, No clubbing, cyanosis, or edema  LYMPH: No lymphadenopathy noted  SKIN: No rashes or lesions      LABS:                        13.1   9.61  )-----------( 434      ( 06 Sep 2020 01:08 )             40.6     -    138  |  98  |  8   ----------------------------<  125<H>  3.2<L>   |  24  |  0.77    Ca    10.8<H>      06 Sep 2020 01:08    TPro  8.4<H>  /  Alb  5.0  /  TBili  0.5  /  DBili  x   /  AST  18  /  ALT  16  /  AlkPhos  82  -06    PT/INR - ( 06 Sep 2020 05:05 )   PT: 14.3 sec;   INR: 1.21 ratio         PTT - ( 06 Sep 2020 05:05 )  PTT:83.9 sec      RADIOLOGY & ADDITIONAL STUDIES: CT A/P reviewed in chart

## 2020-09-06 NOTE — H&P ADULT - ASSESSMENT
35 F with PMH of Hyplori related PUD who p/w 3 days of nausea, abdominal pain and small volume hematemesis, concerning for upper GI bleed as well as incidentally noted coagulopathy

## 2020-09-06 NOTE — PROGRESS NOTE ADULT - PROBLEM SELECTOR PLAN 4
1/2 PPD daily smoking, has been gradually cutting down  - deferred nicotine patches for now  - discussed importance of smoking cessation

## 2020-09-06 NOTE — CONSULT NOTE ADULT - ASSESSMENT
35 F with PMH of refractory Hpylori with PUD, who p/w worsening abdominal pain associated with hematemesis abd weight loss. Hematology was consulted for elevated aPTT.     Elevated PTT is definitely abnormal and will need full evaluation. It is also suspicious about her Hpylori related PUD with CT finding.    #aPTT elevation  -please send mixing study and lupus anticoagulant  -please send hepatitis panel, including hepatitis A, B, C  -based on the result, we will give follow up rec for reversing the aPTT    #refractory Hpylori with PUD  #weight loss  -would have GI to see patient if she need EGD.  -weight loss is concerning for possible malignancy but patient can have low appetite with PUD    Richard-in Riky, PGY-4  Translational Medical Oncology Fellow  [phone]  Case d/w Dr. Cabrera, attending

## 2020-09-06 NOTE — ED PROVIDER NOTE - ATTENDING CONTRIBUTION TO CARE
attending Mitul: 35yF h/o gastric ulcer p/w 4 days decreased appetite, 1 day of vomiting, epigastric pain, few hrs of bloody vomit. Diagnosed with ulcer on endoscopy several months ago. No etoh use. Everyday smoker. Concern for upper GI bleed. Will obtain labs including type and screen, Protonix, CT A/P eval active GI bleeding, admit

## 2020-09-06 NOTE — CONSULT NOTE ADULT - SUBJECTIVE AND OBJECTIVE BOX
Chief Complaint:  Patient is a 35y old  Female who presents with a chief complaint of epigastric pain (06 Sep 2020 13:55)      HPI:    The patient is a 35 year old female who presents with several days of nausea and epigastric discomfort. She experienced several episodes of nonbloody emesis and the final episode was red colored. She denies melena or presyncope.    The patient denies dysphagia, \diarrhea, change in bowel habits or NSAID use.    Her last endoscopy was in 2018.  She has a history of GI bleeding 2/2 duodenal ulcer and her GI is Dr. Anaya.      Allergies:  No Known Allergies      Home Medications:    Hospital Medications:  acetaminophen   Tablet .. 975 milliGRAM(s) Oral every 8 hours PRN  aluminum hydroxide/magnesium hydroxide/simethicone Suspension 30 milliLiter(s) Oral every 6 hours PRN  HYDROmorphone  Injectable 0.5 milliGRAM(s) IV Push every 8 hours PRN  influenza   Vaccine 0.5 milliLiter(s) IntraMuscular once  lactated ringers. 1000 milliLiter(s) IV Continuous <Continuous>  ondansetron Injectable 4 milliGRAM(s) IV Push every 6 hours PRN  pantoprazole Infusion 8 mG/Hr IV Continuous <Continuous>  polyethylene glycol 3350 17 Gram(s) Oral two times a day      PMHX/PSHX:  Anemia  Pancreatitis, chronic  TB (pulmonary tuberculosis)  Reflux  H/O tubal ligation  No significant past surgical history      Family history:  Family history of alcohol abuse      Social History:   Denies ethanol use.  Denies illicit drug use.    ROS:     General:  No wt loss, fevers, chills, night sweats, fatigue,   Eyes:  Good vision, no reported pain  ENT:  No sore throat, pain, runny nose, dysphagia  CV:  No pain, palpitations, hypo/hypertension  Resp:  No dyspnea, cough, tachypnea, wheezing  GI:  See HPI  :  No pain, bleeding, incontinence, nocturia  Muscle:  No pain, weakness  Neuro:  No weakness, tingling, memory problems  Psych:  No fatigue, insomnia, mood problems, depression  Endocrine:  No polyuria, polydipsia, cold/heat intolerance  Heme:  No petechiae, ecchymosis, easy bruisability  Integumentary:  No rash, edema      PHYSICAL EXAM:     GENERAL:  Appears stated age, well-groomed, well-nourished, no distress  HEENT:  NC/AT,  conjunctivae anicteric, clear and pink,   NECK: supple, trachea midline  CHEST:  Full & symmetric excursion, no increased effort, breath sounds clear  HEART:  Regular rhythm, no JVD  ABDOMEN:  Soft, non-tender, non-distended, normoactive bowel sounds,  no masses , no hepatosplenomegaly  EXTREMITIES:  no cyanosis,clubbing or edema  SKIN:  No rash, erythema, or, ecchymoses, no jaundice  NEURO:  Alert, non-focal, no asterixis  PSYCH: Appropriate affect, oriented to place and time  RECTAL: Deferred      Vital Signs:  Vital Signs Last 24 Hrs  T(C): 37.2 (06 Sep 2020 20:00), Max: 37.2 (06 Sep 2020 20:00)  T(F): 99 (06 Sep 2020 20:00), Max: 99 (06 Sep 2020 20:00)  HR: 64 (06 Sep 2020 20:00) (55 - 86)  BP: 121/84 (06 Sep 2020 20:00) (117/78 - 149/91)  BP(mean): --  RR: 18 (06 Sep 2020 20:00) (16 - 18)  SpO2: 98% (06 Sep 2020 20:00) (98% - 100%)  Daily Height in cm: 162.56 (06 Sep 2020 00:46)    Daily     LABS:                        10.7   9.40  )-----------( 342      ( 06 Sep 2020 11:22 )             33.9     09-06    140  |  105  |  6<L>  ----------------------------<  119<H>  3.4<L>   |  24  |  0.77    Ca    9.2      06 Sep 2020 11:22    TPro  6.7  /  Alb  4.1  /  TBili  0.5  /  DBili  x   /  AST  13  /  ALT  12  /  AlkPhos  63  09-06    LIVER FUNCTIONS - ( 06 Sep 2020 11:22 )  Alb: 4.1 g/dL / Pro: 6.7 g/dL / ALK PHOS: 63 U/L / ALT: 12 U/L / AST: 13 U/L / GGT: x           PT/INR - ( 06 Sep 2020 11:22 )   PT: 13.7 sec;   INR: 1.16 ratio         PTT - ( 06 Sep 2020 11:22 )  PTT:198.5 sec    Amylase Serum--      Lipase serum12       Ammonia--

## 2020-09-06 NOTE — H&P ADULT - PROBLEM SELECTOR PLAN 4
1/2 PPD daily smoking, has been gradually cutting down  - deferred nicotine patches for now 1/2 PPD daily smoking, has been gradually cutting down  - deferred nicotine patches for now  - advised smoke cessation

## 2020-09-06 NOTE — ED PROVIDER NOTE - HIV OFFER
[facility] Medicine Services  INPATIENT PROGRESS NOTE    Date of Admission: 1/2/2017  Length of Stay: 7  Primary Care Physician: Radha Pal MD    Subjective     Chief Complaint: bilateral infiltrates  HPI:  No family present.  She is a little more confused today.  No new issues.  Unable to get history from patient.    Review Of Systems:   Review of Systems   Reason unable to perform ROS: patient confusion.     Objective      Temp:  [98.3 °F (36.8 °C)-98.7 °F (37.1 °C)] 98.3 °F (36.8 °C)  Heart Rate:  [] 107  Resp:  [14-16] 14  BP: (119-124)/(67-83) 120/83  Physical Exam    Frail and weak, but no acute distress, currently awake and confused  Up in chair  irreg irrg  abd soft, non tender non distended  Some bilateral rhonchi and rales, unchanged  No LE edema  Neuro - awake, confused, follows commands, and no focal deficits.    Results Review:    I have reviewed the labs, radiology results and diagnostic studies.      Results from last 7 days  Lab Units 01/08/17  0736   WBC 10*3/mm3 11.40*   HEMOGLOBIN g/dL 9.3*   PLATELETS 10*3/mm3 197       Results from last 7 days  Lab Units 01/09/17  0429   SODIUM mmol/L 139   POTASSIUM mmol/L 3.8   TOTAL CO2 mmol/L 33.0*   CREATININE mg/dL 1.00   GLUCOSE mg/dL 93       Culture Data:   BLOOD CULTURE   Date Value Ref Range Status   01/02/2017 No growth at 2 days  Preliminary   01/02/2017 No growth at 2 days  Preliminary     Radiology Data:     I have reviewed the medications.    aspirin 325 mg Oral Daily   brimonidine 1 drop Right Eye BID   bumetanide 1 mg Intravenous Q12H   carvedilol 6.25 mg Oral BID With Meals   cetirizine 10 mg Oral Daily   doxycycline 100 mg Oral Q12H   DULoxetine 30 mg Oral Daily   FLUoxetine 40 mg Oral Daily   fluticasone 2 spray Nasal Daily   gabapentin 100 mg Oral Nightly   guaifenesin-dextromethorphan 1 tablet Oral BID   heparin (porcine) 5,000 Units Subcutaneous Q12H   lidocaine 1 patch Transdermal Q24H   nystatin   "Topical Q12H   pantoprazole 40 mg Oral Q AM   pharmacy consult - MTM  Does not apply Daily   piperacillin-tazobactam 3.375 g Intravenous Q6H   valsartan 40 mg Oral Q12H     CXR (1/8 my read) - worsening edema/effusions    Assessment/Plan     Assessment/Problem List  Principal Problem:    Acute diastolic (congestive) heart failure  Active Problems:    Atrial fibrillation, no anticoags due to hx GIB and fall risk    Hypertension    Pacemaker at end of battery life    Pulmonary hypertension    Acute hypoxic respiratory failure    Healthcare Associated Pneumonia    Valvular heart disease    Hypokalemia    Chronic anemia    Plan  Acute on Chronic Resp failure  - multifactorial, but seems most c/w volume overload  - CHF/PNA     - B/L infitrates on cxr - more consistent with CHF based on improvement with diuresis.  Couldn't exclude PNA, s/p 7 days today, will stop    A/C diastolic CHF  --cards following  - remains a little overloaded, continued IV bumex, possible change to PO tomorrow  - may have to tolerate worsening renal function for breathing comfort    Anemia    - related to recent GI bleed last year, stable     PPM at end of battery life - no plans for change    Valvular Heart Dz  - not a surgical candidate    Dispo:  I discussed with dtr at bedside this past weekend.. Goal is going to be to get \"tuned up\" as much as possible, but she understands that patient will likely continue to decline given age and current condition.  She seems interested in palliative following at SNF and perhaps focusing on comfort and preventing readmissions if possible.    CM made referral to [facility].  Maybe transfer on Wednesday?    DVT prophylaxis: mechanical    High complexity.    Victor Manuel Russ MD   01/09/17   4:30 PM    Please note that portions of this note may have been completed with a voice recognition program. Efforts were made to edit the dictations, but occasionally words are mistranscribed.    " Opt out

## 2020-09-06 NOTE — H&P ADULT - PROBLEM SELECTOR PLAN 2
Noted to have prolong PT and PTT unclear etiology, however appears drinking herbal tea and overall poor PO intake/possible malnutrition. Not on AC at home  - repeat coags with mixing study  - if persistently abnormal will order for oral vitamin K Noted to have prolong PT and PTT unclear etiology, however appears drinking herbal tea and overall poor PO intake/possible malnutrition. Not on AC at home  - fibrinogen intact not less likely DIC  - no obvious evidence of liver disease  - repeat coags with mixing study  - if persistently abnormal will order for oral vitamin K Noted to have prolong PT and PTT unclear etiology, however appears drinking herbal tea and overall poor PO intake/possible malnutrition. Not on AC at home.  - fibrinogen intact not less likely DIC  - no obvious evidence of liver disease per CTAP  - repeat coags with mixing study  - if INR persistently abnormal will order for oral vitamin K

## 2020-09-06 NOTE — ED PROVIDER NOTE - CLINICAL SUMMARY MEDICAL DECISION MAKING FREE TEXT BOX
Epigastric pain, blood vomit. VSS. Labs, imaging to eval for active GI bleed, pancreatitis, ulcer perforation. Pain mgmt, antiemetic, protonix. Will continue to monitor.

## 2020-09-07 LAB
ANION GAP SERPL CALC-SCNC: 9 MMOL/L — SIGNIFICANT CHANGE UP (ref 5–17)
BUN SERPL-MCNC: 5 MG/DL — LOW (ref 7–23)
CALCIUM SERPL-MCNC: 9.2 MG/DL — SIGNIFICANT CHANGE UP (ref 8.4–10.5)
CHLORIDE SERPL-SCNC: 105 MMOL/L — SIGNIFICANT CHANGE UP (ref 96–108)
CO2 SERPL-SCNC: 25 MMOL/L — SIGNIFICANT CHANGE UP (ref 22–31)
CREAT SERPL-MCNC: 0.82 MG/DL — SIGNIFICANT CHANGE UP (ref 0.5–1.3)
GLUCOSE SERPL-MCNC: 94 MG/DL — SIGNIFICANT CHANGE UP (ref 70–99)
HAV IGM SER-ACNC: SIGNIFICANT CHANGE UP
HBV CORE IGM SER-ACNC: SIGNIFICANT CHANGE UP
HBV SURFACE AG SER-ACNC: SIGNIFICANT CHANGE UP
HCT VFR BLD CALC: 33 % — LOW (ref 34.5–45)
HCV AB S/CO SERPL IA: 0.67 S/CO — SIGNIFICANT CHANGE UP (ref 0–0.99)
HCV AB SERPL-IMP: SIGNIFICANT CHANGE UP
HGB BLD-MCNC: 10.3 G/DL — LOW (ref 11.5–15.5)
MCHC RBC-ENTMCNC: 25.5 PG — LOW (ref 27–34)
MCHC RBC-ENTMCNC: 31.2 GM/DL — LOW (ref 32–36)
MCV RBC AUTO: 81.7 FL — SIGNIFICANT CHANGE UP (ref 80–100)
NRBC # BLD: 0 /100 WBCS — SIGNIFICANT CHANGE UP (ref 0–0)
PLATELET # BLD AUTO: 344 K/UL — SIGNIFICANT CHANGE UP (ref 150–400)
POTASSIUM SERPL-MCNC: 3.5 MMOL/L — SIGNIFICANT CHANGE UP (ref 3.5–5.3)
POTASSIUM SERPL-SCNC: 3.5 MMOL/L — SIGNIFICANT CHANGE UP (ref 3.5–5.3)
RBC # BLD: 4.04 M/UL — SIGNIFICANT CHANGE UP (ref 3.8–5.2)
RBC # FLD: 14.4 % — SIGNIFICANT CHANGE UP (ref 10.3–14.5)
SARS-COV-2 IGG SERPL QL IA: NEGATIVE — SIGNIFICANT CHANGE UP
SARS-COV-2 IGM SERPL IA-ACNC: 4.44 AU/ML — SIGNIFICANT CHANGE UP
SODIUM SERPL-SCNC: 139 MMOL/L — SIGNIFICANT CHANGE UP (ref 135–145)
WBC # BLD: 6.71 K/UL — SIGNIFICANT CHANGE UP (ref 3.8–10.5)
WBC # FLD AUTO: 6.71 K/UL — SIGNIFICANT CHANGE UP (ref 3.8–10.5)

## 2020-09-07 RX ORDER — ACETAMINOPHEN 500 MG
650 TABLET ORAL EVERY 6 HOURS
Refills: 0 | Status: DISCONTINUED | OUTPATIENT
Start: 2020-09-07 | End: 2020-09-09

## 2020-09-07 RX ORDER — HYDROMORPHONE HYDROCHLORIDE 2 MG/ML
0.25 INJECTION INTRAMUSCULAR; INTRAVENOUS; SUBCUTANEOUS EVERY 8 HOURS
Refills: 0 | Status: DISCONTINUED | OUTPATIENT
Start: 2020-09-07 | End: 2020-09-09

## 2020-09-07 RX ORDER — SODIUM CHLORIDE 9 MG/ML
1000 INJECTION, SOLUTION INTRAVENOUS
Refills: 0 | Status: DISCONTINUED | OUTPATIENT
Start: 2020-09-07 | End: 2020-09-09

## 2020-09-07 RX ADMIN — Medication 975 MILLIGRAM(S): at 08:42

## 2020-09-07 RX ADMIN — SODIUM CHLORIDE 75 MILLILITER(S): 9 INJECTION, SOLUTION INTRAVENOUS at 16:49

## 2020-09-07 RX ADMIN — HYDROMORPHONE HYDROCHLORIDE 0.5 MILLIGRAM(S): 2 INJECTION INTRAMUSCULAR; INTRAVENOUS; SUBCUTANEOUS at 12:45

## 2020-09-07 RX ADMIN — HYDROMORPHONE HYDROCHLORIDE 0.5 MILLIGRAM(S): 2 INJECTION INTRAMUSCULAR; INTRAVENOUS; SUBCUTANEOUS at 12:13

## 2020-09-07 RX ADMIN — Medication 2.5 MILLIGRAM(S): at 06:42

## 2020-09-07 RX ADMIN — PANTOPRAZOLE SODIUM 10 MG/HR: 20 TABLET, DELAYED RELEASE ORAL at 14:40

## 2020-09-07 RX ADMIN — HYDROMORPHONE HYDROCHLORIDE 0.5 MILLIGRAM(S): 2 INJECTION INTRAMUSCULAR; INTRAVENOUS; SUBCUTANEOUS at 04:35

## 2020-09-07 RX ADMIN — HYDROMORPHONE HYDROCHLORIDE 0.5 MILLIGRAM(S): 2 INJECTION INTRAMUSCULAR; INTRAVENOUS; SUBCUTANEOUS at 05:10

## 2020-09-07 RX ADMIN — POLYETHYLENE GLYCOL 3350 17 GRAM(S): 17 POWDER, FOR SOLUTION ORAL at 05:35

## 2020-09-07 RX ADMIN — HYDROMORPHONE HYDROCHLORIDE 0.25 MILLIGRAM(S): 2 INJECTION INTRAMUSCULAR; INTRAVENOUS; SUBCUTANEOUS at 22:20

## 2020-09-07 RX ADMIN — Medication 975 MILLIGRAM(S): at 09:29

## 2020-09-07 RX ADMIN — POLYETHYLENE GLYCOL 3350 17 GRAM(S): 17 POWDER, FOR SOLUTION ORAL at 17:39

## 2020-09-07 RX ADMIN — HYDROMORPHONE HYDROCHLORIDE 0.25 MILLIGRAM(S): 2 INJECTION INTRAMUSCULAR; INTRAVENOUS; SUBCUTANEOUS at 21:50

## 2020-09-07 NOTE — PROGRESS NOTE ADULT - PROBLEM SELECTOR PLAN 2
Hematology consult appreciated - clotting factor deficiency, await factor 8,9,11   PO vit K x 3 days per hematology.

## 2020-09-07 NOTE — PROGRESS NOTE ADULT - SUBJECTIVE AND OBJECTIVE BOX
seen and examined. complains of ongoing mild epigastric pain. mild nausea. no further emesis.no melena or hematochezia. no fever or chills. no vomiting.     PHYSICAL EXAM:    aaox3, nad  nonlabored breathing, no audible stridor or wheezing  no rashes  no focal deficits      LABS:                        10.3   6.71  )-----------( 344      ( 07 Sep 2020 06:17 )             33.0     09-07    139  |  105  |  5<L>  ----------------------------<  94  3.5   |  25  |  0.82    Ca    9.2      07 Sep 2020 06:17    TPro  6.7  /  Alb  4.1  /  TBili  0.5  /  DBili  x   /  AST  13  /  ALT  12  /  AlkPhos  63  09-06    PT/INR - ( 06 Sep 2020 11:22 )   PT: 13.7 sec;   INR: 1.16 ratio         PTT - ( 06 Sep 2020 11:22 )  PTT:198.5 sec

## 2020-09-07 NOTE — PROGRESS NOTE ADULT - PROBLEM SELECTOR PLAN 3
Constipation for 5 days. CTAP not showing bowel obstruction or heavy stool burden  - started miralax BID  - avoid opiods

## 2020-09-07 NOTE — PROGRESS NOTE ADULT - ASSESSMENT
· Assessment		  35 F p/w epigastric discomfort and hematemesis likely Shira Ellis tear.     Problem/Recommendation - 1:  Problem: Hematemesis. Recommendation: Likely Shira Ellis tear based on history.  -PPI BID  -monitor for recurrent episodes-possible EGD tomorrow, would like to see what the coagulation profile is on am labs     Problem/Recommendation - 2:  ·  Problem: Anemia.  Recommendation: hgb is currently at prior baseline. Initial value was likely due to hemoconcentration.      Problem/Recommendation - 3:  ·  Problem: Abnormal CT of the abdomen.  Recommendation: gastric thickening vs. underdistention       Problem/Recommendation - 4:  ·  Problem: Coagulopathy.  Recommendation: w/u as per heme. Pt given vitamin K today.  -f/u coags tomorrow    Attending Attestation:   Differential diagnosis and plan of care discussed with patient after the evaluation  35 Minutes spent on total encounter of which more than fifty percent of the encounter was spent counseling and/or coordinating care by the attending physician.        Greg Swanson M.D.   Gastroenterology and Hepatology  Cell: [phone].

## 2020-09-07 NOTE — PROGRESS NOTE ADULT - PROBLEM/PLAN-2
Pt called office. He said he tried trazadone for several nights (4) and it does not work. He was unable to get a sound sleep and is very tired.     Michael, can you try to PA the zolpidem now that the pt has tried and failed trazadone? Let pt know outcome.  Thanks.   DISPLAY PLAN FREE TEXT

## 2020-09-07 NOTE — PROGRESS NOTE ADULT - SUBJECTIVE AND OBJECTIVE BOX
Chief Complaint:  Patient is a 35y old  Female who presents with a chief complaint of epigastric pain (07 Sep 2020 21:53)      Interval Events:   no further hematemesis  Allergies:  No Known Allergies      Hospital Medications:  acetaminophen   Tablet .. 650 milliGRAM(s) Oral every 6 hours PRN  aluminum hydroxide/magnesium hydroxide/simethicone Suspension 30 milliLiter(s) Oral every 6 hours PRN  HYDROmorphone  Injectable 0.25 milliGRAM(s) IV Push every 8 hours PRN  influenza   Vaccine 0.5 milliLiter(s) IntraMuscular once  lactated ringers 1000 milliLiter(s) IV Continuous <Continuous>  ondansetron Injectable 4 milliGRAM(s) IV Push every 6 hours PRN  pantoprazole Infusion 8 mG/Hr IV Continuous <Continuous>  polyethylene glycol 3350 17 Gram(s) Oral two times a day      PMHX/PSHX:  Anemia  Pancreatitis, chronic  TB (pulmonary tuberculosis)  Reflux  H/O tubal ligation  No significant past surgical history      Family history:  Family history of alcohol abuse      ROS:     General:  No wt loss, fevers, chills, night sweats, fatigue,   Eyes:  Good vision, no reported pain  ENT:  No sore throat, pain, runny nose, dysphagia  CV:  No pain, palpitations, hypo/hypertension  Resp:  No dyspnea, cough, tachypnea, wheezing  GI:  See HPI  :  No pain, bleeding, incontinence, nocturia  Muscle:  No pain, weakness  Neuro:  No weakness, tingling, memory problems  Psych:  No fatigue, insomnia, mood problems, depression  Endocrine:  No polyuria, polydipsia, cold/heat intolerance  Heme:  No petechiae, ecchymosis, easy bruisability  Skin:  No rash, edema      PHYSICAL EXAM:     GENERAL:  Appears stated age, well-groomed, well-nourished, no distress  HEENT:  NC/AT,  conjunctivae clear, sclera-anicteric  NECK: Trachea midline, supple  CHEST:  Full & symmetric excursion, no increased effort, breath sounds clear  HEART:  Regular rhythm, no silvia/heave  ABDOMEN:  Soft, non-tender, non-distended, normoactive bowel sounds,  no masses ,no hepato-splenomegaly,   EXTREMITIES:  no cyanosis,clubbing or edema  SKIN:  No rash/erythema/petechiae, no jaundice  NEURO:  Alert, oriented, no asterixis  RECTAL: Deferred    Vital Signs:  Vital Signs Last 24 Hrs  T(C): 36.5 (07 Sep 2020 20:48), Max: 37.2 (07 Sep 2020 04:31)  T(F): 97.7 (07 Sep 2020 20:48), Max: 98.9 (07 Sep 2020 04:31)  HR: 64 (07 Sep 2020 20:48) (63 - 68)  BP: 128/87 (07 Sep 2020 20:48) (116/74 - 128/87)  BP(mean): --  RR: 17 (07 Sep 2020 20:48) (17 - 18)  SpO2: 98% (07 Sep 2020 20:48) (98% - 100%)  Daily     Daily     LABS:                        10.3   6.71  )-----------( 344      ( 07 Sep 2020 06:17 )             33.0     09-07    139  |  105  |  5<L>  ----------------------------<  94  3.5   |  25  |  0.82    Ca    9.2      07 Sep 2020 06:17    TPro  6.7  /  Alb  4.1  /  TBili  0.5  /  DBili  x   /  AST  13  /  ALT  12  /  AlkPhos  63  09-06    LIVER FUNCTIONS - ( 06 Sep 2020 11:22 )  Alb: 4.1 g/dL / Pro: 6.7 g/dL / ALK PHOS: 63 U/L / ALT: 12 U/L / AST: 13 U/L / GGT: x           PT/INR - ( 06 Sep 2020 11:22 )   PT: 13.7 sec;   INR: 1.16 ratio         PTT - ( 06 Sep 2020 11:22 )  PTT:198.5 sec        Imaging:

## 2020-09-07 NOTE — PROGRESS NOTE ADULT - PROBLEM SELECTOR PLAN 1
PPI BID for now  GI consult appreciated - Discussed at length with GI  patient states she is likely unable to follow up outpatient and continues to have symptoms, she prefers to have endoscopy done inpatient.   NPO past midnight for EGD tomorrow

## 2020-09-08 ENCOUNTER — RESULT REVIEW (OUTPATIENT)
Age: 35
End: 2020-09-08

## 2020-09-08 LAB
ANION GAP SERPL CALC-SCNC: 7 MMOL/L — SIGNIFICANT CHANGE UP (ref 5–17)
APTT BLD: 48.3 SEC — HIGH (ref 27.5–35.5)
BUN SERPL-MCNC: 7 MG/DL — SIGNIFICANT CHANGE UP (ref 7–23)
CALCIUM SERPL-MCNC: 8.9 MG/DL — SIGNIFICANT CHANGE UP (ref 8.4–10.5)
CHLORIDE SERPL-SCNC: 104 MMOL/L — SIGNIFICANT CHANGE UP (ref 96–108)
CO2 SERPL-SCNC: 27 MMOL/L — SIGNIFICANT CHANGE UP (ref 22–31)
CREAT SERPL-MCNC: 0.78 MG/DL — SIGNIFICANT CHANGE UP (ref 0.5–1.3)
DRVVT SCREEN TO CONFIRM RATIO: SIGNIFICANT CHANGE UP
FACT IX PPP CHRO-ACNC: 135 % — SIGNIFICANT CHANGE UP (ref 80–165)
FACT VIII ACT/NOR PPP: 106 % — SIGNIFICANT CHANGE UP (ref 60–125)
FACT XII ACT/NOR PPP: 123 % — SIGNIFICANT CHANGE UP (ref 70–145)
GLUCOSE SERPL-MCNC: 89 MG/DL — SIGNIFICANT CHANGE UP (ref 70–99)
HCG SERPL-ACNC: <2 MIU/ML — SIGNIFICANT CHANGE UP
HCT VFR BLD CALC: 33.7 % — LOW (ref 34.5–45)
HGB BLD-MCNC: 10.5 G/DL — LOW (ref 11.5–15.5)
INR BLD: 1.04 RATIO — SIGNIFICANT CHANGE UP (ref 0.88–1.16)
LA NT DPL PPP QL: 28.1 SEC — SIGNIFICANT CHANGE UP
MCHC RBC-ENTMCNC: 25.2 PG — LOW (ref 27–34)
MCHC RBC-ENTMCNC: 31.2 GM/DL — LOW (ref 32–36)
MCV RBC AUTO: 80.8 FL — SIGNIFICANT CHANGE UP (ref 80–100)
NORMALIZED SCT PPP-RTO: 1.35 RATIO — HIGH (ref 0–1.16)
NORMALIZED SCT PPP-RTO: ABNORMAL
NRBC # BLD: 0 /100 WBCS — SIGNIFICANT CHANGE UP (ref 0–0)
PLATELET # BLD AUTO: 327 K/UL — SIGNIFICANT CHANGE UP (ref 150–400)
POTASSIUM SERPL-MCNC: 3.9 MMOL/L — SIGNIFICANT CHANGE UP (ref 3.5–5.3)
POTASSIUM SERPL-SCNC: 3.9 MMOL/L — SIGNIFICANT CHANGE UP (ref 3.5–5.3)
PROTHROM AB SERPL-ACNC: 12.3 SEC — SIGNIFICANT CHANGE UP (ref 10.6–13.6)
RBC # BLD: 4.17 M/UL — SIGNIFICANT CHANGE UP (ref 3.8–5.2)
RBC # FLD: 14.4 % — SIGNIFICANT CHANGE UP (ref 10.3–14.5)
SODIUM SERPL-SCNC: 138 MMOL/L — SIGNIFICANT CHANGE UP (ref 135–145)
WBC # BLD: 6.85 K/UL — SIGNIFICANT CHANGE UP (ref 3.8–10.5)
WBC # FLD AUTO: 6.85 K/UL — SIGNIFICANT CHANGE UP (ref 3.8–10.5)

## 2020-09-08 PROCEDURE — 88312 SPECIAL STAINS GROUP 1: CPT | Mod: 26

## 2020-09-08 PROCEDURE — 88305 TISSUE EXAM BY PATHOLOGIST: CPT | Mod: 26

## 2020-09-08 RX ORDER — HYDROMORPHONE HYDROCHLORIDE 2 MG/ML
0.25 INJECTION INTRAMUSCULAR; INTRAVENOUS; SUBCUTANEOUS ONCE
Refills: 0 | Status: DISCONTINUED | OUTPATIENT
Start: 2020-09-08 | End: 2020-09-08

## 2020-09-08 RX ORDER — PHYTONADIONE (VIT K1) 5 MG
2.5 TABLET ORAL ONCE
Refills: 0 | Status: DISCONTINUED | OUTPATIENT
Start: 2020-09-08 | End: 2020-09-08

## 2020-09-08 RX ORDER — PANTOPRAZOLE SODIUM 20 MG/1
40 TABLET, DELAYED RELEASE ORAL
Refills: 0 | Status: DISCONTINUED | OUTPATIENT
Start: 2020-09-08 | End: 2020-09-08

## 2020-09-08 RX ORDER — SODIUM CHLORIDE 9 MG/ML
500 INJECTION INTRAMUSCULAR; INTRAVENOUS; SUBCUTANEOUS
Refills: 0 | Status: DISCONTINUED | OUTPATIENT
Start: 2020-09-08 | End: 2020-09-09

## 2020-09-08 RX ORDER — PANTOPRAZOLE SODIUM 20 MG/1
20 TABLET, DELAYED RELEASE ORAL
Refills: 0 | Status: DISCONTINUED | OUTPATIENT
Start: 2020-09-08 | End: 2020-09-09

## 2020-09-08 RX ORDER — PHYTONADIONE (VIT K1) 5 MG
2.5 TABLET ORAL ONCE
Refills: 0 | Status: COMPLETED | OUTPATIENT
Start: 2020-09-08 | End: 2020-09-08

## 2020-09-08 RX ADMIN — HYDROMORPHONE HYDROCHLORIDE 0.25 MILLIGRAM(S): 2 INJECTION INTRAMUSCULAR; INTRAVENOUS; SUBCUTANEOUS at 20:36

## 2020-09-08 RX ADMIN — HYDROMORPHONE HYDROCHLORIDE 0.25 MILLIGRAM(S): 2 INJECTION INTRAMUSCULAR; INTRAVENOUS; SUBCUTANEOUS at 07:25

## 2020-09-08 RX ADMIN — POLYETHYLENE GLYCOL 3350 17 GRAM(S): 17 POWDER, FOR SOLUTION ORAL at 18:24

## 2020-09-08 RX ADMIN — HYDROMORPHONE HYDROCHLORIDE 0.25 MILLIGRAM(S): 2 INJECTION INTRAMUSCULAR; INTRAVENOUS; SUBCUTANEOUS at 08:00

## 2020-09-08 RX ADMIN — SODIUM CHLORIDE 75 MILLILITER(S): 9 INJECTION, SOLUTION INTRAVENOUS at 08:27

## 2020-09-08 RX ADMIN — HYDROMORPHONE HYDROCHLORIDE 0.25 MILLIGRAM(S): 2 INJECTION INTRAMUSCULAR; INTRAVENOUS; SUBCUTANEOUS at 15:11

## 2020-09-08 RX ADMIN — Medication 2.5 MILLIGRAM(S): at 09:29

## 2020-09-08 RX ADMIN — HYDROMORPHONE HYDROCHLORIDE 0.25 MILLIGRAM(S): 2 INJECTION INTRAMUSCULAR; INTRAVENOUS; SUBCUTANEOUS at 18:21

## 2020-09-08 RX ADMIN — HYDROMORPHONE HYDROCHLORIDE 0.25 MILLIGRAM(S): 2 INJECTION INTRAMUSCULAR; INTRAVENOUS; SUBCUTANEOUS at 21:15

## 2020-09-08 RX ADMIN — POLYETHYLENE GLYCOL 3350 17 GRAM(S): 17 POWDER, FOR SOLUTION ORAL at 05:00

## 2020-09-08 RX ADMIN — PANTOPRAZOLE SODIUM 10 MG/HR: 20 TABLET, DELAYED RELEASE ORAL at 09:30

## 2020-09-08 NOTE — PRE-ANESTHESIA EVALUATION ADULT - NSANTHOSAYNRD_GEN_A_CORE
No. VENICE screening performed.  STOP BANG Legend: 0-2 = LOW Risk; 3-4 = INTERMEDIATE Risk; 5-8 = HIGH Risk

## 2020-09-08 NOTE — PROGRESS NOTE ADULT - SUBJECTIVE AND OBJECTIVE BOX
Chief Complaint:  Patient is a 35y old  Female who presents with a chief complaint of epigastric pain (08 Sep 2020 08:48)      Interval Events:   no further hematemesis  Allergies:  No Known Allergies      Hospital Medications:  acetaminophen   Tablet .. 650 milliGRAM(s) Oral every 6 hours PRN  aluminum hydroxide/magnesium hydroxide/simethicone Suspension 30 milliLiter(s) Oral every 6 hours PRN  HYDROmorphone  Injectable 0.25 milliGRAM(s) IV Push every 8 hours PRN  influenza   Vaccine 0.5 milliLiter(s) IntraMuscular once  lactated ringers 1000 milliLiter(s) IV Continuous <Continuous>  ondansetron Injectable 4 milliGRAM(s) IV Push every 6 hours PRN  pantoprazole    Tablet 20 milliGRAM(s) Oral two times a day  polyethylene glycol 3350 17 Gram(s) Oral two times a day  sodium chloride 0.9%. 500 milliLiter(s) IV Continuous <Continuous>      PMHX/PSHX:  Anemia  Pancreatitis, chronic  TB (pulmonary tuberculosis)  Reflux  H/O tubal ligation  No significant past surgical history      Family history:  Family history of alcohol abuse      ROS:     General:  No wt loss, fevers, chills, night sweats, fatigue,   Eyes:  Good vision, no reported pain  ENT:  No sore throat, pain, runny nose, dysphagia  CV:  No pain, palpitations, hypo/hypertension  Resp:  No dyspnea, cough, tachypnea, wheezing  GI:  See HPI  :  No pain, bleeding, incontinence, nocturia  Muscle:  No pain, weakness  Neuro:  No weakness, tingling, memory problems  Psych:  No fatigue, insomnia, mood problems, depression  Endocrine:  No polyuria, polydipsia, cold/heat intolerance  Heme:  No petechiae, ecchymosis, easy bruisability  Skin:  No rash, edema      PHYSICAL EXAM:     GENERAL:  Appears stated age, well-groomed, well-nourished, no distress  HEENT:  NC/AT,  conjunctivae clear, sclera-anicteric  NECK: Trachea midline, supple  CHEST:  Full & symmetric excursion, no increased effort, breath sounds clear  HEART:  Regular rhythm, no silvia/heave  ABDOMEN:  Soft, non-tender, non-distended, normoactive bowel sounds,  no masses ,no hepato-splenomegaly,   EXTREMITIES:  no cyanosis,clubbing or edema  SKIN:  No rash/erythema/petechiae, no jaundice  NEURO:  Alert, oriented, no asterixis  RECTAL: Deferred    Vital Signs:  Vital Signs Last 24 Hrs  T(C): 36.9 (08 Sep 2020 20:40), Max: 37.1 (08 Sep 2020 15:15)  T(F): 98.4 (08 Sep 2020 20:40), Max: 98.7 (08 Sep 2020 15:15)  HR: 84 (08 Sep 2020 20:40) (62 - 84)  BP: 135/93 (08 Sep 2020 20:40) (115/78 - 147/91)  BP(mean): --  RR: 20 (08 Sep 2020 20:40) (12 - 27)  SpO2: 99% (08 Sep 2020 20:40) (96% - 100%)  Daily Height in cm: 162.56 (08 Sep 2020 16:13)    Daily     LABS:                        10.5   6.85  )-----------( 327      ( 08 Sep 2020 10:37 )             33.7     09-08    138  |  104  |  7   ----------------------------<  89  3.9   |  27  |  0.78    Ca    8.9      08 Sep 2020 10:37        PT/INR - ( 08 Sep 2020 10:37 )   PT: 12.3 sec;   INR: 1.04 ratio         PTT - ( 08 Sep 2020 10:37 )  PTT:48.3 sec        Imaging:

## 2020-09-08 NOTE — PROGRESS NOTE ADULT - PROBLEM SELECTOR PLAN 1
PPI BID for now  Discussed at length with GI  NPO for EGD today - suspected alex renee tear? from wretching

## 2020-09-08 NOTE — PRE PROCEDURE NOTE - PRE PROCEDURE EVALUATION
Attending Physician: dr. paxton walter                           Procedure:  upper gastrointestinal endoscopy     Indication for Procedure: gib  ________________________________________________________  PAST MEDICAL & SURGICAL HISTORY:  Anemia  Pancreatitis, chronic  TB (pulmonary tuberculosis)  H/O tubal ligation    ALLERGIES:  No Known Allergies    HOME MEDICATIONS:  acetaminophen 325 mg oral tablet: 3 tab(s) orally every 6 hours  multivitamin: 1 tab(s) orally once a day    AICD/PPM: [ ] yes   [X] no    PERTINENT LAB DATA:                        10.5   6.85  )-----------( 327      ( 08 Sep 2020 10:37 )             33.7     09-08    138  |  104  |  7   ----------------------------<  89  3.9   |  27  |  0.78    Ca    8.9      08 Sep 2020 10:37      PT/INR - ( 08 Sep 2020 10:37 )   PT: 12.3 sec;   INR: 1.04 ratio      PTT - ( 08 Sep 2020 10:37 )  PTT:48.3 sec        HCG Quantitative, Serum: <2.0 mIU/mL (09-08-20 @ 10:37)      PHYSICAL EXAMINATION:    T(C): 36.7  HR: 62  BP: 115/78  RR: 18  SpO2: 100%    Constitutional: NAD    Neck:  No JVD  Respiratory: CTAB/L  Cardiovascular: S1 and S2  Gastrointestinal: BS+, soft, NT/ND  Extremities: No peripheral edema  Neurological: A/O x 3        COMMENTS:    The patient is a suitable candidate for the planned procedure unless box checked [ ]  No, explain:

## 2020-09-08 NOTE — PROGRESS NOTE ADULT - SUBJECTIVE AND OBJECTIVE BOX
seen and examined. stillw ith mild epigastric pain. mild nausea. no further emesis. no melena or hematochezia. no fever or chills. no vomiting.     PHYSICAL EXAM:    aaox3, nad  no rashes  no focal deficits  rrr s1 s2 no murmurs  clear lungs, no wheezing no crackles  no edema  abd soft nd minimal tenderness at epigastric region, no rebound or guarding     Vital Signs Last 24 Hrs  T(C): 36.8 (08 Sep 2020 05:00), Max: 36.8 (08 Sep 2020 05:00)  T(F): 98.2 (08 Sep 2020 05:00), Max: 98.2 (08 Sep 2020 05:00)  HR: 66 (08 Sep 2020 05:00) (63 - 66)  BP: 126/70 (08 Sep 2020 05:00) (116/74 - 128/87)  BP(mean): --  RR: 18 (08 Sep 2020 05:00) (17 - 18)  SpO2: 98% (08 Sep 2020 05:00) (98% - 100%)                            10.3   6.71  )-----------( 344      ( 07 Sep 2020 06:17 )             33.0     09-07    139  |  105  |  5<L>  ----------------------------<  94  3.5   |  25  |  0.82    Ca    9.2      07 Sep 2020 06:17    TPro  6.7  /  Alb  4.1  /  TBili  0.5  /  DBili  x   /  AST  13  /  ALT  12  /  AlkPhos  63  09-06

## 2020-09-08 NOTE — PROGRESS NOTE ADULT - ASSESSMENT
· Assessment		  35 F p/w epigastric discomfort and hematemesis likely Shira Ellis tear.     Problem/Recommendation - 1:  Problem: Hematemesis. Recommendation: Likely Shira Ellis tear based on history.  -PPI BID  -for EGD today     Problem/Recommendation - 2:  ·  Problem: Anemia.  Recommendation: hgb is currently at prior baseline. Initial value was likely due to hemoconcentration.      Problem/Recommendation - 3:  ·  Problem: Abnormal CT of the abdomen.  Recommendation: gastric thickening vs. underdistention       Problem/Recommendation - 4:  ·  Problem: Coagulopathy.  Recommendation: w/u as per heme. Pt given vitamin K today.  -f/u coags tomorrow    Attending Attestation:   Differential diagnosis and plan of care discussed with patient after the evaluation  35 Minutes spent on total encounter of which more than fifty percent of the encounter was spent counseling and/or coordinating care by the attending physician.        Greg Swanson M.D.   Gastroenterology and Hepatology  Cell: [phone].

## 2020-09-09 ENCOUNTER — TRANSCRIPTION ENCOUNTER (OUTPATIENT)
Age: 35
End: 2020-09-09

## 2020-09-09 VITALS
DIASTOLIC BLOOD PRESSURE: 90 MMHG | RESPIRATION RATE: 18 BRPM | HEART RATE: 76 BPM | OXYGEN SATURATION: 97 % | SYSTOLIC BLOOD PRESSURE: 139 MMHG | TEMPERATURE: 98 F

## 2020-09-09 DIAGNOSIS — F41.9 ANXIETY DISORDER, UNSPECIFIED: ICD-10-CM

## 2020-09-09 LAB
ANION GAP SERPL CALC-SCNC: 10 MMOL/L — SIGNIFICANT CHANGE UP (ref 5–17)
BUN SERPL-MCNC: 6 MG/DL — LOW (ref 7–23)
CALCIUM SERPL-MCNC: 9.2 MG/DL — SIGNIFICANT CHANGE UP (ref 8.4–10.5)
CHLORIDE SERPL-SCNC: 102 MMOL/L — SIGNIFICANT CHANGE UP (ref 96–108)
CO2 SERPL-SCNC: 25 MMOL/L — SIGNIFICANT CHANGE UP (ref 22–31)
CREAT SERPL-MCNC: 0.77 MG/DL — SIGNIFICANT CHANGE UP (ref 0.5–1.3)
GLUCOSE SERPL-MCNC: 91 MG/DL — SIGNIFICANT CHANGE UP (ref 70–99)
HCT VFR BLD CALC: 32.4 % — LOW (ref 34.5–45)
HGB BLD-MCNC: 10.2 G/DL — LOW (ref 11.5–15.5)
MCHC RBC-ENTMCNC: 25.4 PG — LOW (ref 27–34)
MCHC RBC-ENTMCNC: 31.5 GM/DL — LOW (ref 32–36)
MCV RBC AUTO: 80.8 FL — SIGNIFICANT CHANGE UP (ref 80–100)
NRBC # BLD: 0 /100 WBCS — SIGNIFICANT CHANGE UP (ref 0–0)
PLATELET # BLD AUTO: 335 K/UL — SIGNIFICANT CHANGE UP (ref 150–400)
POTASSIUM SERPL-MCNC: 3.9 MMOL/L — SIGNIFICANT CHANGE UP (ref 3.5–5.3)
POTASSIUM SERPL-SCNC: 3.9 MMOL/L — SIGNIFICANT CHANGE UP (ref 3.5–5.3)
RBC # BLD: 4.01 M/UL — SIGNIFICANT CHANGE UP (ref 3.8–5.2)
RBC # FLD: 14.2 % — SIGNIFICANT CHANGE UP (ref 10.3–14.5)
SODIUM SERPL-SCNC: 137 MMOL/L — SIGNIFICANT CHANGE UP (ref 135–145)
VWF:RCO ACT/NOR PPP PL AGG: 90 % — SIGNIFICANT CHANGE UP (ref 45–133)
WBC # BLD: 8.88 K/UL — SIGNIFICANT CHANGE UP (ref 3.8–10.5)
WBC # FLD AUTO: 8.88 K/UL — SIGNIFICANT CHANGE UP (ref 3.8–10.5)

## 2020-09-09 PROCEDURE — 83605 ASSAY OF LACTIC ACID: CPT

## 2020-09-09 PROCEDURE — 85240 CLOT FACTOR VIII AHG 1 STAGE: CPT

## 2020-09-09 PROCEDURE — 80048 BASIC METABOLIC PNL TOTAL CA: CPT

## 2020-09-09 PROCEDURE — 82330 ASSAY OF CALCIUM: CPT

## 2020-09-09 PROCEDURE — 85250 CLOT FACTOR IX PTC/CHRSTMAS: CPT

## 2020-09-09 PROCEDURE — 85270 CLOT FACTOR XI PTA: CPT

## 2020-09-09 PROCEDURE — 88305 TISSUE EXAM BY PATHOLOGIST: CPT

## 2020-09-09 PROCEDURE — 99285 EMERGENCY DEPT VISIT HI MDM: CPT | Mod: 25

## 2020-09-09 PROCEDURE — 96376 TX/PRO/DX INJ SAME DRUG ADON: CPT | Mod: XU

## 2020-09-09 PROCEDURE — 86900 BLOOD TYPING SEROLOGIC ABO: CPT

## 2020-09-09 PROCEDURE — 86850 RBC ANTIBODY SCREEN: CPT

## 2020-09-09 PROCEDURE — 96375 TX/PRO/DX INJ NEW DRUG ADDON: CPT | Mod: XU

## 2020-09-09 PROCEDURE — 85014 HEMATOCRIT: CPT

## 2020-09-09 PROCEDURE — 84702 CHORIONIC GONADOTROPIN TEST: CPT

## 2020-09-09 PROCEDURE — 86901 BLOOD TYPING SEROLOGIC RH(D): CPT

## 2020-09-09 PROCEDURE — 85732 THROMBOPLASTIN TIME PARTIAL: CPT

## 2020-09-09 PROCEDURE — 74177 CT ABD & PELVIS W/CONTRAST: CPT

## 2020-09-09 PROCEDURE — 88312 SPECIAL STAINS GROUP 1: CPT

## 2020-09-09 PROCEDURE — 85280 CLOT FACTOR XII HAGEMAN: CPT

## 2020-09-09 PROCEDURE — 85018 HEMOGLOBIN: CPT

## 2020-09-09 PROCEDURE — 82803 BLOOD GASES ANY COMBINATION: CPT

## 2020-09-09 PROCEDURE — 85730 THROMBOPLASTIN TIME PARTIAL: CPT

## 2020-09-09 PROCEDURE — 86769 SARS-COV-2 COVID-19 ANTIBODY: CPT

## 2020-09-09 PROCEDURE — 85384 FIBRINOGEN ACTIVITY: CPT

## 2020-09-09 PROCEDURE — 82947 ASSAY GLUCOSE BLOOD QUANT: CPT

## 2020-09-09 PROCEDURE — 96374 THER/PROPH/DIAG INJ IV PUSH: CPT | Mod: XU

## 2020-09-09 PROCEDURE — 80053 COMPREHEN METABOLIC PANEL: CPT

## 2020-09-09 PROCEDURE — 84132 ASSAY OF SERUM POTASSIUM: CPT

## 2020-09-09 PROCEDURE — 84295 ASSAY OF SERUM SODIUM: CPT

## 2020-09-09 PROCEDURE — 85245 CLOT FACTOR VIII VW RISTOCTN: CPT

## 2020-09-09 PROCEDURE — 93005 ELECTROCARDIOGRAM TRACING: CPT

## 2020-09-09 PROCEDURE — 85611 PROTHROMBIN TEST: CPT

## 2020-09-09 PROCEDURE — 85293 CLOT FACTOR WGHT KININOGEN: CPT

## 2020-09-09 PROCEDURE — 85027 COMPLETE CBC AUTOMATED: CPT

## 2020-09-09 PROCEDURE — 80074 ACUTE HEPATITIS PANEL: CPT

## 2020-09-09 PROCEDURE — 85670 THROMBIN TIME PLASMA: CPT

## 2020-09-09 PROCEDURE — 82435 ASSAY OF BLOOD CHLORIDE: CPT

## 2020-09-09 PROCEDURE — 85292 CLOT FACTOR FLETCHER FACT: CPT

## 2020-09-09 PROCEDURE — 83690 ASSAY OF LIPASE: CPT

## 2020-09-09 PROCEDURE — 85610 PROTHROMBIN TIME: CPT

## 2020-09-09 RX ORDER — PHYTONADIONE (VIT K1) 5 MG
2.5 TABLET ORAL ONCE
Refills: 0 | Status: COMPLETED | OUTPATIENT
Start: 2020-09-09 | End: 2020-09-09

## 2020-09-09 RX ORDER — ALPRAZOLAM 0.25 MG
1 TABLET ORAL
Qty: 10 | Refills: 0
Start: 2020-09-09 | End: 2020-09-13

## 2020-09-09 RX ORDER — PANTOPRAZOLE SODIUM 20 MG/1
1 TABLET, DELAYED RELEASE ORAL
Qty: 30 | Refills: 0
Start: 2020-09-09 | End: 2020-10-08

## 2020-09-09 RX ADMIN — HYDROMORPHONE HYDROCHLORIDE 0.25 MILLIGRAM(S): 2 INJECTION INTRAMUSCULAR; INTRAVENOUS; SUBCUTANEOUS at 08:46

## 2020-09-09 RX ADMIN — HYDROMORPHONE HYDROCHLORIDE 0.25 MILLIGRAM(S): 2 INJECTION INTRAMUSCULAR; INTRAVENOUS; SUBCUTANEOUS at 00:35

## 2020-09-09 RX ADMIN — Medication 2.5 MILLIGRAM(S): at 10:53

## 2020-09-09 RX ADMIN — HYDROMORPHONE HYDROCHLORIDE 0.25 MILLIGRAM(S): 2 INJECTION INTRAMUSCULAR; INTRAVENOUS; SUBCUTANEOUS at 09:35

## 2020-09-09 RX ADMIN — POLYETHYLENE GLYCOL 3350 17 GRAM(S): 17 POWDER, FOR SOLUTION ORAL at 05:06

## 2020-09-09 RX ADMIN — PANTOPRAZOLE SODIUM 20 MILLIGRAM(S): 20 TABLET, DELAYED RELEASE ORAL at 05:08

## 2020-09-09 RX ADMIN — HYDROMORPHONE HYDROCHLORIDE 0.25 MILLIGRAM(S): 2 INJECTION INTRAMUSCULAR; INTRAVENOUS; SUBCUTANEOUS at 00:02

## 2020-09-09 NOTE — DISCHARGE NOTE PROVIDER - PROVIDER TOKENS
FREE:[LAST:[Hematology],PHONE:[phone]],FAX:[(   )    -],ADDRESS:[[place] [address]]],PROVIDER:[TOKEN:[[id number]],FOLLOWUP:[1 week],ESTABLISHEDPATIENT:[T]]

## 2020-09-09 NOTE — PROGRESS NOTE ADULT - SUBJECTIVE AND OBJECTIVE BOX
Chief Complaint:  Patient is a 35y old  Female who presents with a chief complaint of epigastric pain (09 Sep 2020 13:23)      Interval Events:   no further GI bleeding    Allergies:  No Known Allergies      Hospital Medications:  acetaminophen   Tablet .. 650 milliGRAM(s) Oral every 6 hours PRN  aluminum hydroxide/magnesium hydroxide/simethicone Suspension 30 milliLiter(s) Oral every 6 hours PRN  HYDROmorphone  Injectable 0.25 milliGRAM(s) IV Push every 8 hours PRN  influenza   Vaccine 0.5 milliLiter(s) IntraMuscular once  lactated ringers 1000 milliLiter(s) IV Continuous <Continuous>  ondansetron Injectable 4 milliGRAM(s) IV Push every 6 hours PRN  pantoprazole    Tablet 20 milliGRAM(s) Oral two times a day  polyethylene glycol 3350 17 Gram(s) Oral two times a day  sodium chloride 0.9%. 500 milliLiter(s) IV Continuous <Continuous>      PMHX/PSHX:  Anemia  Pancreatitis, chronic  TB (pulmonary tuberculosis)  Reflux  H/O tubal ligation  No significant past surgical history      Family history:  Family history of alcohol abuse      ROS:     General:  No wt loss, fevers, chills, night sweats, fatigue,   Eyes:  Good vision, no reported pain  ENT:  No sore throat, pain, runny nose, dysphagia  CV:  No pain, palpitations, hypo/hypertension  Resp:  No dyspnea, cough, tachypnea, wheezing  GI:  See HPI  :  No pain, bleeding, incontinence, nocturia  Muscle:  No pain, weakness  Neuro:  No weakness, tingling, memory problems  Psych:  No fatigue, insomnia, mood problems, depression  Endocrine:  No polyuria, polydipsia, cold/heat intolerance  Heme:  No petechiae, ecchymosis, easy bruisability  Skin:  No rash, edema      PHYSICAL EXAM:     GENERAL:  Appears stated age, well-groomed, well-nourished, no distress  HEENT:  NC/AT,  conjunctivae clear, sclera-anicteric  NECK: Trachea midline, supple  CHEST:  Full & symmetric excursion, no increased effort, breath sounds clear  HEART:  Regular rhythm, no silvia/heave  ABDOMEN:  Soft, non-tender, non-distended, normoactive bowel sounds,  no masses ,no hepato-splenomegaly,   EXTREMITIES:  no cyanosis,clubbing or edema  SKIN:  No rash/erythema/petechiae, no jaundice  NEURO:  Alert, oriented, no asterixis  RECTAL: Deferred    Vital Signs:  Vital Signs Last 24 Hrs  T(C): 36.8 (09 Sep 2020 15:33), Max: 36.8 (09 Sep 2020 04:12)  T(F): 98.3 (09 Sep 2020 15:33), Max: 98.3 (09 Sep 2020 15:33)  HR: 76 (09 Sep 2020 15:33) (76 - 86)  BP: 139/90 (09 Sep 2020 15:33) (131/79 - 139/90)  BP(mean): --  RR: 18 (09 Sep 2020 15:33) (18 - 18)  SpO2: 97% (09 Sep 2020 15:33) (96% - 97%)  Daily     Daily     LABS:                        10.2   8.88  )-----------( 335      ( 09 Sep 2020 07:09 )             32.4     09-09    137  |  102  |  6<L>  ----------------------------<  91  3.9   |  25  |  0.77    Ca    9.2      09 Sep 2020 06:59        PT/INR - ( 08 Sep 2020 10:37 )   PT: 12.3 sec;   INR: 1.04 ratio         PTT - ( 08 Sep 2020 10:37 )  PTT:48.3 sec        Imaging:

## 2020-09-09 NOTE — DISCHARGE NOTE PROVIDER - NSDCMRMEDTOKEN_GEN_ALL_CORE_FT
aluminum hydroxide-magnesium hydroxide 200 mg-200 mg/5 mL oral suspension: 30 milliliter(s) orally every 6 hours, As needed, Dyspepsia  multivitamin: 1 tab(s) orally once a day  pantoprazole 40 mg oral delayed release tablet: 1 tab(s) orally once a day (before a meal)   polyethylene glycol 3350 oral powder for reconstitution: 17 gram(s) orally once a day, As Needed for constipation  Xanax 0.5 mg oral tablet: 1 tab(s) orally 2 times a day, As Needed -for anxiety MDD:2 tabs

## 2020-09-09 NOTE — DISCHARGE NOTE PROVIDER - HOSPITAL COURSE
35 F with PMH of Hyplori related PUD who p/w epigastric discomfort and hematemesis likely Shira Ellis tear. Reviewed EGD results with GI, stable for discharge home and outpatient follow up with GI in 1-2 weeks. Also incidentally found to have coagulapathy. To follow up with [facility] as outpatient for continued treatment. Follow up with PCP Dr Willard.

## 2020-09-09 NOTE — PROGRESS NOTE ADULT - PROBLEM SELECTOR PROBLEM 3
Abnormal CT of the abdomen
Coagulopathy
Constipation

## 2020-09-09 NOTE — PROGRESS NOTE ADULT - PROBLEM SELECTOR PLAN 2
start xanax 0.5mg qd prn anxiety. will likely need more long term management with CBT/alternatives to benzos.   f/u in 1 week with Dr Casa Willard in office.,

## 2020-09-09 NOTE — PROGRESS NOTE ADULT - PROBLEM SELECTOR PLAN 3
Hematology consult appreciated - clotting factor deficiency, await factor 8,9,11   PO vit K today  outpatient heme follow up

## 2020-09-09 NOTE — PROGRESS NOTE ADULT - ATTENDING COMMENTS
Gerald Willard,   internal medicine  
Gerald Willard, DO  internal medicien  
for EGD today, possible DC after if GI agreeable.     Gerald Willard, DO  internal medicine  
DC home today  f/u in 1 week with Dr Casa Willard for primary care follow up    Gerald Willard,   internal medicine

## 2020-09-09 NOTE — PROGRESS NOTE ADULT - SUBJECTIVE AND OBJECTIVE BOX
10.2   8.88  )-----------( 335      ( 09 Sep 2020 07:09 )             32.4   09-09    137  |  102  |  6<L>  ----------------------------<  91  3.9   |  25  |  0.77    Ca    9.2      09 Sep 2020 06:59        Vital Signs Last 24 Hrs  T(C): 36.8 (09 Sep 2020 04:12), Max: 37.1 (08 Sep 2020 15:15)  T(F): 98.2 (09 Sep 2020 04:12), Max: 98.7 (08 Sep 2020 15:15)  HR: 86 (09 Sep 2020 04:12) (62 - 86)  BP: 131/79 (09 Sep 2020 04:12) (115/78 - 147/91)  BP(mean): --  RR: 18 (09 Sep 2020 04:12) (12 - 27)  SpO2: 96% (09 Sep 2020 04:12) (96% - 100%)seen and examined. no further bleeding. no fever or chills. minimal abd discomfort. complains of ongoing anxiety, has lots of stress at home. no Si or HI.     PHYSICAL EXAM:    aaox3, nad  no rashes  no focal deficits  rrr s1 s2 no murmurs  clear lungs, no wheezing no crackles  no edema  abd soft nd minimal tenderness at epigastric region, no rebound or guarding

## 2020-09-09 NOTE — DISCHARGE NOTE PROVIDER - NSDCCPCAREPLAN_GEN_ALL_CORE_FT
PRINCIPAL DISCHARGE DIAGNOSIS  Diagnosis: Gastrointestinal bleeding  Assessment and Plan of Treatment: Follow up with GI within 1-2 weeks of discharge.      SECONDARY DISCHARGE DIAGNOSES  Diagnosis: Coagulopathy  Assessment and Plan of Treatment: You will receive a call from the [facility] shortly upon discharge to schedule your follow up appointment.    Diagnosis: Anxiety  Assessment and Plan of Treatment: Continue with Xanax as needed.   Follow up with PCP Dr Willard within 1 one of discharge.

## 2020-09-09 NOTE — DISCHARGE NOTE NURSING/CASE MANAGEMENT/SOCIAL WORK - PATIENT PORTAL LINK FT
You can access the FollowMyHealth Patient Portal offered by [facility] by registering at the following website: [email or web address]. By joining [employer]’s FollowMyHealth portal, you will also be able to view your health information using other applications (apps) compatible with our system.

## 2020-09-09 NOTE — DISCHARGE NOTE PROVIDER - CARE PROVIDER_API CALL
Hematology,   [facility]  [address] [place] [address]  Phone: [phone]  Fax: (   )    -  Follow Up Time:     Gerald Willard  INTERNAL MEDICINE  [address] [place] [address]  Phone: [phone]  Fax: [phone]  Established Patient  Follow Up Time: 1 week

## 2020-09-09 NOTE — DISCHARGE NOTE PROVIDER - NSFOLLOWUPCLINICS_GEN_ALL_ED_FT
[facility]  Gastroenterology  [address] [place] [address]  Phone: [phone]  Fax:   Follow Up Time:

## 2020-09-09 NOTE — PROGRESS NOTE ADULT - ASSESSMENT
· Assessment		  35 F p/w epigastric discomfort and hematemesis likely Shira Ellis tear.     Problem/Recommendation - 1:  Problem: Hematemesis. Recommendation: EGD demonstrated duodenitis, clean based duodenal ulcer. PPI BID. Path shows H. pylori, will start Levaquin based triple therapy (I will call her to discuss)  -follow up with Dr. Anaya.     Problem/Recommendation - 2:  ·  Problem: Anemia.  Recommendation: hgb is currently at prior baseline. Initial value was likely due to hemoconcentration.      Problem/Recommendation - 3:  ·  Problem: Abnormal CT of the abdomen.  Recommendation: gastric thickening likely reactive to severe duodenitis       Problem/Recommendation - 4:  ·  Problem: Coagulopathy.  Recommendation: w/u as per heme. Pt given vitamin K today.  -f/u coags tomorrow    Attending Attestation:   Differential diagnosis and plan of care discussed with patient after the evaluation  35 Minutes spent on total encounter of which more than fifty percent of the encounter was spent counseling and/or coordinating care by the attending physician.        Greg Swanson M.D.   Gastroenterology and Hepatology  Cell: [phone].

## 2020-09-10 LAB — FACT XIIA PPP-ACNC: 76 % — SIGNIFICANT CHANGE UP (ref 45–150)

## 2020-09-11 LAB
HMWK ACTIVITY ACT/NOR PPP: 31.8 SEC — SIGNIFICANT CHANGE UP
HMWK PPP-ACNC: 31.6 SEC — SIGNIFICANT CHANGE UP (ref 27.5–36.5)
HMWK PPP-ACNC: >200 — SIGNIFICANT CHANGE UP
PK PPP-MCNC: 135.5 SEC — SIGNIFICANT CHANGE UP
PK PPP-MCNC: 32 SEC — SIGNIFICANT CHANGE UP
PK PPP-MCNC: 57.4 SEC — HIGH (ref 27.5–36.5)

## 2020-09-21 ENCOUNTER — OUTPATIENT (OUTPATIENT)
Dept: OUTPATIENT SERVICES | Facility: HOSPITAL | Age: 35
LOS: 1 days | Discharge: HOME | End: 2020-09-21

## 2020-09-21 DIAGNOSIS — Z98.51 TUBAL LIGATION STATUS: Chronic | ICD-10-CM

## 2020-09-21 DIAGNOSIS — R79.1 ABNORMAL COAGULATION PROFILE: ICD-10-CM

## 2020-10-15 ENCOUNTER — OUTPATIENT (OUTPATIENT)
Dept: OUTPATIENT SERVICES | Facility: HOSPITAL | Age: 35
LOS: 1 days | Discharge: HOME | End: 2020-10-15

## 2020-10-15 ENCOUNTER — APPOINTMENT (OUTPATIENT)
Dept: HEMATOLOGY ONCOLOGY | Facility: CLINIC | Age: 35
End: 2020-10-15

## 2020-10-15 DIAGNOSIS — Z98.51 TUBAL LIGATION STATUS: Chronic | ICD-10-CM

## 2020-10-15 DIAGNOSIS — R79.1 ABNORMAL COAGULATION PROFILE: ICD-10-CM

## 2021-01-13 NOTE — BEHAVIORAL HEALTH ASSESSMENT NOTE - OTHER
Progress Note    Admit Date:  1/11/2021    Admitted for acute on chronic CHF    Subjective:  Ms. London Ferreira reports she is already feeling  better. Breathing better today. Feels the swelling in her legs has improved a bit. Still having some pain to the RLE and calf. diureised well. Objective:   Patient Vitals for the past 4 hrs:   SpO2   01/13/21 0723 96 %        Intake/Output Summary (Last 24 hours) at 1/13/2021 0743  Last data filed at 1/13/2021 0557  Gross per 24 hour   Intake 200 ml   Output 1750 ml   Net -1550 ml       Physical Exam:  Gen: Obese female, No distress. Alert. Eyes: No conjunctival injection. ENT: No discharge. Pharynx clear. Neck:  Trachea midline. Resp: No accessory muscle use. Faint crackles. No wheezes. No rhonchi. On RA  CV: Regular rate. Regular rhythm. No murmur. No rub. ++ edema. Capillary Refill: Brisk,< 3 seconds   Peripheral Pulses: +2 palpable, equal bilaterally   GI: Non-tender. Non-distended. Normal bowel sounds. Skin: Warm and dry. Multiple shea of superficial wounds and ulcerations to chest wall, BUEs, and BLEs with erythema and mild warmth diffusely and weeping from RLE  M/S:++ edema with TTP to the R Calf with increased swelling of RLE compared to the LLE  Neuro: Awake. Grossly nonfocal    Psych: Oriented x 3. No anxiety or agitation.        Scheduled Meds:   albumin human  25 g Intravenous Q6H    metOLazone  5 mg Oral Daily    lamoTRIgine  200 mg Oral BID    ARIPiprazole  15 mg Oral Daily    aspirin EC  81 mg Oral Daily    busPIRone  30 mg Oral BID    magnesium oxide  400 mg Oral Daily    clopidogrel  75 mg Oral Daily    pantoprazole  40 mg Oral BID    benztropine  1 mg Oral Nightly    buprenorphine-naloxone  1 Film Sublingual BID    insulin glargine  30 Units Subcutaneous Nightly    tiotropium  2 puff Inhalation Daily    insulin lispro  10 Units Subcutaneous TID AC    methocarbamol  500 mg Oral BID    midodrine  5 mg Oral BID    carvedilol  3.125 mg Oral BID WC    atorvastatin  80 mg Oral Daily    sodium chloride flush  10 mL Intravenous 2 times per day    insulin lispro  0-12 Units Subcutaneous TID WC    insulin lispro  0-6 Units Subcutaneous Nightly    heparin (porcine)  5,000 Units Subcutaneous 3 times per day       Continuous Infusions:      PRN Meds:  oxyCODONE, traZODone, nitroGLYCERIN, sodium chloride flush, promethazine **OR** [DISCONTINUED] ondansetron, polyethylene glycol, acetaminophen **OR** acetaminophen, ipratropium-albuterol      Data:  CBC:   Recent Labs     01/11/21  1540 01/12/21  0123   WBC 7.3 7.0   HGB 13.1 12.3   HCT 40.3 37.4   MCV 91.1 92.0    247     BMP:   Recent Labs     01/11/21  1540 01/12/21  0123 01/13/21  0309   * 134* 133*   K 5.0 4.3 4.2   CL 86* 93* 91*   CO2 33* 28 29   PHOS  --   --  4.4   BUN 46* 47* 49*   CREATININE 2.8* 2.3* 2.2*     LIVER PROFILE:   Recent Labs     01/11/21  1540 01/12/21  0123   AST 23 18   ALT 16 12   BILIDIR  --  0.7*   BILITOT 1.7* 1.2*   ALKPHOS 267* 225*     CULTURES  SARS-CoV-2, NAAT Not Detected         RADIOLOGY  XR CHEST (2 VW)   Final Result   Cardiomegaly. No convincing evidence for acute cardiopulmonary pathology. VL Extremity Venous Bilateral    (Results Pending)     EKG  Normal sinus rhythm  Left axis deviation  Left anterior fascicular block  Right bundle branch block  Anterolateral infarct , age undetermined  Nonspecific ST abnormality    Previous   [facility] 8/18/20  CONCLUSIONS:      Elevated filling pressures, continue diuresis  CardioMEMS had good correlation with the right heart catheterization numbers.   Severe pulmonary hypertension  Patent LIMA to LAD graft however there appears to be coronary steal phenomenon due to severe left subclavian stenosis  We will consider left subclavian intervention  Will obtain follow-up vascular ultrasound to assess this  We will consult with CT surgery regarding therapeutic options    Assessment/Plan:  Acute on chronic sCHF  Severe pulm HTN  - Cardiomegaly on CXR, volume overload on exam, BNP elevated at 3512   - Last Echo with EF 25%  - Cardiology consult   - one dose of iv lasix 80 mg 1/12   - Strict I&O's   - daily weights   - nephrology consult for diuretic dosing      SHAUNNA on CKD Stage III   - Baseline creatinine ~1.3-1.6  - Creatinine on admission 2.8-->2.3 --2.2  Recd one dose of iv lasix 80 mg yesterday  nephro following       BLE edema  - likely 2/ CHF and severe pulm HTN  - RLE >LLE, with pain in R calf   - Venous Doppler BLE to rule out DVT   - Reports R is always more swollen than the L but has recently been having intermittent pain as well   - with recent perinephritic hematoma, will hold full dose prophylactic AC at this time     Hyponatremia  - NA on admission 130-->134--133  - suspect 2/2 volume overload   - monitor      CAD s/p CABG in Jan 2020   Ischemic Cardiomyopathy   Elevated troponin   - Initial trop 0.02--> 0.02-->0.02  - Likely 2/2 SHAUNNA and CHF AE  - Tele monitor   - LHC in Aug with severe pulm HTN and coronary steal syndrome with left subclavian stenosis-->s/p L subclavian angio/stent on 9/1/20  - Continue ASA, Plavix, statin and BB    Abnormal LFTs  - monitor     DM2  - Continue Lantus and SSI     PVD  - Continue ASA and Plavix     DVT Prophylaxis: heparin   Diet: Diet NPO, After Midnight Exceptions are: Sips with Meds, Ice Chips  Code Status: Full Code    Edvin Collier 7:43 AM 1/13/2021 at times tearful unable to assess- bedbound

## 2021-06-27 ENCOUNTER — INPATIENT (INPATIENT)
Facility: HOSPITAL | Age: 36
LOS: 2 days | Discharge: HOME | DRG: 392 | End: 2021-06-30
Attending: INTERNAL MEDICINE | Admitting: INTERNAL MEDICINE
Payer: MEDICAID

## 2021-06-27 VITALS
OXYGEN SATURATION: 95 % | HEIGHT: 64 IN | WEIGHT: 164.91 LBS | DIASTOLIC BLOOD PRESSURE: 97 MMHG | RESPIRATION RATE: 18 BRPM | SYSTOLIC BLOOD PRESSURE: 139 MMHG | TEMPERATURE: 98 F | HEART RATE: 67 BPM

## 2021-06-27 DIAGNOSIS — R11.2 NAUSEA WITH VOMITING, UNSPECIFIED: ICD-10-CM

## 2021-06-27 DIAGNOSIS — Z98.51 TUBAL LIGATION STATUS: Chronic | ICD-10-CM

## 2021-06-27 LAB
ALBUMIN SERPL ELPH-MCNC: 5.4 G/DL — HIGH (ref 3.3–5)
ALP SERPL-CCNC: 78 U/L — SIGNIFICANT CHANGE UP (ref 40–120)
ALT FLD-CCNC: 11 U/L — SIGNIFICANT CHANGE UP (ref 10–45)
ANION GAP SERPL CALC-SCNC: 21 MMOL/L — HIGH (ref 5–17)
APPEARANCE UR: ABNORMAL
AST SERPL-CCNC: 12 U/L — SIGNIFICANT CHANGE UP (ref 10–40)
BACTERIA # UR AUTO: NEGATIVE — SIGNIFICANT CHANGE UP
BASOPHILS # BLD AUTO: 0.05 K/UL — SIGNIFICANT CHANGE UP (ref 0–0.2)
BASOPHILS NFR BLD AUTO: 0.4 % — SIGNIFICANT CHANGE UP (ref 0–2)
BILIRUB SERPL-MCNC: 0.5 MG/DL — SIGNIFICANT CHANGE UP (ref 0.2–1.2)
BILIRUB UR-MCNC: ABNORMAL
BUN SERPL-MCNC: 11 MG/DL — SIGNIFICANT CHANGE UP (ref 7–23)
CALCIUM SERPL-MCNC: 10.9 MG/DL — HIGH (ref 8.4–10.5)
CHLORIDE SERPL-SCNC: 94 MMOL/L — LOW (ref 96–108)
CO2 SERPL-SCNC: 20 MMOL/L — LOW (ref 22–31)
COLOR SPEC: YELLOW — SIGNIFICANT CHANGE UP
COMMENT - URINE: SIGNIFICANT CHANGE UP
CREAT SERPL-MCNC: 0.68 MG/DL — SIGNIFICANT CHANGE UP (ref 0.5–1.3)
DIFF PNL FLD: ABNORMAL
EOSINOPHIL # BLD AUTO: 0.03 K/UL — SIGNIFICANT CHANGE UP (ref 0–0.5)
EOSINOPHIL NFR BLD AUTO: 0.3 % — SIGNIFICANT CHANGE UP (ref 0–6)
EPI CELLS # UR: 6 /HPF — HIGH
GAS PNL BLDV: SIGNIFICANT CHANGE UP
GLUCOSE SERPL-MCNC: 104 MG/DL — HIGH (ref 70–99)
GLUCOSE UR QL: ABNORMAL
HCT VFR BLD CALC: 38.8 % — SIGNIFICANT CHANGE UP (ref 34.5–45)
HGB BLD-MCNC: 12.6 G/DL — SIGNIFICANT CHANGE UP (ref 11.5–15.5)
HYALINE CASTS # UR AUTO: 21 /LPF — HIGH (ref 0–2)
IMM GRANULOCYTES NFR BLD AUTO: 0.3 % — SIGNIFICANT CHANGE UP (ref 0–1.5)
KETONES UR-MCNC: ABNORMAL
LEUKOCYTE ESTERASE UR-ACNC: NEGATIVE — SIGNIFICANT CHANGE UP
LIDOCAIN IGE QN: 51 U/L — SIGNIFICANT CHANGE UP (ref 7–60)
LYMPHOCYTES # BLD AUTO: 3.9 K/UL — HIGH (ref 1–3.3)
LYMPHOCYTES # BLD AUTO: 33.9 % — SIGNIFICANT CHANGE UP (ref 13–44)
MCHC RBC-ENTMCNC: 24.5 PG — LOW (ref 27–34)
MCHC RBC-ENTMCNC: 32.5 GM/DL — SIGNIFICANT CHANGE UP (ref 32–36)
MCV RBC AUTO: 75.5 FL — LOW (ref 80–100)
MONOCYTES # BLD AUTO: 0.97 K/UL — HIGH (ref 0–0.9)
MONOCYTES NFR BLD AUTO: 8.4 % — SIGNIFICANT CHANGE UP (ref 2–14)
NEUTROPHILS # BLD AUTO: 6.52 K/UL — SIGNIFICANT CHANGE UP (ref 1.8–7.4)
NEUTROPHILS NFR BLD AUTO: 56.7 % — SIGNIFICANT CHANGE UP (ref 43–77)
NITRITE UR-MCNC: NEGATIVE — SIGNIFICANT CHANGE UP
NRBC # BLD: 0 /100 WBCS — SIGNIFICANT CHANGE UP (ref 0–0)
PH UR: 6.5 — SIGNIFICANT CHANGE UP (ref 5–8)
PLATELET # BLD AUTO: 525 K/UL — HIGH (ref 150–400)
POTASSIUM SERPL-MCNC: 3.2 MMOL/L — LOW (ref 3.5–5.3)
POTASSIUM SERPL-SCNC: 3.2 MMOL/L — LOW (ref 3.5–5.3)
PROT SERPL-MCNC: 9 G/DL — HIGH (ref 6–8.3)
PROT UR-MCNC: >600
RBC # BLD: 5.14 M/UL — SIGNIFICANT CHANGE UP (ref 3.8–5.2)
RBC # FLD: 15.8 % — HIGH (ref 10.3–14.5)
RBC CASTS # UR COMP ASSIST: 86 /HPF — HIGH (ref 0–4)
SARS-COV-2 RNA SPEC QL NAA+PROBE: SIGNIFICANT CHANGE UP
SODIUM SERPL-SCNC: 135 MMOL/L — SIGNIFICANT CHANGE UP (ref 135–145)
SP GR SPEC: 1.04 — HIGH (ref 1.01–1.02)
UROBILINOGEN FLD QL: ABNORMAL
WBC # BLD: 11.5 K/UL — HIGH (ref 3.8–10.5)
WBC # FLD AUTO: 11.5 K/UL — HIGH (ref 3.8–10.5)
WBC UR QL: 6 /HPF — HIGH (ref 0–5)

## 2021-06-27 PROCEDURE — 74177 CT ABD & PELVIS W/CONTRAST: CPT | Mod: 26,MG

## 2021-06-27 PROCEDURE — G1004: CPT

## 2021-06-27 PROCEDURE — 99285 EMERGENCY DEPT VISIT HI MDM: CPT

## 2021-06-27 PROCEDURE — 71045 X-RAY EXAM CHEST 1 VIEW: CPT | Mod: 26

## 2021-06-27 RX ORDER — MORPHINE SULFATE 50 MG/1
2 CAPSULE, EXTENDED RELEASE ORAL ONCE
Refills: 0 | Status: DISCONTINUED | OUTPATIENT
Start: 2021-06-27 | End: 2021-06-28

## 2021-06-27 RX ORDER — POTASSIUM CHLORIDE 20 MEQ
40 PACKET (EA) ORAL ONCE
Refills: 0 | Status: COMPLETED | OUTPATIENT
Start: 2021-06-27 | End: 2021-06-27

## 2021-06-27 RX ORDER — SODIUM CHLORIDE 9 MG/ML
1000 INJECTION INTRAMUSCULAR; INTRAVENOUS; SUBCUTANEOUS ONCE
Refills: 0 | Status: COMPLETED | OUTPATIENT
Start: 2021-06-27 | End: 2021-06-27

## 2021-06-27 RX ORDER — MORPHINE SULFATE 50 MG/1
4 CAPSULE, EXTENDED RELEASE ORAL ONCE
Refills: 0 | Status: DISCONTINUED | OUTPATIENT
Start: 2021-06-27 | End: 2021-06-27

## 2021-06-27 RX ORDER — MORPHINE SULFATE 50 MG/1
2 CAPSULE, EXTENDED RELEASE ORAL ONCE
Refills: 0 | Status: DISCONTINUED | OUTPATIENT
Start: 2021-06-27 | End: 2021-06-27

## 2021-06-27 RX ORDER — ONDANSETRON 8 MG/1
4 TABLET, FILM COATED ORAL ONCE
Refills: 0 | Status: COMPLETED | OUTPATIENT
Start: 2021-06-27 | End: 2021-06-27

## 2021-06-27 RX ORDER — POTASSIUM CHLORIDE 20 MEQ
40 PACKET (EA) ORAL ONCE
Refills: 0 | Status: DISCONTINUED | OUTPATIENT
Start: 2021-06-27 | End: 2021-06-27

## 2021-06-27 RX ORDER — PANTOPRAZOLE SODIUM 20 MG/1
80 TABLET, DELAYED RELEASE ORAL ONCE
Refills: 0 | Status: COMPLETED | OUTPATIENT
Start: 2021-06-27 | End: 2021-06-27

## 2021-06-27 RX ORDER — PANTOPRAZOLE SODIUM 20 MG/1
40 TABLET, DELAYED RELEASE ORAL
Refills: 0 | Status: DISCONTINUED | OUTPATIENT
Start: 2021-06-27 | End: 2021-06-28

## 2021-06-27 RX ORDER — SODIUM CHLORIDE 9 MG/ML
1000 INJECTION, SOLUTION INTRAVENOUS ONCE
Refills: 0 | Status: COMPLETED | OUTPATIENT
Start: 2021-06-27 | End: 2021-06-27

## 2021-06-27 RX ADMIN — MORPHINE SULFATE 4 MILLIGRAM(S): 50 CAPSULE, EXTENDED RELEASE ORAL at 18:28

## 2021-06-27 RX ADMIN — MORPHINE SULFATE 2 MILLIGRAM(S): 50 CAPSULE, EXTENDED RELEASE ORAL at 23:58

## 2021-06-27 RX ADMIN — MORPHINE SULFATE 2 MILLIGRAM(S): 50 CAPSULE, EXTENDED RELEASE ORAL at 22:01

## 2021-06-27 RX ADMIN — Medication 40 MILLIEQUIVALENT(S): at 13:48

## 2021-06-27 RX ADMIN — MORPHINE SULFATE 4 MILLIGRAM(S): 50 CAPSULE, EXTENDED RELEASE ORAL at 13:51

## 2021-06-27 RX ADMIN — MORPHINE SULFATE 4 MILLIGRAM(S): 50 CAPSULE, EXTENDED RELEASE ORAL at 10:42

## 2021-06-27 RX ADMIN — SODIUM CHLORIDE 1000 MILLILITER(S): 9 INJECTION INTRAMUSCULAR; INTRAVENOUS; SUBCUTANEOUS at 10:56

## 2021-06-27 RX ADMIN — MORPHINE SULFATE 4 MILLIGRAM(S): 50 CAPSULE, EXTENDED RELEASE ORAL at 13:48

## 2021-06-27 RX ADMIN — SODIUM CHLORIDE 1000 MILLILITER(S): 9 INJECTION, SOLUTION INTRAVENOUS at 12:20

## 2021-06-27 RX ADMIN — PANTOPRAZOLE SODIUM 80 MILLIGRAM(S): 20 TABLET, DELAYED RELEASE ORAL at 10:43

## 2021-06-27 RX ADMIN — ONDANSETRON 4 MILLIGRAM(S): 8 TABLET, FILM COATED ORAL at 10:42

## 2021-06-27 RX ADMIN — MORPHINE SULFATE 2 MILLIGRAM(S): 50 CAPSULE, EXTENDED RELEASE ORAL at 21:13

## 2021-06-27 RX ADMIN — MORPHINE SULFATE 4 MILLIGRAM(S): 50 CAPSULE, EXTENDED RELEASE ORAL at 18:58

## 2021-06-27 NOTE — ED PROVIDER NOTE - PHYSICAL EXAMINATION
Gen: AAOx3, non-toxic  Head: NCAT  HEENT: oral mucosa moist, normal conjunctiva  Lung: CTAB, no respiratory distress, speaking in full sentences  CV: RRR, no murmurs, rubs or gallops  Abd: soft, diffuse tenderness, no guarding, no CVA tenderness  MSK: no visible deformities  Neuro: No focal sensory or motor deficits  Skin: Warm, well perfused, no rash  Psych: normal affect.   ~Irvin Robert PGY3

## 2021-06-27 NOTE — ED PROVIDER NOTE - NS ED ROS FT
Gen: No fever, No chills  ENT: No congestion, sore throat  Resp: No cough. No SOB  Cardiovascular: No chest pain. No palpitations  GI: +abdominal pain, +nausea/vomiting No diarrhea, constipation  :  No change in urine output or frequency; no dysuria  MS: No joint. No muscle pain  Skin: No rashes  Neuro: No headache; No numbness or weakness  Remainder negative, except as per the HPI

## 2021-06-27 NOTE — PATIENT PROFILE ADULT - NSPROSPHOSPCHAPLAINYN_GEN_A_NUR
no recvd email from ANASTACIO w/the pts email add [email or web address]  [email or web address]  I emailed Samantha Atkins the application & asked her to have thept sign & fill in the family size & income & send it back to me as soon as I recv it back I will fax to [place]  Paula Joseph

## 2021-06-27 NOTE — ED ADULT NURSE NOTE - NSIMPLEMENTINTERV_GEN_ALL_ED
Implemented All Universal Safety Interventions:  [place] to call system. Call bell, personal items and telephone within reach. Instruct patient to call for assistance. Room bathroom lighting operational. Non-slip footwear when patient is off stretcher. Physically safe environment: no spills, clutter or unnecessary equipment. Stretcher in lowest position, wheels locked, appropriate side rails in place.

## 2021-06-27 NOTE — PATIENT PROFILE ADULT - VISION (WITH CORRECTIVE LENSES IF THE PATIENT USUALLY WEARS THEM):
Exam


Note:


George Note:


Please also refer to the separate dictated note~for this date of service 

dictated separately.~Patient seen individually. Discussed the patient with 

Nursing staff reviewed the chart.~Reviewed interim history and current 

functioning. Reviewed vital signs,~Labs/ Radiology~and current medications noted

below. Continue current treatment with the changes noted in the dictated 

addendum note





Assessment:


Vital Signs/I&O:





                                   Vital Signs








  Date Time  Temp Pulse Resp B/P (MAP) Pulse Ox O2 Delivery O2 Flow Rate FiO2


 


1/7/20 15:36 97.1 67 18 130/50 (76) 95   


 


1/3/20 04:49      Room Air  














                                    I & O   


 


 1/6/20 1/6/20 1/7/20





 15:00 23:00 07:00


 


Intake Total 440 ml 240 ml 


 


Balance 440 ml 240 ml 








Labs:





                                Laboratory Tests








Test


 1/7/20


14:10


 


White Blood Count


 10.3 x10^3/uL


(4.0-11.0)


 


Red Blood Count


 4.57 x10^6/uL


(4.30-5.70)


 


Hemoglobin


 13.8 g/dL


(13.0-17.5)


 


Hematocrit


 42.3 %


(39.0-53.0)


 


Mean Corpuscular Volume


 92 fL ()





 


Mean Corpuscular Hemoglobin 30 pg (25-35)  


 


Mean Corpuscular Hemoglobin


Concent 33 g/dL


(31-37)


 


Red Cell Distribution Width


 14.7 %


(11.5-14.5)  H


 


Platelet Count


 232 x10^3/uL


(140-400)


 


Neutrophils (%) (Auto) 67 % (31-73)  


 


Lymphocytes (%) (Auto) 20 % (24-48)  L


 


Monocytes (%) (Auto) 9 % (0-9)  


 


Eosinophils (%) (Auto) 3 % (0-3)  


 


Basophils (%) (Auto) 0 % (0-3)  


 


Neutrophils # (Auto)


 6.9 x10^3uL


(1.8-7.7)


 


Lymphocytes # (Auto)


 2.1 x10^3/uL


(1.0-4.8)


 


Monocytes # (Auto)


 0.9 x10^3/uL


(0.0-1.1)


 


Eosinophils # (Auto)


 0.3 x10^3/uL


(0.0-0.7)


 


Basophils # (Auto)


 0.0 x10^3/uL


(0.0-0.2)


 


Sodium Level


 142 mmol/L


(136-145)


 


Potassium Level


 4.5 mmol/L


(3.5-5.1)


 


Chloride Level


 109 mmol/L


()  H


 


Carbon Dioxide Level


 27 mmol/L


(21-32)


 


Anion Gap 6 (6-14)  


 


Blood Urea Nitrogen


 37 mg/dL


(8-26)  H


 


Creatinine


 1.3 mg/dL


(0.7-1.3)


 


Estimated GFR


(Cockcroft-Gault) 53.1  





 


BUN/Creatinine Ratio 28 (6-20)  H


 


Glucose Level


 111 mg/dL


(70-99)  H


 


Calcium Level


 8.5 mg/dL


(8.5-10.1)


 


Total Bilirubin


 0.5 mg/dL


(0.2-1.0)


 


Aspartate Amino Transferase


(AST) 21 U/L (15-37)





 


Alanine Aminotransferase (ALT)


 20 U/L (16-63)





 


Alkaline Phosphatase


 93 U/L


()


 


Total Protein


 6.9 g/dL


(6.4-8.2)


 


Albumin


 3.0 g/dL


(3.4-5.0)  L


 


Albumin/Globulin Ratio


 0.8 (1.0-1.7)


L











Current Medications:


Meds:





Current Medications








 Medications


  (Trade)  Dose


 Ordered  Sig/Lamberto


 Route


 PRN Reason  Start Time


 Stop Time Status Last Admin


Dose Admin


 


 Divalproex Sodium


  (Depakote


 Sprinkles)  125 mg  0900,1700


 PO


   1/7/20 09:00


    1/7/20 17:14





 


 Quetiapine


 Fumarate


  (SEROquel)  12.5 mg  0900,1300,1700


 PO


   1/7/20 17:00


    1/7/20 17:16











I have reviewed the current psychotropics carefully including drug interactions.

 Risk benefit ratio favors no change other than as noted in my dictated progress

note.





Diagnosis:


Problems:  


(1) Dementia with behavioral disturbance


(2) Anxiety disorder


(3) Dementia, vascular, with delusions


(4) Dementia, vascular, with depression


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


(6) Dementia in Alzheimer's disease with depression


(7) Impulse control disorder











NIKHIL LAUREANO MD                  Jan 7, 2020 21:07 Normal vision: sees adequately in most situations; can see medication labels, newsprint

## 2021-06-27 NOTE — H&P ADULT - HISTORY OF PRESENT ILLNESS
36y female with PMH of PUD presenting abdominal pain. Started 3 days ago, diffuse abdominal pain radiating to the back. Not tolerating PO, +vomiting (NBNB), no black or bloody stools. Felt chills and subjective fevers. No EtOH, allergies reported

## 2021-06-27 NOTE — ED PROVIDER NOTE - ATTENDING CONTRIBUTION TO CARE
RGUJRAL 37yo f hx duodenal ulcer, H pylori presents with diffuse abdominal pain and back pain x 3 d. Pain is sharp associated with nausea and vomiting. Denies diarrhea and BM. No chest pain, palp, sob, cough, uri, f/c. Denies any alcohol abuse. + Smoking hx.   On exam, Patient is awake, alert and oriented x 3.  Patient is well appearing and in mild distress secondary to pain.   Lungs are CTA B/L,+S1S2 no murmurs,  Abdomen:Soft nd/+diffuse tenderness to light palpation. +bs no rebound or guarding. No cvat.  Extremity no edema or calf tender.  Skin with no rash.  Check labs, CT/xray to eval. IVF, PPI and pain control.

## 2021-06-27 NOTE — ED PROVIDER NOTE - OBJECTIVE STATEMENT
36y female with PMH of PUD presenting abdominal pain. Started 3 days ago, diffuse abdominal pain radiating to the back. Not tolerating PO, +vomiting (NBNB), no black or bloody stools. Felt chills and subjective fevers. No EtOH, allergies. History of tuboligation

## 2021-06-27 NOTE — ED PROVIDER NOTE - CLINICAL SUMMARY MEDICAL DECISION MAKING FREE TEXT BOX
36y female with abdominal pain, vomiting, not tolerating PO. Hx of PUD, pancreatitis, again concerning for the same. Also considering kaitlin, sbo. Will get labs, ct ab/pel, treat symptoms and likely admit

## 2021-06-27 NOTE — H&P ADULT - PROBLEM SELECTOR PLAN 1
Abdomional pain +/ PPI started. CLD   \GI to evaluate.  thjis could be related to history of PUD. Lipase is WNL.

## 2021-06-27 NOTE — ED ADULT NURSE NOTE - OBJECTIVE STATEMENT
37yo F, hx stomach ulcers, pancreatits. c/o 3 days lower abdominal pain radiating to lower back. has not been able to eat or drink x 3 days due to pain. denies diarrhea, constipation, blood in urine or vomiting. pain is diffuse, " all over"/ pt arrives aaox3, anxious, guarding her stomach, difficult to find comfort in stretcher. no fevers, diaphoresis, chills or cough. assessment will be ongoing. hx hysterectomy

## 2021-06-28 LAB
ANION GAP SERPL CALC-SCNC: 16 MMOL/L — SIGNIFICANT CHANGE UP (ref 5–17)
BUN SERPL-MCNC: 7 MG/DL — SIGNIFICANT CHANGE UP (ref 7–23)
CALCIUM SERPL-MCNC: 9.7 MG/DL — SIGNIFICANT CHANGE UP (ref 8.4–10.5)
CHLORIDE SERPL-SCNC: 101 MMOL/L — SIGNIFICANT CHANGE UP (ref 96–108)
CO2 SERPL-SCNC: 22 MMOL/L — SIGNIFICANT CHANGE UP (ref 22–31)
COVID-19 SPIKE DOMAIN AB INTERP: POSITIVE
COVID-19 SPIKE DOMAIN ANTIBODY RESULT: 68.3 U/ML — HIGH
CREAT SERPL-MCNC: 0.68 MG/DL — SIGNIFICANT CHANGE UP (ref 0.5–1.3)
CULTURE RESULTS: SIGNIFICANT CHANGE UP
GLUCOSE SERPL-MCNC: 86 MG/DL — SIGNIFICANT CHANGE UP (ref 70–99)
HCT VFR BLD CALC: 33.1 % — LOW (ref 34.5–45)
HGB BLD-MCNC: 10.4 G/DL — LOW (ref 11.5–15.5)
MCHC RBC-ENTMCNC: 24.4 PG — LOW (ref 27–34)
MCHC RBC-ENTMCNC: 31.4 GM/DL — LOW (ref 32–36)
MCV RBC AUTO: 77.7 FL — LOW (ref 80–100)
NRBC # BLD: 0 /100 WBCS — SIGNIFICANT CHANGE UP (ref 0–0)
PLATELET # BLD AUTO: 397 K/UL — SIGNIFICANT CHANGE UP (ref 150–400)
POTASSIUM SERPL-MCNC: 3.3 MMOL/L — LOW (ref 3.5–5.3)
POTASSIUM SERPL-SCNC: 3.3 MMOL/L — LOW (ref 3.5–5.3)
RBC # BLD: 4.26 M/UL — SIGNIFICANT CHANGE UP (ref 3.8–5.2)
RBC # FLD: 15.9 % — HIGH (ref 10.3–14.5)
SARS-COV-2 IGG+IGM SERPL QL IA: 68.3 U/ML — HIGH
SARS-COV-2 IGG+IGM SERPL QL IA: POSITIVE
SODIUM SERPL-SCNC: 139 MMOL/L — SIGNIFICANT CHANGE UP (ref 135–145)
SPECIMEN SOURCE: SIGNIFICANT CHANGE UP
WBC # BLD: 6.95 K/UL — SIGNIFICANT CHANGE UP (ref 3.8–10.5)
WBC # FLD AUTO: 6.95 K/UL — SIGNIFICANT CHANGE UP (ref 3.8–10.5)

## 2021-06-28 RX ORDER — LIDOCAINE 4 G/100G
1 CREAM TOPICAL ONCE
Refills: 0 | Status: COMPLETED | OUTPATIENT
Start: 2021-06-28 | End: 2021-06-28

## 2021-06-28 RX ORDER — PANTOPRAZOLE SODIUM 20 MG/1
40 TABLET, DELAYED RELEASE ORAL
Refills: 0 | Status: DISCONTINUED | OUTPATIENT
Start: 2021-06-28 | End: 2021-06-30

## 2021-06-28 RX ORDER — POTASSIUM CHLORIDE 20 MEQ
40 PACKET (EA) ORAL ONCE
Refills: 0 | Status: COMPLETED | OUTPATIENT
Start: 2021-06-28 | End: 2021-06-28

## 2021-06-28 RX ORDER — MORPHINE SULFATE 50 MG/1
2 CAPSULE, EXTENDED RELEASE ORAL ONCE
Refills: 0 | Status: DISCONTINUED | OUTPATIENT
Start: 2021-06-28 | End: 2021-06-28

## 2021-06-28 RX ORDER — MORPHINE SULFATE 50 MG/1
2 CAPSULE, EXTENDED RELEASE ORAL EVERY 4 HOURS
Refills: 0 | Status: DISCONTINUED | OUTPATIENT
Start: 2021-06-28 | End: 2021-06-29

## 2021-06-28 RX ADMIN — PANTOPRAZOLE SODIUM 40 MILLIGRAM(S): 20 TABLET, DELAYED RELEASE ORAL at 06:04

## 2021-06-28 RX ADMIN — MORPHINE SULFATE 2 MILLIGRAM(S): 50 CAPSULE, EXTENDED RELEASE ORAL at 16:00

## 2021-06-28 RX ADMIN — MORPHINE SULFATE 2 MILLIGRAM(S): 50 CAPSULE, EXTENDED RELEASE ORAL at 21:45

## 2021-06-28 RX ADMIN — MORPHINE SULFATE 2 MILLIGRAM(S): 50 CAPSULE, EXTENDED RELEASE ORAL at 00:17

## 2021-06-28 RX ADMIN — LIDOCAINE 1 PATCH: 4 CREAM TOPICAL at 19:41

## 2021-06-28 RX ADMIN — MORPHINE SULFATE 2 MILLIGRAM(S): 50 CAPSULE, EXTENDED RELEASE ORAL at 03:05

## 2021-06-28 RX ADMIN — LIDOCAINE 1 PATCH: 4 CREAM TOPICAL at 20:41

## 2021-06-28 RX ADMIN — Medication 40 MILLIEQUIVALENT(S): at 08:24

## 2021-06-28 RX ADMIN — MORPHINE SULFATE 2 MILLIGRAM(S): 50 CAPSULE, EXTENDED RELEASE ORAL at 10:00

## 2021-06-28 RX ADMIN — MORPHINE SULFATE 2 MILLIGRAM(S): 50 CAPSULE, EXTENDED RELEASE ORAL at 14:56

## 2021-06-28 RX ADMIN — MORPHINE SULFATE 2 MILLIGRAM(S): 50 CAPSULE, EXTENDED RELEASE ORAL at 09:21

## 2021-06-28 RX ADMIN — MORPHINE SULFATE 2 MILLIGRAM(S): 50 CAPSULE, EXTENDED RELEASE ORAL at 06:25

## 2021-06-28 RX ADMIN — LIDOCAINE 1 PATCH: 4 CREAM TOPICAL at 08:24

## 2021-06-28 RX ADMIN — PANTOPRAZOLE SODIUM 40 MILLIGRAM(S): 20 TABLET, DELAYED RELEASE ORAL at 17:10

## 2021-06-28 RX ADMIN — MORPHINE SULFATE 2 MILLIGRAM(S): 50 CAPSULE, EXTENDED RELEASE ORAL at 06:05

## 2021-06-28 RX ADMIN — MORPHINE SULFATE 2 MILLIGRAM(S): 50 CAPSULE, EXTENDED RELEASE ORAL at 03:24

## 2021-06-28 RX ADMIN — MORPHINE SULFATE 2 MILLIGRAM(S): 50 CAPSULE, EXTENDED RELEASE ORAL at 21:19

## 2021-06-28 NOTE — CONSULT NOTE ADULT - ASSESSMENT
36y female with PMH of PUD presenting abdominal pain  associated with decreased appetite, nausea and vomiting (non bloody, non bilious)  Hx. PUD - gastric ulcer +H  pylori - treated but did not keep scheduled breath test appt for confirmation of HP eradication and no follow up EGD was done.  Reports 10 pound weight loss within past 1-2 weeks    COVID negative    Abdominal pain with nausea/vomiting and weight loss ?PUD ?occult lesion  CT finding of mural thickening of gastric fundus  -PPI IV daily  -NPO after MN for EGD in AM   -tobacco cessation encouraged      Discussed with pt, all questions answered  D/W Medicine team    Thank you for the courtesy of this consult.    Ovidio Veliz PA-C    [facility]  [phone]  After hours and weekend coverage [phone]

## 2021-06-28 NOTE — CONSULT NOTE ADULT - ATTENDING COMMENTS
epigastric pain with n/v. gastric thickening on ct scan, h/o  and h.pylori    plan iV ppi bid         egd tomorrow r/o pud etc.

## 2021-06-28 NOTE — CHART NOTE - NSCHARTNOTEFT_GEN_A_CORE
Pt with c/o of abdominal pain. Stated that her PCP is Dr. Willard and has a history of PUD. CT abd completed (refer to results).   Dr. Willard notified and GI consulted.    Shelby Cheng NP-c  [id number]

## 2021-06-28 NOTE — CONSULT NOTE ADULT - SUBJECTIVE AND OBJECTIVE BOX
Patient is a 36y old  Female who presented with a chief complaint of Abdominal pain (2021 17:07)      HPI:  36y female with PMH of PUD presenting abdominal pain. Started 3 days ago, diffuse abdominal pain radiating to the back. Not tolerating PO, +vomiting (NBNB), no black or bloody stools. Felt chills and subjective fevers. No EtOH, allergies reported (2021 17:07)      Upper abdominal pain radiating to back x 4 days prior to admission with decreased appetite, nausea and vomiting (non bloody, non bilious)  Also with PMH PUD - gastric ulcer +H  pylori - treated but did not keep scheduled breath test appt for confirmation of HP eradication and no follow up EGD was done.  Reports 10 pound weight loss within past 1-2 weeks  +constipation - chronic - states she is against chronic laxative use for fear of developing dependence but instead has BM after smoking cigarette  +Tobacco 5-6 cig/day  compliant with daily PPI use  infrequent NSAIDs use - took ibuprofen last Thursday with onset of pain  denies ETOH  no h/o COVID infection    Primary GI - Dr Anaya    PAST MEDICAL & SURGICAL HISTORY:  TB (pulmonary tuberculosis)    Pancreatitis, chronic    Anemia    H/O tubal ligation      Allergies  No Known Allergies      MEDICATIONS  (STANDING):    MEDICATIONS  (PRN):      Social History:  lives with 5 children  +tobacco    Family History   IBD (  ) Yes   (X  ) No  GI Malignancy (  )  Yes    (X  ) No      Advanced Directives: (   X  ) None    (      ) DNR    (     ) DNI    (     ) Health Care Proxy:     Review of Systems:    General:  No wt loss, fevers, chills, night sweats +weight loss  CV:  No pain, palpitations, hypo/hypertension  Resp:  No dyspnea, cough, tachypnea, wheezing +tobacco +hx pulm. TB  GI:  see HPI  :  No pain, bleeding, incontinence, nocturia  Muscle:  No pain, weakness  Neuro:  No weakness, tingling, memory problems  Psych:  No fatigue, insomnia, mood problems, depression  Endocrine:  No polyuria, polydypsia, cold/heat intolerance  Heme:  No petechiae, ecchymosis, easy bruisability  Skin:  No rash, tattoos, scars, edema      Vital Signs Last 24 Hrs  T(C): 36.7 (2021 09:04), Max: 37 (2021 17:35)  T(F): 98.1 (2021 09:04), Max: 98.6 (2021 17:35)  HR: 65 (2021 09:04) (64 - 78)  BP: 111/72 (2021 09:04) (107/73 - 144/75)  BP(mean): --  RR: 18 (2021 09:04) (17 - 18)  SpO2: 99% (2021 09:04) (98% - 99%)    PHYSICAL EXAM:    Constitutional: NAD, well-developed non toxic appearing AA female OOB to chair  eating italian ice  Neck: No LAD, supple  Respiratory: CTA and P  Cardiovascular: S1 and S2, RRR, no M  Gastrointestinal: BS+, soft, ND +epigastric tenderness without rebound or rigidity  Extremities: No peripheral edema, neg clubbing, cyanosis  Vascular: 2+ peripheral pulses  Neurological: A/O x 3, no focal deficits  Psychiatric: Normal mood, normal affect  Skin: No rashes, anicteric        LABS:                        10.4   6.95  )-----------( 397      ( 2021 07:25 )             33.1     06-28    139  |  101  |  7   ----------------------------<  86  3.3<L>   |  22  |  0.68    Ca    9.7      2021 07:25    TPro  9.0<H>  /  Alb  5.4<H>  /  TBili  0.5  /  DBili  x   /  AST  12  /  ALT  11  /  AlkPhos  78  06-27  Lipase, Serum: 51 U/L (21 @ 10:52)     COVID-19 PCR . (21 @ 10:52)   COVID-19 PCR: NotDetec:  Urinalysis Basic - ( 2021 10:52 )    Color: Yellow / Appearance: Slightly Turbid / S.041 / pH: x  Gluc: x / Ketone: Large  / Bili: Small / Urobili: 2 mg/dL   Blood: x / Protein: >600 / Nitrite: Negative   Leuk Esterase: Negative / RBC: 86 /hpf / WBC 6 /HPF   Sq Epi: x / Non Sq Epi: 6 /hpf / Bacteria: Negative        RADIOLOGY & ADDITIONAL TESTS:  EXAM:  CT ABDOMEN AND PELVIS IC                        PROCEDURE DATE:  2021        INTERPRETATION:  CLINICAL INFORMATION: Abdominal pain. Duodenal ulcer. Vomiting.    COMPARISON: 2020.    CONTRAST/COMPLICATIONS:  IV Contrast: Omnipaque 350  90 mL was administered   10 mL was discarded  Oral Contrast: None  Complications: None reported at the completion of the study.    PROCEDURE:  CT of the Abdomen and Pelvis was performed.  Sagittal and coronal reformats were performed.    FINDINGS:  LOWER CHEST: Within normal limits.    LIVER: 1.5 cm sized probable cyst right hepatic lobe, segment 7, stable.  BILE DUCTS: Normal caliber.  GALLBLADDER: Within normal limits.  SPLEEN: Within normal limits.  PANCREAS: Within normal limits.  ADRENALS: Within normal limits.  KIDNEYS/URETERS: Possible subcentimeter sized cyst lower pole of the right kidney, too small to definitely characterize.    BLADDER: Partially distended, unremarkable.  REPRODUCTIVE ORGANS: Uterus and adnexa within normal limits.    BOWEL: No bowel obstruction. Appendix is normal. Question gastric mural thickening in the region of fundus.  PERITONEUM: No ascites.  VESSELS: Within normal limits.  RETROPERITONEUM/LYMPH NODES: No lymphadenopathy.  ABDOMINAL WALL: Within normal limits.  BONES: Within normal limits.    IMPRESSION:  Question of mural thickening involving the gastric fundus. No other imaging finding to explain abdominal pain.

## 2021-06-29 ENCOUNTER — RESULT REVIEW (OUTPATIENT)
Age: 36
End: 2021-06-29

## 2021-06-29 DIAGNOSIS — R11.2 NAUSEA WITH VOMITING, UNSPECIFIED: ICD-10-CM

## 2021-06-29 LAB
ANION GAP SERPL CALC-SCNC: 14 MMOL/L — SIGNIFICANT CHANGE UP (ref 5–17)
BLD GP AB SCN SERPL QL: NEGATIVE — SIGNIFICANT CHANGE UP
BUN SERPL-MCNC: 7 MG/DL — SIGNIFICANT CHANGE UP (ref 7–23)
CALCIUM SERPL-MCNC: 9.6 MG/DL — SIGNIFICANT CHANGE UP (ref 8.4–10.5)
CHLORIDE SERPL-SCNC: 103 MMOL/L — SIGNIFICANT CHANGE UP (ref 96–108)
CO2 SERPL-SCNC: 22 MMOL/L — SIGNIFICANT CHANGE UP (ref 22–31)
CREAT SERPL-MCNC: 0.7 MG/DL — SIGNIFICANT CHANGE UP (ref 0.5–1.3)
GLUCOSE SERPL-MCNC: 94 MG/DL — SIGNIFICANT CHANGE UP (ref 70–99)
HCT VFR BLD CALC: 35.6 % — SIGNIFICANT CHANGE UP (ref 34.5–45)
HGB BLD-MCNC: 10.9 G/DL — LOW (ref 11.5–15.5)
MCHC RBC-ENTMCNC: 23.6 PG — LOW (ref 27–34)
MCHC RBC-ENTMCNC: 30.6 GM/DL — LOW (ref 32–36)
MCV RBC AUTO: 77.2 FL — LOW (ref 80–100)
NRBC # BLD: 0 /100 WBCS — SIGNIFICANT CHANGE UP (ref 0–0)
PLATELET # BLD AUTO: 401 K/UL — HIGH (ref 150–400)
POTASSIUM SERPL-MCNC: 3.5 MMOL/L — SIGNIFICANT CHANGE UP (ref 3.5–5.3)
POTASSIUM SERPL-SCNC: 3.5 MMOL/L — SIGNIFICANT CHANGE UP (ref 3.5–5.3)
RBC # BLD: 4.61 M/UL — SIGNIFICANT CHANGE UP (ref 3.8–5.2)
RBC # FLD: 16.1 % — HIGH (ref 10.3–14.5)
RH IG SCN BLD-IMP: POSITIVE — SIGNIFICANT CHANGE UP
SODIUM SERPL-SCNC: 139 MMOL/L — SIGNIFICANT CHANGE UP (ref 135–145)
WBC # BLD: 8.02 K/UL — SIGNIFICANT CHANGE UP (ref 3.8–10.5)
WBC # FLD AUTO: 8.02 K/UL — SIGNIFICANT CHANGE UP (ref 3.8–10.5)

## 2021-06-29 PROCEDURE — 88341 IMHCHEM/IMCYTCHM EA ADD ANTB: CPT | Mod: 26

## 2021-06-29 PROCEDURE — 88305 TISSUE EXAM BY PATHOLOGIST: CPT | Mod: 26

## 2021-06-29 PROCEDURE — 88342 IMHCHEM/IMCYTCHM 1ST ANTB: CPT | Mod: 26

## 2021-06-29 RX ORDER — MORPHINE SULFATE 50 MG/1
1 CAPSULE, EXTENDED RELEASE ORAL EVERY 8 HOURS
Refills: 0 | Status: DISCONTINUED | OUTPATIENT
Start: 2021-06-29 | End: 2021-06-30

## 2021-06-29 RX ORDER — ACETAMINOPHEN 500 MG
650 TABLET ORAL EVERY 6 HOURS
Refills: 0 | Status: DISCONTINUED | OUTPATIENT
Start: 2021-06-29 | End: 2021-06-30

## 2021-06-29 RX ORDER — SODIUM CHLORIDE 9 MG/ML
500 INJECTION INTRAMUSCULAR; INTRAVENOUS; SUBCUTANEOUS
Refills: 0 | Status: DISCONTINUED | OUTPATIENT
Start: 2021-06-29 | End: 2021-06-30

## 2021-06-29 RX ORDER — POLYETHYLENE GLYCOL 3350 17 G/17G
17 POWDER, FOR SOLUTION ORAL DAILY
Refills: 0 | Status: DISCONTINUED | OUTPATIENT
Start: 2021-06-29 | End: 2021-06-30

## 2021-06-29 RX ADMIN — MORPHINE SULFATE 2 MILLIGRAM(S): 50 CAPSULE, EXTENDED RELEASE ORAL at 01:27

## 2021-06-29 RX ADMIN — PANTOPRAZOLE SODIUM 40 MILLIGRAM(S): 20 TABLET, DELAYED RELEASE ORAL at 17:30

## 2021-06-29 RX ADMIN — MORPHINE SULFATE 2 MILLIGRAM(S): 50 CAPSULE, EXTENDED RELEASE ORAL at 06:03

## 2021-06-29 RX ADMIN — POLYETHYLENE GLYCOL 3350 17 GRAM(S): 17 POWDER, FOR SOLUTION ORAL at 14:20

## 2021-06-29 RX ADMIN — MORPHINE SULFATE 1 MILLIGRAM(S): 50 CAPSULE, EXTENDED RELEASE ORAL at 22:06

## 2021-06-29 RX ADMIN — MORPHINE SULFATE 2 MILLIGRAM(S): 50 CAPSULE, EXTENDED RELEASE ORAL at 06:25

## 2021-06-29 RX ADMIN — MORPHINE SULFATE 1 MILLIGRAM(S): 50 CAPSULE, EXTENDED RELEASE ORAL at 21:36

## 2021-06-29 RX ADMIN — MORPHINE SULFATE 1 MILLIGRAM(S): 50 CAPSULE, EXTENDED RELEASE ORAL at 14:52

## 2021-06-29 RX ADMIN — PANTOPRAZOLE SODIUM 40 MILLIGRAM(S): 20 TABLET, DELAYED RELEASE ORAL at 06:03

## 2021-06-29 RX ADMIN — MORPHINE SULFATE 1 MILLIGRAM(S): 50 CAPSULE, EXTENDED RELEASE ORAL at 15:36

## 2021-06-29 RX ADMIN — MORPHINE SULFATE 2 MILLIGRAM(S): 50 CAPSULE, EXTENDED RELEASE ORAL at 01:07

## 2021-06-29 NOTE — PRE PROCEDURE NOTE - PRE PROCEDURE EVALUATION
Pre-Endoscopy Evaluation      Referring Physician:             DARIELA Willard MD                       Procedure: EGD    Indication for Procedure: Abdominal pain, nausea, vomiting abnormal CT + weight loss    Pertinent History:  36y female with PMH of PUD presenting abdominal pain  associated with decreased appetite, nausea and vomiting (non bloody, non bilious)  Hx. PUD - gastric ulcer +H  pylori - treated but did not keep scheduled breath test appt for confirmation of HP eradication and no follow up EGD was done.  Reports 10 pound weight loss within past 1-2 weeks    COVID negative    Abdominal pain with nausea/vomiting and weight loss ?PUD ?occult lesion  CT finding of mural thickening of gastric fundus    NPO since MN  required narcotic analgesics overnight  no hx gastroparesis  IV PPI BID since yesterday    Sedation by Anesthesia [X ]    PAST MEDICAL & SURGICAL HISTORY:  TB (pulmonary tuberculosis)    Pancreatitis, chronic    Anemia    H/O tubal ligation        PMH of Gastroparesis [ ]  Gastric Surgery [ ]  Gastric Outlet Obstruction [ ]    Allergies    No Known Allergies    Intolerances        Latex allergy: [ ] yes [X ] no    Medications:MEDICATIONS  (STANDING):  pantoprazole  Injectable 40 milliGRAM(s) IV Push two times a day    MEDICATIONS  (PRN):      Smoking: [X ] yes  [ ] no    AICD/PPM: [ ] yes   [X ] no    Pertinent lab data:                        10.9   8.02  )-----------( 401      ( 29 Jun 2021 07:07 )             35.6     06-29    139  |  103  |  7   ----------------------------<  94  3.5   |  22  |  0.70    Ca    9.6      29 Jun 2021 07:07    TPro  9.0<H>  /  Alb  5.4<H>  /  TBili  0.5  /  DBili  x   /  AST  12  /  ALT  11  /  AlkPhos  78  06-27        EXAM:  CT ABDOMEN AND PELVIS IC                        PROCEDURE DATE:  06/27/2021        INTERPRETATION:  CLINICAL INFORMATION: Abdominal pain. Duodenal ulcer. Vomiting.      IMPRESSION:  Question of mural thickening involving the gastric fundus. No other imaging finding to explain abdominal pain.          Physical Examination:  Daily     Daily   Vital Signs Last 24 Hrs  T(C): 36.7 (29 Jun 2021 05:59), Max: 36.9 (28 Jun 2021 22:08)  T(F): 98.1 (29 Jun 2021 05:59), Max: 98.5 (28 Jun 2021 22:08)  HR: 71 (29 Jun 2021 05:59) (65 - 82)  BP: 111/73 (29 Jun 2021 05:59) (108/74 - 114/70)  BP(mean): --  RR: 18 (29 Jun 2021 05:59) (18 - 18)  SpO2: 98% (29 Jun 2021 05:59) (97% - 99%)    BP:                 HR:                  SPO2:               Temperature:    Constitutional: NAD    HEENT: PERRLA, EOMI,       Neck:  No JVD    Respiratory: CTAB/L    Cardiovascular: S1 and S2    Gastrointestinal: BS+, soft, ND +epigastric tenderness without rebound guarding or rigidity    Extremities: No peripheral edema    Neurological: A/O x 3, no focal deficits    Psychiatric: Normal mood, normal affect    : No Escobar    Skin: No rashes    Comments:    ASA Class: I [ ]  II [ X]  III [ ]  IV [ ]    The patient is a suitable candidate for the planned procedure unless box checked [ ]  No, explain:                                                                                                 Pre-Endoscopy Evaluation      Referring Physician:             DARIELA Willard MD                       Procedure: EGD    Indication for Procedure: Abdominal pain, nausea, vomiting abnormal CT + weight loss    Pertinent History:  36y female with PMH of PUD presenting abdominal pain  associated with decreased appetite, nausea and vomiting (non bloody, non bilious)  Hx. PUD - gastric ulcer +H  pylori - treated but did not keep scheduled breath test appt for confirmation of HP eradication and no follow up EGD was done.  Reports 10 pound weight loss within past 1-2 weeks    COVID negative    Abdominal pain with nausea/vomiting and weight loss ?PUD ?occult lesion  CT finding of mural thickening of gastric fundus    NPO since MN  required narcotic analgesics overnight  no hx gastroparesis  IV PPI BID since yesterday    Sedation by Anesthesia [X ]    PAST MEDICAL & SURGICAL HISTORY:  TB (pulmonary tuberculosis)    Pancreatitis, chronic    Anemia    H/O tubal ligation        PMH of Gastroparesis [ ]  Gastric Surgery [ ]  Gastric Outlet Obstruction [ ]    Allergies    No Known Allergies    Intolerances        Latex allergy: [ ] yes [X ] no    Medications:MEDICATIONS  (STANDING):  pantoprazole  Injectable 40 milliGRAM(s) IV Push two times a day    MEDICATIONS  (PRN):      Smoking: [X ] yes  [ ] no    AICD/PPM: [ ] yes   [X ] no    Pertinent lab data:                        10.9   8.02  )-----------( 401      ( 29 Jun 2021 07:07 )             35.6     06-29    139  |  103  |  7   ----------------------------<  94  3.5   |  22  |  0.70    Ca    9.6      29 Jun 2021 07:07    TPro  9.0<H>  /  Alb  5.4<H>  /  TBili  0.5  /  DBili  x   /  AST  12  /  ALT  11  /  AlkPhos  78  06-27        EXAM:  CT ABDOMEN AND PELVIS IC                        PROCEDURE DATE:  06/27/2021        INTERPRETATION:  CLINICAL INFORMATION: Abdominal pain. Duodenal ulcer. Vomiting.      IMPRESSION:  Question of mural thickening involving the gastric fundus. No other imaging finding to explain abdominal pain.          Physical Examination:  Daily     Daily   Vital Signs Last 24 Hrs  T(C): 36.7 (29 Jun 2021 05:59), Max: 36.9 (28 Jun 2021 22:08)  T(F): 98.1 (29 Jun 2021 05:59), Max: 98.5 (28 Jun 2021 22:08)  HR: 71 (29 Jun 2021 05:59) (65 - 82)  BP: 111/73 (29 Jun 2021 05:59) (108/74 - 114/70)  BP(mean): --  RR: 18 (29 Jun 2021 05:59) (18 - 18)  SpO2: 98% (29 Jun 2021 05:59) (97% - 99%)    Constitutional: NAD    HEENT: PERRLA, EOMI,       Neck:  No JVD    Respiratory: CTAB/L    Cardiovascular: S1 and S2    Gastrointestinal: BS+, soft, ND +epigastric tenderness without rebound guarding or rigidity    Extremities: No peripheral edema    Neurological: A/O x 3, no focal deficits    Psychiatric: Normal mood, normal affect    : No Escobar    Skin: No rashes    Comments:    ASA Class: I [ ]  II [ X]  III [ ]  IV [ ]

## 2021-06-29 NOTE — PRE PROCEDURE NOTE - ATTENDING COMMENTS
Jose Pineda MD, FACP, FACG, AGAF  [facility]  [phone]     After hours and weekend coverage GI service : [phone]

## 2021-06-30 ENCOUNTER — TRANSCRIPTION ENCOUNTER (OUTPATIENT)
Age: 36
End: 2021-06-30

## 2021-06-30 VITALS
RESPIRATION RATE: 18 BRPM | TEMPERATURE: 98 F | SYSTOLIC BLOOD PRESSURE: 123 MMHG | OXYGEN SATURATION: 99 % | DIASTOLIC BLOOD PRESSURE: 78 MMHG | HEART RATE: 72 BPM | WEIGHT: 156.75 LBS

## 2021-06-30 DIAGNOSIS — K26.9 DUODENAL ULCER, UNSPECIFIED AS ACUTE OR CHRONIC, WITHOUT HEMORRHAGE OR PERFORATION: ICD-10-CM

## 2021-06-30 PROCEDURE — 86901 BLOOD TYPING SEROLOGIC RH(D): CPT

## 2021-06-30 PROCEDURE — 82803 BLOOD GASES ANY COMBINATION: CPT

## 2021-06-30 PROCEDURE — 85014 HEMATOCRIT: CPT

## 2021-06-30 PROCEDURE — U0005: CPT

## 2021-06-30 PROCEDURE — 82435 ASSAY OF BLOOD CHLORIDE: CPT

## 2021-06-30 PROCEDURE — 99233 SBSQ HOSP IP/OBS HIGH 50: CPT

## 2021-06-30 PROCEDURE — 86850 RBC ANTIBODY SCREEN: CPT

## 2021-06-30 PROCEDURE — 82330 ASSAY OF CALCIUM: CPT

## 2021-06-30 PROCEDURE — U0003: CPT

## 2021-06-30 PROCEDURE — 87086 URINE CULTURE/COLONY COUNT: CPT

## 2021-06-30 PROCEDURE — 86769 SARS-COV-2 COVID-19 ANTIBODY: CPT

## 2021-06-30 PROCEDURE — 83690 ASSAY OF LIPASE: CPT

## 2021-06-30 PROCEDURE — 88305 TISSUE EXAM BY PATHOLOGIST: CPT

## 2021-06-30 PROCEDURE — 80053 COMPREHEN METABOLIC PANEL: CPT

## 2021-06-30 PROCEDURE — 85025 COMPLETE CBC W/AUTO DIFF WBC: CPT

## 2021-06-30 PROCEDURE — 80048 BASIC METABOLIC PNL TOTAL CA: CPT

## 2021-06-30 PROCEDURE — 74177 CT ABD & PELVIS W/CONTRAST: CPT

## 2021-06-30 PROCEDURE — 71045 X-RAY EXAM CHEST 1 VIEW: CPT

## 2021-06-30 PROCEDURE — 83605 ASSAY OF LACTIC ACID: CPT

## 2021-06-30 PROCEDURE — 85027 COMPLETE CBC AUTOMATED: CPT

## 2021-06-30 PROCEDURE — 84132 ASSAY OF SERUM POTASSIUM: CPT

## 2021-06-30 PROCEDURE — 84295 ASSAY OF SERUM SODIUM: CPT

## 2021-06-30 PROCEDURE — 82947 ASSAY GLUCOSE BLOOD QUANT: CPT

## 2021-06-30 PROCEDURE — 88341 IMHCHEM/IMCYTCHM EA ADD ANTB: CPT

## 2021-06-30 PROCEDURE — 99285 EMERGENCY DEPT VISIT HI MDM: CPT

## 2021-06-30 PROCEDURE — 86900 BLOOD TYPING SEROLOGIC ABO: CPT

## 2021-06-30 PROCEDURE — 81001 URINALYSIS AUTO W/SCOPE: CPT

## 2021-06-30 PROCEDURE — 85018 HEMOGLOBIN: CPT

## 2021-06-30 RX ORDER — PANTOPRAZOLE SODIUM 20 MG/1
1 TABLET, DELAYED RELEASE ORAL
Qty: 60 | Refills: 0
Start: 2021-06-30 | End: 2021-07-29

## 2021-06-30 RX ORDER — ALPRAZOLAM 0.25 MG
1 TABLET ORAL
Qty: 10 | Refills: 0
Start: 2021-06-30 | End: 2021-07-04

## 2021-06-30 RX ORDER — ACETAMINOPHEN 500 MG
2 TABLET ORAL
Qty: 0 | Refills: 0 | DISCHARGE
Start: 2021-06-30

## 2021-06-30 RX ORDER — MORPHINE SULFATE 50 MG/1
0.5 CAPSULE, EXTENDED RELEASE ORAL ONCE
Refills: 0 | Status: DISCONTINUED | OUTPATIENT
Start: 2021-06-30 | End: 2021-06-30

## 2021-06-30 RX ADMIN — MORPHINE SULFATE 1 MILLIGRAM(S): 50 CAPSULE, EXTENDED RELEASE ORAL at 10:00

## 2021-06-30 RX ADMIN — MORPHINE SULFATE 0.5 MILLIGRAM(S): 50 CAPSULE, EXTENDED RELEASE ORAL at 03:26

## 2021-06-30 RX ADMIN — POLYETHYLENE GLYCOL 3350 17 GRAM(S): 17 POWDER, FOR SOLUTION ORAL at 11:44

## 2021-06-30 RX ADMIN — MORPHINE SULFATE 0.5 MILLIGRAM(S): 50 CAPSULE, EXTENDED RELEASE ORAL at 02:56

## 2021-06-30 RX ADMIN — MORPHINE SULFATE 1 MILLIGRAM(S): 50 CAPSULE, EXTENDED RELEASE ORAL at 09:29

## 2021-06-30 RX ADMIN — PANTOPRAZOLE SODIUM 40 MILLIGRAM(S): 20 TABLET, DELAYED RELEASE ORAL at 05:16

## 2021-06-30 NOTE — PROGRESS NOTE ADULT - PROBLEM SELECTOR PLAN 1
t:pt  tolerating  po  well   no  abdominal pain discussed  endoscopy  findings
pt  c/o  abdominal pain  , s/p  upper  endoscopy with  gastritis  and non bleeding  duodenal  ulcer  , to  continue on protonix  40 mg  bid

## 2021-06-30 NOTE — DISCHARGE NOTE PROVIDER - NSDCMRMEDTOKEN_GEN_ALL_CORE_FT
acetaminophen 325 mg oral tablet: 2 tab(s) orally every 6 hours, As needed, Mild Pain (1 - 3)  multivitamin: 1 tab(s) orally once a day  polyethylene glycol 3350 oral powder for reconstitution: 17 gram(s) orally once a day, As Needed for constipation  Protonix 40 mg oral granule, delayed release: 1 each orally every 12 hours

## 2021-06-30 NOTE — DISCHARGE NOTE PROVIDER - PROVIDER TOKENS
PROVIDER:[TOKEN:[[id number]],FOLLOWUP:[1 week]],PROVIDER:[TOKEN:[[id number]],FOLLOWUP:[1 week],ESTABLISHEDPATIENT:[T]]

## 2021-06-30 NOTE — DISCHARGE NOTE NURSING/CASE MANAGEMENT/SOCIAL WORK - NSDCVIVACCINE_GEN_ALL_CORE_FT
Tdap; 22-May-2019 15:13; Meghan Castro (BILLY); [employer]; [id number] (Exp. Date: 12-Mar-2021); IntraMuscular; Deltoid Left.; 0.5 milliLiter(s); VIS (VIS Published: 09-May-2013, VIS Presented: 22-May-2019);

## 2021-06-30 NOTE — PROGRESS NOTE ADULT - PROBLEM SELECTOR PLAN 2
t:pt  tolerating  po  well   no  abdominal pain discussed  endoscopy  findings will  discharge  on protonix  40 mg  bis   x 2 months

## 2021-06-30 NOTE — PROGRESS NOTE ADULT - ASSESSMENT
36y female with PMH of PUD presenting abdominal pain  associated with decreased appetite, nausea and vomiting (non bloody, non bilious)  Hx. PUD - gastric ulcer +H  pylori - treated but did not keep scheduled breath test appt for confirmation of HP eradication and no follow up EGD was done.  Reports 10 pound weight loss within past 1-2 weeks    COVID negative    Abdominal pain with nausea/vomiting and weight loss   CT finding of mural thickening of gastric fundus  EGD 6/29 - non bleeding DU, gastritis  -PPI BID (change to PO)  -follow up EGD pathology as outpt with primary GI  -tobacco cessation encouraged; agreeable to trial of Nicoderm (Medicine attending updated)      Discussed with pt, all questions answered  D/W Medicine attending  No GI objection to discharge      Ovidio Veliz PA-C    [facility]  [phone]  After hours and weekend coverage [phone]

## 2021-06-30 NOTE — DISCHARGE NOTE PROVIDER - HOSPITAL COURSE
35 yo female with PMH of PUD who presented with   diffuse abdominal pain radiating to the back  which started 3 prior to admission.  Not tolerating PO, + vomiting (NBNB), no black or bloody stools. Felt chills and subjective fevers.  Nausea and vomiting:  Pt  tolerating  po  well   no  abdominal pain discussed  Endoscopy  findings.   Duodenal bulb ulcer:    Pt  tolerating  po  well   no  abdominal pain discussed  Endoscopy  findings will  discharge  on Protonix  40 mg  twice daily for  2 months.   Pt medically cleared for discharge home.     37 yo female with PMH of PUD who presented with   diffuse abdominal pain radiating to the back  which started 3 prior to admission.  Not tolerating PO, + vomiting (NBNB), no black or bloody stools. Felt chills and subjective fevers.  Nausea and vomiting:  Pt  tolerating  po  well   no  abdominal pain discussed  Endoscopy  findings.   Duodenal bulb ulcer:    Pt  tolerating  po  well   no  abdominal pain discussed  Endoscopy  findings will  discharge  on Protonix  40 mg  twice daily for  2 months.   Pt medically cleared for discharge home per GI and Med Attending Dr Willard.

## 2021-06-30 NOTE — DISCHARGE NOTE PROVIDER - CARE PROVIDER_API CALL
Casa Willard)  Medicine  [address] [place] [address], [address]  [place] [address]  Phone: [phone]  Fax: [phone]  Follow Up Time: 1 week    Jose Pineda  GASTROENTEROLOGY  [address] [place] [address], [address]  [place], [place] [id number]  Phone: [phone]  Fax: [phone]  Established Patient  Follow Up Time: 1 week

## 2021-06-30 NOTE — DISCHARGE NOTE PROVIDER - NSDCCPCAREPLAN_GEN_ALL_CORE_FT
PRINCIPAL DISCHARGE DIAGNOSIS  Diagnosis: Nausea and vomiting, intractability of vomiting not specified, unspecified vomiting type  Assessment and Plan of Treatment: EGD 6/29 - non bleeding DU, gastritis  Take Protonix Q12 hours every day.   Follow up EGD pathology as outpt with primary GI      SECONDARY DISCHARGE DIAGNOSES  Diagnosis: Duodenal ulcer  Assessment and Plan of Treatment: Contihue Protonix as ordered.  Follow up with Dr Pineda in 1 week for results of Endoscopy. .

## 2021-06-30 NOTE — DISCHARGE NOTE NURSING/CASE MANAGEMENT/SOCIAL WORK - PATIENT PORTAL LINK FT
You can access the FollowMyHealth Patient Portal offered by [facility] by registering at the following website: [email or web address]. By joining [facility]’s FollowMyHealth portal, you will also be able to view your health information using other applications (apps) compatible with our system.

## 2021-06-30 NOTE — PROGRESS NOTE ADULT - SUBJECTIVE AND OBJECTIVE BOX
Chief complaint:pt  c/o  abdominal pain  , s/p  upper  endoscopy with  gastritis  and non bleeding  duodenal  ulcer  , to  continue on protonix  40 mg  bid     HPI:  36y female with PMH of PUD presenting abdominal pain. Started 3 days ago, diffuse abdominal pain radiating to the back. Not tolerating PO, +vomiting (NBNB), no black or bloody stools. Felt chills and subjective fevers. No EtOH, allergies reported (2021 17:07)      REVIEW OF SYSTEMS:    CONSTITUTIONAL: No fever, weight loss, or fatigue  NECK: No pain or stiffness  RESPIRATORY: No cough, wheezing, chills or hemoptysis; No shortness of breath  CARDIOVASCULAR: No chest pain, palpitations, dizziness, or leg swelling  GASTROINTESTINAL: No abdominal or epigastric pain. No nausea, vomiting, or hematemesis; No diarrhea or constipation. No melena or hematochezia.  GENITOURINARY: No dysuria, frequency, hematuria, or incontinence  NEUROLOGICAL: No headaches, memory loss, loss of strength, numbness, or tremors  SKIN: No itching, burning, rashes, or lesions   LYMPH NODES: No enlarged glands  MUSCULOSKELETAL: No joint pain or swelling; No muscle, back, or extremity pain  HEME/LYMPH: No easy bruising, or bleeding gums    MEDICATIONS  (STANDING):  pantoprazole  Injectable 40 milliGRAM(s) IV Push two times a day  sodium chloride 0.9%. 500 milliLiter(s) (30 mL/Hr) IV Continuous <Continuous>    MEDICATIONS  (PRN):      Allergies    No Known Allergies    Intolerances        Vital Signs Last 24 Hrs  T(C): 36.1 (2021 08:52), Max: 36.9 (2021 22:08)  T(F): 97 (2021 08:28), Max: 98.5 (2021 22:08)  HR: 72 (2021 09:08) (71 - 82)  BP: 90/51 (2021 09:08) (90/51 - 114/70)  BP(mean): --  RR: 19 (2021 09:08) (17 - 19)  SpO2: 100% (2021 09:08) (97% - 100%)    PHYSICAL EXAM:    GENERAL: NAD, well-groomed, well-developed  HEAD:  Atraumatic, Normocephalic  EYES: EOMI, PERRLA, conjunctiva and sclera clear  ENMT: No tonsillar erythema, exudates, or enlargement; Moist mucous membranes, Good dentition, No lesions  NECK: Supple, No JVD, Normal thyroid  NERVOUS SYSTEM:  Alert & Oriented X3, Good concentration; Motor Strength 5/5 B/L upper and lower extremities; DTRs 2+ intact and symmetric  CHEST/LUNG: Clear to percussion bilaterally; No rales, rhonchi, wheezing, or rubs  HEART: Regular rate and rhythm; No murmurs, rubs, or gallops  ABDOMEN: Soft, Nontender, Nondistended; Bowel sounds present  EXTREMITIES:  2+ Peripheral Pulses, No clubbing, cyanosis, or edema  LYMPH: No lymphadenopathy noted  SKIN: No rashes or lesions      LABS:                        10.9   8.02  )-----------( 401      ( 2021 07:07 )             35.6     06-29    139  |  103  |  7   ----------------------------<  94  3.5   |  22  |  0.70    Ca    9.6      2021 07:07    TPro  9.0<H>  /  Alb  5.4<H>  /  TBili  0.5  /  DBili  x   /  AST  12  /  ALT  11  /  AlkPhos  78  06-      Urinalysis Basic - ( 2021 10:52 )    Color: Yellow / Appearance: Slightly Turbid / S.041 / pH: x  Gluc: x / Ketone: Large  / Bili: Small / Urobili: 2 mg/dL   Blood: x / Protein: >600 / Nitrite: Negative   Leuk Esterase: Negative / RBC: 86 /hpf / WBC 6 /HPF   Sq Epi: x / Non Sq Epi: 6 /hpf / Bacteria: Negative        RADIOLOGY & ADDITIONAL STUDIES:
Patient is a 36y old  Female who presented with a chief complaint of Abdominal pain (30 Jun 2021 09:17)      INTERVAL HPI/OVERNIGHT EVENTS:  tolerating PO diet  no nausea or vomiting    s/p EGD yesterday +duodenal ulcer (non bleeding), gastritis (biopsied)    MEDICATIONS  (STANDING):  pantoprazole  Injectable 40 milliGRAM(s) IV Push two times a day  polyethylene glycol 3350 17 Gram(s) Oral daily  sodium chloride 0.9%. 500 milliLiter(s) (30 mL/Hr) IV Continuous <Continuous>    MEDICATIONS  (PRN):  acetaminophen   Tablet .. 650 milliGRAM(s) Oral every 6 hours PRN Mild Pain (1 - 3)  morphine  - Injectable 1 milliGRAM(s) IV Push every 8 hours PRN Severe Pain (7 - 10)      Allergies  No Known Allergies      Review of Systems:  General:  No wt loss, fevers, chills, night sweats   CV:  No pain, palpitations, hypo/hypertension  Resp:  No dyspnea, cough, tachypnea, wheezing +tobacco +hx pulm. TB  GI:  see HPI  :  No pain, bleeding, incontinence, nocturia  Muscle:  No pain, weakness  Neuro:  No weakness, tingling, memory problems  Psych:  No fatigue, insomnia, mood problems, depression  Endocrine:  No polyuria, polydypsia, cold/heat intolerance  Heme:  No petechiae, ecchymosis, easy bruisability  Skin:  No rash, tattoos, scars, edema        Vital Signs Last 24 Hrs  T(C): 36.6 (30 Jun 2021 09:26), Max: 36.9 (30 Jun 2021 05:16)  T(F): 97.9 (30 Jun 2021 09:26), Max: 98.4 (30 Jun 2021 05:16)  HR: 78 (30 Jun 2021 09:26) (62 - 80)  BP: 109/68 (30 Jun 2021 09:26) (103/68 - 110/75)  BP(mean): --  RR: 18 (30 Jun 2021 09:26) (18 - 18)  SpO2: 97% (30 Jun 2021 09:26) (97% - 99%)    PHYSICAL EXAM:  Constitutional: NAD, well-developed non toxic appearing AA female   Neck: No LAD, supple  Respiratory: CTA and P  Cardiovascular: S1 and S2, RRR, no M  Gastrointestinal: BS+, soft, ND NT without rebound or rigidity  Extremities: No peripheral edema, neg clubbing, cyanosis  Vascular: 2+ peripheral pulses  Neurological: A/O x 3, no focal deficits  Psychiatric: Normal mood, normal affect  Skin: No rashes, anicteric        LABS:                        10.9   8.02  )-----------( 401      ( 29 Jun 2021 07:07 )             35.6     06-29    139  |  103  |  7   ----------------------------<  94  3.5   |  22  |  0.70    Ca    9.6      29 Jun 2021 07:07          RADIOLOGY & ADDITIONAL TESTS:  
Chief complaint:pt  tolerating  po  well   no  abdominal pain discussed  endoscopy  findings    HPI:  36y female with PMH of PUD presenting abdominal pain. Started 3 days ago, diffuse abdominal pain radiating to the back. Not tolerating PO, +vomiting (NBNB), no black or bloody stools. Felt chills and subjective fevers. No EtOH, allergies reported (27 Jun 2021 17:07)      REVIEW OF SYSTEMS:    CONSTITUTIONAL: No fever, weight loss, or fatigue  NECK: No pain or stiffness  RESPIRATORY: No cough, wheezing, chills or hemoptysis; No shortness of breath  CARDIOVASCULAR: No chest pain, palpitations, dizziness, or leg swelling  GASTROINTESTINAL: No abdominal or epigastric pain. No nausea, vomiting, or hematemesis; No diarrhea or constipation. No melena or hematochezia.  GENITOURINARY: No dysuria, frequency, hematuria, or incontinence  NEUROLOGICAL: No headaches, memory loss, loss of strength, numbness, or tremors  SKIN: No itching, burning, rashes, or lesions   LYMPH NODES: No enlarged glands  MUSCULOSKELETAL: No joint pain or swelling; No muscle, back, or extremity pain  HEME/LYMPH: No easy bruising, or bleeding gums    MEDICATIONS  (STANDING):  pantoprazole  Injectable 40 milliGRAM(s) IV Push two times a day  polyethylene glycol 3350 17 Gram(s) Oral daily  sodium chloride 0.9%. 500 milliLiter(s) (30 mL/Hr) IV Continuous <Continuous>    MEDICATIONS  (PRN):  acetaminophen   Tablet .. 650 milliGRAM(s) Oral every 6 hours PRN Mild Pain (1 - 3)  morphine  - Injectable 1 milliGRAM(s) IV Push every 8 hours PRN Severe Pain (7 - 10)      Allergies    No Known Allergies    Intolerances        Vital Signs Last 24 Hrs  T(C): 36.9 (30 Jun 2021 05:16), Max: 36.9 (30 Jun 2021 05:16)  T(F): 98.4 (30 Jun 2021 05:16), Max: 98.4 (30 Jun 2021 05:16)  HR: 78 (30 Jun 2021 05:16) (62 - 80)  BP: 110/75 (30 Jun 2021 05:16) (90/60 - 111/77)  BP(mean): --  RR: 18 (30 Jun 2021 05:16) (17 - 18)  SpO2: 98% (30 Jun 2021 05:16) (98% - 100%)    PHYSICAL EXAM:    GENERAL: NAD, well-groomed, well-developed  HEAD:  Atraumatic, Normocephalic  EYES: EOMI, PERRLA, conjunctiva and sclera clear  ENMT: No tonsillar erythema, exudates, or enlargement; Moist mucous membranes, Good dentition, No lesions  NECK: Supple, No JVD, Normal thyroid  NERVOUS SYSTEM:  Alert & Oriented X3, Good concentration; Motor Strength 5/5 B/L upper and lower extremities; DTRs 2+ intact and symmetric  CHEST/LUNG: Clear to percussion bilaterally; No rales, rhonchi, wheezing, or rubs  HEART: Regular rate and rhythm; No murmurs, rubs, or gallops  ABDOMEN: Soft, Nontender, Nondistended; Bowel sounds present  EXTREMITIES:  2+ Peripheral Pulses, No clubbing, cyanosis, or edema  LYMPH: No lymphadenopathy noted  SKIN: No rashes or lesions      LABS:                        10.9   8.02  )-----------( 401      ( 29 Jun 2021 07:07 )             35.6     06-29    139  |  103  |  7   ----------------------------<  94  3.5   |  22  |  0.70    Ca    9.6      29 Jun 2021 07:07            RADIOLOGY & ADDITIONAL STUDIES:

## 2021-07-02 LAB — SURGICAL PATHOLOGY STUDY: SIGNIFICANT CHANGE UP

## 2021-07-07 NOTE — PROVIDER CONTACT NOTE (CRITICAL VALUE NOTIFICATION) - NAME OF MD/NP/PA/DO NOTIFIED:
We haven't done exam so will try diflucan and if persist needs to make eval here or with GYN. Don't recommend cream anymore since not helping  
Sydney CHERY
Tiny CHERY
Tiny Holly
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
## 2021-12-01 PROCEDURE — G9005: CPT

## 2022-01-02 ENCOUNTER — INPATIENT (INPATIENT)
Facility: HOSPITAL | Age: 37
LOS: 0 days | Discharge: HOME | DRG: 392 | End: 2022-01-03
Attending: INTERNAL MEDICINE | Admitting: INTERNAL MEDICINE
Payer: MEDICAID

## 2022-01-02 VITALS
HEIGHT: 64 IN | WEIGHT: 139.99 LBS | RESPIRATION RATE: 20 BRPM | TEMPERATURE: 98 F | HEART RATE: 102 BPM | SYSTOLIC BLOOD PRESSURE: 113 MMHG | DIASTOLIC BLOOD PRESSURE: 75 MMHG | OXYGEN SATURATION: 97 %

## 2022-01-02 DIAGNOSIS — F41.9 ANXIETY DISORDER, UNSPECIFIED: ICD-10-CM

## 2022-01-02 DIAGNOSIS — Z98.51 TUBAL LIGATION STATUS: Chronic | ICD-10-CM

## 2022-01-02 DIAGNOSIS — R11.2 NAUSEA WITH VOMITING, UNSPECIFIED: ICD-10-CM

## 2022-01-02 DIAGNOSIS — K27.9 PEPTIC ULCER, SITE UNSPECIFIED, UNSPECIFIED AS ACUTE OR CHRONIC, WITHOUT HEMORRHAGE OR PERFORATION: ICD-10-CM

## 2022-01-02 DIAGNOSIS — Z29.9 ENCOUNTER FOR PROPHYLACTIC MEASURES, UNSPECIFIED: ICD-10-CM

## 2022-01-02 DIAGNOSIS — R10.9 UNSPECIFIED ABDOMINAL PAIN: ICD-10-CM

## 2022-01-02 DIAGNOSIS — D64.9 ANEMIA, UNSPECIFIED: ICD-10-CM

## 2022-01-02 LAB
ALBUMIN SERPL ELPH-MCNC: 4.9 G/DL — SIGNIFICANT CHANGE UP (ref 3.3–5)
ALP SERPL-CCNC: 61 U/L — SIGNIFICANT CHANGE UP (ref 40–120)
ALT FLD-CCNC: 13 U/L — SIGNIFICANT CHANGE UP (ref 10–45)
ANION GAP SERPL CALC-SCNC: 13 MMOL/L — SIGNIFICANT CHANGE UP (ref 5–17)
AST SERPL-CCNC: 32 U/L — SIGNIFICANT CHANGE UP (ref 10–40)
BASOPHILS # BLD AUTO: 0 K/UL — SIGNIFICANT CHANGE UP (ref 0–0.2)
BASOPHILS NFR BLD AUTO: 0 % — SIGNIFICANT CHANGE UP (ref 0–2)
BILIRUB SERPL-MCNC: 0.4 MG/DL — SIGNIFICANT CHANGE UP (ref 0.2–1.2)
BUN SERPL-MCNC: 11 MG/DL — SIGNIFICANT CHANGE UP (ref 7–23)
CALCIUM SERPL-MCNC: 10 MG/DL — SIGNIFICANT CHANGE UP (ref 8.4–10.5)
CHLORIDE SERPL-SCNC: 102 MMOL/L — SIGNIFICANT CHANGE UP (ref 96–108)
CO2 SERPL-SCNC: 21 MMOL/L — LOW (ref 22–31)
CREAT SERPL-MCNC: 0.75 MG/DL — SIGNIFICANT CHANGE UP (ref 0.5–1.3)
DACRYOCYTES BLD QL SMEAR: SLIGHT — SIGNIFICANT CHANGE UP
ELLIPTOCYTES BLD QL SMEAR: SLIGHT — SIGNIFICANT CHANGE UP
EOSINOPHIL # BLD AUTO: 0.08 K/UL — SIGNIFICANT CHANGE UP (ref 0–0.5)
EOSINOPHIL NFR BLD AUTO: 0.9 % — SIGNIFICANT CHANGE UP (ref 0–6)
GLUCOSE SERPL-MCNC: 113 MG/DL — HIGH (ref 70–99)
HCG SERPL-ACNC: <2 MIU/ML — SIGNIFICANT CHANGE UP
HCT VFR BLD CALC: 31.4 % — LOW (ref 34.5–45)
HCT VFR BLD CALC: 34.9 % — SIGNIFICANT CHANGE UP (ref 34.5–45)
HGB BLD-MCNC: 10.8 G/DL — LOW (ref 11.5–15.5)
HGB BLD-MCNC: 9.6 G/DL — LOW (ref 11.5–15.5)
HYPOCHROMIA BLD QL: SIGNIFICANT CHANGE UP
LIDOCAIN IGE QN: 72 U/L — HIGH (ref 7–60)
LYMPHOCYTES # BLD AUTO: 4.07 K/UL — HIGH (ref 1–3.3)
LYMPHOCYTES # BLD AUTO: 46.5 % — HIGH (ref 13–44)
MAGNESIUM SERPL-MCNC: 2.1 MG/DL — SIGNIFICANT CHANGE UP (ref 1.6–2.6)
MANUAL SMEAR VERIFICATION: SIGNIFICANT CHANGE UP
MCHC RBC-ENTMCNC: 22.7 PG — LOW (ref 27–34)
MCHC RBC-ENTMCNC: 22.9 PG — LOW (ref 27–34)
MCHC RBC-ENTMCNC: 30.6 GM/DL — LOW (ref 32–36)
MCHC RBC-ENTMCNC: 30.9 GM/DL — LOW (ref 32–36)
MCV RBC AUTO: 74.1 FL — LOW (ref 80–100)
MCV RBC AUTO: 74.4 FL — LOW (ref 80–100)
MONOCYTES # BLD AUTO: 0.69 K/UL — SIGNIFICANT CHANGE UP (ref 0–0.9)
MONOCYTES NFR BLD AUTO: 7.9 % — SIGNIFICANT CHANGE UP (ref 2–14)
NEUTROPHILS # BLD AUTO: 3.92 K/UL — SIGNIFICANT CHANGE UP (ref 1.8–7.4)
NEUTROPHILS NFR BLD AUTO: 44.7 % — SIGNIFICANT CHANGE UP (ref 43–77)
NRBC # BLD: 0 /100 WBCS — SIGNIFICANT CHANGE UP (ref 0–0)
PLAT MORPH BLD: ABNORMAL
PLATELET # BLD AUTO: 389 K/UL — SIGNIFICANT CHANGE UP (ref 150–400)
PLATELET # BLD AUTO: 442 K/UL — HIGH (ref 150–400)
POIKILOCYTOSIS BLD QL AUTO: SLIGHT — SIGNIFICANT CHANGE UP
POLYCHROMASIA BLD QL SMEAR: SLIGHT — SIGNIFICANT CHANGE UP
POTASSIUM SERPL-MCNC: 5.1 MMOL/L — SIGNIFICANT CHANGE UP (ref 3.5–5.3)
POTASSIUM SERPL-SCNC: 5.1 MMOL/L — SIGNIFICANT CHANGE UP (ref 3.5–5.3)
PROT SERPL-MCNC: 8.4 G/DL — HIGH (ref 6–8.3)
RBC # BLD: 4.22 M/UL — SIGNIFICANT CHANGE UP (ref 3.8–5.2)
RBC # BLD: 4.71 M/UL — SIGNIFICANT CHANGE UP (ref 3.8–5.2)
RBC # FLD: 18.3 % — HIGH (ref 10.3–14.5)
RBC # FLD: 18.4 % — HIGH (ref 10.3–14.5)
RBC BLD AUTO: ABNORMAL
SARS-COV-2 RNA SPEC QL NAA+PROBE: SIGNIFICANT CHANGE UP
SMUDGE CELLS # BLD: PRESENT — SIGNIFICANT CHANGE UP
SODIUM SERPL-SCNC: 136 MMOL/L — SIGNIFICANT CHANGE UP (ref 135–145)
TARGETS BLD QL SMEAR: SLIGHT — SIGNIFICANT CHANGE UP
WBC # BLD: 7.33 K/UL — SIGNIFICANT CHANGE UP (ref 3.8–10.5)
WBC # BLD: 8.76 K/UL — SIGNIFICANT CHANGE UP (ref 3.8–10.5)
WBC # FLD AUTO: 7.33 K/UL — SIGNIFICANT CHANGE UP (ref 3.8–10.5)
WBC # FLD AUTO: 8.76 K/UL — SIGNIFICANT CHANGE UP (ref 3.8–10.5)

## 2022-01-02 PROCEDURE — 99285 EMERGENCY DEPT VISIT HI MDM: CPT

## 2022-01-02 PROCEDURE — 99222 1ST HOSP IP/OBS MODERATE 55: CPT

## 2022-01-02 RX ORDER — KETOROLAC TROMETHAMINE 30 MG/ML
15 SYRINGE (ML) INJECTION ONCE
Refills: 0 | Status: DISCONTINUED | OUTPATIENT
Start: 2022-01-02 | End: 2022-01-02

## 2022-01-02 RX ORDER — PANTOPRAZOLE SODIUM 20 MG/1
40 TABLET, DELAYED RELEASE ORAL
Refills: 0 | Status: DISCONTINUED | OUTPATIENT
Start: 2022-01-02 | End: 2022-01-03

## 2022-01-02 RX ORDER — NICOTINE POLACRILEX 2 MG
1 GUM BUCCAL DAILY
Refills: 0 | Status: DISCONTINUED | OUTPATIENT
Start: 2022-01-02 | End: 2022-01-03

## 2022-01-02 RX ORDER — ONDANSETRON 8 MG/1
4 TABLET, FILM COATED ORAL EVERY 8 HOURS
Refills: 0 | Status: DISCONTINUED | OUTPATIENT
Start: 2022-01-02 | End: 2022-01-03

## 2022-01-02 RX ORDER — ACETAMINOPHEN 500 MG
650 TABLET ORAL ONCE
Refills: 0 | Status: COMPLETED | OUTPATIENT
Start: 2022-01-02 | End: 2022-01-02

## 2022-01-02 RX ORDER — SODIUM CHLORIDE 9 MG/ML
1000 INJECTION, SOLUTION INTRAVENOUS ONCE
Refills: 0 | Status: COMPLETED | OUTPATIENT
Start: 2022-01-02 | End: 2022-01-02

## 2022-01-02 RX ORDER — POLYETHYLENE GLYCOL 3350 17 G/17G
17 POWDER, FOR SOLUTION ORAL DAILY
Refills: 0 | Status: DISCONTINUED | OUTPATIENT
Start: 2022-01-02 | End: 2022-01-03

## 2022-01-02 RX ORDER — SODIUM CHLORIDE 9 MG/ML
1000 INJECTION, SOLUTION INTRAVENOUS
Refills: 0 | Status: DISCONTINUED | OUTPATIENT
Start: 2022-01-02 | End: 2022-01-03

## 2022-01-02 RX ORDER — MORPHINE SULFATE 50 MG/1
2 CAPSULE, EXTENDED RELEASE ORAL ONCE
Refills: 0 | Status: DISCONTINUED | OUTPATIENT
Start: 2022-01-02 | End: 2022-01-02

## 2022-01-02 RX ORDER — LANOLIN ALCOHOL/MO/W.PET/CERES
3 CREAM (GRAM) TOPICAL AT BEDTIME
Refills: 0 | Status: DISCONTINUED | OUTPATIENT
Start: 2022-01-02 | End: 2022-01-03

## 2022-01-02 RX ORDER — SUCRALFATE 1 G
1 TABLET ORAL ONCE
Refills: 0 | Status: COMPLETED | OUTPATIENT
Start: 2022-01-02 | End: 2022-01-02

## 2022-01-02 RX ORDER — ONDANSETRON 8 MG/1
4 TABLET, FILM COATED ORAL ONCE
Refills: 0 | Status: COMPLETED | OUTPATIENT
Start: 2022-01-02 | End: 2022-01-02

## 2022-01-02 RX ORDER — ENOXAPARIN SODIUM 100 MG/ML
40 INJECTION SUBCUTANEOUS DAILY
Refills: 0 | Status: DISCONTINUED | OUTPATIENT
Start: 2022-01-02 | End: 2022-01-03

## 2022-01-02 RX ORDER — FAMOTIDINE 10 MG/ML
20 INJECTION INTRAVENOUS ONCE
Refills: 0 | Status: COMPLETED | OUTPATIENT
Start: 2022-01-02 | End: 2022-01-02

## 2022-01-02 RX ORDER — MIRTAZAPINE 45 MG/1
15 TABLET, ORALLY DISINTEGRATING ORAL AT BEDTIME
Refills: 0 | Status: DISCONTINUED | OUTPATIENT
Start: 2022-01-02 | End: 2022-01-03

## 2022-01-02 RX ORDER — ACETAMINOPHEN 500 MG
650 TABLET ORAL EVERY 6 HOURS
Refills: 0 | Status: DISCONTINUED | OUTPATIENT
Start: 2022-01-02 | End: 2022-01-03

## 2022-01-02 RX ORDER — SUCRALFATE 1 G
1 TABLET ORAL
Refills: 0 | Status: DISCONTINUED | OUTPATIENT
Start: 2022-01-02 | End: 2022-01-03

## 2022-01-02 RX ADMIN — SODIUM CHLORIDE 1000 MILLILITER(S): 9 INJECTION, SOLUTION INTRAVENOUS at 03:14

## 2022-01-02 RX ADMIN — Medication 1 GRAM(S): at 16:56

## 2022-01-02 RX ADMIN — ONDANSETRON 4 MILLIGRAM(S): 8 TABLET, FILM COATED ORAL at 01:52

## 2022-01-02 RX ADMIN — Medication 3 MILLIGRAM(S): at 21:29

## 2022-01-02 RX ADMIN — SODIUM CHLORIDE 75 MILLILITER(S): 9 INJECTION, SOLUTION INTRAVENOUS at 18:36

## 2022-01-02 RX ADMIN — FAMOTIDINE 20 MILLIGRAM(S): 10 INJECTION INTRAVENOUS at 01:53

## 2022-01-02 RX ADMIN — Medication 15 MILLIGRAM(S): at 03:45

## 2022-01-02 RX ADMIN — Medication 650 MILLIGRAM(S): at 19:34

## 2022-01-02 RX ADMIN — MORPHINE SULFATE 2 MILLIGRAM(S): 50 CAPSULE, EXTENDED RELEASE ORAL at 03:25

## 2022-01-02 RX ADMIN — PANTOPRAZOLE SODIUM 40 MILLIGRAM(S): 20 TABLET, DELAYED RELEASE ORAL at 21:29

## 2022-01-02 RX ADMIN — MORPHINE SULFATE 2 MILLIGRAM(S): 50 CAPSULE, EXTENDED RELEASE ORAL at 11:16

## 2022-01-02 RX ADMIN — Medication 1 PATCH: at 22:21

## 2022-01-02 RX ADMIN — MORPHINE SULFATE 2 MILLIGRAM(S): 50 CAPSULE, EXTENDED RELEASE ORAL at 04:00

## 2022-01-02 RX ADMIN — MORPHINE SULFATE 2 MILLIGRAM(S): 50 CAPSULE, EXTENDED RELEASE ORAL at 08:14

## 2022-01-02 RX ADMIN — MIRTAZAPINE 15 MILLIGRAM(S): 45 TABLET, ORALLY DISINTEGRATING ORAL at 22:20

## 2022-01-02 RX ADMIN — Medication 15 MILLIGRAM(S): at 04:30

## 2022-01-02 RX ADMIN — PANTOPRAZOLE SODIUM 40 MILLIGRAM(S): 20 TABLET, DELAYED RELEASE ORAL at 11:15

## 2022-01-02 RX ADMIN — Medication 1 GRAM(S): at 22:20

## 2022-01-02 NOTE — ED PROVIDER NOTE - CLINICAL SUMMARY MEDICAL DECISION MAKING FREE TEXT BOX
Harris Nicole MD. pt presents for epigastric abd pain, has chronic PUD and pancreatitis, presents to the ED for similar pain with vomiting. pt has recent stressors due to family deaths, which makes her pain worse, causing her to have trouble eating. pt denies SI/HI. pt abd is soft, tender to epigastric region. no ruq tenderness. pt otherwise well appearing. will obtain labs, ivf, analgesia, reassess Harris Nicole MD. pt presents for epigastric abd pain, has chronic PUD and pancreatitis, presents to the ED for similar pain with vomiting. pt has recent stressors due to family deaths, which makes her pain worse, causing her to have trouble eating. pt denies SI/HI. pt abd is soft, tender to epigastric region. no ruq tenderness. pt otherwise well appearing. will obtain labs, ivf, analgesia, reassess  Attending Tran Rausch: 35 yo female prsenting with abdominal pain. h/o pancreatitis. reports unable to tolerate po and has been vomiting. h/o similar in the past and diagnosed with peptic ulcer disease. does endorse marijuana use which could be contributing. on exam abd soft and notnender. no RUQ ttp . less likely biliary cause. no black ot bloody stools. willcheck labs, IVF, anti emetic, and gi cocktail. may require admission for GI eval

## 2022-01-02 NOTE — H&P ADULT - PROBLEM SELECTOR PLAN 2
reports one ep of blood in emesis, may be 2/2 retching from nausea however given hgb stable from prior and no repeat episodes since then, less likely alex renee tear vs active UGIB. BUN also not elevated.  - ppi 40 mg iv q12h  - sucralfate 1 g 4x/day  - outpt gi follow up  - check h pylori stool to ensure resolution of prior h pylori infection from 6/2021

## 2022-01-02 NOTE — H&P ADULT - NSHPPHYSICALEXAM_GEN_ALL_CORE
Vital Signs Last 24 Hrs  T(C): 37 (02 Jan 2022 16:30), Max: 37.1 (02 Jan 2022 06:00)  T(F): 98.6 (02 Jan 2022 16:30), Max: 98.8 (02 Jan 2022 06:00)  HR: 78 (02 Jan 2022 16:30) (77 - 102)  BP: 153/98 (02 Jan 2022 16:30) (113/75 - 153/98)  BP(mean): --  RR: 18 (02 Jan 2022 16:30) (18 - 20)  SpO2: 100% (02 Jan 2022 16:30) (97% - 100%)    CONSTITUTIONAL: Well appearing, slightly disheveled f, in no apparent distress  EYES: No conjunctival or scleral injection, non-icteric; PERRLA and symmetric  ENMT: No external nasal lesions; nasal mucosa not inflamed; normal dentition; no pharyngeal injection or exudates, oral mucosa with moist membranes  RESPIRATORY: Breathing comfortably; lungs CTA without wheeze/rhonchi/rales  CARDIOVASCULAR: +S1S2, RRR, no M/G/R; pedal pulses full and symmetric; no lower extremity edema  GASTROINTESTINAL: mild ttp epigastrium. No palpable masses, +BS throughout, no rebound/guarding; no hernia palpated  MUSCULOSKELETAL: No digital clubbing or cyanosis; no paraspinal tenderness; no joint effusions of upper or lower extremities; normal strength and tone of extremities  SKIN: No rashes or ulcers noted; no subcutaneous nodules or induration palpable  NEUROLOGIC: sensation intact in LEs b/l to light touch  PSYCHIATRIC: A+O x 3; anxious, tearful during exam

## 2022-01-02 NOTE — H&P ADULT - NSHPLABSRESULTS_GEN_ALL_CORE
LABS:                         10.8   8.76  )-----------( 442      ( 02 Jan 2022 01:55 )             34.9     01-02    136  |  102  |  11  ----------------------------<  113<H>  5.1   |  21<L>  |  0.75    Ca    10.0      02 Jan 2022 01:55  Mg     2.1     01-02    TPro  8.4<H>  /  Alb  4.9  /  TBili  0.4  /  DBili  x   /  AST  32  /  ALT  13  /  AlkPhos  61  01-02    no imaging to review

## 2022-01-02 NOTE — ED PROVIDER NOTE - OBJECTIVE STATEMENT
35 yo F PMHx PUD, pancreatitis, anxiety, presents to the ED c/o epigastric abd pain with nbnb emesis chronically, but worsening over the past few days due to stressors. Pt states that she has been feeling overwhelmed due to her father and grandmother dying recently. She feels like the stress makes her stomach pain worse and thus, has been unable to eat or drink, which is stressing her out more. She denies cp, sob, fevers, diarrhea, si/hi, hallucinations.

## 2022-01-02 NOTE — ED ADULT NURSE NOTE - SUICIDE PROTECTIVE FACTORS
Responsibility to family and others/Identifies reasons for living/Has future plans/Fear of death or the actual act of killing self/Engaged in work or school

## 2022-01-02 NOTE — H&P ADULT - NSHPREVIEWOFSYSTEMS_GEN_ALL_CORE
Review of Systems:   CONSTITUTIONAL: +WEIGHT LOSS. No fever  EYES: No eye pain, visual disturbances, or discharge  ENMT:  No difficulty hearing, tinnitus, vertigo; No sinus or throat pain  RESPIRATORY: No SOB. No cough, wheezing, chills or hemoptysis  CARDIOVASCULAR: No chest pain, palpitations, dizziness, or leg swelling  GASTROINTESTINAL: +N/V, BLOODY VOMIT, EPIGASTRIC PAIN. No abdominal pain.  GENITOURINARY: No dysuria, frequency, hematuria, or incontinence  NEUROLOGICAL: No headaches, memory loss, loss of strength, numbness, or tremors  SKIN: No itching, burning, rashes, or lesions   ENDOCRINE: No heat or cold intolerance; No hair loss  MUSCULOSKELETAL: No joint pain or swelling; No muscle, back pain  PSYCHIATRIC: No depression, anxiety, mood swings, or difficulty sleeping  HEME/LYMPH: No easy bruising, or bleeding gums

## 2022-01-02 NOTE — ED ADULT NURSE REASSESSMENT NOTE - NS ED NURSE REASSESS COMMENT FT1
pt. c/o abdominal pain, unable to relax or keep still, was medicated w/ morphine sulfate 2mg IV push and Toradol 15mg IV push. will continue to monitor for its effect.

## 2022-01-02 NOTE — ED ADULT NURSE NOTE - OBJECTIVE STATEMENT
35 y/o female bib self to ED for c/o anxiety r/t stress unable to eat/ drink, takes prilosec for GERD, has multiple stressors ( mother  last year, father  about 3 years, has 5 children, lives w/ a partner who's not supportive,. on assessment, pt. denies any hx psych/ etoh/ drug, however, she admits smoking marijuana on a daily basis, denies any s/hi or any a/v hallucinations, no paranoid ideation or IOR.

## 2022-01-02 NOTE — H&P ADULT - ASSESSMENT
37 y/o F PMH PUD, H pylori, anxiety, anemia, hx of pancreatitis p/w overall anxiety and epigastric pain and inability to tolerate po for the past few days.

## 2022-01-02 NOTE — ED ADULT NURSE REASSESSMENT NOTE - NS ED NURSE REASSESS COMMENT FT1
Pt admitted to medicine, removed to pink 52 as no holding beds available, VSS; pt c/o abdominal pain, tearful, medicine team contacted and pt received morphine 2mg IV, good effect noted, pt sleeping at intervals; continue to monitor.

## 2022-01-02 NOTE — ED PROVIDER NOTE - ATTENDING CONTRIBUTION TO CARE
Attending MD Tran Rausch:  I personally have seen and examined this patient.  Resident note reviewed and agree on plan of care and except where noted.  See HPI, PE, and MDM for details.
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
## 2022-01-02 NOTE — H&P ADULT - PROBLEM SELECTOR PLAN 4
multiple stressors  - seen by SW in the ED  - psych consult  - start mirtazipine 15 mg qhs to assist to insomnia and lack of appetite
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
## 2022-01-02 NOTE — H&P ADULT - NSICDXFAMILYHX_GEN_ALL_CORE_FT
FAMILY HISTORY:  Family history of alcohol abuse, father  , alcohol related issues    Mother  Still living? Unknown  FH: peptic ulcer disease, Age at diagnosis: Age Unknown

## 2022-01-02 NOTE — H&P ADULT - PROBLEM SELECTOR PLAN 1
likely d/t gastritis worsened by anxiety/stressors  - zofran prn  - IVF x 20 hrs  - clear diet w/ ordered to adv as tolerates  - management of PUD and anxiety/depression as below likely d/t gastritis worsened by anxiety/stressors  - zofran prn  - IVF x 20 hrs  - clear diet w/ order to adv as tolerates  - management of PUD and anxiety/depression as below

## 2022-01-02 NOTE — ED PROVIDER NOTE - PHYSICAL EXAMINATION
PHYSICAL EXAM:  GENERAL: non-toxic appearing; in no respiratory distress  HEAD Atraumatic, Normocephalic  NECK: No JVD; trachea midline  EYES: PERRL, EOMs intact b/l w/out deficits; normal conjunctiva  CHEST/LUNG: CTAB no wheezes/rhonchi/rales  HEART: RRR no murmur/gallops/rubs  ABDOMEN: +BS, soft, epigastric tenderness; no ruq tenderness;   EXTREMITIES: No LE edema, +2 radial pulses b/l, +2 DP/PT pulses b/l  MUSCULOSKELETAL: FROM of all 4 extremities;  NERVOUS SYSTEM:  A&Ox3, No motor deficits or sensory deficits; CNII-XII intact; Speech is fluent and appropriate  SKIN:  Warm and dry as visualized PHYSICAL EXAM:  GENERAL: non-toxic appearing; in no respiratory distress  HEAD Atraumatic, Normocephalic  NECK: No JVD; trachea midline  EYES: PERRL, EOMs intact b/l w/out deficits; normal conjunctiva  CHEST/LUNG: CTAB no wheezes/rhonchi/rales  HEART: RRR no murmur/gallops/rubs  ABDOMEN: +BS, soft, epigastric tenderness; no ruq tenderness;   EXTREMITIES: No LE edema, +2 radial pulses b/l, +2 DP/PT pulses b/l  MUSCULOSKELETAL: FROM of all 4 extremities;  NERVOUS SYSTEM:  A&Ox3, No motor deficits or sensory deficits; CNII-XII intact; Speech is fluent and appropriate  SKIN:  Warm and dry as visualized  Attending Tran Rausch: Gen: NAD, heent: atrauamtic,perrla, mmm, op pink, uvula midline, neck; nttp, no nuchal rigidity, chest: nttp, no crepitus, cv: rrr, no murmurs, lungs: ctab, abd: soft, nontender, nondistended, no peritoneal signs, no guarding, ext: wwp, neg homans, skin: no rash, neuro: awake and alert, following commands, speech clear, sensation and strength intact, no focal deficits

## 2022-01-02 NOTE — H&P ADULT - NSICDXPASTMEDICALHX_GEN_ALL_CORE_FT
PAST MEDICAL HISTORY:  Anemia     Anxiety     Helicobacter pylori gastritis     Pancreatitis, chronic     PUD (peptic ulcer disease)     TB (pulmonary tuberculosis) S/P INH age 12

## 2022-01-02 NOTE — ED PROVIDER NOTE - NS ED ROS FT
Constitutional: no fevers; no chills  HEENT: no visual changes, no sore throat, no rhinorrhea  CV: no cp; no palpitations  Resp: no sob; no cough  GI: abd pain, nausea, vomiting, no diarrhea, no constipation  : no dysuria, no hematuria  MSK: no myalgias; no arthralgias  skin: no rashes  neuro: no HA, no numbness; no weakness, no tingling  endo: no polyuria, no polydipsia

## 2022-01-03 ENCOUNTER — TRANSCRIPTION ENCOUNTER (OUTPATIENT)
Age: 37
End: 2022-01-03

## 2022-01-03 VITALS
RESPIRATION RATE: 19 BRPM | HEART RATE: 64 BPM | DIASTOLIC BLOOD PRESSURE: 74 MMHG | OXYGEN SATURATION: 100 % | TEMPERATURE: 99 F | SYSTOLIC BLOOD PRESSURE: 120 MMHG

## 2022-01-03 LAB
ANION GAP SERPL CALC-SCNC: 13 MMOL/L — SIGNIFICANT CHANGE UP (ref 5–17)
BUN SERPL-MCNC: 10 MG/DL — SIGNIFICANT CHANGE UP (ref 7–23)
CALCIUM SERPL-MCNC: 9.2 MG/DL — SIGNIFICANT CHANGE UP (ref 8.4–10.5)
CHLORIDE SERPL-SCNC: 105 MMOL/L — SIGNIFICANT CHANGE UP (ref 96–108)
CO2 SERPL-SCNC: 21 MMOL/L — LOW (ref 22–31)
CREAT SERPL-MCNC: 0.69 MG/DL — SIGNIFICANT CHANGE UP (ref 0.5–1.3)
GLUCOSE SERPL-MCNC: 79 MG/DL — SIGNIFICANT CHANGE UP (ref 70–99)
HCT VFR BLD CALC: 32.5 % — LOW (ref 34.5–45)
HGB BLD-MCNC: 9.9 G/DL — LOW (ref 11.5–15.5)
MCHC RBC-ENTMCNC: 22.8 PG — LOW (ref 27–34)
MCHC RBC-ENTMCNC: 30.5 GM/DL — LOW (ref 32–36)
MCV RBC AUTO: 74.7 FL — LOW (ref 80–100)
NRBC # BLD: 0 /100 WBCS — SIGNIFICANT CHANGE UP (ref 0–0)
PLATELET # BLD AUTO: 383 K/UL — SIGNIFICANT CHANGE UP (ref 150–400)
POTASSIUM SERPL-MCNC: 3.9 MMOL/L — SIGNIFICANT CHANGE UP (ref 3.5–5.3)
POTASSIUM SERPL-SCNC: 3.9 MMOL/L — SIGNIFICANT CHANGE UP (ref 3.5–5.3)
RBC # BLD: 4.35 M/UL — SIGNIFICANT CHANGE UP (ref 3.8–5.2)
RBC # FLD: 18.1 % — HIGH (ref 10.3–14.5)
SODIUM SERPL-SCNC: 139 MMOL/L — SIGNIFICANT CHANGE UP (ref 135–145)
WBC # BLD: 6.53 K/UL — SIGNIFICANT CHANGE UP (ref 3.8–10.5)
WBC # FLD AUTO: 6.53 K/UL — SIGNIFICANT CHANGE UP (ref 3.8–10.5)

## 2022-01-03 PROCEDURE — 80048 BASIC METABOLIC PNL TOTAL CA: CPT

## 2022-01-03 PROCEDURE — 96374 THER/PROPH/DIAG INJ IV PUSH: CPT

## 2022-01-03 PROCEDURE — 84702 CHORIONIC GONADOTROPIN TEST: CPT

## 2022-01-03 PROCEDURE — 87635 SARS-COV-2 COVID-19 AMP PRB: CPT

## 2022-01-03 PROCEDURE — 99285 EMERGENCY DEPT VISIT HI MDM: CPT | Mod: 25

## 2022-01-03 PROCEDURE — 36415 COLL VENOUS BLD VENIPUNCTURE: CPT

## 2022-01-03 PROCEDURE — 85027 COMPLETE CBC AUTOMATED: CPT

## 2022-01-03 PROCEDURE — 83735 ASSAY OF MAGNESIUM: CPT

## 2022-01-03 PROCEDURE — 85025 COMPLETE CBC W/AUTO DIFF WBC: CPT

## 2022-01-03 PROCEDURE — 83690 ASSAY OF LIPASE: CPT

## 2022-01-03 PROCEDURE — 96375 TX/PRO/DX INJ NEW DRUG ADDON: CPT

## 2022-01-03 PROCEDURE — 99233 SBSQ HOSP IP/OBS HIGH 50: CPT

## 2022-01-03 PROCEDURE — 80053 COMPREHEN METABOLIC PANEL: CPT

## 2022-01-03 RX ORDER — INFLUENZA VIRUS VACCINE 15; 15; 15; 15 UG/.5ML; UG/.5ML; UG/.5ML; UG/.5ML
0.5 SUSPENSION INTRAMUSCULAR ONCE
Refills: 0 | Status: DISCONTINUED | OUTPATIENT
Start: 2022-01-03 | End: 2022-01-03

## 2022-01-03 RX ORDER — OMEPRAZOLE 10 MG/1
1 CAPSULE, DELAYED RELEASE ORAL
Qty: 60 | Refills: 0
Start: 2022-01-03 | End: 2022-02-01

## 2022-01-03 RX ORDER — OMEPRAZOLE 10 MG/1
1 CAPSULE, DELAYED RELEASE ORAL
Qty: 0 | Refills: 0 | DISCHARGE

## 2022-01-03 RX ORDER — SUCRALFATE 1 G
10 TABLET ORAL
Qty: 450 | Refills: 0
Start: 2022-01-03 | End: 2022-01-17

## 2022-01-03 RX ORDER — PANTOPRAZOLE SODIUM 20 MG/1
40 TABLET, DELAYED RELEASE ORAL
Refills: 0 | Status: DISCONTINUED | OUTPATIENT
Start: 2022-01-03 | End: 2022-01-03

## 2022-01-03 RX ADMIN — Medication 1 GRAM(S): at 15:44

## 2022-01-03 RX ADMIN — Medication 1 GRAM(S): at 05:52

## 2022-01-03 RX ADMIN — PANTOPRAZOLE SODIUM 40 MILLIGRAM(S): 20 TABLET, DELAYED RELEASE ORAL at 05:52

## 2022-01-03 NOTE — PATIENT PROFILE ADULT - FALL HARM RISK - UNIVERSAL INTERVENTIONS
Bed in lowest position, wheels locked, appropriate side rails in place/Call bell, personal items and telephone in reach/Instruct patient to call for assistance before getting out of bed or chair/Non-slip footwear when patient is out of bed/[place] to call system/Physically safe environment - no spills, clutter or unnecessary equipment/Purposeful Proactive Rounding/Room/bathroom lighting operational, light cord in reach

## 2022-01-03 NOTE — CONSULT NOTE ADULT - ASSESSMENT
35 y/o F PMH PUD, H pylori, anxiety, anemia, hx of pancreatitis p/w overall anxiety and epigastric pain and inability to tolerate po for the past few days.     COVID negative  HCG negative    H pylori gastritis with PUD - reportedly completed HP Rx  Clinically able to tolerate PO  -continue PPI and carafate  -will need outpt follow up, serologic testing to confirm HP eradication (outpt OK)  -favor repeat EGD given ongoing symptoms (outpt OK)  -PPI compliance reinforced  -importance of Tobacco cessation reinforced with pt; will clinically help in gastritis/PUD healing. Pt expresses understanding; to start Nicoderm patch  -avoid NSAIDs    No GI objection to d/c plans per primary team  Importance of outpt follow up discussed with pt  Can follow up with primary GI (Dr Anaya) or if pt prefers, can follow up with Dr Pineda ([phone]) who performed last EGD    Thank you for the courtesy of this consult.    Ovidio Veliz PA-C    [facility]  [phone]  After hours and weekend coverage [phone]

## 2022-01-03 NOTE — DISCHARGE NOTE PROVIDER - NSDCCPCAREPLAN_GEN_ALL_CORE_FT
PRINCIPAL DISCHARGE DIAGNOSIS  Diagnosis: Intractable abdominal pain  Assessment and Plan of Treatment: Due to GERD- same resolved- Now tolerating oral intake without incident- Continue carafate and Omeprazol as prescribed- follow up with Dr. Willard in 1-3 days

## 2022-01-03 NOTE — DISCHARGE NOTE NURSING/CASE MANAGEMENT/SOCIAL WORK - NSDCVIVACCINE_GEN_ALL_CORE_FT
Tdap; 22-May-2019 15:13; Meghan aCstro (BILLY); [employer]; [id number] (Exp. Date: 12-Mar-2021); IntraMuscular; Deltoid Left.; 0.5 milliLiter(s); VIS (VIS Published: 09-May-2013, VIS Presented: 22-May-2019);

## 2022-01-03 NOTE — PROGRESS NOTE ADULT - PROBLEM SELECTOR PLAN 1
pt  c/o  mild  improvement  on abdominal  pain, able  to  tolerate liquids .improved  on  famotidine  and  carafate , pt  wishes  to  go  home  today  and  follow  up[  in office.
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
## 2022-01-03 NOTE — CONSULT NOTE ADULT - ATTENDING COMMENTS
h/o pud, now admitted with n/v, abdominal pain, pt was off ppi and smoking as outpatient.  Currently symptoms improved on ppi/carafate,tolerating po    plan; continue management as above      recommend outpatient GI follow up and outpatient EGD

## 2022-01-03 NOTE — DISCHARGE NOTE PROVIDER - CARE PROVIDER_API CALL
Casa Willard)  Medicine  [address] [place] [address], [address]  [place] [address]  Phone: [phone]  Fax: [phone]  Follow Up Time: 1-3 days

## 2022-01-03 NOTE — DISCHARGE NOTE NURSING/CASE MANAGEMENT/SOCIAL WORK - NSDCPEFALRISK_GEN_ALL_CORE
For information on Fall & Injury Prevention, visit: [email or web address] OR  [email or web address] OR  https://www.cdc.gov/steadi/patient.html

## 2022-01-03 NOTE — DISCHARGE NOTE PROVIDER - NSDCMRMEDTOKEN_GEN_ALL_CORE_FT
omeprazole 40 mg oral delayed release capsule: 1 cap(s) orally 2 times a day MDD:2 caps  sucralfate 1 g/10 mL oral suspension: 10 milliliter(s) orally 3 times a day (with meals)

## 2022-01-03 NOTE — DISCHARGE NOTE PROVIDER - HOSPITAL COURSE
35 yo F PMHx PUD, pancreatitis, anxiety, presents to the ED c/o epigastric abd pain with nbnb emesis chronically, but worsening over the past few days due to stressors.     Dx Acute abd pain, unable to tolerate po, PUD- s/p Pepcid IV, clear liquid diet   D/C with outpatient follow up  	No GI objection to d/c plans per primary team
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
## 2022-01-03 NOTE — DISCHARGE NOTE NURSING/CASE MANAGEMENT/SOCIAL WORK - PATIENT PORTAL LINK FT
You can access the FollowMyHealth Patient Portal offered by [facility] by registering at the following website: [email or web address]. By joining [employer]’s FollowMyHealth portal, you will also be able to view your health information using other applications (apps) compatible with our system.

## 2022-01-12 NOTE — PROGRESS NOTE BEHAVIORAL HEALTH - NSBHCHARTREVIEWVS_PSY_A_CORE FT
Vital Signs Last 24 Hrs  T(C): 36.6 (21 Sep 2018 08:20), Max: 37.1 (20 Sep 2018 19:08)  T(F): 97.9 (21 Sep 2018 08:20), Max: 98.8 (20 Sep 2018 22:05)  HR: 77 (21 Sep 2018 05:27) (76 - 79)  BP: 113/74 (21 Sep 2018 05:27) (113/74 - 129/77)  BP(mean): --  RR: 18 (21 Sep 2018 08:20) (18 - 18)  SpO2: 100% (21 Sep 2018 08:20) (98% - 100%)
You can access the FollowMyHealth Patient Portal offered by [facility] by registering at the following website: [email or web address]. By joining [employer]’s FollowMyHealth portal, you will also be able to view your health information using other applications (apps) compatible with our system.

## 2022-01-26 NOTE — PATIENT PROFILE ADULT - TOBACCO USE
Patient calling stating that pharmacy needs a PA for both prescriptions that were prescribed during his visit.  Please advise   Arsen Rinaldi Smoker. current status unknown

## 2022-02-09 ENCOUNTER — EMERGENCY (EMERGENCY)
Facility: HOSPITAL | Age: 37
LOS: 1 days | Discharge: HOME | End: 2022-02-09
Attending: EMERGENCY MEDICINE
Payer: MEDICAID

## 2022-02-09 VITALS
SYSTOLIC BLOOD PRESSURE: 129 MMHG | HEIGHT: 64 IN | HEART RATE: 75 BPM | RESPIRATION RATE: 16 BRPM | DIASTOLIC BLOOD PRESSURE: 96 MMHG | OXYGEN SATURATION: 100 %

## 2022-02-09 DIAGNOSIS — Z98.51 TUBAL LIGATION STATUS: Chronic | ICD-10-CM

## 2022-02-09 LAB
ALBUMIN SERPL ELPH-MCNC: 5.3 G/DL — HIGH (ref 3.3–5)
ALP SERPL-CCNC: 70 U/L — SIGNIFICANT CHANGE UP (ref 40–120)
ALT FLD-CCNC: 9 U/L — LOW (ref 10–45)
ANION GAP SERPL CALC-SCNC: 15 MMOL/L — SIGNIFICANT CHANGE UP (ref 5–17)
AST SERPL-CCNC: 17 U/L — SIGNIFICANT CHANGE UP (ref 10–40)
BASOPHILS # BLD AUTO: 0.08 K/UL — SIGNIFICANT CHANGE UP (ref 0–0.2)
BASOPHILS NFR BLD AUTO: 0.8 % — SIGNIFICANT CHANGE UP (ref 0–2)
BILIRUB SERPL-MCNC: 0.6 MG/DL — SIGNIFICANT CHANGE UP (ref 0.2–1.2)
BUN SERPL-MCNC: 9 MG/DL — SIGNIFICANT CHANGE UP (ref 7–23)
CALCIUM SERPL-MCNC: 10.7 MG/DL — HIGH (ref 8.4–10.5)
CHLORIDE SERPL-SCNC: 100 MMOL/L — SIGNIFICANT CHANGE UP (ref 96–108)
CO2 SERPL-SCNC: 23 MMOL/L — SIGNIFICANT CHANGE UP (ref 22–31)
CREAT SERPL-MCNC: 0.59 MG/DL — SIGNIFICANT CHANGE UP (ref 0.5–1.3)
EOSINOPHIL # BLD AUTO: 0 K/UL — SIGNIFICANT CHANGE UP (ref 0–0.5)
EOSINOPHIL NFR BLD AUTO: 0 % — SIGNIFICANT CHANGE UP (ref 0–6)
GLUCOSE SERPL-MCNC: 109 MG/DL — HIGH (ref 70–99)
HCG SERPL-ACNC: <2 MIU/ML — SIGNIFICANT CHANGE UP
HCT VFR BLD CALC: 34.7 % — SIGNIFICANT CHANGE UP (ref 34.5–45)
HGB BLD-MCNC: 10.7 G/DL — LOW (ref 11.5–15.5)
LIDOCAIN IGE QN: 44 U/L — SIGNIFICANT CHANGE UP (ref 7–60)
LYMPHOCYTES # BLD AUTO: 39.5 % — SIGNIFICANT CHANGE UP (ref 13–44)
LYMPHOCYTES # BLD AUTO: 4.13 K/UL — HIGH (ref 1–3.3)
MCHC RBC-ENTMCNC: 22.5 PG — LOW (ref 27–34)
MCHC RBC-ENTMCNC: 30.8 GM/DL — LOW (ref 32–36)
MCV RBC AUTO: 72.9 FL — LOW (ref 80–100)
MONOCYTES # BLD AUTO: 0.44 K/UL — SIGNIFICANT CHANGE UP (ref 0–0.9)
MONOCYTES NFR BLD AUTO: 4.2 % — SIGNIFICANT CHANGE UP (ref 2–14)
NEUTROPHILS # BLD AUTO: 5.8 K/UL — SIGNIFICANT CHANGE UP (ref 1.8–7.4)
NEUTROPHILS NFR BLD AUTO: 55.5 % — SIGNIFICANT CHANGE UP (ref 43–77)
PLATELET # BLD AUTO: 519 K/UL — HIGH (ref 150–400)
POTASSIUM SERPL-MCNC: 3 MMOL/L — LOW (ref 3.5–5.3)
POTASSIUM SERPL-SCNC: 3 MMOL/L — LOW (ref 3.5–5.3)
PROT SERPL-MCNC: 8.4 G/DL — HIGH (ref 6–8.3)
RBC # BLD: 4.76 M/UL — SIGNIFICANT CHANGE UP (ref 3.8–5.2)
RBC # FLD: 18.2 % — HIGH (ref 10.3–14.5)
SODIUM SERPL-SCNC: 138 MMOL/L — SIGNIFICANT CHANGE UP (ref 135–145)
WBC # BLD: 10.45 K/UL — SIGNIFICANT CHANGE UP (ref 3.8–10.5)
WBC # FLD AUTO: 10.45 K/UL — SIGNIFICANT CHANGE UP (ref 3.8–10.5)

## 2022-02-09 PROCEDURE — 99285 EMERGENCY DEPT VISIT HI MDM: CPT | Mod: 25

## 2022-02-09 PROCEDURE — 93010 ELECTROCARDIOGRAM REPORT: CPT

## 2022-02-09 RX ORDER — FAMOTIDINE 10 MG/ML
20 INJECTION INTRAVENOUS ONCE
Refills: 0 | Status: COMPLETED | OUTPATIENT
Start: 2022-02-09 | End: 2022-02-09

## 2022-02-09 RX ORDER — ACETAMINOPHEN 500 MG
1000 TABLET ORAL ONCE
Refills: 0 | Status: COMPLETED | OUTPATIENT
Start: 2022-02-09 | End: 2022-02-09

## 2022-02-09 RX ORDER — METOCLOPRAMIDE HCL 10 MG
10 TABLET ORAL ONCE
Refills: 0 | Status: COMPLETED | OUTPATIENT
Start: 2022-02-09 | End: 2022-02-09

## 2022-02-09 RX ORDER — SODIUM CHLORIDE 9 MG/ML
1000 INJECTION, SOLUTION INTRAVENOUS ONCE
Refills: 0 | Status: COMPLETED | OUTPATIENT
Start: 2022-02-09 | End: 2022-02-09

## 2022-02-09 RX ORDER — POTASSIUM CHLORIDE 20 MEQ
10 PACKET (EA) ORAL
Refills: 0 | Status: COMPLETED | OUTPATIENT
Start: 2022-02-09 | End: 2022-02-10

## 2022-02-09 RX ADMIN — Medication 10 MILLIGRAM(S): at 21:51

## 2022-02-09 RX ADMIN — SODIUM CHLORIDE 1000 MILLILITER(S): 9 INJECTION, SOLUTION INTRAVENOUS at 21:46

## 2022-02-09 RX ADMIN — Medication 100 MILLIEQUIVALENT(S): at 23:02

## 2022-02-09 RX ADMIN — FAMOTIDINE 20 MILLIGRAM(S): 10 INJECTION INTRAVENOUS at 21:49

## 2022-02-09 RX ADMIN — Medication 400 MILLIGRAM(S): at 21:47

## 2022-02-09 RX ADMIN — SODIUM CHLORIDE 1000 MILLILITER(S): 9 INJECTION, SOLUTION INTRAVENOUS at 22:55

## 2022-02-09 RX ADMIN — Medication 1000 MILLIGRAM(S): at 22:54

## 2022-02-09 RX ADMIN — Medication 1 MILLIGRAM(S): at 21:50

## 2022-02-09 NOTE — ED PROVIDER NOTE - ATTENDING CONTRIBUTION TO CARE
Attending MD Gonzalez:  I personally have seen and examined this patient. I have performed a substantive portion of the visit including all aspects of the medical decision making.  Resident note reviewed and agree on plan of care and except where noted.  See HPI, PE, and MDM for details.        36F with ho gastritis presenting with several days of upper abdominal pain n/v. Patient in severe pain on arrival, abd exam with mild ttp epigastrium, otherwise nontender. Chart review reveals patient with several past presentations to ED and hospitalizations for same symptom complex with non diagnostics CT and   EGD just with gastritis. Ddx includes pancreatitis, gastritis, gastroparesis, functional abdominal pain. Plan for labs, CT a/p, analgesia, anti emetics, IV fluids reassess

## 2022-02-09 NOTE — ED ADULT NURSE NOTE - HISTORY OF COVID-19 VACCINATION
Patient:  Roverto Keane  :  1955  MRN:  [id number]   Date:  2019 12:39 PM  Attending:  Mik Man MD  Financial:  [id number]                 No past medical history on file.    No current facility-administered medications for this encounter.      Current Outpatient Medications   Medication Sig Dispense Refill   • tamsulosin (FLOMAX) 0.4 MG Cap Take 0.4 mg by mouth daily after a meal.     • naproxen (NAPROSYN) 500 MG tablet Take 500 mg by mouth 2 times daily (with meals).     • HYDROcodone-acetaminophen (NORCO) 5-325 MG per tablet Take 1 tablet by mouth every 6 hours as needed for Pain.     • ondansetron (ZOFRAN) 4 MG tablet Take 4 mg by mouth every 8 hours as needed for Nausea.         ALLERGIES:  No Known Allergies    Social History     Socioeconomic History   • Marital status: /Civil Union     Spouse name: Not on file   • Number of children: Not on file   • Years of education: Not on file   • Highest education level: Not on file   Occupational History   • Not on file   Social Needs   • Financial resource strain: Not on file   • Food insecurity:     Worry: Not on file     Inability: Not on file   • Transportation needs:     Medical: Not on file     Non-medical: Not on file   Tobacco Use   • Smoking status: Never Smoker   • Smokeless tobacco: Never Used   Substance and Sexual Activity   • Alcohol use: Yes     Comment: occasionally   • Drug use: Never   • Sexual activity: Not on file   Lifestyle   • Physical activity:     Days per week: Not on file     Minutes per session: Not on file   • Stress: Not on file   Relationships   • Social connections:     Talks on phone: Not on file     Gets together: Not on file     Attends Congregational service: Not on file     Active member of club or organization: Not on file     Attends meetings of clubs or organizations: Not on file     Relationship status: Not on file   • Intimate partner violence:     Fear of current or ex partner: Not on file      Emotionally abused: Not on file     Physically abused: Not on file     Forced sexual activity: Not on file   Other Topics Concern   • Not on file   Social History Narrative   • Not on file       No family history on file.    PHYSICAL EXAMINATION:  VITALS:  There were no vitals taken for this visit.  NECK:  Without bruits or lymphadenopathy.  LUNGS:  Clear to auscultation.  HEART:  Sounds are regular without obvious murmur.  No peripheral edema or signs of ischemia.    Plan:  Proceed with surgery.     Yes

## 2022-02-09 NOTE — ED ADULT NURSE NOTE - OBJECTIVE STATEMENT
Pt 36 year old female, A/O x4. Pt came in due to gen abdominal pain and gen back pain. PMH- Stomach ulcers (diagnosed in January), anxiety. Pt complaining of burning abdominal pain radiating to back, N/V x 3 days. Last BM today. Upon assessment, pt crying and screaming in pain. Skin- warm, dry, intact. Abd. soft, tender, nondistended. Denies chest pain, SOB, fever, chills, diarrhea, constipation, HA.

## 2022-02-09 NOTE — ED PROVIDER NOTE - PHYSICAL EXAMINATION
GENERAL APPEARANCE: Well developed, appears uncomfortable   HEENT:  PERRL, EOMI. hearing grossly intact.  NECK: Neck supple, non-tender no lymphadenopathy, masses or thyromegaly.  CARDIAC: Normal S1 and S2. no mrg. RRR  LUNGS: Clear to auscultation B/L, no rales, rhonchi, or wheezing  ABDOMEN: Soft , bowel sounds normal. Diffuse TTP, No guarding or rebound.   MUSCULOSKELETAL: ROM intact.  No joint erythema or tenderness.   EXTREMITIES: No edema. Peripheral pulses intact.   NEUROLOGICAL: Non focal. Strength and sensation symmetric and intact throughout.   SKIN: Warm and dry , Well perfused  PSYCHIATRIC: AOx3

## 2022-02-09 NOTE — ED PROVIDER NOTE - PATIENT PORTAL LINK FT
You can access the FollowMyHealth Patient Portal offered by [facility] by registering at the following website: [email or web address]. By joining [employer]’s FollowMyHealth portal, you will also be able to view your health information using other applications (apps) compatible with our system.

## 2022-02-09 NOTE — ED PROVIDER NOTE - NSFOLLOWUPINSTRUCTIONS_ED_ALL_ED_FT
You presented to the hospital with abdominal pain. CT scan of your abdomen was negative. You were however found to have a urinary tract infection. You are being discharged on antibiotics to help treat your infection. Please continue to take your medications as recommended. Please return to the E.D should you experience any new or worsening symptoms.

## 2022-02-09 NOTE — ED PROVIDER NOTE - CARE PLAN
1 Principal Discharge DX:	Urinary tract infection   Principal Discharge DX:	Acute abdominal pain  Secondary Diagnosis:	Nausea and vomiting

## 2022-02-09 NOTE — ED PROVIDER NOTE - OBJECTIVE STATEMENT
37 y/o F hx anxiety, ?pancreatitis, PUD presenting w. abdominal pain x 3 days. Pain described as a burning sensation located in the middle of the abdomen, 100/10 in severity with radiation to the L/R quadrants. Exacerbated by eating, drinking or movement. Associated with nausea and 1 episode of vomiting (NBNB). States she takes prilosec and sucralfate at home 2/2 her hx of PUD. Attempted taking both without improvement in symptoms. ROS otherwise positive for generalized malaise.   Denies fevers, chills, chest pain, SOB, palpitations, constipation, diarrhea, recent travel or sick contacts.

## 2022-02-09 NOTE — ED PROVIDER NOTE - PROGRESS NOTE DETAILS
Hemodynamically stable, appears uncomfortable 2/2 pain, sig anxiety. Will obtain blood work, CT A/P, iv tylenol, ativan 1mg X 1 CT A/P negative, UA positive. CTX x 1, will dc on PO keflex

## 2022-02-09 NOTE — ED PROVIDER NOTE - CLINICAL SUMMARY MEDICAL DECISION MAKING FREE TEXT BOX
37 y/o F hx anxiety, ?pancreatitis, PUD presenting w. abdominal pain x 3 days likely 2/2 PUD, will obtain CT A/P to r/o acute abd 35 y/o F hx anxiety, ?pancreatitis, PUD presenting w. abdominal pain x 3 days likely 2/2 PUD, will obtain CT A/P to r/o acute abd.    CT A/P negative. UA positive, CTX x 1 to be dc'd on PO abx (keflex).

## 2022-02-10 VITALS
RESPIRATION RATE: 18 BRPM | TEMPERATURE: 99 F | HEART RATE: 85 BPM | OXYGEN SATURATION: 98 % | DIASTOLIC BLOOD PRESSURE: 98 MMHG | SYSTOLIC BLOOD PRESSURE: 146 MMHG

## 2022-02-10 PROBLEM — K29.70 GASTRITIS, UNSPECIFIED, WITHOUT BLEEDING: Chronic | Status: ACTIVE | Noted: 2022-01-02

## 2022-02-10 PROBLEM — K27.9 PEPTIC ULCER, SITE UNSPECIFIED, UNSPECIFIED AS ACUTE OR CHRONIC, WITHOUT HEMORRHAGE OR PERFORATION: Chronic | Status: ACTIVE | Noted: 2022-01-02

## 2022-02-10 PROBLEM — F41.9 ANXIETY DISORDER, UNSPECIFIED: Chronic | Status: ACTIVE | Noted: 2022-01-02

## 2022-02-10 LAB
APPEARANCE UR: CLEAR — SIGNIFICANT CHANGE UP
BILIRUB UR-MCNC: NEGATIVE — SIGNIFICANT CHANGE UP
COLOR SPEC: YELLOW — SIGNIFICANT CHANGE UP
DIFF PNL FLD: ABNORMAL
GLUCOSE UR QL: NEGATIVE — SIGNIFICANT CHANGE UP
KETONES UR-MCNC: ABNORMAL
LEUKOCYTE ESTERASE UR-ACNC: NEGATIVE — SIGNIFICANT CHANGE UP
NITRITE UR-MCNC: POSITIVE
PH UR: 6.5 — SIGNIFICANT CHANGE UP (ref 5–8)
PROT UR-MCNC: 100 — SIGNIFICANT CHANGE UP
SP GR SPEC: 1.05 — HIGH (ref 1.01–1.02)
UROBILINOGEN FLD QL: ABNORMAL

## 2022-02-10 PROCEDURE — G1004: CPT

## 2022-02-10 PROCEDURE — 74177 CT ABD & PELVIS W/CONTRAST: CPT | Mod: 26,ME

## 2022-02-10 PROCEDURE — 36415 COLL VENOUS BLD VENIPUNCTURE: CPT

## 2022-02-10 PROCEDURE — 96375 TX/PRO/DX INJ NEW DRUG ADDON: CPT

## 2022-02-10 PROCEDURE — 96367 TX/PROPH/DG ADDL SEQ IV INF: CPT

## 2022-02-10 PROCEDURE — 93005 ELECTROCARDIOGRAM TRACING: CPT

## 2022-02-10 PROCEDURE — 81001 URINALYSIS AUTO W/SCOPE: CPT

## 2022-02-10 PROCEDURE — 80053 COMPREHEN METABOLIC PANEL: CPT

## 2022-02-10 PROCEDURE — 84702 CHORIONIC GONADOTROPIN TEST: CPT

## 2022-02-10 PROCEDURE — 83690 ASSAY OF LIPASE: CPT

## 2022-02-10 PROCEDURE — 96365 THER/PROPH/DIAG IV INF INIT: CPT | Mod: XU

## 2022-02-10 PROCEDURE — 99285 EMERGENCY DEPT VISIT HI MDM: CPT | Mod: 25

## 2022-02-10 PROCEDURE — 74177 CT ABD & PELVIS W/CONTRAST: CPT | Mod: ME

## 2022-02-10 PROCEDURE — 85025 COMPLETE CBC W/AUTO DIFF WBC: CPT

## 2022-02-10 RX ORDER — CEPHALEXIN 500 MG
1 CAPSULE ORAL
Qty: 16 | Refills: 0
Start: 2022-02-10 | End: 2022-02-13

## 2022-02-10 RX ORDER — HYDROMORPHONE HYDROCHLORIDE 2 MG/ML
0.25 INJECTION INTRAMUSCULAR; INTRAVENOUS; SUBCUTANEOUS ONCE
Refills: 0 | Status: DISCONTINUED | OUTPATIENT
Start: 2022-02-10 | End: 2022-02-10

## 2022-02-10 RX ORDER — CEFTRIAXONE 500 MG/1
1000 INJECTION, POWDER, FOR SOLUTION INTRAMUSCULAR; INTRAVENOUS ONCE
Refills: 0 | Status: COMPLETED | OUTPATIENT
Start: 2022-02-10 | End: 2022-02-10

## 2022-02-10 RX ADMIN — Medication 10 MILLIEQUIVALENT(S): at 00:15

## 2022-02-10 RX ADMIN — Medication 100 MILLIEQUIVALENT(S): at 00:12

## 2022-02-10 RX ADMIN — Medication 100 MILLIEQUIVALENT(S): at 01:52

## 2022-02-10 RX ADMIN — Medication 10 MILLIEQUIVALENT(S): at 03:02

## 2022-02-10 RX ADMIN — HYDROMORPHONE HYDROCHLORIDE 0.25 MILLIGRAM(S): 2 INJECTION INTRAMUSCULAR; INTRAVENOUS; SUBCUTANEOUS at 02:51

## 2022-02-10 RX ADMIN — HYDROMORPHONE HYDROCHLORIDE 0.25 MILLIGRAM(S): 2 INJECTION INTRAMUSCULAR; INTRAVENOUS; SUBCUTANEOUS at 02:45

## 2022-02-10 RX ADMIN — CEFTRIAXONE 100 MILLIGRAM(S): 500 INJECTION, POWDER, FOR SOLUTION INTRAMUSCULAR; INTRAVENOUS at 03:04

## 2022-02-10 NOTE — ED ADULT NURSE REASSESSMENT NOTE - NS ED NURSE REASSESS COMMENT FT1
Pt complaining of pain and restless and hunched over in bed. ED Resident Minnie made aware. Dilaudid to be ordered. Pt ambulating to restroom with ED RN.

## 2022-03-20 ENCOUNTER — TRANSCRIPTION ENCOUNTER (OUTPATIENT)
Age: 37
End: 2022-03-20

## 2022-03-26 ENCOUNTER — TRANSCRIPTION ENCOUNTER (OUTPATIENT)
Age: 37
End: 2022-03-26

## 2022-04-09 ENCOUNTER — EMERGENCY (EMERGENCY)
Facility: HOSPITAL | Age: 37
LOS: 1 days | Discharge: HOME | End: 2022-04-09
Attending: EMERGENCY MEDICINE
Payer: MEDICAID

## 2022-04-09 VITALS
TEMPERATURE: 98 F | DIASTOLIC BLOOD PRESSURE: 84 MMHG | RESPIRATION RATE: 16 BRPM | HEART RATE: 76 BPM | HEIGHT: 64 IN | SYSTOLIC BLOOD PRESSURE: 134 MMHG | WEIGHT: 130.07 LBS | OXYGEN SATURATION: 97 %

## 2022-04-09 DIAGNOSIS — Z98.51 TUBAL LIGATION STATUS: Chronic | ICD-10-CM

## 2022-04-09 LAB
ALP SERPL-CCNC: 66 U/L — SIGNIFICANT CHANGE UP (ref 40–120)
ALT FLD-CCNC: 14 U/L — SIGNIFICANT CHANGE UP (ref 10–45)
ANION GAP SERPL CALC-SCNC: 19 MMOL/L — HIGH (ref 5–17)
AST SERPL-CCNC: 17 U/L — SIGNIFICANT CHANGE UP (ref 10–40)
BILIRUB SERPL-MCNC: 0.4 MG/DL — SIGNIFICANT CHANGE UP (ref 0.2–1.2)
BUN SERPL-MCNC: 12 MG/DL — SIGNIFICANT CHANGE UP (ref 7–23)
CALCIUM SERPL-MCNC: 10.6 MG/DL — HIGH (ref 8.4–10.5)
CHLORIDE SERPL-SCNC: 93 MMOL/L — LOW (ref 96–108)
CO2 SERPL-SCNC: 24 MMOL/L — SIGNIFICANT CHANGE UP (ref 22–31)
CREAT SERPL-MCNC: 0.74 MG/DL — SIGNIFICANT CHANGE UP (ref 0.5–1.3)
EGFR: 106 ML/MIN/1.73M2 — SIGNIFICANT CHANGE UP
GAS PNL BLDV: SIGNIFICANT CHANGE UP
GLUCOSE SERPL-MCNC: 118 MG/DL — HIGH (ref 70–99)
HCG SERPL-ACNC: <2 MIU/ML — SIGNIFICANT CHANGE UP
LIDOCAIN IGE QN: 23 U/L — SIGNIFICANT CHANGE UP (ref 7–60)
POTASSIUM SERPL-MCNC: 3.2 MMOL/L — LOW (ref 3.5–5.3)
POTASSIUM SERPL-SCNC: 3.2 MMOL/L — LOW (ref 3.5–5.3)
PROT SERPL-MCNC: 8.5 G/DL — HIGH (ref 6–8.3)
SODIUM SERPL-SCNC: 136 MMOL/L — SIGNIFICANT CHANGE UP (ref 135–145)

## 2022-04-09 PROCEDURE — 99285 EMERGENCY DEPT VISIT HI MDM: CPT

## 2022-04-09 RX ORDER — SODIUM CHLORIDE 9 MG/ML
1000 INJECTION, SOLUTION INTRAVENOUS ONCE
Refills: 0 | Status: COMPLETED | OUTPATIENT
Start: 2022-04-09 | End: 2022-04-09

## 2022-04-09 RX ORDER — SUCRALFATE 1 G
1 TABLET ORAL ONCE
Refills: 0 | Status: COMPLETED | OUTPATIENT
Start: 2022-04-09 | End: 2022-04-09

## 2022-04-09 RX ORDER — FAMOTIDINE 10 MG/ML
20 INJECTION INTRAVENOUS ONCE
Refills: 0 | Status: COMPLETED | OUTPATIENT
Start: 2022-04-09 | End: 2022-04-09

## 2022-04-09 RX ORDER — METOCLOPRAMIDE HCL 10 MG
10 TABLET ORAL ONCE
Refills: 0 | Status: COMPLETED | OUTPATIENT
Start: 2022-04-09 | End: 2022-04-09

## 2022-04-09 RX ADMIN — FAMOTIDINE 20 MILLIGRAM(S): 10 INJECTION INTRAVENOUS at 22:49

## 2022-04-09 RX ADMIN — SODIUM CHLORIDE 1000 MILLILITER(S): 9 INJECTION, SOLUTION INTRAVENOUS at 22:49

## 2022-04-09 RX ADMIN — Medication 1 GRAM(S): at 23:20

## 2022-04-09 RX ADMIN — Medication 10 MILLIGRAM(S): at 22:49

## 2022-04-09 NOTE — ED ADULT NURSE NOTE - OBJECTIVE STATEMENT
36 year old female c/o chills and abdominal pain radiating to back pain x1 week. PMH includes gastritis and PUD; pt reports vomiting blood this morning. Upon assessment, pt presents guarding abdomen. Pt denies fevers, chest pain, SOB, N/V, urinary or bowel symptoms.

## 2022-04-09 NOTE — ED PROVIDER NOTE - PHYSICAL EXAMINATION
General: well appearing   HEENT: neck supple, anicteric sclera  Cardiovascular: Normal s1, s2, RRR  Respiratory: CTA b/l   Abdominal: Soft, mild ttp diffusely   Extremities: No swelling in LEs  Neurologic: Non focal  Psych: Awake, alert answering questions appropriately

## 2022-04-09 NOTE — ED PROVIDER NOTE - ATTENDING CONTRIBUTION TO CARE
Attending MD Gonzalez:  I personally have seen and examined this patient. I have performed a substantive portion of the visit including all aspects of the medical decision making.  Resident note reviewed and agree on plan of care and except where noted.      36F with ho "gastritis" presenting with generalized abdominal pain and back pain. Patient states she vomited blood today. Patient kneeling on stretcher writhing in pain (though was notably lying comfortable in stretcher with EMS). No focal abd ttp on exam. Chart review reveals many ED admissions and hospitalizations for similar presentations with negative CT imaging, objective findings of PUD though in past. Will obtain screening labs including lipase (to rule out pancreatitis), treat with IV reglan, pepcid, carafate, reassess. Do not feel abdominal imaging would be helpful for patient given innumerable previous CT imaging unless labwork returns abnormal.           *The above represents an initial assessment/impression. Please refer to progress notes for potential changes in patient clinical course*

## 2022-04-09 NOTE — ED PROVIDER NOTE - NSFOLLOWUPINSTRUCTIONS_ED_ALL_ED_FT
You have been seen and evaluated for abdominal pain    Please make sure to follow up with your PCP and gastroenterologist regarding this visit.    Please make sure to return to the ED for the following symptoms which include but are not limited to persistent/unrelenting abdominal pain, inability to eat or drink or any change in baseline that is concerning.

## 2022-04-09 NOTE — ED PROVIDER NOTE - NSFOLLOWUPCLINICS_GEN_ALL_ED_FT
Gastroenterology at [facility]  Gastroenterology  [address] [place] [id number]  Phone: [phone]  Fax:   Follow Up Time: 4-6 Days

## 2022-04-09 NOTE — ED PROVIDER NOTE - OBJECTIVE STATEMENT
37 y/o F hx anxiety, ?pancreatitis, PUD presenting w. abdominal pain x 5 days. Generalized, non radiating, c/w PUD pain she has had in past. Patient has had multiple visits for same reason w/ negative work up. Last endoscopy showed non bleeding peptic ulcer. Remote hx of h pylori, treated but never confirmed clearance w/ GI. States hasn't had a chance to follow up w/ GI >1 year. No other complaints.

## 2022-04-09 NOTE — ED PROVIDER NOTE - NS ED ROS FT
General: no fever, chills  HENT: no nasal congestion, no sore throat  Eyes: no visual changes, no blurred vision  Neck: no neck pain  CV: denies chest pain, no palpitations  Resp: no difficulty breathing, no cough  Abdominal: no nausea, no vomiting, no diarrhea, +abdominal pain, no blood in stool, no dark stool  MSK: no muscle aches  Neuro: no headaches  Skin: no rashes

## 2022-04-09 NOTE — ED PROVIDER NOTE - CLINICAL SUMMARY MEDICAL DECISION MAKING FREE TEXT BOX
35 y/o F hx anxiety, ?pancreatitis, PUD presenting w. abdominal pain x 5 days likely related to PUD. Labs, meds. Dispo pending.

## 2022-04-09 NOTE — ED PROVIDER NOTE - PROGRESS NOTE DETAILS
Keshav PGY3: Patient sx improved, tolerating PO. States would like to go home. Counseled on need to follow up with GI and OTC meds for symptomatic relied of PUD. Expressed understanding. VSS. Will dc w/ pcp and gi f/u.

## 2022-04-09 NOTE — ED PROVIDER NOTE - PATIENT PORTAL LINK FT
You can access the FollowMyHealth Patient Portal offered by [facility] by registering at the following website: [email or web address]. By joining [facility]’s FollowMyHealth portal, you will also be able to view your health information using other applications (apps) compatible with our system.

## 2022-04-10 VITALS
OXYGEN SATURATION: 100 % | TEMPERATURE: 98 F | RESPIRATION RATE: 18 BRPM | SYSTOLIC BLOOD PRESSURE: 95 MMHG | HEART RATE: 83 BPM | DIASTOLIC BLOOD PRESSURE: 59 MMHG

## 2022-04-10 LAB
ALBUMIN SERPL ELPH-MCNC: 5.1 G/DL — HIGH (ref 3.3–5)
BASOPHILS # BLD AUTO: 0.09 K/UL — SIGNIFICANT CHANGE UP (ref 0–0.2)
BASOPHILS NFR BLD AUTO: 0.9 % — SIGNIFICANT CHANGE UP (ref 0–2)
EOSINOPHIL # BLD AUTO: 0.09 K/UL — SIGNIFICANT CHANGE UP (ref 0–0.5)
EOSINOPHIL NFR BLD AUTO: 0.9 % — SIGNIFICANT CHANGE UP (ref 0–6)
HCT VFR BLD CALC: 37.5 % — SIGNIFICANT CHANGE UP (ref 34.5–45)
HGB BLD-MCNC: 11.5 G/DL — SIGNIFICANT CHANGE UP (ref 11.5–15.5)
LYMPHOCYTES # BLD AUTO: 3.83 K/UL — HIGH (ref 1–3.3)
LYMPHOCYTES # BLD AUTO: 40.5 % — SIGNIFICANT CHANGE UP (ref 13–44)
MCHC RBC-ENTMCNC: 21.9 PG — LOW (ref 27–34)
MCHC RBC-ENTMCNC: 30.7 GM/DL — LOW (ref 32–36)
MCV RBC AUTO: 71.4 FL — LOW (ref 80–100)
MONOCYTES # BLD AUTO: 0.6 K/UL — SIGNIFICANT CHANGE UP (ref 0–0.9)
MONOCYTES NFR BLD AUTO: 6.3 % — SIGNIFICANT CHANGE UP (ref 2–14)
NEUTROPHILS # BLD AUTO: 4.86 K/UL — SIGNIFICANT CHANGE UP (ref 1.8–7.4)
NEUTROPHILS NFR BLD AUTO: 50.5 % — SIGNIFICANT CHANGE UP (ref 43–77)
PLATELET # BLD AUTO: 548 K/UL — HIGH (ref 150–400)
RBC # BLD: 5.25 M/UL — HIGH (ref 3.8–5.2)
RBC # FLD: 17.7 % — HIGH (ref 10.3–14.5)
WBC # BLD: 9.46 K/UL — SIGNIFICANT CHANGE UP (ref 3.8–10.5)
WBC # FLD AUTO: 9.46 K/UL — SIGNIFICANT CHANGE UP (ref 3.8–10.5)

## 2022-04-10 PROCEDURE — 80053 COMPREHEN METABOLIC PANEL: CPT

## 2022-04-10 PROCEDURE — 96374 THER/PROPH/DIAG INJ IV PUSH: CPT

## 2022-04-10 PROCEDURE — 83605 ASSAY OF LACTIC ACID: CPT

## 2022-04-10 PROCEDURE — 84132 ASSAY OF SERUM POTASSIUM: CPT

## 2022-04-10 PROCEDURE — 84295 ASSAY OF SERUM SODIUM: CPT

## 2022-04-10 PROCEDURE — 82803 BLOOD GASES ANY COMBINATION: CPT

## 2022-04-10 PROCEDURE — 82330 ASSAY OF CALCIUM: CPT

## 2022-04-10 PROCEDURE — 83690 ASSAY OF LIPASE: CPT

## 2022-04-10 PROCEDURE — 84702 CHORIONIC GONADOTROPIN TEST: CPT

## 2022-04-10 PROCEDURE — 96375 TX/PRO/DX INJ NEW DRUG ADDON: CPT

## 2022-04-10 PROCEDURE — 85018 HEMOGLOBIN: CPT

## 2022-04-10 PROCEDURE — 36415 COLL VENOUS BLD VENIPUNCTURE: CPT

## 2022-04-10 PROCEDURE — 82435 ASSAY OF BLOOD CHLORIDE: CPT

## 2022-04-10 PROCEDURE — 82947 ASSAY GLUCOSE BLOOD QUANT: CPT

## 2022-04-10 PROCEDURE — 85014 HEMATOCRIT: CPT

## 2022-04-10 PROCEDURE — 85025 COMPLETE CBC W/AUTO DIFF WBC: CPT

## 2022-04-10 PROCEDURE — 99284 EMERGENCY DEPT VISIT MOD MDM: CPT | Mod: 25

## 2022-04-10 PROCEDURE — 93005 ELECTROCARDIOGRAM TRACING: CPT

## 2022-04-10 PROCEDURE — 82565 ASSAY OF CREATININE: CPT

## 2022-04-10 RX ORDER — ACETAMINOPHEN 500 MG
1000 TABLET ORAL ONCE
Refills: 0 | Status: COMPLETED | OUTPATIENT
Start: 2022-04-10 | End: 2022-04-10

## 2022-04-10 RX ORDER — POTASSIUM CHLORIDE 20 MEQ
10 PACKET (EA) ORAL
Refills: 0 | Status: DISCONTINUED | OUTPATIENT
Start: 2022-04-10 | End: 2022-04-10

## 2022-04-10 RX ORDER — POTASSIUM CHLORIDE 20 MEQ
40 PACKET (EA) ORAL ONCE
Refills: 0 | Status: COMPLETED | OUTPATIENT
Start: 2022-04-10 | End: 2022-04-10

## 2022-04-10 RX ORDER — PANTOPRAZOLE SODIUM 20 MG/1
40 TABLET, DELAYED RELEASE ORAL ONCE
Refills: 0 | Status: COMPLETED | OUTPATIENT
Start: 2022-04-10 | End: 2022-04-10

## 2022-04-10 RX ORDER — LIDOCAINE 4 G/100G
10 CREAM TOPICAL ONCE
Refills: 0 | Status: COMPLETED | OUTPATIENT
Start: 2022-04-10 | End: 2022-04-10

## 2022-04-10 RX ADMIN — Medication 30 MILLILITER(S): at 05:05

## 2022-04-10 RX ADMIN — Medication 40 MILLIEQUIVALENT(S): at 01:04

## 2022-04-10 RX ADMIN — Medication 400 MILLIGRAM(S): at 01:51

## 2022-04-10 RX ADMIN — LIDOCAINE 10 MILLILITER(S): 4 CREAM TOPICAL at 05:05

## 2022-04-10 RX ADMIN — PANTOPRAZOLE SODIUM 40 MILLIGRAM(S): 20 TABLET, DELAYED RELEASE ORAL at 01:04

## 2022-04-10 NOTE — ED ADULT NURSE REASSESSMENT NOTE - NS ED NURSE REASSESS COMMENT FT1
Pt A+Ox3, VSS, states she is unable to tolerate PO challenge r/t nausea and abdominal pain after eating and drinking. MD Keshav made aware.
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
## 2022-04-29 ENCOUNTER — NON-APPOINTMENT (OUTPATIENT)
Age: 37
End: 2022-04-29

## 2022-05-03 NOTE — PROGRESS NOTE ADULT - PROBLEM SELECTOR PROBLEM 4
"Subjective:       Patient ID: Pankaj Floyd is a 58 y.o. male.    Vitals:  height is 5' 11" (1.803 m) and weight is 84.8 kg (187 lb). His temperature is 97.8 °F (36.6 °C). His blood pressure is 146/91 (abnormal) and his pulse is 63. His respiration is 16 and oxygen saturation is 98%.     Chief Complaint: Cough    58-year-old male presents to clinic for evaluation of nasal congestion, cough, chills, body aches x2 days.  He is vaccinated for COVID, denies any recent sick contacts.  He presents with his son who has similar symptoms.  He is not taking any medications for his current symptoms.  He denies chest pain, fever, shortness of breath.  He is awake alert, answers questions appropriately, no acute distress noted on today's visit.    Cough  This is a new problem. The current episode started yesterday. Associated symptoms include chills, nasal congestion, a sore throat and sweats. Pertinent negatives include no chest pain, ear pain, fever, headaches or shortness of breath. Treatments tried: motrin. The treatment provided mild relief. There is no history of asthma or bronchitis.       Constitution: Positive for chills. Negative for activity change, appetite change, sweating, fatigue and fever.   HENT: Positive for congestion and sore throat. Negative for ear pain.    Cardiovascular: Negative for chest pain.   Respiratory: Positive for cough. Negative for shortness of breath.    Gastrointestinal: Negative for abdominal pain, nausea and vomiting.   Neurological: Negative for dizziness and headaches.       Objective:      Physical Exam   Constitutional: He is oriented to person, place, and time. He appears well-developed.  Non-toxic appearance. He does not appear ill. No distress.   HENT:   Head: Normocephalic and atraumatic. Head is without abrasion, without contusion and without laceration.   Ears:   Right Ear: External ear normal.   Left Ear: External ear normal.   Mouth/Throat: Oropharynx is clear and moist and "
mucous membranes are normal.   Eyes: Conjunctivae, EOM and lids are normal. Right eye exhibits no discharge. Left eye exhibits no discharge.   Neck: Trachea normal and phonation normal. Neck supple.   Cardiovascular: Normal rate.   Pulmonary/Chest: Effort normal. No respiratory distress.   Abdominal: Normal appearance.   Musculoskeletal: Normal range of motion.         General: Normal range of motion.   Neurological: He is alert and oriented to person, place, and time.   Skin: Skin is warm, dry, intact, not diaphoretic and not pale. No abrasion, No burn and No ecchymosis   Psychiatric: His speech is normal and behavior is normal. Mood, judgment and thought content normal.   Nursing note and vitals reviewed.    Results for orders placed or performed in visit on 05/03/22   POCT COVID-19 Rapid Screening   Result Value Ref Range    POC Rapid COVID Positive (A) Negative     Acceptable Yes      Due to the state of emergency surrounding the COVID-19 pandemic, the physical exam was limited per [facility]'s current protocol.   Patient agreed with being treated without an actual full physical exam taking place.     The patient was sociable and calm without any verbal evidence of acute distress. Was able to speak fluid sentences without labored breathing.  AAO x3.        Assessment:       1. COVID-19 virus infection          Plan:         COVID-19 virus infection  -     POCT COVID-19 Rapid Screening  -     COVID-19 Home Symptom Monitoring  - Duration (days): 14  -     Ambulatory referral/consult to [facility]  -     fluticasone propionate (FLONASE) 50 mcg/actuation nasal spray; 1 spray (50 mcg total) by Each Nostril route 2 (two) times daily.  Dispense: 9.9 mL; Refill: 0  -     azelastine (ASTELIN) 137 mcg (0.1 %) nasal spray; 1 spray (137 mcg total) by Nasal route 2 (two) times daily. for 10 days  Dispense: 30 mL; Refill: 0  -     benzonatate (TESSALON) 200 MG capsule; Take 1 capsule (200 mg total) by mouth 3 
(three) times daily as needed for Cough.  Dispense: 30 capsule; Refill: 0    - Recommend daily antihistamine (Claritin, Zyrtec, or Allegra), decongestant (Mucinex, or Coricidin if hx of HTN), cough suppressant, Nasal spray (Flonase), Tylenol (if not contraindicated) for pain or fever, along with plenty of fluids and rest. Discussed POSITIVE Covid result w/ patient. Discussed quarantine instructions. Patient verbally understood and agreed with treatment plan. ER for worsening symptoms.     Patient Instructions   - You must understand that you have received an Urgent Care treatment only and that you may be released before all of your medical problems are known or treated.   - You, the patient, will arrange for follow up care as instructed.   - If your condition worsens or fails to improve we recommend that you receive another evaluation at the ER immediately or contact your PCP to discuss your concerns or return here.     You have tested POSITIVE for COVID-19 today.           ISOLATION    If you tested positive and do not have symptoms, you must isolate for 5 days starting on the day of the positive test. I       If you tested positive and have symptoms, you must isolate for 5 days starting on the day of the first symptoms,  not the day of the positive test.       This is the most important part, both the CDC and the [facility] emphasize that you do not test out of isolation.       After 5 days, if your symptoms have improved and you have not had fever on day 5, you can return to the community on day 6- NO TESTING REQUIRED!        In fact, we do not retest if you were positive in the last 90 days.       After your 5 days of isolation are completed, the CDC recommends strict mask use for the first 5 days that you come out of isolation.     CDC Testing and Quarantine Guidelines for patients with exposure to a known-positive COVID-19 person:    ·     A 'close exposure' is defined as anyone who has had an exposure (masked or 
unmasked) to a known COVID -19 positive person            within 6 feet of someone            for a cumulative total of 15 minutes or more over a 24-hour period.    ·     vaccinated Have been boosted or completed the primary series of Pfizer or Moderna vaccine within the last 6 months or completed the primary series of J&J vaccine within the last 2 months and/or had a positive test within 90 days            do NOT need to quarantine after contact with someone who had COVID-19 unless they have symptoms.            fully vaccinated people who have not had a positive test within 90 days, should get tested 3-5 days after their exposure, even if they don't have symptoms and wear a mask indoors in public for 10 days following exposure or until their test result is negative on day 5.            If you develop symptoms test and quarantine.         ·     Unvaccinated, or are more than six months out from their second mRNA dose (or more than 2 months after the J&J vaccine) and not yet boosted,  and/or NOT had a positive test within 90 days and meet 'close exposure'    you are required by CDC guidelines to quarantine for at least 5 days from time of exposure followed by 5 days of strict masking. It is recommended, but not required to test after 5 days, unless you develop symptoms, in which case you should test at that time.    If you do decide to test at 5 days and are asymptomatic, the risk is that if you test without symptoms on Day 5 for example) and you are positive, your 5 day isolation begins on that day, and you turned your 5 day quarantine into 10 days.            If your exposure does not meet the above definition, you can return to your normal daily activities to include social distancing, wearing a mask and frequent handwashing.    Alternatively, if a 5-day quarantine is not feasible, it is imperative that an exposed person wear a well-fitting mask at all times when around others for 10 days after 
exposure.                         
Coagulopathy
Constipation
Current smoker

## 2022-05-16 ENCOUNTER — INPATIENT (INPATIENT)
Facility: HOSPITAL | Age: 37
LOS: 2 days | Discharge: HOME | DRG: 384 | End: 2022-05-19
Attending: INTERNAL MEDICINE | Admitting: INTERNAL MEDICINE
Payer: MEDICAID

## 2022-05-16 VITALS
OXYGEN SATURATION: 100 % | DIASTOLIC BLOOD PRESSURE: 78 MMHG | RESPIRATION RATE: 18 BRPM | HEART RATE: 98 BPM | HEIGHT: 64 IN | WEIGHT: 130.07 LBS | SYSTOLIC BLOOD PRESSURE: 111 MMHG | TEMPERATURE: 98 F

## 2022-05-16 DIAGNOSIS — Z98.51 TUBAL LIGATION STATUS: Chronic | ICD-10-CM

## 2022-05-16 DIAGNOSIS — K27.9 PEPTIC ULCER, SITE UNSPECIFIED, UNSPECIFIED AS ACUTE OR CHRONIC, WITHOUT HEMORRHAGE OR PERFORATION: ICD-10-CM

## 2022-05-16 LAB
ACANTHOCYTES BLD QL SMEAR: SLIGHT — SIGNIFICANT CHANGE UP
ALBUMIN SERPL ELPH-MCNC: 5.1 G/DL — HIGH (ref 3.3–5)
ALP SERPL-CCNC: 66 U/L — SIGNIFICANT CHANGE UP (ref 40–120)
ALT FLD-CCNC: 11 U/L — SIGNIFICANT CHANGE UP (ref 10–45)
ANION GAP SERPL CALC-SCNC: 18 MMOL/L — HIGH (ref 5–17)
ANISOCYTOSIS BLD QL: SLIGHT — SIGNIFICANT CHANGE UP
AST SERPL-CCNC: 12 U/L — SIGNIFICANT CHANGE UP (ref 10–40)
BASOPHILS # BLD AUTO: 0.08 K/UL — SIGNIFICANT CHANGE UP (ref 0–0.2)
BASOPHILS NFR BLD AUTO: 0.9 % — SIGNIFICANT CHANGE UP (ref 0–2)
BILIRUB SERPL-MCNC: 0.4 MG/DL — SIGNIFICANT CHANGE UP (ref 0.2–1.2)
BUN SERPL-MCNC: 13 MG/DL — SIGNIFICANT CHANGE UP (ref 7–23)
BURR CELLS BLD QL SMEAR: PRESENT — SIGNIFICANT CHANGE UP
BURR CELLS BLD QL SMEAR: SLIGHT — SIGNIFICANT CHANGE UP
CALCIUM SERPL-MCNC: 11.2 MG/DL — HIGH (ref 8.4–10.5)
CHLORIDE SERPL-SCNC: 92 MMOL/L — LOW (ref 96–108)
CO2 SERPL-SCNC: 27 MMOL/L — SIGNIFICANT CHANGE UP (ref 22–31)
CREAT SERPL-MCNC: 0.8 MG/DL — SIGNIFICANT CHANGE UP (ref 0.5–1.3)
DACRYOCYTES BLD QL SMEAR: SLIGHT — SIGNIFICANT CHANGE UP
EGFR: 96 ML/MIN/1.73M2 — SIGNIFICANT CHANGE UP
ELLIPTOCYTES BLD QL SMEAR: SLIGHT — SIGNIFICANT CHANGE UP
EOSINOPHIL # BLD AUTO: 0 K/UL — SIGNIFICANT CHANGE UP (ref 0–0.5)
EOSINOPHIL NFR BLD AUTO: 0 % — SIGNIFICANT CHANGE UP (ref 0–6)
GLUCOSE SERPL-MCNC: 118 MG/DL — HIGH (ref 70–99)
HCG SERPL-ACNC: <2 MIU/ML — SIGNIFICANT CHANGE UP
HCT VFR BLD CALC: 36.6 % — SIGNIFICANT CHANGE UP (ref 34.5–45)
HGB BLD-MCNC: 11.4 G/DL — LOW (ref 11.5–15.5)
HIV 1 & 2 AB SERPL IA.RAPID: SIGNIFICANT CHANGE UP
LACTATE BLDV-MCNC: 0.8 MMOL/L — SIGNIFICANT CHANGE UP (ref 0.7–2)
LIDOCAIN IGE QN: 80 U/L — HIGH (ref 7–60)
LYMPHOCYTES # BLD AUTO: 3.4 K/UL — HIGH (ref 1–3.3)
LYMPHOCYTES # BLD AUTO: 36.5 % — SIGNIFICANT CHANGE UP (ref 13–44)
MACROCYTES BLD QL: SLIGHT — SIGNIFICANT CHANGE UP
MANUAL SMEAR VERIFICATION: SIGNIFICANT CHANGE UP
MCHC RBC-ENTMCNC: 22 PG — LOW (ref 27–34)
MCHC RBC-ENTMCNC: 31.1 GM/DL — LOW (ref 32–36)
MCV RBC AUTO: 70.5 FL — LOW (ref 80–100)
MICROCYTES BLD QL: SLIGHT — SIGNIFICANT CHANGE UP
MONOCYTES # BLD AUTO: 1.38 K/UL — HIGH (ref 0–0.9)
MONOCYTES NFR BLD AUTO: 14.8 % — HIGH (ref 2–14)
NEUTROPHILS # BLD AUTO: 4.45 K/UL — SIGNIFICANT CHANGE UP (ref 1.8–7.4)
NEUTROPHILS NFR BLD AUTO: 47.8 % — SIGNIFICANT CHANGE UP (ref 43–77)
PLAT MORPH BLD: NORMAL — SIGNIFICANT CHANGE UP
PLATELET # BLD AUTO: 559 K/UL — HIGH (ref 150–400)
POIKILOCYTOSIS BLD QL AUTO: SIGNIFICANT CHANGE UP
POLYCHROMASIA BLD QL SMEAR: SLIGHT — SIGNIFICANT CHANGE UP
POTASSIUM SERPL-MCNC: 2.8 MMOL/L — CRITICAL LOW (ref 3.5–5.3)
POTASSIUM SERPL-SCNC: 2.8 MMOL/L — CRITICAL LOW (ref 3.5–5.3)
PROT SERPL-MCNC: 8.8 G/DL — HIGH (ref 6–8.3)
RBC # BLD: 5.19 M/UL — SIGNIFICANT CHANGE UP (ref 3.8–5.2)
RBC # FLD: 18.6 % — HIGH (ref 10.3–14.5)
RBC BLD AUTO: ABNORMAL
SARS-COV-2 RNA SPEC QL NAA+PROBE: SIGNIFICANT CHANGE UP
SCHISTOCYTES BLD QL AUTO: SLIGHT — SIGNIFICANT CHANGE UP
SODIUM SERPL-SCNC: 137 MMOL/L — SIGNIFICANT CHANGE UP (ref 135–145)
TARGETS BLD QL SMEAR: SLIGHT — SIGNIFICANT CHANGE UP
WBC # BLD: 9.32 K/UL — SIGNIFICANT CHANGE UP (ref 3.8–10.5)
WBC # FLD AUTO: 9.32 K/UL — SIGNIFICANT CHANGE UP (ref 3.8–10.5)

## 2022-05-16 PROCEDURE — 99285 EMERGENCY DEPT VISIT HI MDM: CPT

## 2022-05-16 PROCEDURE — G1004: CPT

## 2022-05-16 PROCEDURE — 71046 X-RAY EXAM CHEST 2 VIEWS: CPT | Mod: 26

## 2022-05-16 PROCEDURE — 99223 1ST HOSP IP/OBS HIGH 75: CPT

## 2022-05-16 PROCEDURE — 74177 CT ABD & PELVIS W/CONTRAST: CPT | Mod: 26,MG

## 2022-05-16 RX ORDER — MORPHINE SULFATE 50 MG/1
4 CAPSULE, EXTENDED RELEASE ORAL ONCE
Refills: 0 | Status: DISCONTINUED | OUTPATIENT
Start: 2022-05-16 | End: 2022-05-16

## 2022-05-16 RX ORDER — SODIUM CHLORIDE 9 MG/ML
1000 INJECTION INTRAMUSCULAR; INTRAVENOUS; SUBCUTANEOUS ONCE
Refills: 0 | Status: COMPLETED | OUTPATIENT
Start: 2022-05-16 | End: 2022-05-16

## 2022-05-16 RX ORDER — ACETAMINOPHEN 500 MG
1000 TABLET ORAL ONCE
Refills: 0 | Status: COMPLETED | OUTPATIENT
Start: 2022-05-16 | End: 2022-05-16

## 2022-05-16 RX ORDER — MORPHINE SULFATE 50 MG/1
2 CAPSULE, EXTENDED RELEASE ORAL EVERY 4 HOURS
Refills: 0 | Status: DISCONTINUED | OUTPATIENT
Start: 2022-05-16 | End: 2022-05-19

## 2022-05-16 RX ORDER — POTASSIUM CHLORIDE 20 MEQ
10 PACKET (EA) ORAL
Refills: 0 | Status: COMPLETED | OUTPATIENT
Start: 2022-05-16 | End: 2022-05-17

## 2022-05-16 RX ORDER — PANTOPRAZOLE SODIUM 20 MG/1
40 TABLET, DELAYED RELEASE ORAL EVERY 12 HOURS
Refills: 0 | Status: DISCONTINUED | OUTPATIENT
Start: 2022-05-16 | End: 2022-05-16

## 2022-05-16 RX ORDER — POTASSIUM CHLORIDE 20 MEQ
10 PACKET (EA) ORAL ONCE
Refills: 0 | Status: COMPLETED | OUTPATIENT
Start: 2022-05-16 | End: 2022-05-16

## 2022-05-16 RX ORDER — ONDANSETRON 8 MG/1
4 TABLET, FILM COATED ORAL EVERY 6 HOURS
Refills: 0 | Status: DISCONTINUED | OUTPATIENT
Start: 2022-05-16 | End: 2022-05-19

## 2022-05-16 RX ORDER — POLYETHYLENE GLYCOL 3350 17 G/17G
17 POWDER, FOR SOLUTION ORAL
Refills: 0 | Status: DISCONTINUED | OUTPATIENT
Start: 2022-05-16 | End: 2022-05-19

## 2022-05-16 RX ORDER — MULTIVIT WITH MIN/MFOLATE/K2 340-15/3 G
0.5 POWDER (GRAM) ORAL ONCE
Refills: 0 | Status: DISCONTINUED | OUTPATIENT
Start: 2022-05-16 | End: 2022-05-19

## 2022-05-16 RX ORDER — FAMOTIDINE 10 MG/ML
20 INJECTION INTRAVENOUS ONCE
Refills: 0 | Status: COMPLETED | OUTPATIENT
Start: 2022-05-16 | End: 2022-05-16

## 2022-05-16 RX ORDER — POTASSIUM CHLORIDE 20 MEQ
40 PACKET (EA) ORAL ONCE
Refills: 0 | Status: COMPLETED | OUTPATIENT
Start: 2022-05-16 | End: 2022-05-16

## 2022-05-16 RX ORDER — POLYETHYLENE GLYCOL 3350 17 G/17G
0 POWDER, FOR SOLUTION ORAL
Qty: 0 | Refills: 0 | DISCHARGE

## 2022-05-16 RX ORDER — SODIUM CHLORIDE 9 MG/ML
1000 INJECTION INTRAMUSCULAR; INTRAVENOUS; SUBCUTANEOUS
Refills: 0 | Status: DISCONTINUED | OUTPATIENT
Start: 2022-05-16 | End: 2022-05-19

## 2022-05-16 RX ORDER — ONDANSETRON 8 MG/1
4 TABLET, FILM COATED ORAL ONCE
Refills: 0 | Status: COMPLETED | OUTPATIENT
Start: 2022-05-16 | End: 2022-05-16

## 2022-05-16 RX ORDER — SIMETHICONE 80 MG/1
80 TABLET, CHEWABLE ORAL THREE TIMES A DAY
Refills: 0 | Status: DISCONTINUED | OUTPATIENT
Start: 2022-05-16 | End: 2022-05-19

## 2022-05-16 RX ORDER — PANTOPRAZOLE SODIUM 20 MG/1
8 TABLET, DELAYED RELEASE ORAL
Qty: 80 | Refills: 0 | Status: DISCONTINUED | OUTPATIENT
Start: 2022-05-16 | End: 2022-05-17

## 2022-05-16 RX ORDER — MORPHINE SULFATE 50 MG/1
2 CAPSULE, EXTENDED RELEASE ORAL ONCE
Refills: 0 | Status: DISCONTINUED | OUTPATIENT
Start: 2022-05-16 | End: 2022-05-16

## 2022-05-16 RX ADMIN — Medication 40 MILLIEQUIVALENT(S): at 12:55

## 2022-05-16 RX ADMIN — ONDANSETRON 4 MILLIGRAM(S): 8 TABLET, FILM COATED ORAL at 11:25

## 2022-05-16 RX ADMIN — ONDANSETRON 4 MILLIGRAM(S): 8 TABLET, FILM COATED ORAL at 22:46

## 2022-05-16 RX ADMIN — SODIUM CHLORIDE 1000 MILLILITER(S): 9 INJECTION INTRAMUSCULAR; INTRAVENOUS; SUBCUTANEOUS at 15:31

## 2022-05-16 RX ADMIN — FAMOTIDINE 20 MILLIGRAM(S): 10 INJECTION INTRAVENOUS at 11:25

## 2022-05-16 RX ADMIN — Medication 30 MILLILITER(S): at 11:25

## 2022-05-16 RX ADMIN — Medication 100 MILLIEQUIVALENT(S): at 17:31

## 2022-05-16 RX ADMIN — MORPHINE SULFATE 4 MILLIGRAM(S): 50 CAPSULE, EXTENDED RELEASE ORAL at 22:28

## 2022-05-16 RX ADMIN — SIMETHICONE 80 MILLIGRAM(S): 80 TABLET, CHEWABLE ORAL at 22:46

## 2022-05-16 RX ADMIN — Medication 1000 MILLIGRAM(S): at 15:30

## 2022-05-16 RX ADMIN — MORPHINE SULFATE 4 MILLIGRAM(S): 50 CAPSULE, EXTENDED RELEASE ORAL at 15:33

## 2022-05-16 RX ADMIN — MORPHINE SULFATE 4 MILLIGRAM(S): 50 CAPSULE, EXTENDED RELEASE ORAL at 22:58

## 2022-05-16 RX ADMIN — MORPHINE SULFATE 2 MILLIGRAM(S): 50 CAPSULE, EXTENDED RELEASE ORAL at 17:31

## 2022-05-16 RX ADMIN — SODIUM CHLORIDE 1000 MILLILITER(S): 9 INJECTION INTRAMUSCULAR; INTRAVENOUS; SUBCUTANEOUS at 13:18

## 2022-05-16 RX ADMIN — Medication 400 MILLIGRAM(S): at 20:13

## 2022-05-16 RX ADMIN — MORPHINE SULFATE 2 MILLIGRAM(S): 50 CAPSULE, EXTENDED RELEASE ORAL at 02:45

## 2022-05-16 RX ADMIN — PANTOPRAZOLE SODIUM 40 MILLIGRAM(S): 20 TABLET, DELAYED RELEASE ORAL at 22:46

## 2022-05-16 RX ADMIN — SODIUM CHLORIDE 1000 MILLILITER(S): 9 INJECTION INTRAMUSCULAR; INTRAVENOUS; SUBCUTANEOUS at 11:25

## 2022-05-16 RX ADMIN — Medication 400 MILLIGRAM(S): at 13:18

## 2022-05-16 NOTE — ED PROVIDER NOTE - CLINICAL SUMMARY MEDICAL DECISION MAKING FREE TEXT BOX
Kelsea Valencia MD  37 y/o F hx anxiety, ?pancreatitis, PUD presenting w. abdominal pain and 2 episodes of vomiting blood, on exam, minimal epigastric abdominal pain, no rebound, no guarding, no CVA tenderness, most likely gastritis, Plan: labs, CXR r/o free air, IV fluids, pepcid, maalox, reassess.

## 2022-05-16 NOTE — ED ADULT TRIAGE NOTE - CHIEF COMPLAINT QUOTE
1 episode bloody emesis this morning, body aches, generalized abd pain  denies fevers. hx "ulcers in intestines"

## 2022-05-16 NOTE — ED PROVIDER NOTE - PROGRESS NOTE DETAILS
Kelsea Valencia MD  called  for evaluation BINH Khoury, Attending: sign out from Annie. In brief: known hx of gastritis, PUD, h pylori seen for epigastric pain, NV, decreased PO. No signs of acute surgical issue on CT or pancreatitis. Hypokalemia noticed. Given inability to take PO and already signs of dehydration/electrolyte issues. Plan for repletion, analgesia, hydration, antiemetics. Spoke to Dr. Willard about admission. He suggests plan to c/s GI tomorrow. Of not pt with ovarian cyst on R. Asked about the pain. States she has never had pain in the lower abd/pelvic region. Tender only in epigastrium. Non tender RLQ. Suspect incidental finding and unrelated to sx today. BINH Khoury, Attending: pt repeatedly asking questions about new symptoms and plan of care. Offered her a blanket to rest her head on, Iv Tylenol for back pain from lying in stretcher. However at this point pt has been admitted for 3 hours. Received many rounds of medications including several doses of morphine. Pt intermittently going from severe distress to calm/motionless state on stretcher. There is some concern for seeking behavior. Believe inpatient team should do istop and possibly pain consult if felt indicated. Will have RN contact primary team to assess needs and additional medications.

## 2022-05-16 NOTE — ED PROVIDER NOTE - CARE PLAN
Principal Discharge DX:	Peptic ulcer disease  Secondary Diagnosis:	Nausea and vomiting  Secondary Diagnosis:	Hypokalemia   1

## 2022-05-16 NOTE — H&P ADULT - NSHPREVIEWOFSYSTEMS_GEN_ALL_CORE
REVIEW OF SYSTEMS:  CONSTITUTIONAL: No weakness. No fevers. +chills. No rigors. +weight loss. +night sweats. +poor appetite.  EYES: No blurry or double vision. No eye pain.  ENT: No hearing difficulty. No vertigo. No dysphagia. No sore throat. No Sinusitis/rhinorrhea.   NECK: No pain. No stiffness/rigidity.  CARDIAC: +chest pain. No palpitations. No lightheadedness. No syncope.  RESPIRATORY: No cough. +SOB. No hemoptysis.  GASTROINTESTINAL: +abdominal pain. +nausea. +vomiting. +hematemesis. No diarrhea. +constipation. No melena. No hematochezia.  GENITOURINARY: No dysuria. No frequency. No hesitancy. No hematuria. No oliguria.  NEUROLOGICAL: No numbness/tingling. No focal weakness. No urinary or fecal incontinence. No headache. No unsteady gait.  BACK: No back pain. No flank pain.  EXTREMITIES: No lower extremity edema. Full ROM. No joint pain.  SKIN: No rashes. No itching. No other lesions.  PSYCHIATRIC: +anxiety. No SI/HI.  ALLERGIC: No lip swelling. No hives.  All other review of systems is negative unless indicated above.  Unless indicated above, unable to assess ROS 2/2

## 2022-05-16 NOTE — H&P ADULT - NSHPPHYSICALEXAM_GEN_ALL_CORE
PHYSICAL EXAM:   GENERAL: Alert. Not confused. Anxious, tearful. +thin. Not cachectic. Not obese.  HEAD:  Atraumatic. Normocephalic.  EYES: EOMI. PERRLA. Normal conjunctiva/sclera.  ENT: Neck supple. No JVD. Moist oral mucosa. Not edentulous. No thrush.  LYMPH: Normal supraclavicular/cervical lymph nodes.   CARDIAC: Not tachy, Not saadia. Regular rhythm. Not irregularly irregular. S1. S2. No murmur. No rub. No distant heart sounds.  LUNG/CHEST: CTAB. BS equal bilaterally. No wheezes. No rales. No rhonchi.  ABDOMEN: Soft. +mild epigastric tenderness. No distension. No fluid wave. Normal bowel sounds.  BACK: No midline/vertebral tenderness. No flank tenderness.  VASCULAR: +2 b/l radial or ulnar pulses. Palpable DP pulses.  EXTREMITIES:  No clubbing. No cyanosis. No edema. Moving all 4.  NEUROLOGY: A&Ox3. Non-focal exam. Cranial nerves intact. Normal speech. Sensation intact.  PSYCH: Normal behavior. Anxious affect.  SKIN: No jaundice. No erythema. No rash/lesion.  Vascular Access:     ICU Vital Signs Last 24 Hrs  T(C): 37.2 (16 May 2022 22:13), Max: 37.2 (16 May 2022 22:13)  T(F): 99 (16 May 2022 22:13), Max: 99 (16 May 2022 22:13)  HR: 70 (16 May 2022 22:13) (70 - 98)  BP: 158/87 (16 May 2022 22:13) (106/69 - 158/87)  BP(mean): --  ABP: --  ABP(mean): --  RR: 18 (16 May 2022 22:13) (18 - 20)  SpO2: 96% (16 May 2022 22:13) (95% - 100%)      I&O's Summary

## 2022-05-16 NOTE — H&P ADULT - PROBLEM SELECTOR PLAN 4
- trend CE  - add on trop  - can get stress test as outpt once current bleed/abd pain episode resolves

## 2022-05-16 NOTE — ED ADULT NURSE NOTE - OBJECTIVE STATEMENT
1044 pt 37yf aox4 c/o abd pain n/v at home pt able to verbalize concerns, inc anxiety noted, pt stated shes stressed out w/ her teenager at home, dx hx ulcers, pt vss no distress pending eval

## 2022-05-16 NOTE — H&P ADULT - HISTORY OF PRESENT ILLNESS
36F c hx anxiety not on any meds, pancreatitis, PUD/duodenal ulcer, frequent ED visits for abd pain and hematemesis, pw abd pain and hematemesis.    On my arrival at bedside, pt crying, shouting we are "torturing her", asking for pain meds. Pt states she has had epigastric abd pain for 3 days, and has vomited 4 times so far. Pt reports tasting the blood in her mouth and reports a "small amount" of blood in her vomit. Pt reports being constipated and last BM was 1 week ago. Pt states she only takes tylenol at home for her pain. Pt states for the past 2 days, has had episodes of chest tightness and SOB "can't breathe" that lasts for a few minutes at a time, improved when she takes a shower. Pt reports being under a lot of stress taking care of her 5 children. Pt denies any relieving or exacerbating factors. Hpylori treated in past, but eradication was not confirmed.

## 2022-05-16 NOTE — H&P ADULT - NSHPLABSRESULTS_GEN_ALL_CORE
Personally reviewed old records.  Personally reviewed labs.  Personally reviewed imaging.  Personally reviewed EKG.                          11.4   9.32  )-----------( 559      ( 16 May 2022 12:02 )             36.6       05-16    137  |  92<L>  |  13  ----------------------------<  118<H>  2.8<LL>   |  27  |  0.80    Ca    11.2<H>      16 May 2022 12:02    TPro  8.8<H>  /  Alb  5.1<H>  /  TBili  0.4  /  DBili  x   /  AST  12  /  ALT  11  /  AlkPhos  66  05-16            LIVER FUNCTIONS - ( 16 May 2022 12:02 )  Alb: 5.1 g/dL / Pro: 8.8 g/dL / ALK PHOS: 66 U/L / ALT: 11 U/L / AST: 12 U/L / GGT: x
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
## 2022-05-16 NOTE — H&P ADULT - NSHPSOCIALHISTORY_GEN_ALL_CORE
Social History:    Marital Status: (  ) , ( x ) Single, (  ) , (  ) , (  )   # of Children: 5  Lives with: (  ) alone, ( x ) children, (  ) spouse, (  ) parents, (  ) siblings, (  ) friends, (  ) other:   Occupation:     Substance Use/Illicit Drugs: (  ) never used vs other: marijuana smoker  Tobacco Usage: (  ) never smoked, ( x ) former smoker, (  ) current smoker and Total Pack-Years: 15 pk yrs  Last Alcohol Usage/Frequency/Amount/Withdrawal/Hx of Abuse:  2 weeks ago  Foreign travel:   Animal exposure:

## 2022-05-16 NOTE — H&P ADULT - ASSESSMENT
36F c hx anxiety not on any meds, pancreatitis, PUD/duodenal ulcer, frequent ED visits for abd pain and hematemesis, pw abd pain and hematemesis.

## 2022-05-16 NOTE — ED ADULT TRIAGE NOTE - PATIENT ON (OXYGEN DELIVERY METHOD)
What Type Of Note Output Would You Prefer (Optional)?: Bullet Format
How Severe Is Your Rash?: severe
Is This A New Presentation, Or A Follow-Up?: Rash
Additional History: Patient was seen by pediatrician and placed on Desonide. It has only mildly helped the eczema. Patient has a family history of eczema.
room air

## 2022-05-16 NOTE — ED PROVIDER NOTE - CROS ED SKIN ALL NEG
[de-identified] : BRIAN MANCUSO is a 62 year old female with PMHx  HTN, HLD, anxiety and bowel obstruction 3 episodes last 3 years who presents in the office for post hospitalization visit. Patient was admitted to [facility] with SBO. Patient had CT of the abdomen and pelvis that showed  small bowel obstruction with transition point in the distal ileum, and , increased size of cystic lesion in the pancreatic neck, measuring 3.0 cm, previously 2.0 cm.. Patient recovered bowel function, diet advanced and she was discharged home. She was advised to follow up with GI for colonoscopy. She presents for follow up. She feels well and has no complaints. She is eating normally and having normal bowel movements and gas.
negative...

## 2022-05-16 NOTE — ED ADULT NURSE NOTE - CHIEF COMPLAINT QUOTE
72 hours. APTT:   No results for input(s): APTT in the last 72 hours. BNP:  No results for input(s): BNP in the last 72 hours. IMAGING:     Assessment:  Patient Active Problem List    Diagnosis Date Noted    Hypotension due to drugs 05/24/2018     Priority: High    Nonrheumatic aortic valve insufficiency 03/06/2018     Priority: Low    NSTEMI (non-ST elevated myocardial infarction) ([facility] [address] 02/08/2018     Priority: Low    Osteoporosis 08/14/2013     Priority: Low    Scoliosis 08/14/2013     Priority: Low    Memory difficulties 08/14/2013     Priority: Low    Depressive disorder 02/08/2013     Priority: Low    Macular degeneration of both eyes      Priority: Low    CAD (coronary artery disease)      Priority: Low    Hyperlipidemia      Priority: Low    Hiatal hernia      Priority: Low    Benign essential HTN 08/24/2011     Priority: Low    Atrial fibrillation (HCC)     Cellulitis        Plan:  1. Remains sinus rhythm. No chest pain/sob. On Eliquis. Bp better this am.  Wants to go home.        Core Measures:  · Discharge instructions:   · LVEF documented:   · ACEI for LV dysfunction:   · Smoking Cessation:    Nuria Awad MD 7/17/2018 10:49 AM 1 episode bloody emesis this morning, body aches, generalized abd pain  denies fevers. hx "ulcers in intestines"

## 2022-05-17 ENCOUNTER — RESULT REVIEW (OUTPATIENT)
Age: 37
End: 2022-05-17

## 2022-05-17 DIAGNOSIS — E87.6 HYPOKALEMIA: ICD-10-CM

## 2022-05-17 DIAGNOSIS — R07.9 CHEST PAIN, UNSPECIFIED: ICD-10-CM

## 2022-05-17 DIAGNOSIS — R10.13 EPIGASTRIC PAIN: ICD-10-CM

## 2022-05-17 DIAGNOSIS — K92.0 HEMATEMESIS: ICD-10-CM

## 2022-05-17 LAB
ALBUMIN SERPL ELPH-MCNC: 3.9 G/DL — SIGNIFICANT CHANGE UP (ref 3.3–5)
ALP SERPL-CCNC: 44 U/L — SIGNIFICANT CHANGE UP (ref 40–120)
ALT FLD-CCNC: 10 U/L — SIGNIFICANT CHANGE UP (ref 10–45)
ANION GAP SERPL CALC-SCNC: 15 MMOL/L — SIGNIFICANT CHANGE UP (ref 5–17)
ANION GAP SERPL CALC-SCNC: 15 MMOL/L — SIGNIFICANT CHANGE UP (ref 5–17)
AST SERPL-CCNC: 14 U/L — SIGNIFICANT CHANGE UP (ref 10–40)
BASOPHILS # BLD AUTO: 0.06 K/UL — SIGNIFICANT CHANGE UP (ref 0–0.2)
BASOPHILS NFR BLD AUTO: 0.8 % — SIGNIFICANT CHANGE UP (ref 0–2)
BILIRUB SERPL-MCNC: 0.4 MG/DL — SIGNIFICANT CHANGE UP (ref 0.2–1.2)
BLD GP AB SCN SERPL QL: NEGATIVE — SIGNIFICANT CHANGE UP
BUN SERPL-MCNC: 8 MG/DL — SIGNIFICANT CHANGE UP (ref 7–23)
BUN SERPL-MCNC: 9 MG/DL — SIGNIFICANT CHANGE UP (ref 7–23)
CALCIUM SERPL-MCNC: 9 MG/DL — SIGNIFICANT CHANGE UP (ref 8.4–10.5)
CALCIUM SERPL-MCNC: 9.3 MG/DL — SIGNIFICANT CHANGE UP (ref 8.4–10.5)
CHLORIDE SERPL-SCNC: 101 MMOL/L — SIGNIFICANT CHANGE UP (ref 96–108)
CHLORIDE SERPL-SCNC: 103 MMOL/L — SIGNIFICANT CHANGE UP (ref 96–108)
CO2 SERPL-SCNC: 21 MMOL/L — LOW (ref 22–31)
CO2 SERPL-SCNC: 24 MMOL/L — SIGNIFICANT CHANGE UP (ref 22–31)
CREAT SERPL-MCNC: 0.65 MG/DL — SIGNIFICANT CHANGE UP (ref 0.5–1.3)
CREAT SERPL-MCNC: 0.68 MG/DL — SIGNIFICANT CHANGE UP (ref 0.5–1.3)
EGFR: 114 ML/MIN/1.73M2 — SIGNIFICANT CHANGE UP
EGFR: 115 ML/MIN/1.73M2 — SIGNIFICANT CHANGE UP
EOSINOPHIL # BLD AUTO: 0.12 K/UL — SIGNIFICANT CHANGE UP (ref 0–0.5)
EOSINOPHIL NFR BLD AUTO: 1.6 % — SIGNIFICANT CHANGE UP (ref 0–6)
FERRITIN SERPL-MCNC: 7 NG/ML — LOW (ref 15–150)
GLUCOSE SERPL-MCNC: 80 MG/DL — SIGNIFICANT CHANGE UP (ref 70–99)
GLUCOSE SERPL-MCNC: 90 MG/DL — SIGNIFICANT CHANGE UP (ref 70–99)
HCT VFR BLD CALC: 27.5 % — LOW (ref 34.5–45)
HGB BLD-MCNC: 8.3 G/DL — LOW (ref 11.5–15.5)
IMM GRANULOCYTES NFR BLD AUTO: 0.3 % — SIGNIFICANT CHANGE UP (ref 0–1.5)
IRON SATN MFR SERPL: 10 % — LOW (ref 14–50)
IRON SATN MFR SERPL: 38 UG/DL — SIGNIFICANT CHANGE UP (ref 30–160)
LYMPHOCYTES # BLD AUTO: 4.14 K/UL — HIGH (ref 1–3.3)
LYMPHOCYTES # BLD AUTO: 56.8 % — HIGH (ref 13–44)
MAGNESIUM SERPL-MCNC: 1.9 MG/DL — SIGNIFICANT CHANGE UP (ref 1.6–2.6)
MAGNESIUM SERPL-MCNC: 2 MG/DL — SIGNIFICANT CHANGE UP (ref 1.6–2.6)
MCHC RBC-ENTMCNC: 21.8 PG — LOW (ref 27–34)
MCHC RBC-ENTMCNC: 30.2 GM/DL — LOW (ref 32–36)
MCV RBC AUTO: 72.4 FL — LOW (ref 80–100)
MONOCYTES # BLD AUTO: 0.57 K/UL — SIGNIFICANT CHANGE UP (ref 0–0.9)
MONOCYTES NFR BLD AUTO: 7.8 % — SIGNIFICANT CHANGE UP (ref 2–14)
NEUTROPHILS # BLD AUTO: 2.38 K/UL — SIGNIFICANT CHANGE UP (ref 1.8–7.4)
NEUTROPHILS NFR BLD AUTO: 32.7 % — LOW (ref 43–77)
NRBC # BLD: 0 /100 WBCS — SIGNIFICANT CHANGE UP (ref 0–0)
PHOSPHATE SERPL-MCNC: 2.9 MG/DL — SIGNIFICANT CHANGE UP (ref 2.5–4.5)
PLATELET # BLD AUTO: 453 K/UL — HIGH (ref 150–400)
POTASSIUM SERPL-MCNC: 3.3 MMOL/L — LOW (ref 3.5–5.3)
POTASSIUM SERPL-MCNC: 3.7 MMOL/L — SIGNIFICANT CHANGE UP (ref 3.5–5.3)
POTASSIUM SERPL-SCNC: 3.3 MMOL/L — LOW (ref 3.5–5.3)
POTASSIUM SERPL-SCNC: 3.7 MMOL/L — SIGNIFICANT CHANGE UP (ref 3.5–5.3)
PROT SERPL-MCNC: 6.3 G/DL — SIGNIFICANT CHANGE UP (ref 6–8.3)
RBC # BLD: 3.8 M/UL — SIGNIFICANT CHANGE UP (ref 3.8–5.2)
RBC # FLD: 18.4 % — HIGH (ref 10.3–14.5)
RH IG SCN BLD-IMP: POSITIVE — SIGNIFICANT CHANGE UP
SODIUM SERPL-SCNC: 139 MMOL/L — SIGNIFICANT CHANGE UP (ref 135–145)
SODIUM SERPL-SCNC: 140 MMOL/L — SIGNIFICANT CHANGE UP (ref 135–145)
TIBC SERPL-MCNC: 388 UG/DL — SIGNIFICANT CHANGE UP (ref 220–430)
TROPONIN T, HIGH SENSITIVITY RESULT: <6 NG/L — SIGNIFICANT CHANGE UP (ref 0–51)
TROPONIN T, HIGH SENSITIVITY RESULT: <6 NG/L — SIGNIFICANT CHANGE UP (ref 0–51)
TSH SERPL-MCNC: 0.34 UIU/ML — SIGNIFICANT CHANGE UP (ref 0.27–4.2)
UIBC SERPL-MCNC: 350 UG/DL — SIGNIFICANT CHANGE UP (ref 110–370)
WBC # BLD: 7.29 K/UL — SIGNIFICANT CHANGE UP (ref 3.8–10.5)
WBC # FLD AUTO: 7.29 K/UL — SIGNIFICANT CHANGE UP (ref 3.8–10.5)

## 2022-05-17 PROCEDURE — 88305 TISSUE EXAM BY PATHOLOGIST: CPT | Mod: 26

## 2022-05-17 PROCEDURE — 88342 IMHCHEM/IMCYTCHM 1ST ANTB: CPT | Mod: 26

## 2022-05-17 DEVICE — NET RETRV ROT ROTH 2.5MMX230CM: Type: IMPLANTABLE DEVICE | Status: FUNCTIONAL

## 2022-05-17 RX ORDER — POTASSIUM CHLORIDE 20 MEQ
40 PACKET (EA) ORAL ONCE
Refills: 0 | Status: COMPLETED | OUTPATIENT
Start: 2022-05-17 | End: 2022-05-17

## 2022-05-17 RX ORDER — SODIUM CHLORIDE 9 MG/ML
500 INJECTION INTRAMUSCULAR; INTRAVENOUS; SUBCUTANEOUS
Refills: 0 | Status: COMPLETED | OUTPATIENT
Start: 2022-05-17 | End: 2022-05-18

## 2022-05-17 RX ORDER — POTASSIUM CHLORIDE 20 MEQ
40 PACKET (EA) ORAL DAILY
Refills: 0 | Status: DISCONTINUED | OUTPATIENT
Start: 2022-05-17 | End: 2022-05-17

## 2022-05-17 RX ORDER — PANTOPRAZOLE SODIUM 20 MG/1
40 TABLET, DELAYED RELEASE ORAL
Refills: 0 | Status: DISCONTINUED | OUTPATIENT
Start: 2022-05-17 | End: 2022-05-19

## 2022-05-17 RX ORDER — SODIUM CHLORIDE 9 MG/ML
500 INJECTION, SOLUTION INTRAVENOUS ONCE
Refills: 0 | Status: COMPLETED | OUTPATIENT
Start: 2022-05-17 | End: 2022-05-17

## 2022-05-17 RX ORDER — POTASSIUM CHLORIDE 20 MEQ
10 PACKET (EA) ORAL
Refills: 0 | Status: DISCONTINUED | OUTPATIENT
Start: 2022-05-17 | End: 2022-05-17

## 2022-05-17 RX ADMIN — PANTOPRAZOLE SODIUM 40 MILLIGRAM(S): 20 TABLET, DELAYED RELEASE ORAL at 18:25

## 2022-05-17 RX ADMIN — POLYETHYLENE GLYCOL 3350 17 GRAM(S): 17 POWDER, FOR SOLUTION ORAL at 18:25

## 2022-05-17 RX ADMIN — MORPHINE SULFATE 2 MILLIGRAM(S): 50 CAPSULE, EXTENDED RELEASE ORAL at 10:04

## 2022-05-17 RX ADMIN — SIMETHICONE 80 MILLIGRAM(S): 80 TABLET, CHEWABLE ORAL at 05:08

## 2022-05-17 RX ADMIN — Medication 100 MILLIEQUIVALENT(S): at 02:25

## 2022-05-17 RX ADMIN — SODIUM CHLORIDE 100 MILLILITER(S): 9 INJECTION INTRAMUSCULAR; INTRAVENOUS; SUBCUTANEOUS at 09:00

## 2022-05-17 RX ADMIN — Medication 100 MILLIEQUIVALENT(S): at 00:55

## 2022-05-17 RX ADMIN — MORPHINE SULFATE 2 MILLIGRAM(S): 50 CAPSULE, EXTENDED RELEASE ORAL at 21:37

## 2022-05-17 RX ADMIN — SODIUM CHLORIDE 500 MILLILITER(S): 9 INJECTION, SOLUTION INTRAVENOUS at 05:03

## 2022-05-17 RX ADMIN — Medication 40 MILLIEQUIVALENT(S): at 11:55

## 2022-05-17 RX ADMIN — PANTOPRAZOLE SODIUM 10 MG/HR: 20 TABLET, DELAYED RELEASE ORAL at 03:37

## 2022-05-17 RX ADMIN — FAMOTIDINE 20 MILLIGRAM(S): 10 INJECTION INTRAVENOUS at 00:51

## 2022-05-17 RX ADMIN — POLYETHYLENE GLYCOL 3350 17 GRAM(S): 17 POWDER, FOR SOLUTION ORAL at 11:56

## 2022-05-17 RX ADMIN — MORPHINE SULFATE 2 MILLIGRAM(S): 50 CAPSULE, EXTENDED RELEASE ORAL at 02:31

## 2022-05-17 RX ADMIN — SODIUM CHLORIDE 100 MILLILITER(S): 9 INJECTION INTRAMUSCULAR; INTRAVENOUS; SUBCUTANEOUS at 00:08

## 2022-05-17 RX ADMIN — MORPHINE SULFATE 2 MILLIGRAM(S): 50 CAPSULE, EXTENDED RELEASE ORAL at 22:18

## 2022-05-17 RX ADMIN — SIMETHICONE 80 MILLIGRAM(S): 80 TABLET, CHEWABLE ORAL at 22:41

## 2022-05-17 RX ADMIN — MORPHINE SULFATE 2 MILLIGRAM(S): 50 CAPSULE, EXTENDED RELEASE ORAL at 09:34

## 2022-05-17 NOTE — PROVIDER CONTACT NOTE (OTHER) - ASSESSMENT
A&OX4. Vitals 94/58 HR 72. Denies cp & sob, no s&s of discomfort.
A&OX4. /87 HR 70. Temp: 99. Complains of 10/10 back & abdominal pain.
A&OX4. BP 97/63 HR 72, previous BP was 158/87 HR 70. Denies CP & SOB. No s&s of distress.

## 2022-05-17 NOTE — PROVIDER CONTACT NOTE (OTHER) - ACTION/TREATMENT ORDERED:
Provider ordered LR bolus
Provider ordered to push miralax and magnesium solution.
Provider ordered morphine stat. Provider assessed at bedside. As per provider vitals due to high pain score. Provider also ordered zofran, pepcid as per EMR.

## 2022-05-17 NOTE — PROVIDER CONTACT NOTE (OTHER) - BACKGROUND
Pt admitted for peptic ulcer. Pt due for miralax & magnesium solution. Pt NPO except meds.
Pt admitted for peptic ulcer
Pt admitted for peptic ulcer

## 2022-05-17 NOTE — PROVIDER CONTACT NOTE (OTHER) - SITUATION
Blood pressure 97/63, previous bp was 158/87.
Order for NPO except meds, clarification needed to administer Miralax & magnesium
Pt complains of 10/10 pain/ BP high/ temp of 99F

## 2022-05-17 NOTE — CONSULT NOTE ADULT - NS ATTEND AMEND GEN_ALL_CORE FT
36 y F with PMH of PUD/Pancreatitis/Anxiety disorder adm with Epig pain and Vomitings  VSS Hb 8.2g CT abd showed no pancreatitis  agree with EGD for eval

## 2022-05-17 NOTE — PROVIDER CONTACT NOTE (OTHER) - REASON
Order for NPO except meds, clarification needed to administer miralax & magnesium
Blood pressure 97/63, previous bp was 158/87
Pt complains of 10/10 pain/ BP high/ temp of 99F

## 2022-05-17 NOTE — PRE PROCEDURE NOTE - PRE PROCEDURE EVALUATION
Pre-Endoscopy Evaluation      Referring Physician:                                SAMPSON Willard MD    Procedure: EGD    Indication for Procedure: nausea/vomiting ?hemetemesis (blood streaked), anemia Hx PUD    Pertinent History:  36F c hx anxiety not on any meds, pancreatitis, PUD/duodenal ulcer admitted with epigastric pain, nausea/vomiting and reports of some blood tinged emesis - drop from Hgb 11.4->8.3  No jane hemetemesis  Hx. PUD - gastric ulcer +H  pylori - treated but did not keep scheduled breath test appt for confirmation of HP eradication and no follow up EGD was done.    last EGD 6/29/2021 - normal esophagus, mild gastritis, non bleeding DU with no stigmata of recent bleeding  CT - pancreas WNL    COVID negative  HCG negative    #anemia  #epigastric pain - r/o PUD, CT negative for pancreatitis  #nausea/vomiting; reports of "some blood tinged" emesis ?MW tear ?esophagitis ?2/2 PUD; no overt/brisk GI bleeding (no BM in 1 week)  -NPO for EGD today (add on schedule)  -continue PPI gtts as ordered  -further plan pending EGD findings    #constipation, no obstruction on CT  -continue mIralax as ordered  -hold off on PO magnesium citrate until after EGD      Sedation by Anesthesia [ X]    PAST MEDICAL & SURGICAL HISTORY:  TB (pulmonary tuberculosis)  S/P INH age 12      Pancreatitis, chronic      Anemia      Anxiety      Helicobacter pylori gastritis      PUD (peptic ulcer disease)      H/O tubal ligation          PMH of Gastroparesis [ ]  Gastric Surgery [ ]  Gastric Outlet Obstruction [ ]    Allergies    No Known Allergies      Latex allergy: [ ] yes [X ] no    Medications:MEDICATIONS  (STANDING):  magnesium citrate Oral Solution 0.5 Bottle Oral once  pantoprazole Infusion 8 mG/Hr (10 mL/Hr) IV Continuous <Continuous>  polyethylene glycol 3350 17 Gram(s) Oral two times a day  potassium chloride    Tablet ER 40 milliEquivalent(s) Oral once  simethicone 80 milliGRAM(s) Chew three times a day  sodium chloride 0.9%. 1000 milliLiter(s) (100 mL/Hr) IV Continuous <Continuous>    MEDICATIONS  (PRN):  morphine  - Injectable 2 milliGRAM(s) IV Push every 4 hours PRN mod to severe pain  ondansetron Injectable 4 milliGRAM(s) IV Push every 6 hours PRN Nausea and/or Vomiting      Smoking: [X ] yes  [ ] no    AICD/PPM: [ ] yes   [X ] no    Pertinent lab data:                        8.3    7.29  )-----------( 453      ( 17 May 2022 06:51 )             27.5     05-17    139  |  103  |  8   ----------------------------<  80  3.7   |  21<L>  |  0.65    Ca    9.0      17 May 2022 06:51  Phos  2.9     05-17  Mg     1.9     05-17    TPro  6.3  /  Alb  3.9  /  TBili  0.4  /  DBili  x   /  AST  14  /  ALT  10  /  AlkPhos  44  05-17            HCG Quantitative, Serum: <2.0 mIU/mL (05-16 @ 12:02)        Physical Examination:  Daily     Daily   Vital Signs Last 24 Hrs  T(C): 37 (17 May 2022 09:24), Max: 37.2 (16 May 2022 22:13)  T(F): 98.6 (17 May 2022 09:24), Max: 99 (16 May 2022 22:13)  HR: 66 (17 May 2022 09:24) (66 - 83)  BP: 110/67 (17 May 2022 09:24) (94/58 - 158/87)  BP(mean): --  RR: 16 (17 May 2022 09:24) (16 - 20)  SpO2: 100% (17 May 2022 09:24) (95% - 100%)    BP:                 HR:                  SPO2:               Temperature:    Constitutional: NAD    HEENT: PERRLA, EOMI,       Neck:  No JVD    Respiratory: CTAB/L    Cardiovascular: S1 and S2    Gastrointestinal: BS+, soft, ND +epigastric tenderness without rebound or rigidity    Extremities: No peripheral edema    Neurological: A/O x 3, no focal deficits    Psychiatric: Normal mood, normal affect    : No Escobar    Skin: No rashes    Comments:    ASA Class: I [ ]  II [ ]  III [X ]  IV [ ]    The patient is a suitable candidate for the planned procedure unless box checked [ ]  No, explain:

## 2022-05-17 NOTE — CONSULT NOTE ADULT - ASSESSMENT
36F c hx anxiety not on any meds, pancreatitis, PUD/duodenal ulcer admitted with epigastric pain, nausea/vomiting and reports of some blood tinged emesis - drop from Hgb 11.4->8.3  No jane hemetemesis  Hx. PUD - gastric ulcer +H  pylori - treated but did not keep scheduled breath test appt for confirmation of HP eradication and no follow up EGD was done.    last EGD 6/29/2021 - normal esophagus, mild gastritis, non bleeding DU with no stigmata of recent bleeding  CT - pancreas WNL    COVID negative  HCG negative    #anemia  #epigastric pain - r/o PUD, CT negative for pancreatitis  #nausea/vomiting; reports of "some blood tinged" emesis ?MW tear ?esophagitis ?2/2 PUD; no overt/brisk GI bleeding (no BM in 1 week)  -NPO for EGD today (add on schedule)  -continue PPI gtts as ordered  -further plan pending EGD findings    #constipation, no obstruction on CT  -continue mIralax as ordered  -hold off on PO magnesium citrate until after EGD    Discussed with pt, all questions answered  Further care per primary team; discussed with Medicine attending    Thank you for the courtesy of this consult.    Ovidio Veliz PA-C    [facility]  [phone]  After hours and weekend coverage [phone]

## 2022-05-17 NOTE — CONSULT NOTE ADULT - SUBJECTIVE AND OBJECTIVE BOX
Patient is a 37y old  Female who presents with a chief complaint of abd pain, chest pain, hematemesis (17 May 2022 08:38)      HPI:  36F c hx anxiety not on any meds, pancreatitis, PUD/duodenal ulcer, frequent ED visits for abd pain and hematemesis, pw abd pain and hematemesis.    On my arrival at bedside, pt crying, shouting we are "torturing her", asking for pain meds. Pt states she has had epigastric abd pain for 3 days, and has vomited 4 times so far. Pt reports tasting the blood in her mouth and reports a "small amount" of blood in her vomit. Pt reports being constipated and last BM was 1 week ago. Pt states she only takes tylenol at home for her pain. Pt states for the past 2 days, has had episodes of chest tightness and SOB "can't breathe" that lasts for a few minutes at a time, improved when she takes a shower. Pt reports being under a lot of stress taking care of her 5 children. Pt denies any relieving or exacerbating factors. Hpylori treated in past, but eradication was not confirmed.  (16 May 2022 22:59)    no jane hemetemesis, no melena or rectal bleeding  +epigastric abdominal pain  reports increased stressors at home related to her children/family life  denies physical abuse, states significant emotional stressors  +tobacco ~7 cig/day  decreased appetite persists, reports decreased PO intake - pt attributes to stress as well as epigastric pain - unable to quantify weight loss  denies ETOH, NSAIDs or steroids use  Hx. PUD - gastric ulcer +H  pylori - treated but did not keep scheduled breath test appt for confirmation of HP eradication and no follow up EGD was done.  last EGD 6/29/2021 - normal esophagus, mild gastritis, non bleeding DU with no stigmata of recent bleeding    last BM 1 week ago    PAST MEDICAL & SURGICAL HISTORY:  TB (pulmonary tuberculosis)  S/P INH age 12    Pancreatitis, chronic    Anemia    Anxiety    Helicobacter pylori gastritis    PUD (peptic ulcer disease)    H/O tubal ligation          Allergies  No Known Allergies      MEDICATIONS  (STANDING):  magnesium citrate Oral Solution 0.5 Bottle Oral once  pantoprazole Infusion 8 mG/Hr (10 mL/Hr) IV Continuous <Continuous>  polyethylene glycol 3350 17 Gram(s) Oral two times a day  potassium chloride    Tablet ER 40 milliEquivalent(s) Oral once  simethicone 80 milliGRAM(s) Chew three times a day  sodium chloride 0.9%. 1000 milliLiter(s) (100 mL/Hr) IV Continuous <Continuous>    MEDICATIONS  (PRN):  morphine  - Injectable 2 milliGRAM(s) IV Push every 4 hours PRN mod to severe pain  ondansetron Injectable 4 milliGRAM(s) IV Push every 6 hours PRN Nausea and/or Vomiting      Social History:  lives with 5 children  +tobacco 1 pack/week  social ETOH    Family History   IBD (  ) Yes   (X  ) No  GI Malignancy (  )  Yes    ( X ) No    Health Management  Last Colonoscopy - none    Advanced Directives: (  X   ) None    (      ) DNR    (     ) DNI    (     ) Health Care Proxy:     Review of Systems:    General:  No , fevers, chills, night sweats, +fatigue,   CV:  No pain, palpitations, hypo/hypertension  Resp:  No dyspnea, cough, tachypnea, wheezing  GI:  see HPI  :  No pain, bleeding, incontinence, nocturia  Muscle:  No pain, weakness  Neuro:  No weakness, tingling, memory problems  Psych:  No fatigue, insomnia, +anxiety  Endocrine:  No polyuria, polydypsia, cold/heat intolerance  Heme:  No petechiae, ecchymosis, easy bruisability  Skin:  No rash, tattoos, scars, edema      Vital Signs Last 24 Hrs  T(C): 37 (17 May 2022 09:24), Max: 37.2 (16 May 2022 22:13)  T(F): 98.6 (17 May 2022 09:24), Max: 99 (16 May 2022 22:13)  HR: 66 (17 May 2022 09:24) (66 - 98)  BP: 110/67 (17 May 2022 09:24) (94/58 - 158/87)  BP(mean): --  RR: 16 (17 May 2022 09:24) (16 - 20)  SpO2: 100% (17 May 2022 09:24) (95% - 100%)    PHYSICAL EXAM:    Constitutional: NAD, well-developed non toxic appearing AA female. alert and appropriate  Neck: No LAD, supple no JVD  Respiratory: Clear b/l no accessory muscle use  Cardiovascular: S1 and S2, RRR, no M  Gastrointestinal: BS+, soft, ND +epigastric tenderness without rebound or rigidity  Extremities: No peripheral edema, neg clubing, cyanosis  Vascular: 2+ peripheral pulses  Neurological: A/O x 3, no focal deficits  Psychiatric: Normal mood, normal affect  Skin: No rashes, anicteric        LABS:                        8.3    7.29  )-----------( 453      ( 17 May 2022 06:51 )             27.5   Hemoglobin: 11.4 g/dL (05.16.22 @ 12:02)   Hemoglobin: 11.5 g/dL (04.09.22 @ 23:11)     05-17    139  |  103  |  8   ----------------------------<  80  3.7   |  21<L>  |  0.65    Ca    9.0      17 May 2022 06:51  Phos  2.9     05-17  Mg     1.9     05-17    TPro  6.3  /  Alb  3.9  /  TBili  0.4  /  DBili  x   /  AST  14  /  ALT  10  /  AlkPhos  44  05-17  Lipase, Serum: 80 U/L (05.16.22 @ 12:02)   Troponin T, High Sensitivity Result: <6: Specimen not hemolyzed   COVID-19 PCR . (05.16.22 @ 18:37)   COVID-19 PCR: NotDetec  HCG Quantitative, Serum: <2.0    RADIOLOGY & ADDITIONAL TESTS:  ACC: [id number] EXAM:  CT ABDOMEN AND PELVIS IC                        PROCEDURE DATE:  05/16/2022      INTERPRETATION:  CLINICAL INFORMATION: Pain, elevated lipase    COMPARISON: February 10, 2022    CONTRAST/COMPLICATIONS:  IV Contrast: Omnipaque 350  90 cc administered   0 cc discarded  Oral Contrast: NONE  Complications: None reported at time of study completion    PROCEDURE:  CT of the Abdomen and Pelvis was performed.  Sagittal and coronal reformats were performed.    FINDINGS:  LOWERCHEST: Within normal limits.    LIVER: Cyst in the RIGHT lobe of the liver.  BILE DUCTS: Normal caliber.  GALLBLADDER: Within normal limits.  SPLEEN: Within normal limits.  PANCREAS: Within normal limits.  ADRENALS: Within normal limits.  KIDNEYS/URETERS: Within normal limits.    BLADDER: Within normal limits.  REPRODUCTIVE ORGANS: RIGHT ovarian cyst measuring 5.4 x 4.0 x 5.1 cm. The   uterus is unremarkable. LEFT ovary is normal.    BOWEL: No bowel obstruction. Appendix is normal.  PERITONEUM: No ascites.  VESSELS: Within normal limits.  RETROPERITONEUM/LYMPH NODES: No lymphadenopathy.  ABDOMINAL WALL: Within normal limits.  BONES: Within normal limits.    IMPRESSION:  No evidence of pancreatitis.  RIGHT ovarian cyst with maximal diameter 5.4 cm. Follow-up pelvic   ultrasound in 6 weeks is recommended to document resolution.    --- End of Report ---

## 2022-05-18 LAB
ANION GAP SERPL CALC-SCNC: 10 MMOL/L — SIGNIFICANT CHANGE UP (ref 5–17)
BUN SERPL-MCNC: 10 MG/DL — SIGNIFICANT CHANGE UP (ref 7–23)
CALCIUM SERPL-MCNC: 8.8 MG/DL — SIGNIFICANT CHANGE UP (ref 8.4–10.5)
CHLORIDE SERPL-SCNC: 106 MMOL/L — SIGNIFICANT CHANGE UP (ref 96–108)
CO2 SERPL-SCNC: 21 MMOL/L — LOW (ref 22–31)
CREAT SERPL-MCNC: 0.71 MG/DL — SIGNIFICANT CHANGE UP (ref 0.5–1.3)
EGFR: 111 ML/MIN/1.73M2 — SIGNIFICANT CHANGE UP
GLUCOSE SERPL-MCNC: 84 MG/DL — SIGNIFICANT CHANGE UP (ref 70–99)
HCT VFR BLD CALC: 25.8 % — LOW (ref 34.5–45)
HCT VFR BLD CALC: 27.7 % — LOW (ref 34.5–45)
HGB BLD-MCNC: 7.9 G/DL — LOW (ref 11.5–15.5)
HGB BLD-MCNC: 8.4 G/DL — LOW (ref 11.5–15.5)
MCHC RBC-ENTMCNC: 22 PG — LOW (ref 27–34)
MCHC RBC-ENTMCNC: 22.1 PG — LOW (ref 27–34)
MCHC RBC-ENTMCNC: 30.3 GM/DL — LOW (ref 32–36)
MCHC RBC-ENTMCNC: 30.6 GM/DL — LOW (ref 32–36)
MCV RBC AUTO: 72.3 FL — LOW (ref 80–100)
MCV RBC AUTO: 72.7 FL — LOW (ref 80–100)
NRBC # BLD: 0 /100 WBCS — SIGNIFICANT CHANGE UP (ref 0–0)
NRBC # BLD: 0 /100 WBCS — SIGNIFICANT CHANGE UP (ref 0–0)
PLATELET # BLD AUTO: 356 K/UL — SIGNIFICANT CHANGE UP (ref 150–400)
PLATELET # BLD AUTO: 357 K/UL — SIGNIFICANT CHANGE UP (ref 150–400)
POTASSIUM SERPL-MCNC: 4.2 MMOL/L — SIGNIFICANT CHANGE UP (ref 3.5–5.3)
POTASSIUM SERPL-SCNC: 4.2 MMOL/L — SIGNIFICANT CHANGE UP (ref 3.5–5.3)
RBC # BLD: 3.57 M/UL — LOW (ref 3.8–5.2)
RBC # BLD: 3.81 M/UL — SIGNIFICANT CHANGE UP (ref 3.8–5.2)
RBC # FLD: 18 % — HIGH (ref 10.3–14.5)
RBC # FLD: 18.2 % — HIGH (ref 10.3–14.5)
SODIUM SERPL-SCNC: 137 MMOL/L — SIGNIFICANT CHANGE UP (ref 135–145)
WBC # BLD: 6.68 K/UL — SIGNIFICANT CHANGE UP (ref 3.8–10.5)
WBC # BLD: 8.09 K/UL — SIGNIFICANT CHANGE UP (ref 3.8–10.5)
WBC # FLD AUTO: 6.68 K/UL — SIGNIFICANT CHANGE UP (ref 3.8–10.5)
WBC # FLD AUTO: 8.09 K/UL — SIGNIFICANT CHANGE UP (ref 3.8–10.5)

## 2022-05-18 PROCEDURE — 99233 SBSQ HOSP IP/OBS HIGH 50: CPT

## 2022-05-18 RX ADMIN — PANTOPRAZOLE SODIUM 40 MILLIGRAM(S): 20 TABLET, DELAYED RELEASE ORAL at 06:12

## 2022-05-18 RX ADMIN — MORPHINE SULFATE 2 MILLIGRAM(S): 50 CAPSULE, EXTENDED RELEASE ORAL at 03:23

## 2022-05-18 RX ADMIN — SIMETHICONE 80 MILLIGRAM(S): 80 TABLET, CHEWABLE ORAL at 13:34

## 2022-05-18 RX ADMIN — SODIUM CHLORIDE 30 MILLILITER(S): 9 INJECTION INTRAMUSCULAR; INTRAVENOUS; SUBCUTANEOUS at 18:35

## 2022-05-18 RX ADMIN — MORPHINE SULFATE 2 MILLIGRAM(S): 50 CAPSULE, EXTENDED RELEASE ORAL at 03:53

## 2022-05-18 RX ADMIN — SIMETHICONE 80 MILLIGRAM(S): 80 TABLET, CHEWABLE ORAL at 06:13

## 2022-05-18 RX ADMIN — PANTOPRAZOLE SODIUM 40 MILLIGRAM(S): 20 TABLET, DELAYED RELEASE ORAL at 18:18

## 2022-05-18 RX ADMIN — POLYETHYLENE GLYCOL 3350 17 GRAM(S): 17 POWDER, FOR SOLUTION ORAL at 06:12

## 2022-05-18 RX ADMIN — POLYETHYLENE GLYCOL 3350 17 GRAM(S): 17 POWDER, FOR SOLUTION ORAL at 18:18

## 2022-05-18 RX ADMIN — SIMETHICONE 80 MILLIGRAM(S): 80 TABLET, CHEWABLE ORAL at 21:30

## 2022-05-19 ENCOUNTER — TRANSCRIPTION ENCOUNTER (OUTPATIENT)
Age: 37
End: 2022-05-19

## 2022-05-19 VITALS
OXYGEN SATURATION: 100 % | HEART RATE: 78 BPM | RESPIRATION RATE: 19 BRPM | SYSTOLIC BLOOD PRESSURE: 124 MMHG | TEMPERATURE: 99 F | DIASTOLIC BLOOD PRESSURE: 80 MMHG

## 2022-05-19 LAB
HCT VFR BLD CALC: 26.9 % — LOW (ref 34.5–45)
HGB BLD-MCNC: 8.3 G/DL — LOW (ref 11.5–15.5)
MCHC RBC-ENTMCNC: 22.4 PG — LOW (ref 27–34)
MCHC RBC-ENTMCNC: 30.9 GM/DL — LOW (ref 32–36)
MCV RBC AUTO: 72.5 FL — LOW (ref 80–100)
NRBC # BLD: 0 /100 WBCS — SIGNIFICANT CHANGE UP (ref 0–0)
PLATELET # BLD AUTO: 358 K/UL — SIGNIFICANT CHANGE UP (ref 150–400)
RBC # BLD: 3.71 M/UL — LOW (ref 3.8–5.2)
RBC # FLD: 18.3 % — HIGH (ref 10.3–14.5)
SURGICAL PATHOLOGY STUDY: SIGNIFICANT CHANGE UP
WBC # BLD: 5.67 K/UL — SIGNIFICANT CHANGE UP (ref 3.8–10.5)
WBC # FLD AUTO: 5.67 K/UL — SIGNIFICANT CHANGE UP (ref 3.8–10.5)

## 2022-05-19 PROCEDURE — 99233 SBSQ HOSP IP/OBS HIGH 50: CPT

## 2022-05-19 RX ORDER — PANTOPRAZOLE SODIUM 20 MG/1
1 TABLET, DELAYED RELEASE ORAL
Qty: 60 | Refills: 0
Start: 2022-05-19 | End: 2022-06-17

## 2022-05-19 RX ORDER — PANTOPRAZOLE SODIUM 20 MG/1
40 TABLET, DELAYED RELEASE ORAL
Refills: 0 | Status: DISCONTINUED | OUTPATIENT
Start: 2022-05-19 | End: 2022-05-19

## 2022-05-19 RX ADMIN — MORPHINE SULFATE 2 MILLIGRAM(S): 50 CAPSULE, EXTENDED RELEASE ORAL at 15:56

## 2022-05-19 RX ADMIN — SIMETHICONE 80 MILLIGRAM(S): 80 TABLET, CHEWABLE ORAL at 04:44

## 2022-05-19 RX ADMIN — MORPHINE SULFATE 2 MILLIGRAM(S): 50 CAPSULE, EXTENDED RELEASE ORAL at 04:45

## 2022-05-19 RX ADMIN — MORPHINE SULFATE 2 MILLIGRAM(S): 50 CAPSULE, EXTENDED RELEASE ORAL at 04:54

## 2022-05-19 RX ADMIN — SIMETHICONE 80 MILLIGRAM(S): 80 TABLET, CHEWABLE ORAL at 15:54

## 2022-05-19 RX ADMIN — PANTOPRAZOLE SODIUM 40 MILLIGRAM(S): 20 TABLET, DELAYED RELEASE ORAL at 04:45

## 2022-05-19 NOTE — DISCHARGE NOTE NURSING/CASE MANAGEMENT/SOCIAL WORK - PATIENT PORTAL LINK FT
You can access the FollowMyHealth Patient Portal offered by [facility] by registering at the following website: [email or web address]. By joining [employer]’s FollowMyHealth portal, you will also be able to view your health information using other applications (apps) compatible with our system.

## 2022-05-19 NOTE — DISCHARGE NOTE PROVIDER - CARE PROVIDERS DIRECT ADDRESSES
,[email or web address]_Unknown,[email or web address]

## 2022-05-19 NOTE — CHART NOTE - NSCHARTNOTEFT_GEN_A_CORE
[place] [facility]  non bleeding duodenal ulcer    PPI gtts d/c'ed and changed to IV PPI BID  PO clears, if tolerates then ok to advance to solids in AM  PO laxatives as ordered  Tobacco cessation counseling reinforced; can rx Nicoderm patch    Dr Willard updated  Dicussed with pt, all questions answered    Ovidio Veliz PA-C    [facility]  [phone]  After hours and weekend coverage [phone]
EMERGENCY : SW consulted by ED Attending Physician as patient endorsing stress at home due to behavorial issues with her son. LMSW reviewed patient's chart. As per chart review patient is a "37 y/o F hx anxiety, ?pancreatitis, PUD presenting w. abdominal pain x 2 days, Generalized, non radiating, c/w PUD pain she has had in past, had some blood in  the vmitus. Patient has had multiple visits for same reason w/ negative work up. Last endoscopy showed non bleeding peptic ulcer. Remote hx of h pylori, treated but never confirmed clearance w/ GI. States hasn't had a chance to follow up w/ GI >1 year. No other complaints. Patient has no suicidal or homicidal ideation."    LMSW made and attempt to meet with patient at bedside and introduce self and role but patient actively vomiting at this time and requesting to speak with nurse, LMSW made patient's RN aware and will continue to attempt to meet with patient. Social work continues to follow.
Medically cleared by Dr. Bauer for discharge home. Continue ppi BID per Dr. Bauer, Follow up with Dr. Bauer and GYN for r ovarian cyst

## 2022-05-19 NOTE — PROGRESS NOTE ADULT - PROBLEM SELECTOR PLAN 1
pt   comes to  ER  c/o abdominal  pain , associated  with N/V  pt  under  a lot  of  stress at  home with  her  children . Hb this  AM  8.7 dropped  from 11.5 . now  on pantoprazole  drip. pt  needs  social  work consultation for  multiple  social  issues  GI  consult  called
:pt   no abdominal  pain tolerating  po  well   HB 8.4   no  N/V  pt  to be  discharged  home  today  with  follow  with  GI  follow up  colonoscopy c/w PPI  bid
pt  improved  no  abdominal  pain, S/P  upper  endoscopy  with one  non bleding  DU , Hb 7.9 will  observe  x  24  hours , will advance  diet  to   regular , will  continue on PPI  bid

## 2022-05-19 NOTE — DISCHARGE NOTE PROVIDER - NSDCMRMEDTOKEN_GEN_ALL_CORE_FT
MiraLax oral powder for reconstitution: orally once a day  omeprazole 40 mg oral delayed release capsule: 1 cap(s) orally 2 times a day MDD:2 caps  sucralfate 1 g/10 mL oral suspension: 10 milliliter(s) orally 3 times a day (with meals)    MiraLax oral powder for reconstitution: orally once a day  Protonix 40 mg oral delayed release tablet: 1 tab(s) orally 2 times a day   sucralfate 1 g/10 mL oral suspension: 10 milliliter(s) orally 3 times a day (with meals)

## 2022-05-19 NOTE — PROGRESS NOTE ADULT - REASON FOR ADMISSION
abd pain, chest pain, hematemesis

## 2022-05-19 NOTE — DISCHARGE NOTE PROVIDER - CARE PROVIDER_API CALL
Casa Willard)  Medicine  [address] [place] [address], [address]  [place] [address]  Phone: [phone]  Fax: [phone]  Follow Up Time:     Brunner, Robert J (MD)  Gastroenterology  [address] [facility], [address] [place] [address]  [place] [address]  Phone: [phone]  Fax: [phone]  Follow Up Time:

## 2022-05-19 NOTE — DISCHARGE NOTE PROVIDER - PROVIDER RX CONTACT NUMBER
You can access the [facility], offered by [facility], by registering with the following website: [email or web address]
[phone]

## 2022-05-19 NOTE — DISCHARGE NOTE NURSING/CASE MANAGEMENT/SOCIAL WORK - NSDCVIVACCINE_GEN_ALL_CORE_FT
Tdap; 22-May-2019 15:13; Meghan Castro (BILLY); [employer]; [id number] (Exp. Date: 12-Mar-2021); IntraMuscular; Deltoid Left.; 0.5 milliLiter(s); VIS (VIS Published: 09-May-2013, VIS Presented: 22-May-2019);

## 2022-05-19 NOTE — PROGRESS NOTE ADULT - SUBJECTIVE AND OBJECTIVE BOX
INTERVAL HPI/OVERNIGHT EVENTS:  +BM yesterday - brown  tolerating PO (solids)  no nausea or vomiting    MEDICATIONS  (STANDING):  magnesium citrate Oral Solution 0.5 Bottle Oral once  pantoprazole  Injectable 40 milliGRAM(s) IV Push two times a day  polyethylene glycol 3350 17 Gram(s) Oral two times a day  simethicone 80 milliGRAM(s) Chew three times a day  sodium chloride 0.9%. 1000 milliLiter(s) (100 mL/Hr) IV Continuous <Continuous>    MEDICATIONS  (PRN):  morphine  - Injectable 2 milliGRAM(s) IV Push every 4 hours PRN mod to severe pain  ondansetron Injectable 4 milliGRAM(s) IV Push every 6 hours PRN Nausea and/or Vomiting      Allergies  No Known Allergies      Review of Systems:  General:  No , fevers, chills, night sweats, +fatigue, +weight loss   CV:  No pain, palpitations, hypo/hypertension  Resp:  No dyspnea, cough, tachypnea, wheezing  GI:  see HPI  :  No pain, bleeding, incontinence, nocturia  Muscle:  No pain, weakness  Neuro:  No weakness, tingling, memory problems  Psych:  No fatigue, insomnia, +anxiety  Endocrine:  No polyuria, polydypsia, cold/heat intolerance  Heme:  No petechiae, ecchymosis, easy bruisability  Skin:  No rash, tattoos, scars, edema    Vital Signs Last 24 Hrs  T(C): 36.7 (19 May 2022 04:44), Max: 37.3 (18 May 2022 14:35)  T(F): 98.1 (19 May 2022 04:44), Max: 99.2 (18 May 2022 14:35)  HR: 74 (19 May 2022 04:44) (74 - 86)  BP: 118/77 (19 May 2022 04:44) (109/75 - 118/77)  BP(mean): --  RR: 18 (19 May 2022 04:44) (18 - 18)  SpO2: 99% (19 May 2022 04:44) (99% - 100%)    PHYSICAL EXAM:  Constitutional: NAD, well-developed non toxic appearing AA female. alert and appropriate OOB to chair  Neck: No LAD, supple no JVD  Respiratory: Clear b/l no accessory muscle use  Cardiovascular: S1 and S2, RRR, no M  Gastrointestinal: BS+, soft, ND NT without rebound or rigidity  Extremities: No peripheral edema, neg clubbing, cyanosis  Vascular: 2+ peripheral pulses  Neurological: A/O x 3, no focal deficits  Psychiatric: Normal mood, normal affect  Skin: No rashes, anicteric    LABS:                        8.3    5.67  )-----------( 358      ( 19 May 2022 06:17 )             26.9     05-18    137  |  106  |  10  ----------------------------<  84  4.2   |  21<L>  |  0.71    Ca    8.8      18 May 2022 06:44          RADIOLOGY & ADDITIONAL TESTS:  
Chief complaint:pt  improved  no  abdominal  pain, S/P  upper  endoscopy  with one  non bleding  DU , Hb 7.9 will  observe  x  24  hours , will advance  diet  to   regular , will  continue on PPI  bid     HPI:  36F c hx anxiety not on any meds, pancreatitis, PUD/duodenal ulcer, frequent ED visits for abd pain and hematemesis, pw abd pain and hematemesis.    On my arrival at bedside, pt crying, shouting we are "torturing her", asking for pain meds. Pt states she has had epigastric abd pain for 3 days, and has vomited 4 times so far. Pt reports tasting the blood in her mouth and reports a "small amount" of blood in her vomit. Pt reports being constipated and last BM was 1 week ago. Pt states she only takes tylenol at home for her pain. Pt states for the past 2 days, has had episodes of chest tightness and SOB "can't breathe" that lasts for a few minutes at a time, improved when she takes a shower. Pt reports being under a lot of stress taking care of her 5 children. Pt denies any relieving or exacerbating factors. Hpylori treated in past, but eradication was not confirmed.  (16 May 2022 22:59)      REVIEW OF SYSTEMS:    CONSTITUTIONAL: No fever, weight loss, or fatigue  NECK: No pain or stiffness  RESPIRATORY: No cough, wheezing, chills or hemoptysis; No shortness of breath  CARDIOVASCULAR: No chest pain, palpitations, dizziness, or leg swelling  GASTROINTESTINAL: No abdominal or epigastric pain. No nausea, vomiting, or hematemesis; No diarrhea or constipation. No melena or hematochezia.  GENITOURINARY: No dysuria, frequency, hematuria, or incontinence  NEUROLOGICAL: No headaches, memory loss, loss of strength, numbness, or tremors  SKIN: No itching, burning, rashes, or lesions   LYMPH NODES: No enlarged glands  MUSCULOSKELETAL: No joint pain or swelling; No muscle, back, or extremity pain  HEME/LYMPH: No easy bruising, or bleeding gums    MEDICATIONS  (STANDING):  magnesium citrate Oral Solution 0.5 Bottle Oral once  pantoprazole  Injectable 40 milliGRAM(s) IV Push two times a day  polyethylene glycol 3350 17 Gram(s) Oral two times a day  simethicone 80 milliGRAM(s) Chew three times a day  sodium chloride 0.9%. 1000 milliLiter(s) (100 mL/Hr) IV Continuous <Continuous>  sodium chloride 0.9%. 500 milliLiter(s) (30 mL/Hr) IV Continuous <Continuous>    MEDICATIONS  (PRN):  morphine  - Injectable 2 milliGRAM(s) IV Push every 4 hours PRN mod to severe pain  ondansetron Injectable 4 milliGRAM(s) IV Push every 6 hours PRN Nausea and/or Vomiting      Allergies    No Known Allergies    Intolerances        Vital Signs Last 24 Hrs  T(C): 36.9 (18 May 2022 05:02), Max: 37.3 (17 May 2022 14:46)  T(F): 98.5 (18 May 2022 05:02), Max: 99.1 (17 May 2022 14:46)  HR: 72 (18 May 2022 05:02) (64 - 113)  BP: 115/70 (18 May 2022 05:02) (103/54 - 125/77)  BP(mean): --  RR: 18 (18 May 2022 05:02) (16 - 20)  SpO2: 99% (18 May 2022 05:02) (98% - 100%)    PHYSICAL EXAM:    GENERAL: NAD, well-groomed, well-developed  HEAD:  Atraumatic, Normocephalic  EYES: EOMI, PERRLA, conjunctiva and sclera clear  ENMT: No tonsillar erythema, exudates, or enlargement; Moist mucous membranes, Good dentition, No lesions  NECK: Supple, No JVD, Normal thyroid  NERVOUS SYSTEM:  Alert & Oriented X3, Good concentration; Motor Strength 5/5 B/L upper and lower extremities; DTRs 2+ intact and symmetric  CHEST/LUNG: Clear to percussion bilaterally; No rales, rhonchi, wheezing, or rubs  HEART: Regular rate and rhythm; No murmurs, rubs, or gallops  ABDOMEN: Soft, Nontender, Nondistended; Bowel sounds present  EXTREMITIES:  2+ Peripheral Pulses, No clubbing, cyanosis, or edema  LYMPH: No lymphadenopathy noted  SKIN: No rashes or lesions      LABS:                        7.9    8.09  )-----------( 357      ( 18 May 2022 07:05 )             25.8     05-18    137  |  106  |  10  ----------------------------<  84  4.2   |  21<L>  |  0.71    Ca    8.8      18 May 2022 06:44  Phos  2.9     05-17  Mg     1.9     05-17    TPro  6.3  /  Alb  3.9  /  TBili  0.4  /  DBili  x   /  AST  14  /  ALT  10  /  AlkPhos  44  05-17          RADIOLOGY & ADDITIONAL STUDIES:
  INTERVAL HPI/OVERNIGHT EVENTS:  s/p EGD yesterday - non bleeding DU  tolerated liquids well yesterday and this AM following EGD  no abdominal pain    +flatus, no BM yet  started Miralax today; did not get magnesium citrate dose as ordered    MEDICATIONS  (STANDING):  magnesium citrate Oral Solution 0.5 Bottle Oral once  pantoprazole  Injectable 40 milliGRAM(s) IV Push two times a day  polyethylene glycol 3350 17 Gram(s) Oral two times a day  simethicone 80 milliGRAM(s) Chew three times a day  sodium chloride 0.9%. 1000 milliLiter(s) (100 mL/Hr) IV Continuous <Continuous>  sodium chloride 0.9%. 500 milliLiter(s) (30 mL/Hr) IV Continuous <Continuous>    MEDICATIONS  (PRN):  morphine  - Injectable 2 milliGRAM(s) IV Push every 4 hours PRN mod to severe pain  ondansetron Injectable 4 milliGRAM(s) IV Push every 6 hours PRN Nausea and/or Vomiting      Allergies  No Known Allergies    Review of Systems:  General:  No , fevers, chills, night sweats, +fatigue, +weight loss   CV:  No pain, palpitations, hypo/hypertension  Resp:  No dyspnea, cough, tachypnea, wheezing  GI:  see HPI  :  No pain, bleeding, incontinence, nocturia  Muscle:  No pain, weakness  Neuro:  No weakness, tingling, memory problems  Psych:  No fatigue, insomnia, +anxiety  Endocrine:  No polyuria, polydypsia, cold/heat intolerance  Heme:  No petechiae, ecchymosis, easy bruisability  Skin:  No rash, tattoos, scars, edema      Vital Signs Last 24 Hrs  T(C): 36.9 (18 May 2022 05:02), Max: 37.3 (17 May 2022 14:46)  T(F): 98.5 (18 May 2022 05:02), Max: 99.1 (17 May 2022 14:46)  HR: 72 (18 May 2022 05:02) (64 - 113)  BP: 115/70 (18 May 2022 05:02) (103/54 - 125/77)  BP(mean): --  RR: 18 (18 May 2022 05:02) (18 - 20)  SpO2: 99% (18 May 2022 05:02) (98% - 100%)    PHYSICAL EXAM:  Constitutional: NAD, well-developed non toxic appearing AA female. alert and appropriate OOB to chair  Neck: No LAD, supple no JVD  Respiratory: Clear b/l no accessory muscle use  Cardiovascular: S1 and S2, RRR, no M  Gastrointestinal: BS+, soft, ND NT without rebound or rigidity  Extremities: No peripheral edema, neg clubbing, cyanosis  Vascular: 2+ peripheral pulses  Neurological: A/O x 3, no focal deficits  Psychiatric: Normal mood, normal affect  Skin: No rashes, anicteric    LABS:                        8.4    6.68  )-----------( 356      ( 18 May 2022 11:19 )             27.7   Hemoglobin: 7.9 g/dL (05.18.22 @ 07:05)  ?lab error, repeat CBC sent  Hemoglobin: 8.3 g/dL (05.17.22 @ 06:51)   Hemoglobin: 11.4 g/dL (05.16.22 @ 12:02)   Mean Cell Volume: 72.7 fl (05.18.22 @ 11:19)     05-18    137  |  106  |  10  ----------------------------<  84  4.2   |  21<L>  |  0.71    Ca    8.8      18 May 2022 06:44  Phos  2.9     05-17  Mg     1.9     05-17    TPro  6.3  /  Alb  3.9  /  TBili  0.4  /  DBili  x   /  AST  14  /  ALT  10  /  AlkPhos  44  05-17    Thyroid Stimulating Hormone, Serum: 0.34 uIU/mL (05.17.22 @ 06:51)   Iron with Total Binding Capacity (05.17.22 @ 06:51)   Iron - Total Binding Capacity.: 388 ug/dL   % Saturation, Iron: 10 %   Iron Total, Serum: 38: Moderate hemolysis. Results may be falsely elevated. ug/dL   Unsaturated Iron Binding Capacity: 350: Moderate hemolysis. Results may be falsely elevated. ug/dL   Ferritin, Serum: 7 ng/mL (05.17.22 @ 06:51)         RADIOLOGY & ADDITIONAL TESTS:  
Chief complaint:pt   comes to  ER  c/o abdominal  pain , associated  with N/V  pt  under  a lot  of  stress at  home with  her  children . Hb this  AM  8.7 dropped  from 11.5 . now  on pantoprazole  drip. pt  needs  social  work consultation for  multiple  social  issues     HPI:  36F c hx anxiety not on any meds, pancreatitis, PUD/duodenal ulcer, frequent ED visits for abd pain and hematemesis, pw abd pain and hematemesis.    On my arrival at bedside, pt crying, shouting we are "torturing her", asking for pain meds. Pt states she has had epigastric abd pain for 3 days, and has vomited 4 times so far. Pt reports tasting the blood in her mouth and reports a "small amount" of blood in her vomit. Pt reports being constipated and last BM was 1 week ago. Pt states she only takes tylenol at home for her pain. Pt states for the past 2 days, has had episodes of chest tightness and SOB "can't breathe" that lasts for a few minutes at a time, improved when she takes a shower. Pt reports being under a lot of stress taking care of her 5 children. Pt denies any relieving or exacerbating factors. Hpylori treated in past, but eradication was not confirmed.  (16 May 2022 22:59)      REVIEW OF SYSTEMS:    CONSTITUTIONAL: No fever, weight loss, or fatigue  NECK: No pain or stiffness  RESPIRATORY: No cough, wheezing, chills or hemoptysis; No shortness of breath  CARDIOVASCULAR: No chest pain, palpitations, dizziness, or leg swelling  GASTROINTESTINAL: No abdominal or epigastric pain. No nausea, vomiting, or hematemesis; No diarrhea or constipation. No melena or hematochezia.  GENITOURINARY: No dysuria, frequency, hematuria, or incontinence  NEUROLOGICAL: No headaches, memory loss, loss of strength, numbness, or tremors  SKIN: No itching, burning, rashes, or lesions   LYMPH NODES: No enlarged glands  MUSCULOSKELETAL: No joint pain or swelling; No muscle, back, or extremity pain  HEME/LYMPH: No easy bruising, or bleeding gums    MEDICATIONS  (STANDING):  magnesium citrate Oral Solution 0.5 Bottle Oral once  pantoprazole Infusion 8 mG/Hr (10 mL/Hr) IV Continuous <Continuous>  polyethylene glycol 3350 17 Gram(s) Oral two times a day  potassium chloride  10 mEq/100 mL IVPB 10 milliEquivalent(s) IV Intermittent every 1 hour  simethicone 80 milliGRAM(s) Chew three times a day  sodium chloride 0.9%. 1000 milliLiter(s) (100 mL/Hr) IV Continuous <Continuous>    MEDICATIONS  (PRN):  morphine  - Injectable 2 milliGRAM(s) IV Push every 4 hours PRN mod to severe pain  ondansetron Injectable 4 milliGRAM(s) IV Push every 6 hours PRN Nausea and/or Vomiting      Allergies    No Known Allergies    Intolerances        Vital Signs Last 24 Hrs  T(C): 36.6 (17 May 2022 04:49), Max: 37.2 (16 May 2022 22:13)  T(F): 97.9 (17 May 2022 04:49), Max: 99 (16 May 2022 22:13)  HR: 72 (17 May 2022 04:49) (70 - 98)  BP: 94/58 (17 May 2022 06:10) (94/58 - 158/87)  BP(mean): --  RR: 18 (17 May 2022 04:49) (18 - 20)  SpO2: 100% (17 May 2022 04:49) (95% - 100%)    PHYSICAL EXAM:    GENERAL: NAD, well-groomed, well-developed  HEAD:  Atraumatic, Normocephalic  EYES: EOMI, PERRLA, conjunctiva and sclera clear  ENMT: No tonsillar erythema, exudates, or enlargement; Moist mucous membranes, Good dentition, No lesions  NECK: Supple, No JVD, Normal thyroid  NERVOUS SYSTEM:  Alert & Oriented X3, Good concentration; Motor Strength 5/5 B/L upper and lower extremities; DTRs 2+ intact and symmetric  CHEST/LUNG: Clear to percussion bilaterally; No rales, rhonchi, wheezing, or rubs  HEART: Regular rate and rhythm; No murmurs, rubs, or gallops  ABDOMEN: Soft, Nontender, Nondistended; Bowel sounds present  EXTREMITIES:  2+ Peripheral Pulses, No clubbing, cyanosis, or edema  LYMPH: No lymphadenopathy noted  SKIN: No rashes or lesions      LABS:                        8.3    7.29  )-----------( 453      ( 17 May 2022 06:51 )             27.5     05-17    139  |  103  |  8   ----------------------------<  80  3.7   |  21<L>  |  0.65    Ca    9.0      17 May 2022 06:51  Phos  2.9     05-17  Mg     1.9     05-17    TPro  6.3  /  Alb  3.9  /  TBili  0.4  /  DBili  x   /  AST  14  /  ALT  10  /  AlkPhos  44  05-17          RADIOLOGY & ADDITIONAL STUDIES:
Chief complaint:pt   no abdominal  pain tolerating  po  well   HB 8.4   no  N/V  pt  to be  discharged  home  today  with  follow  with  GI  follow up  colonoscopy c/w PPI  bid     HPI:  36F c hx anxiety not on any meds, pancreatitis, PUD/duodenal ulcer, frequent ED visits for abd pain and hematemesis, pw abd pain and hematemesis.    On my arrival at bedside, pt crying, shouting we are "torturing her", asking for pain meds. Pt states she has had epigastric abd pain for 3 days, and has vomited 4 times so far. Pt reports tasting the blood in her mouth and reports a "small amount" of blood in her vomit. Pt reports being constipated and last BM was 1 week ago. Pt states she only takes tylenol at home for her pain. Pt states for the past 2 days, has had episodes of chest tightness and SOB "can't breathe" that lasts for a few minutes at a time, improved when she takes a shower. Pt reports being under a lot of stress taking care of her 5 children. Pt denies any relieving or exacerbating factors. Hpylori treated in past, but eradication was not confirmed.  (16 May 2022 22:59)      REVIEW OF SYSTEMS:    CONSTITUTIONAL: No fever, weight loss, or fatigue  NECK: No pain or stiffness  RESPIRATORY: No cough, wheezing, chills or hemoptysis; No shortness of breath  CARDIOVASCULAR: No chest pain, palpitations, dizziness, or leg swelling  GASTROINTESTINAL: No abdominal or epigastric pain. No nausea, vomiting, or hematemesis; No diarrhea or constipation. No melena or hematochezia.  GENITOURINARY: No dysuria, frequency, hematuria, or incontinence  NEUROLOGICAL: No headaches, memory loss, loss of strength, numbness, or tremors  SKIN: No itching, burning, rashes, or lesions   LYMPH NODES: No enlarged glands  MUSCULOSKELETAL: No joint pain or swelling; No muscle, back, or extremity pain  HEME/LYMPH: No easy bruising, or bleeding gums    MEDICATIONS  (STANDING):  magnesium citrate Oral Solution 0.5 Bottle Oral once  pantoprazole  Injectable 40 milliGRAM(s) IV Push two times a day  polyethylene glycol 3350 17 Gram(s) Oral two times a day  simethicone 80 milliGRAM(s) Chew three times a day  sodium chloride 0.9%. 1000 milliLiter(s) (100 mL/Hr) IV Continuous <Continuous>    MEDICATIONS  (PRN):  morphine  - Injectable 2 milliGRAM(s) IV Push every 4 hours PRN mod to severe pain  ondansetron Injectable 4 milliGRAM(s) IV Push every 6 hours PRN Nausea and/or Vomiting      Allergies    No Known Allergies    Intolerances        Vital Signs Last 24 Hrs  T(C): 36.7 (19 May 2022 04:44), Max: 37.3 (18 May 2022 14:35)  T(F): 98.1 (19 May 2022 04:44), Max: 99.2 (18 May 2022 14:35)  HR: 74 (19 May 2022 04:44) (74 - 86)  BP: 118/77 (19 May 2022 04:44) (109/75 - 118/77)  BP(mean): --  RR: 18 (19 May 2022 04:44) (18 - 18)  SpO2: 99% (19 May 2022 04:44) (99% - 100%)    PHYSICAL EXAM:    GENERAL: NAD, well-groomed, well-developed  HEAD:  Atraumatic, Normocephalic  EYES: EOMI, PERRLA, conjunctiva and sclera clear  ENMT: No tonsillar erythema, exudates, or enlargement; Moist mucous membranes, Good dentition, No lesions  NECK: Supple, No JVD, Normal thyroid  NERVOUS SYSTEM:  Alert & Oriented X3, Good concentration; Motor Strength 5/5 B/L upper and lower extremities; DTRs 2+ intact and symmetric  CHEST/LUNG: Clear to percussion bilaterally; No rales, rhonchi, wheezing, or rubs  HEART: Regular rate and rhythm; No murmurs, rubs, or gallops  ABDOMEN: Soft, Nontender, Nondistended; Bowel sounds present  EXTREMITIES:  2+ Peripheral Pulses, No clubbing, cyanosis, or edema  LYMPH: No lymphadenopathy noted  SKIN: No rashes or lesions      LABS:                        8.3    5.67  )-----------( 358      ( 19 May 2022 06:17 )             26.9     05-18    137  |  106  |  10  ----------------------------<  84  4.2   |  21<L>  |  0.71    Ca    8.8      18 May 2022 06:44            RADIOLOGY & ADDITIONAL STUDIES:

## 2022-05-19 NOTE — DISCHARGE NOTE NURSING/CASE MANAGEMENT/SOCIAL WORK - NSDCPEFALRISK_GEN_ALL_CORE
For information on Fall & Injury Prevention, visit: [email or web address] OR  [email or web address] OR  https://www.cdc.gov/steadi/patient.html

## 2022-05-19 NOTE — DISCHARGE NOTE PROVIDER - NSDCCPCAREPLAN_GEN_ALL_CORE_FT
PRINCIPAL DISCHARGE DIAGNOSIS  Diagnosis: Peptic ulcer disease  Assessment and Plan of Treatment: Continue current medicaitons, follow up with GI for managment      SECONDARY DISCHARGE DIAGNOSES  Diagnosis: Hypokalemia  Assessment and Plan of Treatment: Resolved. Continue supplements if instructed by PMD    Diagnosis: Constipation  Assessment and Plan of Treatment: Follow up with PMD for managment , continue bowel regimen    Diagnosis: Anemia  Assessment and Plan of Treatment: Follow up with PMD for managment     PRINCIPAL DISCHARGE DIAGNOSIS  Diagnosis: Peptic ulcer disease  Assessment and Plan of Treatment: Continue current medicaitons, follow up with GI for managment      SECONDARY DISCHARGE DIAGNOSES  Diagnosis: Hypokalemia  Assessment and Plan of Treatment: Resolved. Continue supplements if instructed by PMD    Diagnosis: Constipation  Assessment and Plan of Treatment: Follow up with PMD for managment , continue bowel regimen    Diagnosis: Anemia  Assessment and Plan of Treatment: Follow up with PMD for managment    Diagnosis: Ovarian cyst  Assessment and Plan of Treatment: Follow up with GYN for managment of ovarian cyst

## 2022-05-19 NOTE — PROGRESS NOTE ADULT - PROBLEM SELECTOR PLAN 2
pt   comes to  ER  c/o abdominal  pain , associated  with N/V  pt  under  a lot  of  stress at  home with  her  children . Hb this  AM  8.7 dropped  from 11.5 . now  on pantoprazole  drip. pt  needs  social  work consultation for  multiple  social  issues .GI  consult  called
pt  improved  no  abdominal  pain, S/P  upper  endoscopy  with one  non bleding  DU , Hb 7.9 will  observe  x  24  hours , will advance  diet  to   regular , will  continue on PPI  bid
:pt   no abdominal  pain tolerating  po  well   HB 8.4   no  N/V  pt  to be  discharged  home  today  with  follow  with  GI  follow up  colonoscopy c/w PPI  bid

## 2022-05-19 NOTE — PROGRESS NOTE ADULT - ASSESSMENT
36F c hx anxiety not on any meds, pancreatitis, PUD/duodenal ulcer admitted with epigastric pain, nausea/vomiting and reports of some blood tinged emesis - drop from Hgb 11.4->8.3  No jane hemetemesis  Hx. PUD - gastric ulcer +H  pylori - treated but did not keep scheduled breath test appt for confirmation of HP eradication and no follow up EGD was done.    EGD 6/29/2021 - normal esophagus, mild gastritis, non bleeding DU with no stigmata of recent bleeding  CT - pancreas WNL    COVID negative  HCG negative    #anemia; iron defiency (ferritin 7)  #epigastric pain - EGD 5/17: non bleeding DU, normal esophagus, medium HH, gastritis; CT negative for pancreatitis  -PPI BID  -ok for PO solids (ordered)  -outpt Colonoscopy +/-VCE for iron deficiency anemia  -tobacco cessation again encourages    #constipation, no obstruction on CT  -continue mIralax as ordered  -PO magnesium citrate x 1 dose as ordered  -OOB, ambulate  -avoid narcotics as able 2/2 constipation SE    Discussed with pt, all questions answered  discussed with Medicine attending      Ovidio Veliz PA-C    [facility]  [phone]  After hours and weekend coverage [phone]  
36F c hx anxiety not on any meds, pancreatitis, PUD/duodenal ulcer admitted with epigastric pain, nausea/vomiting and reports of some blood tinged emesis - drop from Hgb 11.4->8.3  No jane hemetemesis  Hx. PUD - gastric ulcer +H  pylori - treated but did not keep scheduled breath test appt for confirmation of HP eradication and no follow up EGD was done.    EGD 6/29/2021 - normal esophagus, mild gastritis, non bleeding DU with no stigmata of recent bleeding  CT - pancreas WNL    COVID negative  HCG negative    #anemia; iron deficient (ferritin 7)  #epigastric pain - EGD 5/17: non bleeding DU, normal esophagus, medium HH, gastritis; CT negative for pancreatitis  -PPI BID (change to PO for dc)  -PO as tolerated  -outpt Colonoscopy +/-VCE for iron deficiency anemia (Dr ISSAC Christianson office to contact pt to schedule)  -tobacco cessation again encouraged; pt agreeable to comply    #constipation, no obstruction on CT  -continue miralax as ordered  -avoid narcotics as able 2/2 constipation SE    Discussed with pt, all questions answered  discussed with Medicine attending  No GI objection to dc home today    Ovidio Veliz PA-C    [facility]  [phone]  After hours and weekend coverage [phone]

## 2022-05-30 PROCEDURE — 88305 TISSUE EXAM BY PATHOLOGIST: CPT

## 2022-05-30 PROCEDURE — 86850 RBC ANTIBODY SCREEN: CPT

## 2022-05-30 PROCEDURE — 96376 TX/PRO/DX INJ SAME DRUG ADON: CPT

## 2022-05-30 PROCEDURE — 84443 ASSAY THYROID STIM HORMONE: CPT

## 2022-05-30 PROCEDURE — G1004: CPT

## 2022-05-30 PROCEDURE — 96375 TX/PRO/DX INJ NEW DRUG ADDON: CPT

## 2022-05-30 PROCEDURE — 85027 COMPLETE CBC AUTOMATED: CPT

## 2022-05-30 PROCEDURE — U0003: CPT

## 2022-05-30 PROCEDURE — 86703 HIV-1/HIV-2 1 RESULT ANTBDY: CPT

## 2022-05-30 PROCEDURE — 83735 ASSAY OF MAGNESIUM: CPT

## 2022-05-30 PROCEDURE — 80053 COMPREHEN METABOLIC PANEL: CPT

## 2022-05-30 PROCEDURE — U0005: CPT

## 2022-05-30 PROCEDURE — 82728 ASSAY OF FERRITIN: CPT

## 2022-05-30 PROCEDURE — 99285 EMERGENCY DEPT VISIT HI MDM: CPT

## 2022-05-30 PROCEDURE — 83605 ASSAY OF LACTIC ACID: CPT

## 2022-05-30 PROCEDURE — 84484 ASSAY OF TROPONIN QUANT: CPT

## 2022-05-30 PROCEDURE — 84702 CHORIONIC GONADOTROPIN TEST: CPT

## 2022-05-30 PROCEDURE — 86900 BLOOD TYPING SEROLOGIC ABO: CPT

## 2022-05-30 PROCEDURE — 71046 X-RAY EXAM CHEST 2 VIEWS: CPT

## 2022-05-30 PROCEDURE — 85025 COMPLETE CBC W/AUTO DIFF WBC: CPT

## 2022-05-30 PROCEDURE — 36415 COLL VENOUS BLD VENIPUNCTURE: CPT

## 2022-05-30 PROCEDURE — 83540 ASSAY OF IRON: CPT

## 2022-05-30 PROCEDURE — 84100 ASSAY OF PHOSPHORUS: CPT

## 2022-05-30 PROCEDURE — 83690 ASSAY OF LIPASE: CPT

## 2022-05-30 PROCEDURE — 96361 HYDRATE IV INFUSION ADD-ON: CPT

## 2022-05-30 PROCEDURE — 83550 IRON BINDING TEST: CPT

## 2022-05-30 PROCEDURE — 74177 CT ABD & PELVIS W/CONTRAST: CPT | Mod: MG

## 2022-05-30 PROCEDURE — 96374 THER/PROPH/DIAG INJ IV PUSH: CPT

## 2022-05-30 PROCEDURE — 86901 BLOOD TYPING SEROLOGIC RH(D): CPT

## 2022-05-30 PROCEDURE — 88342 IMHCHEM/IMCYTCHM 1ST ANTB: CPT

## 2022-05-30 PROCEDURE — 80048 BASIC METABOLIC PNL TOTAL CA: CPT

## 2022-07-01 ENCOUNTER — TRANSCRIPTION ENCOUNTER (OUTPATIENT)
Age: 37
End: 2022-07-01

## 2022-07-02 ENCOUNTER — INPATIENT (INPATIENT)
Facility: HOSPITAL | Age: 37
LOS: 9 days | Discharge: HOME | DRG: 329 | End: 2022-07-12
Attending: SURGERY | Admitting: SURGERY
Payer: MEDICAID

## 2022-07-02 ENCOUNTER — TRANSCRIPTION ENCOUNTER (OUTPATIENT)
Age: 37
End: 2022-07-02

## 2022-07-02 VITALS
HEART RATE: 122 BPM | OXYGEN SATURATION: 98 % | SYSTOLIC BLOOD PRESSURE: 84 MMHG | DIASTOLIC BLOOD PRESSURE: 61 MMHG | WEIGHT: 179.9 LBS | HEIGHT: 64 IN | TEMPERATURE: 98 F | RESPIRATION RATE: 17 BRPM

## 2022-07-02 DIAGNOSIS — Z98.51 TUBAL LIGATION STATUS: Chronic | ICD-10-CM

## 2022-07-02 DIAGNOSIS — R10.9 UNSPECIFIED ABDOMINAL PAIN: ICD-10-CM

## 2022-07-02 LAB
ALBUMIN SERPL ELPH-MCNC: 4.7 G/DL — SIGNIFICANT CHANGE UP (ref 3.3–5)
ALBUMIN SERPL ELPH-MCNC: 5.6 G/DL — HIGH (ref 3.3–5)
ALP SERPL-CCNC: 61 U/L — SIGNIFICANT CHANGE UP (ref 40–120)
ALP SERPL-CCNC: 69 U/L — SIGNIFICANT CHANGE UP (ref 40–120)
ALT FLD-CCNC: 12 U/L — SIGNIFICANT CHANGE UP (ref 10–45)
ALT FLD-CCNC: 16 U/L — SIGNIFICANT CHANGE UP (ref 10–45)
ANION GAP SERPL CALC-SCNC: 17 MMOL/L — SIGNIFICANT CHANGE UP (ref 5–17)
ANION GAP SERPL CALC-SCNC: 19 MMOL/L — HIGH (ref 5–17)
ANISOCYTOSIS BLD QL: SLIGHT — SIGNIFICANT CHANGE UP
APTT BLD: 87.5 SEC — HIGH (ref 27.5–35.5)
AST SERPL-CCNC: 10 U/L — SIGNIFICANT CHANGE UP (ref 10–40)
AST SERPL-CCNC: 18 U/L — SIGNIFICANT CHANGE UP (ref 10–40)
BASE EXCESS BLDV CALC-SCNC: -1.3 MMOL/L — SIGNIFICANT CHANGE UP (ref -2–2)
BASOPHILS # BLD AUTO: 0 K/UL — SIGNIFICANT CHANGE UP (ref 0–0.2)
BASOPHILS # BLD AUTO: 0.02 K/UL — SIGNIFICANT CHANGE UP (ref 0–0.2)
BASOPHILS NFR BLD AUTO: 0 % — SIGNIFICANT CHANGE UP (ref 0–2)
BASOPHILS NFR BLD AUTO: 0.2 % — SIGNIFICANT CHANGE UP (ref 0–2)
BILIRUB SERPL-MCNC: 0.4 MG/DL — SIGNIFICANT CHANGE UP (ref 0.2–1.2)
BILIRUB SERPL-MCNC: 0.5 MG/DL — SIGNIFICANT CHANGE UP (ref 0.2–1.2)
BLD GP AB SCN SERPL QL: NEGATIVE — SIGNIFICANT CHANGE UP
BUN SERPL-MCNC: 19 MG/DL — SIGNIFICANT CHANGE UP (ref 7–23)
BUN SERPL-MCNC: 21 MG/DL — SIGNIFICANT CHANGE UP (ref 7–23)
BURR CELLS BLD QL SMEAR: SLIGHT — SIGNIFICANT CHANGE UP
CA-I SERPL-SCNC: 1.27 MMOL/L — SIGNIFICANT CHANGE UP (ref 1.15–1.33)
CALCIUM SERPL-MCNC: 11.1 MG/DL — HIGH (ref 8.4–10.5)
CALCIUM SERPL-MCNC: 9.9 MG/DL — SIGNIFICANT CHANGE UP (ref 8.4–10.5)
CHLORIDE BLDV-SCNC: 96 MMOL/L — SIGNIFICANT CHANGE UP (ref 96–108)
CHLORIDE SERPL-SCNC: 93 MMOL/L — LOW (ref 96–108)
CHLORIDE SERPL-SCNC: 97 MMOL/L — SIGNIFICANT CHANGE UP (ref 96–108)
CO2 BLDV-SCNC: 28 MMOL/L — HIGH (ref 22–26)
CO2 SERPL-SCNC: 20 MMOL/L — LOW (ref 22–31)
CO2 SERPL-SCNC: 26 MMOL/L — SIGNIFICANT CHANGE UP (ref 22–31)
CREAT SERPL-MCNC: 1 MG/DL — SIGNIFICANT CHANGE UP (ref 0.5–1.3)
CREAT SERPL-MCNC: 1.25 MG/DL — SIGNIFICANT CHANGE UP (ref 0.5–1.3)
DACRYOCYTES BLD QL SMEAR: SLIGHT — SIGNIFICANT CHANGE UP
EGFR: 56 ML/MIN/1.73M2 — LOW
EGFR: 73 ML/MIN/1.73M2 — SIGNIFICANT CHANGE UP
ELLIPTOCYTES BLD QL SMEAR: SLIGHT — SIGNIFICANT CHANGE UP
EOSINOPHIL # BLD AUTO: 0 K/UL — SIGNIFICANT CHANGE UP (ref 0–0.5)
EOSINOPHIL # BLD AUTO: 0 K/UL — SIGNIFICANT CHANGE UP (ref 0–0.5)
EOSINOPHIL NFR BLD AUTO: 0 % — SIGNIFICANT CHANGE UP (ref 0–6)
EOSINOPHIL NFR BLD AUTO: 0 % — SIGNIFICANT CHANGE UP (ref 0–6)
GAS PNL BLDA: SIGNIFICANT CHANGE UP
GAS PNL BLDV: 130 MMOL/L — LOW (ref 136–145)
GAS PNL BLDV: SIGNIFICANT CHANGE UP
GAS PNL BLDV: SIGNIFICANT CHANGE UP
GLUCOSE BLDV-MCNC: 129 MG/DL — HIGH (ref 70–99)
GLUCOSE SERPL-MCNC: 116 MG/DL — HIGH (ref 70–99)
GLUCOSE SERPL-MCNC: 223 MG/DL — HIGH (ref 70–99)
HCO3 BLDV-SCNC: 26 MMOL/L — SIGNIFICANT CHANGE UP (ref 22–29)
HCT VFR BLD CALC: 38.2 % — SIGNIFICANT CHANGE UP (ref 34.5–45)
HCT VFR BLD CALC: 38.9 % — SIGNIFICANT CHANGE UP (ref 34.5–45)
HCT VFR BLDA CALC: 38 % — SIGNIFICANT CHANGE UP (ref 34.5–46.5)
HGB BLD CALC-MCNC: 12.5 G/DL — SIGNIFICANT CHANGE UP (ref 11.7–16.1)
HGB BLD-MCNC: 11.5 G/DL — SIGNIFICANT CHANGE UP (ref 11.5–15.5)
HGB BLD-MCNC: 12.1 G/DL — SIGNIFICANT CHANGE UP (ref 11.5–15.5)
HYPOCHROMIA BLD QL: SLIGHT — SIGNIFICANT CHANGE UP
IMM GRANULOCYTES NFR BLD AUTO: 0.2 % — SIGNIFICANT CHANGE UP (ref 0–1.5)
INR BLD: 1.2 RATIO — HIGH (ref 0.88–1.16)
LACTATE BLDV-MCNC: 2.2 MMOL/L — HIGH (ref 0.7–2)
LACTATE BLDV-MCNC: 3.5 MMOL/L — HIGH (ref 0.7–2)
LIDOCAIN IGE QN: 37 U/L — SIGNIFICANT CHANGE UP (ref 7–60)
LYMPHOCYTES # BLD AUTO: 0.75 K/UL — LOW (ref 1–3.3)
LYMPHOCYTES # BLD AUTO: 1.81 K/UL — SIGNIFICANT CHANGE UP (ref 1–3.3)
LYMPHOCYTES # BLD AUTO: 14.9 % — SIGNIFICANT CHANGE UP (ref 13–44)
LYMPHOCYTES # BLD AUTO: 6 % — LOW (ref 13–44)
MACROCYTES BLD QL: SLIGHT — SIGNIFICANT CHANGE UP
MANUAL SMEAR VERIFICATION: SIGNIFICANT CHANGE UP
MCHC RBC-ENTMCNC: 21.4 PG — LOW (ref 27–34)
MCHC RBC-ENTMCNC: 21.6 PG — LOW (ref 27–34)
MCHC RBC-ENTMCNC: 30.1 GM/DL — LOW (ref 32–36)
MCHC RBC-ENTMCNC: 31.1 GM/DL — LOW (ref 32–36)
MCV RBC AUTO: 69.5 FL — LOW (ref 80–100)
MCV RBC AUTO: 71 FL — LOW (ref 80–100)
MICROCYTES BLD QL: SLIGHT — SIGNIFICANT CHANGE UP
MONOCYTES # BLD AUTO: 0.77 K/UL — SIGNIFICANT CHANGE UP (ref 0–0.9)
MONOCYTES # BLD AUTO: 0.85 K/UL — SIGNIFICANT CHANGE UP (ref 0–0.9)
MONOCYTES NFR BLD AUTO: 6.2 % — SIGNIFICANT CHANGE UP (ref 2–14)
MONOCYTES NFR BLD AUTO: 7 % — SIGNIFICANT CHANGE UP (ref 2–14)
NEUTROPHILS # BLD AUTO: 10.95 K/UL — HIGH (ref 1.8–7.4)
NEUTROPHILS # BLD AUTO: 9.5 K/UL — HIGH (ref 1.8–7.4)
NEUTROPHILS NFR BLD AUTO: 78.1 % — HIGH (ref 43–77)
NEUTROPHILS NFR BLD AUTO: 87.4 % — HIGH (ref 43–77)
NRBC # BLD: 0 /100 WBCS — SIGNIFICANT CHANGE UP (ref 0–0)
PCO2 BLDV: 56 MMHG — HIGH (ref 39–42)
PH BLDV: 7.28 — LOW (ref 7.32–7.43)
PLAT MORPH BLD: NORMAL — SIGNIFICANT CHANGE UP
PLATELET # BLD AUTO: 603 K/UL — HIGH (ref 150–400)
PLATELET # BLD AUTO: 637 K/UL — HIGH (ref 150–400)
PO2 BLDV: 39 MMHG — SIGNIFICANT CHANGE UP (ref 25–45)
POIKILOCYTOSIS BLD QL AUTO: SIGNIFICANT CHANGE UP
POLYCHROMASIA BLD QL SMEAR: SLIGHT — SIGNIFICANT CHANGE UP
POTASSIUM BLDV-SCNC: 3.1 MMOL/L — LOW (ref 3.5–5.1)
POTASSIUM SERPL-MCNC: 3 MMOL/L — LOW (ref 3.5–5.3)
POTASSIUM SERPL-MCNC: 3.6 MMOL/L — SIGNIFICANT CHANGE UP (ref 3.5–5.3)
POTASSIUM SERPL-SCNC: 3 MMOL/L — LOW (ref 3.5–5.3)
POTASSIUM SERPL-SCNC: 3.6 MMOL/L — SIGNIFICANT CHANGE UP (ref 3.5–5.3)
PROT SERPL-MCNC: 7.8 G/DL — SIGNIFICANT CHANGE UP (ref 6–8.3)
PROT SERPL-MCNC: 8.9 G/DL — HIGH (ref 6–8.3)
PROTHROM AB SERPL-ACNC: 13.8 SEC — HIGH (ref 10.5–13.4)
RBC # BLD: 5.38 M/UL — HIGH (ref 3.8–5.2)
RBC # BLD: 5.6 M/UL — HIGH (ref 3.8–5.2)
RBC # FLD: 18.7 % — HIGH (ref 10.3–14.5)
RBC # FLD: 19 % — HIGH (ref 10.3–14.5)
RBC BLD AUTO: ABNORMAL
RH IG SCN BLD-IMP: POSITIVE — SIGNIFICANT CHANGE UP
SAO2 % BLDV: 54.9 % — LOW (ref 67–88)
SARS-COV-2 RNA SPEC QL NAA+PROBE: SIGNIFICANT CHANGE UP
SCHISTOCYTES BLD QL AUTO: SLIGHT — SIGNIFICANT CHANGE UP
SODIUM SERPL-SCNC: 134 MMOL/L — LOW (ref 135–145)
SODIUM SERPL-SCNC: 138 MMOL/L — SIGNIFICANT CHANGE UP (ref 135–145)
TARGETS BLD QL SMEAR: SLIGHT — SIGNIFICANT CHANGE UP
TROPONIN T, HIGH SENSITIVITY RESULT: <6 NG/L — SIGNIFICANT CHANGE UP (ref 0–51)
WBC # BLD: 12.16 K/UL — HIGH (ref 3.8–10.5)
WBC # BLD: 12.52 K/UL — HIGH (ref 3.8–10.5)
WBC # FLD AUTO: 12.16 K/UL — HIGH (ref 3.8–10.5)
WBC # FLD AUTO: 12.52 K/UL — HIGH (ref 3.8–10.5)

## 2022-07-02 PROCEDURE — 49905 OMENTAL FLAP INTRA-ABDOM: CPT

## 2022-07-02 PROCEDURE — 99223 1ST HOSP IP/OBS HIGH 75: CPT | Mod: 57

## 2022-07-02 PROCEDURE — 43659 UNLISTED LAPS PX STOMACH: CPT

## 2022-07-02 PROCEDURE — 71045 X-RAY EXAM CHEST 1 VIEW: CPT | Mod: 26

## 2022-07-02 PROCEDURE — 99285 EMERGENCY DEPT VISIT HI MDM: CPT

## 2022-07-02 PROCEDURE — 74177 CT ABD & PELVIS W/CONTRAST: CPT | Mod: 26,MA

## 2022-07-02 DEVICE — SURGICEL 2 X 14": Type: IMPLANTABLE DEVICE | Status: FUNCTIONAL

## 2022-07-02 RX ORDER — MORPHINE SULFATE 50 MG/1
4 CAPSULE, EXTENDED RELEASE ORAL ONCE
Refills: 0 | Status: DISCONTINUED | OUTPATIENT
Start: 2022-07-02 | End: 2022-07-02

## 2022-07-02 RX ORDER — SODIUM CHLORIDE 9 MG/ML
1000 INJECTION INTRAMUSCULAR; INTRAVENOUS; SUBCUTANEOUS ONCE
Refills: 0 | Status: COMPLETED | OUTPATIENT
Start: 2022-07-02 | End: 2022-07-02

## 2022-07-02 RX ORDER — POTASSIUM CHLORIDE 20 MEQ
10 PACKET (EA) ORAL ONCE
Refills: 0 | Status: COMPLETED | OUTPATIENT
Start: 2022-07-02 | End: 2022-07-02

## 2022-07-02 RX ORDER — HYDROMORPHONE HYDROCHLORIDE 2 MG/ML
1 INJECTION INTRAMUSCULAR; INTRAVENOUS; SUBCUTANEOUS ONCE
Refills: 0 | Status: DISCONTINUED | OUTPATIENT
Start: 2022-07-02 | End: 2022-07-02

## 2022-07-02 RX ORDER — MAGNESIUM SULFATE 500 MG/ML
2 VIAL (ML) INJECTION ONCE
Refills: 0 | Status: COMPLETED | OUTPATIENT
Start: 2022-07-02 | End: 2022-07-02

## 2022-07-02 RX ORDER — SODIUM CHLORIDE 9 MG/ML
1000 INJECTION, SOLUTION INTRAVENOUS
Refills: 0 | Status: DISCONTINUED | OUTPATIENT
Start: 2022-07-02 | End: 2022-07-02

## 2022-07-02 RX ORDER — FAMOTIDINE 10 MG/ML
20 INJECTION INTRAVENOUS ONCE
Refills: 0 | Status: COMPLETED | OUTPATIENT
Start: 2022-07-02 | End: 2022-07-02

## 2022-07-02 RX ORDER — PIPERACILLIN AND TAZOBACTAM 4; .5 G/20ML; G/20ML
3.38 INJECTION, POWDER, LYOPHILIZED, FOR SOLUTION INTRAVENOUS ONCE
Refills: 0 | Status: DISCONTINUED | OUTPATIENT
Start: 2022-07-02 | End: 2022-07-03

## 2022-07-02 RX ORDER — FLUCONAZOLE 150 MG/1
800 TABLET ORAL ONCE
Refills: 0 | Status: DISCONTINUED | OUTPATIENT
Start: 2022-07-02 | End: 2022-07-03

## 2022-07-02 RX ORDER — POTASSIUM CHLORIDE 20 MEQ
40 PACKET (EA) ORAL ONCE
Refills: 0 | Status: COMPLETED | OUTPATIENT
Start: 2022-07-02 | End: 2022-07-02

## 2022-07-02 RX ORDER — ACETAMINOPHEN 500 MG
1000 TABLET ORAL ONCE
Refills: 0 | Status: DISCONTINUED | OUTPATIENT
Start: 2022-07-02 | End: 2022-07-02

## 2022-07-02 RX ORDER — SODIUM CHLORIDE 9 MG/ML
1000 INJECTION, SOLUTION INTRAVENOUS ONCE
Refills: 0 | Status: DISCONTINUED | OUTPATIENT
Start: 2022-07-02 | End: 2022-07-03

## 2022-07-02 RX ORDER — ONDANSETRON 8 MG/1
4 TABLET, FILM COATED ORAL ONCE
Refills: 0 | Status: COMPLETED | OUTPATIENT
Start: 2022-07-02 | End: 2022-07-02

## 2022-07-02 RX ADMIN — HYDROMORPHONE HYDROCHLORIDE 1 MILLIGRAM(S): 2 INJECTION INTRAMUSCULAR; INTRAVENOUS; SUBCUTANEOUS at 14:00

## 2022-07-02 RX ADMIN — HYDROMORPHONE HYDROCHLORIDE 1 MILLIGRAM(S): 2 INJECTION INTRAMUSCULAR; INTRAVENOUS; SUBCUTANEOUS at 18:33

## 2022-07-02 RX ADMIN — ONDANSETRON 4 MILLIGRAM(S): 8 TABLET, FILM COATED ORAL at 12:29

## 2022-07-02 RX ADMIN — SODIUM CHLORIDE 1000 MILLILITER(S): 9 INJECTION INTRAMUSCULAR; INTRAVENOUS; SUBCUTANEOUS at 12:42

## 2022-07-02 RX ADMIN — SODIUM CHLORIDE 1000 MILLILITER(S): 9 INJECTION INTRAMUSCULAR; INTRAVENOUS; SUBCUTANEOUS at 14:22

## 2022-07-02 RX ADMIN — MORPHINE SULFATE 4 MILLIGRAM(S): 50 CAPSULE, EXTENDED RELEASE ORAL at 13:30

## 2022-07-02 RX ADMIN — Medication 40 MILLIEQUIVALENT(S): at 15:39

## 2022-07-02 RX ADMIN — Medication 2 GRAM(S): at 16:10

## 2022-07-02 RX ADMIN — FAMOTIDINE 20 MILLIGRAM(S): 10 INJECTION INTRAVENOUS at 12:29

## 2022-07-02 RX ADMIN — HYDROMORPHONE HYDROCHLORIDE 1 MILLIGRAM(S): 2 INJECTION INTRAMUSCULAR; INTRAVENOUS; SUBCUTANEOUS at 16:10

## 2022-07-02 RX ADMIN — Medication 100 MILLIEQUIVALENT(S): at 17:24

## 2022-07-02 RX ADMIN — MORPHINE SULFATE 4 MILLIGRAM(S): 50 CAPSULE, EXTENDED RELEASE ORAL at 12:48

## 2022-07-02 RX ADMIN — Medication 25 GRAM(S): at 15:38

## 2022-07-02 RX ADMIN — MORPHINE SULFATE 4 MILLIGRAM(S): 50 CAPSULE, EXTENDED RELEASE ORAL at 12:30

## 2022-07-02 NOTE — PRE-ANESTHESIA EVALUATION ADULT - NSANTHPMHFT_GEN_ALL_CORE
37F c hx anxiety not on any meds, pancreatitis, PUD/duodenal ulcer, frequent ED visits for abd pain and hematemesis, pw abd pain. Pt had history of H.Pylori however never was able to complete the treatment course. Admitted for hematemesis in May 2022, UGI showed nonbleeding GI DU. Today pt presents with sever pain which was going on for a few days and today got worse. Pt denies chest pain, SOB, N/V, diarrhea.     In ED, Pt is tachy, and ct is consistent with gastric perforation.     Now for laparoscopy, possible ex lap

## 2022-07-02 NOTE — ED ADULT NURSE NOTE - OBJECTIVE STATEMENT
Pt brought in by ambulance from home with acute distress complaining of feeling very weak and pain all over.  Pt yelling Pt brought in by ambulance from home with acute distress complaining of feeling very weak and pain all over.  Pt yelling that she needs help that she is so weak and in so much pain, rolling around in stretcher, strange affect.  Will not allow for abdominal exam yelling at very light touch that she is in too much pain and feels that her abdomen is "cramping up."  Endorses "cold sweats," nausea and vomiting, unable to obtain additional information due to patient distress.

## 2022-07-02 NOTE — ED PROVIDER NOTE - NS_BEDUNITTYPES_ED_ALL_ED
Left message for pt advising pt he can contact the office to schedule a nurse visit for labs. SURGERY

## 2022-07-02 NOTE — H&P ADULT - ATTENDING COMMENTS
Patient seen and examined and agree with above.  37 year old female with PMH significant for peptic ulcer disease now presents with pneumoperitoneum noted on CT scan. Patient states she has had abdominal pain for 3 days which progressively worsened and let her to come to the ED.   She has sinus tachycardia in the ED as well as lactic acidosis which improved with IVF resuscitation. Lactate initially 3.5 and improved with IVF now 2.2.  Sinus tachycardia has improved as well.   On exam abdomen is tender diffusely to palpation and patient is resistant to further exam secondary to uncontrollable pain.   On labs there is a leukocytosis of WBC 12 and started on zosyn and fluconazole given duration of symptoms (3 days).   CT findings with small volume free air with ascites and findings in keeping with peritonitis. Findings suggestive for a perforated distal gastric versus   proximal duodenal ulcer.     Admit to ACS.  I discussed surgical intervention with the patient as she will need an ex lap for perforated viscus, possible evette patch.   Pain control at this time with tylenol and dilaudid.   Consented and awaiting OR time.   Continue antibiotics and antifungal at this time.   NPO, IVF  Continue IVF resuscitation.

## 2022-07-02 NOTE — H&P ADULT - NSHPPHYSICALEXAM_GEN_ALL_CORE
GEN: in pain   CV: tachy   Pulm: unlabored breathing on RA  Abd: tender to palpation, peritonitic   LLE: well perfused

## 2022-07-02 NOTE — ED PROVIDER NOTE - PROGRESS NOTE DETAILS
Mervat Nicholas MD Attending Physician- patient signed out to me at usual time of sign out. ct concerning for peritonitis and possible ruptured ulcer. on exam, absomen is soft but diffusely tender. accepted to surgery, patient started on zosyn

## 2022-07-02 NOTE — ED PROVIDER NOTE - CONSTITUTIONAL, MLM
very thin chronically ill , awake, alert, oriented to person, place, time/situation ,very anxius and screaming normal...

## 2022-07-02 NOTE — H&P ADULT - HISTORY OF PRESENT ILLNESS
37F c hx anxiety not on any meds, pancreatitis, PUD/duodenal ulcer, frequent ED visits for abd pain and hematemesis, pw abd pain. Pt had history of H.Pylori however never was able to complete the treatment course. Admitted for hematemesis in May 2022, UGI showed nonbleeding GI DU. Today pt presents with sever pain which was going on for a few days and today got worse. Pt denies chest pain, SOB, N/V, diarrhea.     In ED, Pt is tachy, and ct is consistent with gastric perforation.

## 2022-07-02 NOTE — ED ADULT NURSE NOTE - ED STAT RN HANDOFF TIME
Pt's spouse Millicent bardales VM on triage line reporting their pharmacy does not carry the liquid Oxy sent yesterday by Dr Ag Arriola. They are unable to transfer Rx as it is a controlled substance.     Requesting Rx be sent to pharmacy that carries medication.     Discussed with Palliative Care RNCC Abhilash Peter who will follow-up on request.    Deepti Moser, NAHEEDN, RN, PHN, OCN  Oncology Care Coordinator  [facility]       19:07

## 2022-07-02 NOTE — H&P ADULT - NSHPLABSRESULTS_GEN_ALL_CORE
.  LABS:                         12.1   12.52 )-----------( 603      ( 02 Jul 2022 18:50 )             38.9     07-02    134<L>  |  97  |  21  ----------------------------<  116<H>  3.6   |  20<L>  |  1.00    Ca    9.9      02 Jul 2022 18:50    TPro  7.8  /  Alb  4.7  /  TBili  0.4  /  DBili  x   /  AST  18  /  ALT  16  /  AlkPhos  61  07-02    PT/INR - ( 02 Jul 2022 17:38 )   PT: 13.8 sec;   INR: 1.20 ratio         PTT - ( 02 Jul 2022 17:38 )  PTT:87.5 sec          RADIOLOGY, EKG & ADDITIONAL TESTS:  LIVER: Subcentimeter right lobe hypodensities, probably cysts.  BILE DUCTS: Normal caliber.  GALLBLADDER: Nondistended. Folded. Contains sludge versus noncalcified gallstones.  SPLEEN: Diminutive  PANCREAS: Preserved pancreatic parenchymal enhancement. No duct dilation  ADRENALS: Within normal limits.  KIDNEYS/URETERS: Within normal limits.    BLADDER: Within normal limits.  REPRODUCTIVE ORGANS: Right ovary contains an approximately 2 cm mildly complex cyst which could represent a hemorrhagic cyst, decreased in size since the prior exam.  Upper vagina is mildly distended by air and fluid    BOWEL: Lower esophageal wall thickening. Fluid filled distended stomach.  Distal gastric and proximal duodenal wall thickening. Findings suggestive for a small ulcer (3, 40 as well as sagittal series 604, images 45-47)  Unobstructed bowel Appendix is normal. Nonspecific descending and sigmoid colonic wall thickening  PERITONEUM: Small volume upper abdominal free air with air tracking along the portal triad to the hepatic hilus. Foci of air also adjacent to the thick walled distal stomach and proximal duodenum.  Small volume abdominal and moderate volume of pelvic ascites.  Smooth enhancement of peritoneal reflection best seen anteriorly within the pelvis  VESSELS: Within normal limits.  RETROPERITONEUM/LYMPH NODES: No lymphadenopathy.  ABDOMINAL WALL: No significant acute abnormality.  BONES: No significant acute bony abnormality.    IMPRESSION:    Small volume free air with ascites and findings in keeping with peritonitis. Findings suggestive for a perforated distal gastric versus proximal duodenal ulcer. Recommend surgical consultation    Descending and sigmoid wall thickening could represent nonspecific colitis

## 2022-07-02 NOTE — ED ADULT NURSE REASSESSMENT NOTE - NS ED NURSE REASSESS COMMENT FT1
Pt belongings secured in OR cabinet in ED. Pt valuables locked in safe with envelope #[id number], BILLY PEREZ as witness for envelope closure and given to representative.

## 2022-07-02 NOTE — ED PROVIDER NOTE - CLINICAL SUMMARY MEDICAL DECISION MAKING FREE TEXT BOX
37 y old f with PMH of peptic ulcer disease, pancreatitis, presents with severe epigastric pain, nausea, vomiting for the last 3 days. Denies alcohol and drug addiction, concern for perforated ulcer vs acute pancreatitis. Will obtain blood work, IV hydration, CT scan to r/o pseudocyst, pain medication and reassess ZR 37 y old f with PMH of peptic ulcer disease, pancreatitis, presents with severe epigastric pain, nausea, vomiting and very tender abdomen  for the last 3 days. Denies alcohol and drug addiction, concern for  acute abdomen 2 ry perforated ulcer vs acute pancreatitis. Will obtain blood work, IV hydration, CT scan to r/o pseudocyst, surgical cons , pain medication and reassess ZR

## 2022-07-02 NOTE — ED ADULT NURSE REASSESSMENT NOTE - NS ED NURSE REASSESS COMMENT FT1
1758 Pt has perforated ulcer with free air in the abd surgery consulted ands type vbg and ptt sent as ordered Pt started on meds potassium via pump as ordered Pt given phone to call her mother and Md also to explain any questions ans second iv placed also Pending further orders Janie

## 2022-07-02 NOTE — ED PROVIDER NOTE - OBJECTIVE STATEMENT
37 y old f with history of anxiety ,PUD and H pillory ,hx of chronic pancreatitis  last attack 2 mo ago ,came in complains of severe epigastric pain ,nausea vomiting  for 3 days ,feels the same pain like she   had  her previous pancreatitis ,she also complains of weakness ,fatigue and not able to eat ,denies  alcohol ,drugs addiction

## 2022-07-03 LAB
ANION GAP SERPL CALC-SCNC: 16 MMOL/L — SIGNIFICANT CHANGE UP (ref 5–17)
APTT BLD: 34.7 SEC — SIGNIFICANT CHANGE UP (ref 27.5–35.5)
BUN SERPL-MCNC: 16 MG/DL — SIGNIFICANT CHANGE UP (ref 7–23)
CALCIUM SERPL-MCNC: 7.8 MG/DL — LOW (ref 8.4–10.5)
CHLORIDE SERPL-SCNC: 102 MMOL/L — SIGNIFICANT CHANGE UP (ref 96–108)
CO2 SERPL-SCNC: 20 MMOL/L — LOW (ref 22–31)
CREAT SERPL-MCNC: 0.83 MG/DL — SIGNIFICANT CHANGE UP (ref 0.5–1.3)
EGFR: 92 ML/MIN/1.73M2 — SIGNIFICANT CHANGE UP
GAS PNL BLDA: SIGNIFICANT CHANGE UP
GAS PNL BLDA: SIGNIFICANT CHANGE UP
GLUCOSE SERPL-MCNC: 103 MG/DL — HIGH (ref 70–99)
GRAM STN FLD: SIGNIFICANT CHANGE UP
HCT VFR BLD CALC: 33 % — LOW (ref 34.5–45)
HGB BLD-MCNC: 10.5 G/DL — LOW (ref 11.5–15.5)
INR BLD: 1.54 RATIO — HIGH (ref 0.88–1.16)
MAGNESIUM SERPL-MCNC: 2.4 MG/DL — SIGNIFICANT CHANGE UP (ref 1.6–2.6)
MCHC RBC-ENTMCNC: 22.9 PG — LOW (ref 27–34)
MCHC RBC-ENTMCNC: 31.8 GM/DL — LOW (ref 32–36)
MCV RBC AUTO: 72.1 FL — LOW (ref 80–100)
NRBC # BLD: 0 /100 WBCS — SIGNIFICANT CHANGE UP (ref 0–0)
PHOSPHATE SERPL-MCNC: 4.1 MG/DL — SIGNIFICANT CHANGE UP (ref 2.5–4.5)
PLATELET # BLD AUTO: 295 K/UL — SIGNIFICANT CHANGE UP (ref 150–400)
POTASSIUM SERPL-MCNC: 3.7 MMOL/L — SIGNIFICANT CHANGE UP (ref 3.5–5.3)
POTASSIUM SERPL-SCNC: 3.7 MMOL/L — SIGNIFICANT CHANGE UP (ref 3.5–5.3)
PREALB SERPL-MCNC: 10 MG/DL — LOW (ref 20–40)
PROTHROM AB SERPL-ACNC: 17.8 SEC — HIGH (ref 10.5–13.4)
RBC # BLD: 4.58 M/UL — SIGNIFICANT CHANGE UP (ref 3.8–5.2)
RBC # FLD: 19.2 % — HIGH (ref 10.3–14.5)
SODIUM SERPL-SCNC: 138 MMOL/L — SIGNIFICANT CHANGE UP (ref 135–145)
SPECIMEN SOURCE: SIGNIFICANT CHANGE UP
TRIGL SERPL-MCNC: 50 MG/DL — SIGNIFICANT CHANGE UP
WBC # BLD: 12.85 K/UL — HIGH (ref 3.8–10.5)
WBC # FLD AUTO: 12.85 K/UL — HIGH (ref 3.8–10.5)

## 2022-07-03 PROCEDURE — 71045 X-RAY EXAM CHEST 1 VIEW: CPT | Mod: 26

## 2022-07-03 PROCEDURE — 99291 CRITICAL CARE FIRST HOUR: CPT

## 2022-07-03 RX ORDER — HYDROMORPHONE HYDROCHLORIDE 2 MG/ML
0.5 INJECTION INTRAMUSCULAR; INTRAVENOUS; SUBCUTANEOUS ONCE
Refills: 0 | Status: DISCONTINUED | OUTPATIENT
Start: 2022-07-03 | End: 2022-07-03

## 2022-07-03 RX ORDER — FLUCONAZOLE 150 MG/1
400 TABLET ORAL EVERY 24 HOURS
Refills: 0 | Status: DISCONTINUED | OUTPATIENT
Start: 2022-07-04 | End: 2022-07-08

## 2022-07-03 RX ORDER — BENZOCAINE 10 %
1 GEL (GRAM) MUCOUS MEMBRANE
Refills: 0 | Status: DISCONTINUED | OUTPATIENT
Start: 2022-07-03 | End: 2022-07-03

## 2022-07-03 RX ORDER — TETRACAINE/BENZOCAINE/BUTAMBEN 2%-14%-2%
1 OINTMENT (GRAM) TOPICAL THREE TIMES A DAY
Refills: 0 | Status: DISCONTINUED | OUTPATIENT
Start: 2022-07-03 | End: 2022-07-12

## 2022-07-03 RX ORDER — CALCIUM GLUCONATE 100 MG/ML
2 VIAL (ML) INTRAVENOUS ONCE
Refills: 0 | Status: COMPLETED | OUTPATIENT
Start: 2022-07-03 | End: 2022-07-03

## 2022-07-03 RX ORDER — PROPOFOL 10 MG/ML
50 INJECTION, EMULSION INTRAVENOUS
Qty: 1000 | Refills: 0 | Status: DISCONTINUED | OUTPATIENT
Start: 2022-07-03 | End: 2022-07-03

## 2022-07-03 RX ORDER — HYDROMORPHONE HYDROCHLORIDE 2 MG/ML
0.5 INJECTION INTRAMUSCULAR; INTRAVENOUS; SUBCUTANEOUS
Refills: 0 | Status: DISCONTINUED | OUTPATIENT
Start: 2022-07-03 | End: 2022-07-09

## 2022-07-03 RX ORDER — CHLORHEXIDINE GLUCONATE 213 G/1000ML
1 SOLUTION TOPICAL
Refills: 0 | Status: DISCONTINUED | OUTPATIENT
Start: 2022-07-03 | End: 2022-07-12

## 2022-07-03 RX ORDER — ACETAMINOPHEN 500 MG
1000 TABLET ORAL EVERY 6 HOURS
Refills: 0 | Status: COMPLETED | OUTPATIENT
Start: 2022-07-03 | End: 2022-07-04

## 2022-07-03 RX ORDER — PIPERACILLIN AND TAZOBACTAM 4; .5 G/20ML; G/20ML
3.38 INJECTION, POWDER, LYOPHILIZED, FOR SOLUTION INTRAVENOUS EVERY 8 HOURS
Refills: 0 | Status: DISCONTINUED | OUTPATIENT
Start: 2022-07-03 | End: 2022-07-08

## 2022-07-03 RX ORDER — POTASSIUM CHLORIDE 20 MEQ
10 PACKET (EA) ORAL
Refills: 0 | Status: COMPLETED | OUTPATIENT
Start: 2022-07-03 | End: 2022-07-03

## 2022-07-03 RX ORDER — SODIUM CHLORIDE 9 MG/ML
1000 INJECTION, SOLUTION INTRAVENOUS
Refills: 0 | Status: DISCONTINUED | OUTPATIENT
Start: 2022-07-03 | End: 2022-07-04

## 2022-07-03 RX ORDER — ENOXAPARIN SODIUM 100 MG/ML
40 INJECTION SUBCUTANEOUS EVERY 24 HOURS
Refills: 0 | Status: DISCONTINUED | OUTPATIENT
Start: 2022-07-03 | End: 2022-07-11

## 2022-07-03 RX ORDER — PANTOPRAZOLE SODIUM 20 MG/1
40 TABLET, DELAYED RELEASE ORAL EVERY 12 HOURS
Refills: 0 | Status: DISCONTINUED | OUTPATIENT
Start: 2022-07-03 | End: 2022-07-12

## 2022-07-03 RX ORDER — CHLORHEXIDINE GLUCONATE 213 G/1000ML
15 SOLUTION TOPICAL EVERY 12 HOURS
Refills: 0 | Status: DISCONTINUED | OUTPATIENT
Start: 2022-07-03 | End: 2022-07-03

## 2022-07-03 RX ORDER — PHENYLEPHRINE HYDROCHLORIDE 10 MG/ML
0.6 INJECTION INTRAVENOUS
Qty: 40 | Refills: 0 | Status: DISCONTINUED | OUTPATIENT
Start: 2022-07-03 | End: 2022-07-03

## 2022-07-03 RX ORDER — ACETAMINOPHEN 500 MG
1000 TABLET ORAL EVERY 6 HOURS
Refills: 0 | Status: DISCONTINUED | OUTPATIENT
Start: 2022-07-03 | End: 2022-07-12

## 2022-07-03 RX ADMIN — Medication 1000 MILLIGRAM(S): at 12:39

## 2022-07-03 RX ADMIN — PHENYLEPHRINE HYDROCHLORIDE 18.4 MICROGRAM(S)/KG/MIN: 10 INJECTION INTRAVENOUS at 07:23

## 2022-07-03 RX ADMIN — PANTOPRAZOLE SODIUM 40 MILLIGRAM(S): 20 TABLET, DELAYED RELEASE ORAL at 05:18

## 2022-07-03 RX ADMIN — Medication 100 MILLIEQUIVALENT(S): at 06:18

## 2022-07-03 RX ADMIN — Medication 200 GRAM(S): at 05:16

## 2022-07-03 RX ADMIN — HYDROMORPHONE HYDROCHLORIDE 0.5 MILLIGRAM(S): 2 INJECTION INTRAMUSCULAR; INTRAVENOUS; SUBCUTANEOUS at 03:30

## 2022-07-03 RX ADMIN — Medication 400 MILLIGRAM(S): at 11:04

## 2022-07-03 RX ADMIN — PHENYLEPHRINE HYDROCHLORIDE 18.4 MICROGRAM(S)/KG/MIN: 10 INJECTION INTRAVENOUS at 05:16

## 2022-07-03 RX ADMIN — SODIUM CHLORIDE 125 MILLILITER(S): 9 INJECTION, SOLUTION INTRAVENOUS at 05:16

## 2022-07-03 RX ADMIN — PIPERACILLIN AND TAZOBACTAM 25 GRAM(S): 4; .5 INJECTION, POWDER, LYOPHILIZED, FOR SOLUTION INTRAVENOUS at 05:16

## 2022-07-03 RX ADMIN — CHLORHEXIDINE GLUCONATE 15 MILLILITER(S): 213 SOLUTION TOPICAL at 06:18

## 2022-07-03 RX ADMIN — PIPERACILLIN AND TAZOBACTAM 25 GRAM(S): 4; .5 INJECTION, POWDER, LYOPHILIZED, FOR SOLUTION INTRAVENOUS at 21:46

## 2022-07-03 RX ADMIN — HYDROMORPHONE HYDROCHLORIDE 0.5 MILLIGRAM(S): 2 INJECTION INTRAMUSCULAR; INTRAVENOUS; SUBCUTANEOUS at 23:57

## 2022-07-03 RX ADMIN — Medication 1000 MILLIGRAM(S): at 18:10

## 2022-07-03 RX ADMIN — SODIUM CHLORIDE 125 MILLILITER(S): 9 INJECTION, SOLUTION INTRAVENOUS at 07:23

## 2022-07-03 RX ADMIN — Medication 100 MILLIEQUIVALENT(S): at 05:15

## 2022-07-03 RX ADMIN — HYDROMORPHONE HYDROCHLORIDE 0.5 MILLIGRAM(S): 2 INJECTION INTRAMUSCULAR; INTRAVENOUS; SUBCUTANEOUS at 10:22

## 2022-07-03 RX ADMIN — Medication 400 MILLIGRAM(S): at 17:43

## 2022-07-03 RX ADMIN — HYDROMORPHONE HYDROCHLORIDE 0.5 MILLIGRAM(S): 2 INJECTION INTRAMUSCULAR; INTRAVENOUS; SUBCUTANEOUS at 04:00

## 2022-07-03 RX ADMIN — HYDROMORPHONE HYDROCHLORIDE 0.5 MILLIGRAM(S): 2 INJECTION INTRAMUSCULAR; INTRAVENOUS; SUBCUTANEOUS at 15:44

## 2022-07-03 RX ADMIN — HYDROMORPHONE HYDROCHLORIDE 0.5 MILLIGRAM(S): 2 INJECTION INTRAMUSCULAR; INTRAVENOUS; SUBCUTANEOUS at 14:34

## 2022-07-03 RX ADMIN — Medication 100 MILLIEQUIVALENT(S): at 07:24

## 2022-07-03 RX ADMIN — PIPERACILLIN AND TAZOBACTAM 25 GRAM(S): 4; .5 INJECTION, POWDER, LYOPHILIZED, FOR SOLUTION INTRAVENOUS at 12:42

## 2022-07-03 RX ADMIN — HYDROMORPHONE HYDROCHLORIDE 0.5 MILLIGRAM(S): 2 INJECTION INTRAMUSCULAR; INTRAVENOUS; SUBCUTANEOUS at 11:00

## 2022-07-03 RX ADMIN — Medication 400 MILLIGRAM(S): at 23:57

## 2022-07-03 RX ADMIN — PANTOPRAZOLE SODIUM 40 MILLIGRAM(S): 20 TABLET, DELAYED RELEASE ORAL at 17:45

## 2022-07-03 RX ADMIN — PROPOFOL 24.5 MICROGRAM(S)/KG/MIN: 10 INJECTION, EMULSION INTRAVENOUS at 05:16

## 2022-07-03 RX ADMIN — SODIUM CHLORIDE 125 MILLILITER(S): 9 INJECTION, SOLUTION INTRAVENOUS at 23:59

## 2022-07-03 RX ADMIN — CHLORHEXIDINE GLUCONATE 1 APPLICATION(S): 213 SOLUTION TOPICAL at 05:18

## 2022-07-03 RX ADMIN — HYDROMORPHONE HYDROCHLORIDE 0.5 MILLIGRAM(S): 2 INJECTION INTRAMUSCULAR; INTRAVENOUS; SUBCUTANEOUS at 12:42

## 2022-07-03 RX ADMIN — ENOXAPARIN SODIUM 40 MILLIGRAM(S): 100 INJECTION SUBCUTANEOUS at 05:17

## 2022-07-03 RX ADMIN — PROPOFOL 24.5 MICROGRAM(S)/KG/MIN: 10 INJECTION, EMULSION INTRAVENOUS at 07:23

## 2022-07-03 NOTE — PHYSICAL THERAPY INITIAL EVALUATION ADULT - PERTINENT HX OF CURRENT PROBLEM, REHAB EVAL
37 y old f with history of anxiety ,PUD and H pillory ,hx of chronic pancreatitis  last attack 2 mo ago ,came in complains of severe epigastric pain ,nausea vomiting  for 3 days. ct is consistent with gastric perforation. s/p Laparoscopic evette patch repair of perforated duodenal ulcer.patient required 2 U PRBC intro operatively and was requiring pressor support throughout the case.

## 2022-07-03 NOTE — BRIEF OPERATIVE NOTE - OPERATION/FINDINGS
Diagnostic laparoscopy. Succus in all 4 quadrants. Perforated duodenal ulcer noted in D1. All four quadrants irrigated and suctioned. Qamar patch repair performed. ALYSSA drain left around repair site.

## 2022-07-03 NOTE — OCCUPATIONAL THERAPY INITIAL EVALUATION ADULT - GENERAL OBSERVATIONS, REHAB EVAL
Pt received supine in bed with +ALYSSA drain to surgery site, +NG tube to suction, +cardiac monitor, +PIV, +Escobar, +A line, +venodynes.

## 2022-07-03 NOTE — OCCUPATIONAL THERAPY INITIAL EVALUATION ADULT - PERTINENT HX OF CURRENT PROBLEM, REHAB EVAL
37 y old f with history of anxiety ,PUD, H pillory and chronic pancreatitis; last attack 2 mo ago, came in complains of severe epigastric pain ,nausea vomiting  for 3 days. ct is consistent with gastric perforation. Pt s/p Laparoscopic evette patch repair of perforated duodenal ulcer on 7/3. Patient required 2 U PRBC intro operatively and was requiring pressor support throughout the case.

## 2022-07-03 NOTE — PHYSICAL THERAPY INITIAL EVALUATION ADULT - ADDITIONAL COMMENTS
Patient lives in pvt house with family  2 steps to enter,13 steps inside.   Patient ambulated without AD independent.

## 2022-07-03 NOTE — CONSULT NOTE ADULT - ASSESSMENT
37F c hx anxiety not on any meds, pancreatitis, PUD/duodenal ulcer, frequent ED visits for abd pain and hematemesis, H Pylori s/p incomplete treatment course, who presents to [facility] ED with abd pain, found to be tachycardic and with perforated duodenal ulcer in CT. Patient now s/p Laparoscopic evette patch repair of perforated duodenal ulcer. SICU consulted for hemodynamic monitoring, as patient required 2 U PRBC intro operatively and was requiring pressor support throughout the case.     PLAN:    Neuro: RASS 0, post operative pain control   - Propofol gtt for sedation   - Dilaudid 0.5 IVP for pain control       Resp: Intubated and Mechanically ventilated  -Vent settings: FiO2 40%, PEEP 5, RR 16,   -F/U ABG     CV: tachycardic pre op, s/p 2 U PRBC + 4.5 L crystalloid + 1 L albumin intra op, Requiring pressors intra op  - Zion, wean as tolerated  - F/u Lactate  - Plasma lyte 125cc/hr  - Monitor Vital Signs    GI: perf duodenal ulcer s/p evette patch  - NPO  - NGT LIWS, do not manipulate or flush x 5 days  - Protonix BID  - Await ROBF    Renal: UOP in critically ill   - Escobar  - Monitor I&Os and electrolytes w/ repletions as necessary  - Hypocalcemic, replete     Heme: Downtrending H/H post op  - Monitor CBC and coags  - Lovenox for VTE prophylaxis    ID: perforated viscus with jane spillage of succus intra peritoneal   - Monitor for clinical evidence of active infection  - Monitor WBC, temperature   - Zosyn  - Fluconazole         Code Status: Full Code     Disposition: SICU, F/u PT/OT     37F c hx anxiety not on any meds, pancreatitis, PUD/duodenal ulcer, frequent ED visits for abd pain and hematemesis, H Pylori s/p incomplete treatment course, who presents to [facility] ED with abd pain, found to be tachycardic and with perforated duodenal ulcer in CT. Patient now s/p Laparoscopic evette patch repair of perforated duodenal ulcer. SICU consulted for hemodynamic monitoring, as patient required 2 U PRBC intro operatively and was requiring pressor support throughout the case.     PLAN:    Neuro: RASS 0, post operative pain control   - Propofol gtt for sedation   - Dilaudid 0.5 IVP for pain control       Resp: Intubated and Mechanically ventilated  -Vent settings: FiO2 40%, PEEP 5, RR 16,   -F/U ABG     CV: tachycardic pre op, s/p 2 U PRBC + 4.5 L crystalloid + 1 L albumin intra op, Requiring pressors intra op  - Zion, wean as tolerated  - F/u Lactate  - Plasma lyte 125cc/hr  - Monitor Vital Signs    GI: perf duodenal ulcer s/p evette patch  - NPO  - NGT LIWS, do not manipulate or flush x 5 days  - Protonix BID  - Await ROBF    Renal: UOP in critically ill   - Escobar  - Monitor I&Os and electrolytes w/ repletions as necessary  - Hypocalcemic, replete     Heme: Downtrending H/H post op, elevated INR  - Monitor CBC and coags  - Lovenox for VTE prophylaxis    ID: perforated viscus with jane spillage of succus intra peritoneal   - Monitor for clinical evidence of active infection  - Monitor WBC, temperature   - Zosyn  - Fluconazole         Code Status: Full Code     Disposition: SICU, F/u PT/OT

## 2022-07-03 NOTE — PHYSICAL THERAPY INITIAL EVALUATION ADULT - GENERAL OBSERVATIONS, REHAB EVAL
received supine in bed in NAD.NGT to suction, dominguez cath, L radial charlie, telemetry. received supine in bed in NAD.NGT to suction, dominguez cath, L radial charlie, telemetry, ALYSSA drain.

## 2022-07-03 NOTE — CONSULT NOTE ADULT - SUBJECTIVE AND OBJECTIVE BOX
HPI:   37F c hx anxiety not on any meds, pancreatitis, PUD/duodenal ulcer, frequent ED visits for abd pain and hematemesis, H Pylori s/p incomplete treatment course, who presents to [facility] ED with abd pain, found to be tachycardic and with perforated duodenal ulcer in CT. Patient now s/p Laparoscopic evette patch repair of perforated duodenal ulcer. SICU consulted for hemodynamic monitoring, as patient required 2 U PRBC intro operatively and was requiring pressor support throughout the case.       PAST MEDICAL HISTORY: Reflux    TB (pulmonary tuberculosis)    Pancreatitis, chronic    Anemia    Anxiety    Helicobacter pylori gastritis    PUD (peptic ulcer disease)        PAST SURGICAL HISTORY: No significant past surgical history    H/O tubal ligation        HOME MEDICATIONS:    ALLERGIES: No Known Allergies      FAMILY HISTORY: Family history of alcohol abuse    FH: peptic ulcer disease (Mother)        SOCIAL HISTORY:    REVIEW OF SYSTEMS:    VITAL SIGNS:  ICU Vital Signs Last 24 Hrs  T(C): 36.8 (03 Jul 2022 03:03), Max: 37 (02 Jul 2022 20:16)  T(F): 98.3 (03 Jul 2022 03:03), Max: 98.6 (02 Jul 2022 20:16)  HR: 90 (03 Jul 2022 04:00) (78 - 140)  BP: 127/91 (03 Jul 2022 03:03) (84/61 - 167/129)  BP(mean): 105 (03 Jul 2022 03:03) (105 - 105)  ABP: 89/79 (03 Jul 2022 04:00) (89/79 - 162/100)  ABP(mean): 84 (03 Jul 2022 04:00) (84 - 125)  RR: 16 (03 Jul 2022 04:00) (16 - 26)  SpO2: 100% (03 Jul 2022 04:00) (98% - 100%)      PHYSICAL EXAMINATION:  General - critically ill   Neuro - sedated   HEENT - Intubated, NGT LIWS, poor dentition  Respiratory- mechanically ventilated   Heart - NSR  Abdomen - softly distended, incision sites with dressings cdi, ALYSSA w ss output   Extremities - all four extremities are warm, without gross deformities     LABS:                          10.5   12.85 )-----------( 295      ( 03 Jul 2022 03:45 )             33.0       07-03    138  |  102  |  16  ----------------------------<  103<H>  3.7   |  20<L>  |  0.83    Ca    7.8<L>      03 Jul 2022 03:45  Phos  4.1     07-03  Mg     2.4     07-03    TPro  7.8  /  Alb  4.7  /  TBili  0.4  /  DBili  x   /  AST  18  /  ALT  16  /  AlkPhos  61  07-02      PT/INR - ( 03 Jul 2022 03:45 )   PT: 17.8 sec;   INR: 1.54 ratio         PTT - ( 03 Jul 2022 03:45 )  PTT:34.7 sec    ABG - ( 03 Jul 2022 03:33 )  pH: 7.39  /  pCO2: 34    /  pO2: 182   / HCO3: 21    / Base Excess: -3.7  /  SaO2: 99.7    Lactate: x                  VBG - ( 02 Jul 2022 17:38 )  pH: 7.28  /  pCO2: 56    /  pO2: 39    / HCO3: 26    / Base Excess: -1.3  /  SaO2: 54.9   Lactate: 2.2      RECENT CULTURES:    CAPILLARY BLOOD GLUCOSE      IMAGING STUDIES:    ACC: [id number] EXAM:  CT ABDOMEN AND PELVIS IC                          PROCEDURE DATE:  07/02/2022          INTERPRETATION:  CLINICAL INFORMATION: Pancreatitis    COMPARISON: CT abdomen and pelvis 5/16/2022    CONTRAST/COMPLICATIONS:  IV Contrast: Omnipaque 350  90 cc administered   0 cc discarded  Oral Contrast: Water  Complications: None reported at time of study completion    PROCEDURE:  CT of the Abdomen and Pelvis was performed.  Arterial and Portal Venous phases were acquired.  Sagittal and coronal reformats were performed.    FINDINGS:  LOWER CHEST: Within normal limits.    LIVER: Subcentimeter right lobe hypodensities, probably cysts.  BILE DUCTS: Normal caliber.  GALLBLADDER: Nondistended. Folded. Contains sludge versus noncalcified   gallstones.  SPLEEN: Diminutive  PANCREAS: Preserved pancreatic parenchymal enhancement. No duct dilation  ADRENALS: Within normal limits.  KIDNEYS/URETERS: Within normal limits.    BLADDER: Within normal limits.  REPRODUCTIVE ORGANS: Right ovary contains an approximately 2 cm mildly   complex cyst which could represent a hemorrhagic cyst, decreased in size   since the prior exam.  Upper vagina is mildly distended by air and fluid    BOWEL: Lower esophageal wall thickening. Fluid filled distended stomach.  Distal gastric and proximal duodenal wall thickening. Findings suggestive   for a small ulcer (3, 40 as well as sagittal series 604, images 45-47)  Unobstructed bowel Appendix is normal. Nonspecific descending and sigmoid   colonic wall thickening  PERITONEUM: Small volume upper abdominal free air with air tracking along   the portal triad to the hepatic hilus. Foci of air also adjacent to the   thick walled distal stomach and proximal duodenum.  Small volume abdominal and moderate volume of pelvic ascites.  Smooth enhancement of peritoneal reflection best seen anteriorly within   the pelvis  VESSELS: Within normal limits.  RETROPERITONEUM/LYMPH NODES: No lymphadenopathy.  ABDOMINAL WALL: No significant acute abnormality.  BONES:No significant acute bony abnormality.    IMPRESSION:    Small volume free air with ascites and findings in keeping with   peritonitis. Findings suggestive for a perforated distal gastric versus   proximal duodenal ulcer. Recommend surgical consultation    Descending and sigmoid wall thickening could represent nonspecific colitis      Findings were discussed with Dr. Garzon  7/2/2022 5:11 PM by Dr. Sinha   with read back confirmation.    --- End of Report ---             PEREZ SINHA MD; Attending Radiologist  This document has been electronically signed. Jul 2 2022  5:17PM

## 2022-07-03 NOTE — CONSULT NOTE ADULT - ATTENDING COMMENTS
Patient seen and examined and agree with above.     Current management includes:  Neuro- pain currently controlled with current regiment  CV- Hemodynamically stable   Pulm - on       -goal SpO2 92%  GI-  ID-   Endocrine -   Dispo- Patient seen and examined and agree with above.   37 year old female with history significant for peptic ulcer disease who presents with pneumoperitoneum and undergoes a laparoscopic evette patch repair. She presents to the SICU for post operative care.   I have reviewed PMH, PSH, labs, meds and imaging.  Labs significant for leukocytosis 12.5.   Lactic acidosis has improved.   Current management includes:  Neuro- pain currently controlled with current regimen. Will continue with tylenol and dilaudid PRN.   Propofol for sedation.   CV- Hypotension - on phenylephrine likely secondary to sepsis secondary to perforated viscus.  -will resuscitate as needed to MAP goal of 65 mmHg.   - perfusion is improving and patient making adequate urine output.   Pulm - acute respiratory insufficiency - will continue mechanical ventilation at this time and wean as tolerated. SBT when clinically appropriate.    -goal SpO2 92%  CXR clear.  GI- NPO. Protonix for peptic ulcer and will obtain Upper GI study on POD #5.  Will need H pylori treatment as unclear if fully treated as outpatient.   ID- Continue zosyn and fluconozale given 3 day duration of pain and succus found in all 4 quadrants.   -will monitor hemodynamics and wbc of patient.   -follow up cultures.   Lovenox for dvt chemoppx.     This patient was critically ill with one or more vital organ systems at a high probability of imminent or life threatening deterioration. Complex decision making was required to assess and treat single or multiple vital organ system failure and/or prevent further life threatening deterioration of the patient's condition.   CC time: 70 min

## 2022-07-03 NOTE — AIRWAY REMOVAL NOTE  ADULT & PEDS - ARTIFICAL AIRWAY REMOVAL COMMENTS
Written order for extubation verified.The patient was identified by full name and birth date compared to the identification band. Present during the procedure was Cassidy Horner RN

## 2022-07-04 LAB
ALBUMIN SERPL ELPH-MCNC: 3.1 G/DL — LOW (ref 3.3–5)
ALP SERPL-CCNC: 37 U/L — LOW (ref 40–120)
ALT FLD-CCNC: 24 U/L — SIGNIFICANT CHANGE UP (ref 10–45)
ANION GAP SERPL CALC-SCNC: 12 MMOL/L — SIGNIFICANT CHANGE UP (ref 5–17)
APTT BLD: 39.8 SEC — HIGH (ref 27.5–35.5)
APTT BLD: 41.2 SEC — HIGH (ref 27.5–35.5)
AST SERPL-CCNC: 22 U/L — SIGNIFICANT CHANGE UP (ref 10–40)
BILIRUB SERPL-MCNC: 0.8 MG/DL — SIGNIFICANT CHANGE UP (ref 0.2–1.2)
BUN SERPL-MCNC: 11 MG/DL — SIGNIFICANT CHANGE UP (ref 7–23)
CALCIUM SERPL-MCNC: 8.1 MG/DL — LOW (ref 8.4–10.5)
CHLORIDE SERPL-SCNC: 103 MMOL/L — SIGNIFICANT CHANGE UP (ref 96–108)
CO2 SERPL-SCNC: 24 MMOL/L — SIGNIFICANT CHANGE UP (ref 22–31)
CREAT SERPL-MCNC: 0.6 MG/DL — SIGNIFICANT CHANGE UP (ref 0.5–1.3)
EGFR: 117 ML/MIN/1.73M2 — SIGNIFICANT CHANGE UP
GLUCOSE SERPL-MCNC: 74 MG/DL — SIGNIFICANT CHANGE UP (ref 70–99)
HCT VFR BLD CALC: 27.8 % — LOW (ref 34.5–45)
HCT VFR BLD CALC: 28.8 % — LOW (ref 34.5–45)
HGB BLD-MCNC: 8.9 G/DL — LOW (ref 11.5–15.5)
HGB BLD-MCNC: 9.1 G/DL — LOW (ref 11.5–15.5)
INR BLD: 1.46 RATIO — HIGH (ref 0.88–1.16)
INR BLD: 1.73 RATIO — HIGH (ref 0.88–1.16)
MAGNESIUM SERPL-MCNC: 2.5 MG/DL — SIGNIFICANT CHANGE UP (ref 1.6–2.6)
MCHC RBC-ENTMCNC: 23.1 PG — LOW (ref 27–34)
MCHC RBC-ENTMCNC: 23.2 PG — LOW (ref 27–34)
MCHC RBC-ENTMCNC: 31.6 GM/DL — LOW (ref 32–36)
MCHC RBC-ENTMCNC: 32 GM/DL — SIGNIFICANT CHANGE UP (ref 32–36)
MCV RBC AUTO: 72.4 FL — LOW (ref 80–100)
MCV RBC AUTO: 73.1 FL — LOW (ref 80–100)
NRBC # BLD: 0 /100 WBCS — SIGNIFICANT CHANGE UP (ref 0–0)
NRBC # BLD: 0 /100 WBCS — SIGNIFICANT CHANGE UP (ref 0–0)
PHOSPHATE SERPL-MCNC: 2.3 MG/DL — LOW (ref 2.5–4.5)
PLATELET # BLD AUTO: 233 K/UL — SIGNIFICANT CHANGE UP (ref 150–400)
PLATELET # BLD AUTO: 251 K/UL — SIGNIFICANT CHANGE UP (ref 150–400)
POTASSIUM SERPL-MCNC: 3.3 MMOL/L — LOW (ref 3.5–5.3)
POTASSIUM SERPL-SCNC: 3.3 MMOL/L — LOW (ref 3.5–5.3)
PROT SERPL-MCNC: 5.4 G/DL — LOW (ref 6–8.3)
PROTHROM AB SERPL-ACNC: 16.9 SEC — HIGH (ref 10.5–13.4)
PROTHROM AB SERPL-ACNC: 20.2 SEC — HIGH (ref 10.5–13.4)
RBC # BLD: 3.84 M/UL — SIGNIFICANT CHANGE UP (ref 3.8–5.2)
RBC # BLD: 3.94 M/UL — SIGNIFICANT CHANGE UP (ref 3.8–5.2)
RBC # FLD: 19.3 % — HIGH (ref 10.3–14.5)
RBC # FLD: 19.8 % — HIGH (ref 10.3–14.5)
SODIUM SERPL-SCNC: 139 MMOL/L — SIGNIFICANT CHANGE UP (ref 135–145)
WBC # BLD: 12.72 K/UL — HIGH (ref 3.8–10.5)
WBC # BLD: 12.89 K/UL — HIGH (ref 3.8–10.5)
WBC # FLD AUTO: 12.72 K/UL — HIGH (ref 3.8–10.5)
WBC # FLD AUTO: 12.89 K/UL — HIGH (ref 3.8–10.5)

## 2022-07-04 PROCEDURE — 71045 X-RAY EXAM CHEST 1 VIEW: CPT | Mod: 26

## 2022-07-04 RX ORDER — ACETAMINOPHEN 500 MG
1000 TABLET ORAL EVERY 6 HOURS
Refills: 0 | Status: COMPLETED | OUTPATIENT
Start: 2022-07-04 | End: 2022-07-05

## 2022-07-04 RX ORDER — LANOLIN ALCOHOL/MO/W.PET/CERES
5 CREAM (GRAM) TOPICAL AT BEDTIME
Refills: 0 | Status: DISCONTINUED | OUTPATIENT
Start: 2022-07-04 | End: 2022-07-12

## 2022-07-04 RX ORDER — POTASSIUM CHLORIDE 20 MEQ
10 PACKET (EA) ORAL
Refills: 0 | Status: COMPLETED | OUTPATIENT
Start: 2022-07-04 | End: 2022-07-04

## 2022-07-04 RX ORDER — SODIUM CHLORIDE 9 MG/ML
1000 INJECTION, SOLUTION INTRAVENOUS
Refills: 0 | Status: DISCONTINUED | OUTPATIENT
Start: 2022-07-04 | End: 2022-07-08

## 2022-07-04 RX ORDER — POTASSIUM PHOSPHATE, MONOBASIC POTASSIUM PHOSPHATE, DIBASIC 236; 224 MG/ML; MG/ML
30 INJECTION, SOLUTION INTRAVENOUS ONCE
Refills: 0 | Status: COMPLETED | OUTPATIENT
Start: 2022-07-04 | End: 2022-07-04

## 2022-07-04 RX ADMIN — Medication 1000 MILLIGRAM(S): at 23:46

## 2022-07-04 RX ADMIN — Medication 400 MILLIGRAM(S): at 05:53

## 2022-07-04 RX ADMIN — Medication 5 MILLIGRAM(S): at 21:12

## 2022-07-04 RX ADMIN — HYDROMORPHONE HYDROCHLORIDE 0.5 MILLIGRAM(S): 2 INJECTION INTRAMUSCULAR; INTRAVENOUS; SUBCUTANEOUS at 16:50

## 2022-07-04 RX ADMIN — Medication 400 MILLIGRAM(S): at 17:46

## 2022-07-04 RX ADMIN — HYDROMORPHONE HYDROCHLORIDE 0.5 MILLIGRAM(S): 2 INJECTION INTRAMUSCULAR; INTRAVENOUS; SUBCUTANEOUS at 22:56

## 2022-07-04 RX ADMIN — PANTOPRAZOLE SODIUM 40 MILLIGRAM(S): 20 TABLET, DELAYED RELEASE ORAL at 17:52

## 2022-07-04 RX ADMIN — PIPERACILLIN AND TAZOBACTAM 25 GRAM(S): 4; .5 INJECTION, POWDER, LYOPHILIZED, FOR SOLUTION INTRAVENOUS at 21:13

## 2022-07-04 RX ADMIN — SODIUM CHLORIDE 125 MILLILITER(S): 9 INJECTION, SOLUTION INTRAVENOUS at 12:33

## 2022-07-04 RX ADMIN — CHLORHEXIDINE GLUCONATE 1 APPLICATION(S): 213 SOLUTION TOPICAL at 05:54

## 2022-07-04 RX ADMIN — Medication 1 SPRAY(S): at 21:13

## 2022-07-04 RX ADMIN — PIPERACILLIN AND TAZOBACTAM 25 GRAM(S): 4; .5 INJECTION, POWDER, LYOPHILIZED, FOR SOLUTION INTRAVENOUS at 05:54

## 2022-07-04 RX ADMIN — HYDROMORPHONE HYDROCHLORIDE 0.5 MILLIGRAM(S): 2 INJECTION INTRAMUSCULAR; INTRAVENOUS; SUBCUTANEOUS at 22:26

## 2022-07-04 RX ADMIN — Medication 1000 MILLIGRAM(S): at 18:20

## 2022-07-04 RX ADMIN — Medication 5 MILLIGRAM(S): at 00:25

## 2022-07-04 RX ADMIN — HYDROMORPHONE HYDROCHLORIDE 0.5 MILLIGRAM(S): 2 INJECTION INTRAMUSCULAR; INTRAVENOUS; SUBCUTANEOUS at 17:05

## 2022-07-04 RX ADMIN — HYDROMORPHONE HYDROCHLORIDE 0.5 MILLIGRAM(S): 2 INJECTION INTRAMUSCULAR; INTRAVENOUS; SUBCUTANEOUS at 08:30

## 2022-07-04 RX ADMIN — Medication 100 MILLIEQUIVALENT(S): at 04:00

## 2022-07-04 RX ADMIN — HYDROMORPHONE HYDROCHLORIDE 0.5 MILLIGRAM(S): 2 INJECTION INTRAMUSCULAR; INTRAVENOUS; SUBCUTANEOUS at 00:27

## 2022-07-04 RX ADMIN — Medication 100 MILLIEQUIVALENT(S): at 05:54

## 2022-07-04 RX ADMIN — Medication 400 MILLIGRAM(S): at 23:15

## 2022-07-04 RX ADMIN — PIPERACILLIN AND TAZOBACTAM 25 GRAM(S): 4; .5 INJECTION, POWDER, LYOPHILIZED, FOR SOLUTION INTRAVENOUS at 12:33

## 2022-07-04 RX ADMIN — HYDROMORPHONE HYDROCHLORIDE 0.5 MILLIGRAM(S): 2 INJECTION INTRAMUSCULAR; INTRAVENOUS; SUBCUTANEOUS at 08:08

## 2022-07-04 RX ADMIN — PANTOPRAZOLE SODIUM 40 MILLIGRAM(S): 20 TABLET, DELAYED RELEASE ORAL at 05:53

## 2022-07-04 RX ADMIN — Medication 100 MILLIEQUIVALENT(S): at 03:32

## 2022-07-04 RX ADMIN — ENOXAPARIN SODIUM 40 MILLIGRAM(S): 100 INJECTION SUBCUTANEOUS at 05:53

## 2022-07-04 RX ADMIN — Medication 400 MILLIGRAM(S): at 12:32

## 2022-07-04 RX ADMIN — SODIUM CHLORIDE 125 MILLILITER(S): 9 INJECTION, SOLUTION INTRAVENOUS at 16:53

## 2022-07-04 RX ADMIN — Medication 1000 MILLIGRAM(S): at 12:50

## 2022-07-04 RX ADMIN — POTASSIUM PHOSPHATE, MONOBASIC POTASSIUM PHOSPHATE, DIBASIC 83.33 MILLIMOLE(S): 236; 224 INJECTION, SOLUTION INTRAVENOUS at 08:09

## 2022-07-04 RX ADMIN — Medication 1000 MILLIGRAM(S): at 06:54

## 2022-07-04 NOTE — PROGRESS NOTE ADULT - SUBJECTIVE AND OBJECTIVE BOX
Interval Events:  7/4: HOD2 POD1 : S/P Laparoscopic evette patch repair of perforated duodenal ulcer on 7/3 admitted to SICU for hemodynamic monitoring s/p 2U PRBCs.  Pt Extubated 7/3, off pressors.     HPI:   37F c hx anxiety not on any meds, pancreatitis, PUD/duodenal ulcer, frequent ED visits for abd pain and hematemesis, H Pylori s/p incomplete treatment course, who presents to [facility] ED with abd pain, found to be tachycardic and with perforated duodenal ulcer in CT. Patient now s/p Laparoscopic evette patch repair of perforated duodenal ulcer. SICU consulted for hemodynamic monitoring, as patient required 2 U PRBC intro operatively and was requiring pressor support throughout the case.     PAST MEDICAL & SURGICAL HISTORY:  TB (pulmonary tuberculosis)  S/P INH age 12  Pancreatitis, chronic  Anemia  Anxiety  Helicobacter pylori gastritis  PUD (peptic ulcer disease)  H/O tubal ligation    MEDICATIONS  (STANDING):  acetaminophen   IVPB .. 1000 milliGRAM(s) IV Intermittent every 6 hours  acetaminophen   IVPB .. 1000 milliGRAM(s) IV Intermittent every 6 hours  chlorhexidine 2% Cloths 1 Application(s) Topical <User Schedule>  dextrose 5% + lactated ringers. 1000 milliLiter(s) (125 mL/Hr) IV Continuous <Continuous>  enoxaparin Injectable 40 milliGRAM(s) SubCutaneous every 24 hours  fluconAZOLE IVPB 400 milliGRAM(s) IV Intermittent every 24 hours  melatonin 5 milliGRAM(s) Oral at bedtime  pantoprazole  Injectable 40 milliGRAM(s) IV Push every 12 hours  piperacillin/tazobactam IVPB.. 3.375 Gram(s) IV Intermittent every 8 hours    MEDICATIONS  (PRN):  HYDROmorphone  Injectable 0.5 milliGRAM(s) IV Push every 3 hours PRN Severe Breakthrough Pain  tetracaine/benzocaine/butamben Spray 1 Spray(s) Topical three times a day PRN sore throat    ICU Vital Signs Last 24 Hrs  T(C): 36.9 (04 Jul 2022 03:00), Max: 36.9 (04 Jul 2022 03:00)  T(F): 98.5 (04 Jul 2022 03:00), Max: 98.5 (04 Jul 2022 03:00)  HR: 87 (04 Jul 2022 12:00) (66 - 90)  BP: --  BP(mean): --  ABP: 114/64 (04 Jul 2022 12:00) (96/49 - 134/77)  ABP(mean): 84 (04 Jul 2022 12:00) (66 - 100)  RR: 26 (04 Jul 2022 12:00) (10 - 26)  SpO2: 99% (04 Jul 2022 12:00) (93% - 100%)    Physical Exam:   General: AAOX3  Exam: softly distended, incision sites with dressings cdi, ALYSSA w ss output     I&O's Detail    03 Jul 2022 07:01  -  04 Jul 2022 07:00  --------------------------------------------------------  IN:    IV PiggyBack: 83 mL    IV PiggyBack: 100 mL    IV PiggyBack: 800 mL    multiple electrolytes Injection Type 1.: 2875 mL    Phenylephrine: 12.1 mL    Propofol: 45 mL  Total IN: 3915.1 mL    OUT:    Bulb (mL): 310 mL    Indwelling Catheter - Urethral (mL): 1290 mL    Nasogastric/Oral tube (mL): 1250 mL  Total OUT: 2850 mL    Total NET: 1065.1 mL      04 Jul 2022 07:01  -  04 Jul 2022 13:06  --------------------------------------------------------  IN:    dextrose 5% + lactated ringers: 625 mL    IV PiggyBack: 100 mL    IV PiggyBack: 25 mL    IV PiggyBack: 332 mL  Total IN: 1082 mL    OUT:    Indwelling Catheter - Urethral (mL): 290 mL    Nasogastric/Oral tube (mL): 50 mL  Total OUT: 340 mL    Total NET: 742 mL    CT ABD /PELVIS WITH IV CONTRAST  IMPRESSION:    Small volume free air with ascites and findings in keeping with   peritonitis. Findings suggestive for a perforated distal gastric versus   proximal duodenal ulcer. Recommend surgical consultation    Descending and sigmoid wall thickening could represent nonspecific colitis      Findings were discussed with Dr. Garzon  7/2/2022 5:11 PM by Dr. Sinha   with read back confirmation.    --- End of Report ---

## 2022-07-04 NOTE — PROGRESS NOTE ADULT - ASSESSMENT
37F c hx OF pancreatitis, PUD/duodenal ulcer. Patient now s/p Laparoscopic evette patch repair of perforated duodenal ulcer 7/3. s/p  2 U PRBC + pressors intraoperatively. Pt extubated 7/3 and off pressors.    - Continue IV abx- Fluconazole + Zosyn  - F/U cultures  - Multimodal pain control   - Appreciate excellent SICU care

## 2022-07-04 NOTE — PROGRESS NOTE ADULT - ASSESSMENT
37F c hx anxiety not on any meds, pancreatitis, PUD/duodenal ulcer, frequent ED visits for abd pain and hematemesis, H Pylori s/p incomplete treatment course, who presents to [facility] ED with abd pain, found to be tachycardic and with perforated duodenal ulcer in CT. Patient now s/p Laparoscopic evette patch repair of perforated duodenal ulcer. SICU consulted for hemodynamic monitoring, as patient required 2 U PRBC intro operatively and was requiring pressor support throughout the case.     PLAN:    Neuro:, post operative pain control   -standing acetaminophen iv and Dilaudid 0.5 IV prn for pain control       Resp: extubated in SICU  -monitor SpO2  -F/U ABG     CV: tachycardic pre op, s/p 2 U PRBC + 4.5 L crystalloid + 1 L albumin intra op, Requiring pressors intra op  - off  Zion  - F/u Lactate  - Plasma lyte 125cc/hr  - Monitor Vital Signs    GI: perf duodenal ulcer s/p evette patch  - NPO  - NGT LIWS, do not manipulate or flush x 5 days  - Protonix BID  - Await ROBF    Renal: UOP in critically ill   - Escobar  - Monitor I&Os and electrolytes w/ repletions as necessary    Heme: Downtrending H/H post op, elevated INR  - H/H 8.9/27.8 from 10.5/33, f/u 6am CBC and coags  - Lovenox for VTE prophylaxis    ID: perforated viscus with jane spillage of succus intra peritoneal   - Monitor for clinical evidence of active infection  - Monitor WBC, temperature   - Zosyn  - Fluconazole         Code Status: Full Code     Disposition: SICU, F/u PT/OT     37F c hx anxiety not on any meds, pancreatitis, PUD/duodenal ulcer, frequent ED visits for abd pain and hematemesis, H Pylori s/p incomplete treatment course, who presents to [facility] ED with abd pain, found to be tachycardic and with perforated duodenal ulcer in CT. Patient now s/p Laparoscopic evette patch repair of perforated duodenal ulcer on 7/3/2022. SICU consulted for hemodynamic monitoring, as patient required 2 U PRBC intro operatively and was requiring pressor support throughout the case.     PLAN:    Neuro:, post operative pain control   -standing acetaminophen iv and Dilaudid 0.5 IV prn for pain control       Resp: extubated in SICU  -monitor SpO2 and RR    CV: tachycardic pre op, s/p 2 U PRBC + 4.5 L crystalloid + 1 L albumin intra op, Requiring pressors intra op  - off  Zion  - F/u Lactate  - Plasma lyte 125cc/hr  - Monitor Vital Signs    GI: perf duodenal ulcer s/p evette patch  - NPO  - NGT LIWS, do not manipulate or flush x 5 days  - Protonix BID  - Await ROBF    Renal: UOP in critically ill   - Escobar  - Monitor I&Os and electrolytes w/ repletions as necessary    Heme: Downtrending H/H post op, elevated INR  - H/H 8.9/27.8 from 10.5/33, f/u 6am CBC and coags  - Lovenox for VTE prophylaxis    ID: perforated viscus with jane spillage of succus intra peritoneal   - Monitor for clinical evidence of active infection  - Monitor WBC, temperature   - Zosyn  - Fluconazole         Code Status: Full Code     Disposition: SICU, F/u PT/OT

## 2022-07-05 LAB
ALBUMIN SERPL ELPH-MCNC: 3 G/DL — LOW (ref 3.3–5)
ALP SERPL-CCNC: 53 U/L — SIGNIFICANT CHANGE UP (ref 40–120)
ALT FLD-CCNC: 27 U/L — SIGNIFICANT CHANGE UP (ref 10–45)
ANION GAP SERPL CALC-SCNC: 7 MMOL/L — SIGNIFICANT CHANGE UP (ref 5–17)
AST SERPL-CCNC: 27 U/L — SIGNIFICANT CHANGE UP (ref 10–40)
BILIRUB SERPL-MCNC: 0.6 MG/DL — SIGNIFICANT CHANGE UP (ref 0.2–1.2)
BUN SERPL-MCNC: 11 MG/DL — SIGNIFICANT CHANGE UP (ref 7–23)
CALCIUM SERPL-MCNC: 8.4 MG/DL — SIGNIFICANT CHANGE UP (ref 8.4–10.5)
CHLORIDE SERPL-SCNC: 104 MMOL/L — SIGNIFICANT CHANGE UP (ref 96–108)
CO2 SERPL-SCNC: 27 MMOL/L — SIGNIFICANT CHANGE UP (ref 22–31)
CREAT SERPL-MCNC: 0.57 MG/DL — SIGNIFICANT CHANGE UP (ref 0.5–1.3)
EGFR: 118 ML/MIN/1.73M2 — SIGNIFICANT CHANGE UP
GLUCOSE SERPL-MCNC: 129 MG/DL — HIGH (ref 70–99)
HCT VFR BLD CALC: 29.1 % — LOW (ref 34.5–45)
HGB BLD-MCNC: 9.1 G/DL — LOW (ref 11.5–15.5)
MAGNESIUM SERPL-MCNC: 2.3 MG/DL — SIGNIFICANT CHANGE UP (ref 1.6–2.6)
MCHC RBC-ENTMCNC: 23 PG — LOW (ref 27–34)
MCHC RBC-ENTMCNC: 31.3 GM/DL — LOW (ref 32–36)
MCV RBC AUTO: 73.7 FL — LOW (ref 80–100)
NRBC # BLD: 0 /100 WBCS — SIGNIFICANT CHANGE UP (ref 0–0)
PHOSPHATE SERPL-MCNC: 1.8 MG/DL — LOW (ref 2.5–4.5)
PLATELET # BLD AUTO: 264 K/UL — SIGNIFICANT CHANGE UP (ref 150–400)
POTASSIUM SERPL-MCNC: 3.3 MMOL/L — LOW (ref 3.5–5.3)
POTASSIUM SERPL-SCNC: 3.3 MMOL/L — LOW (ref 3.5–5.3)
PROT SERPL-MCNC: 5.7 G/DL — LOW (ref 6–8.3)
RBC # BLD: 3.95 M/UL — SIGNIFICANT CHANGE UP (ref 3.8–5.2)
RBC # FLD: 20.1 % — HIGH (ref 10.3–14.5)
SODIUM SERPL-SCNC: 138 MMOL/L — SIGNIFICANT CHANGE UP (ref 135–145)
WBC # BLD: 9.59 K/UL — SIGNIFICANT CHANGE UP (ref 3.8–10.5)
WBC # FLD AUTO: 9.59 K/UL — SIGNIFICANT CHANGE UP (ref 3.8–10.5)

## 2022-07-05 PROCEDURE — 71045 X-RAY EXAM CHEST 1 VIEW: CPT | Mod: 26

## 2022-07-05 RX ORDER — POTASSIUM CHLORIDE 20 MEQ
10 PACKET (EA) ORAL
Refills: 0 | Status: COMPLETED | OUTPATIENT
Start: 2022-07-05 | End: 2022-07-05

## 2022-07-05 RX ORDER — BENZOCAINE AND MENTHOL 5; 1 G/100ML; G/100ML
1 LIQUID ORAL
Refills: 0 | Status: DISCONTINUED | OUTPATIENT
Start: 2022-07-05 | End: 2022-07-12

## 2022-07-05 RX ADMIN — SODIUM CHLORIDE 125 MILLILITER(S): 9 INJECTION, SOLUTION INTRAVENOUS at 05:32

## 2022-07-05 RX ADMIN — PIPERACILLIN AND TAZOBACTAM 25 GRAM(S): 4; .5 INJECTION, POWDER, LYOPHILIZED, FOR SOLUTION INTRAVENOUS at 21:07

## 2022-07-05 RX ADMIN — HYDROMORPHONE HYDROCHLORIDE 0.5 MILLIGRAM(S): 2 INJECTION INTRAMUSCULAR; INTRAVENOUS; SUBCUTANEOUS at 21:07

## 2022-07-05 RX ADMIN — Medication 50 MILLIEQUIVALENT(S): at 13:50

## 2022-07-05 RX ADMIN — PIPERACILLIN AND TAZOBACTAM 25 GRAM(S): 4; .5 INJECTION, POWDER, LYOPHILIZED, FOR SOLUTION INTRAVENOUS at 12:44

## 2022-07-05 RX ADMIN — PANTOPRAZOLE SODIUM 40 MILLIGRAM(S): 20 TABLET, DELAYED RELEASE ORAL at 05:30

## 2022-07-05 RX ADMIN — HYDROMORPHONE HYDROCHLORIDE 0.5 MILLIGRAM(S): 2 INJECTION INTRAMUSCULAR; INTRAVENOUS; SUBCUTANEOUS at 09:24

## 2022-07-05 RX ADMIN — Medication 85 MILLIMOLE(S): at 17:11

## 2022-07-05 RX ADMIN — Medication 50 MILLIEQUIVALENT(S): at 16:45

## 2022-07-05 RX ADMIN — HYDROMORPHONE HYDROCHLORIDE 0.5 MILLIGRAM(S): 2 INJECTION INTRAMUSCULAR; INTRAVENOUS; SUBCUTANEOUS at 09:00

## 2022-07-05 RX ADMIN — HYDROMORPHONE HYDROCHLORIDE 0.5 MILLIGRAM(S): 2 INJECTION INTRAMUSCULAR; INTRAVENOUS; SUBCUTANEOUS at 03:13

## 2022-07-05 RX ADMIN — Medication 1000 MILLIGRAM(S): at 06:19

## 2022-07-05 RX ADMIN — HYDROMORPHONE HYDROCHLORIDE 0.5 MILLIGRAM(S): 2 INJECTION INTRAMUSCULAR; INTRAVENOUS; SUBCUTANEOUS at 17:23

## 2022-07-05 RX ADMIN — Medication 50 MILLIEQUIVALENT(S): at 15:07

## 2022-07-05 RX ADMIN — Medication 400 MILLIGRAM(S): at 05:32

## 2022-07-05 RX ADMIN — HYDROMORPHONE HYDROCHLORIDE 0.5 MILLIGRAM(S): 2 INJECTION INTRAMUSCULAR; INTRAVENOUS; SUBCUTANEOUS at 16:53

## 2022-07-05 RX ADMIN — ENOXAPARIN SODIUM 40 MILLIGRAM(S): 100 INJECTION SUBCUTANEOUS at 05:30

## 2022-07-05 RX ADMIN — HYDROMORPHONE HYDROCHLORIDE 0.5 MILLIGRAM(S): 2 INJECTION INTRAMUSCULAR; INTRAVENOUS; SUBCUTANEOUS at 14:00

## 2022-07-05 RX ADMIN — PANTOPRAZOLE SODIUM 40 MILLIGRAM(S): 20 TABLET, DELAYED RELEASE ORAL at 17:02

## 2022-07-05 RX ADMIN — HYDROMORPHONE HYDROCHLORIDE 0.5 MILLIGRAM(S): 2 INJECTION INTRAMUSCULAR; INTRAVENOUS; SUBCUTANEOUS at 21:37

## 2022-07-05 RX ADMIN — PIPERACILLIN AND TAZOBACTAM 25 GRAM(S): 4; .5 INJECTION, POWDER, LYOPHILIZED, FOR SOLUTION INTRAVENOUS at 05:31

## 2022-07-05 RX ADMIN — HYDROMORPHONE HYDROCHLORIDE 0.5 MILLIGRAM(S): 2 INJECTION INTRAMUSCULAR; INTRAVENOUS; SUBCUTANEOUS at 03:28

## 2022-07-05 RX ADMIN — HYDROMORPHONE HYDROCHLORIDE 0.5 MILLIGRAM(S): 2 INJECTION INTRAMUSCULAR; INTRAVENOUS; SUBCUTANEOUS at 13:50

## 2022-07-05 RX ADMIN — FLUCONAZOLE 100 MILLIGRAM(S): 150 TABLET ORAL at 00:16

## 2022-07-05 NOTE — PROGRESS NOTE ADULT - SUBJECTIVE AND OBJECTIVE BOX
ACS SURGERY DAILY PROGRESS NOTE:     SUBJECTIVE/ROS: Patient seen and examined,   Denies nausea, vomiting, chest pain, shortness of breath, is passing flatus, no bm       MEDICATIONS  (STANDING):  acetaminophen   IVPB .. 1000 milliGRAM(s) IV Intermittent every 6 hours  chlorhexidine 2% Cloths 1 Application(s) Topical <User Schedule>  dextrose 5% + lactated ringers. 1000 milliLiter(s) (125 mL/Hr) IV Continuous <Continuous>  enoxaparin Injectable 40 milliGRAM(s) SubCutaneous every 24 hours  fluconAZOLE IVPB 400 milliGRAM(s) IV Intermittent every 24 hours  melatonin 5 milliGRAM(s) Oral at bedtime  pantoprazole  Injectable 40 milliGRAM(s) IV Push every 12 hours  piperacillin/tazobactam IVPB.. 3.375 Gram(s) IV Intermittent every 8 hours    MEDICATIONS  (PRN):  HYDROmorphone  Injectable 0.5 milliGRAM(s) IV Push every 3 hours PRN Severe Breakthrough Pain  tetracaine/benzocaine/butamben Spray 1 Spray(s) Topical three times a day PRN sore throat      OBJECTIVE:    Vital Signs Last 24 Hrs  T(C): 36.7 (05 Jul 2022 04:39), Max: 36.9 (04 Jul 2022 11:00)  T(F): 98 (05 Jul 2022 04:39), Max: 98.4 (04 Jul 2022 11:00)  HR: 75 (05 Jul 2022 04:39) (66 - 87)  BP: 140/88 (05 Jul 2022 04:39) (116/80 - 140/88)  BP(mean): --  RR: 20 (05 Jul 2022 04:39) (16 - 26)  SpO2: 95% (05 Jul 2022 04:39) (93% - 100%)        I&O's Detail    04 Jul 2022 07:01  -  05 Jul 2022 07:00  --------------------------------------------------------  IN:    dextrose 5% + lactated ringers: 1375 mL    IV PiggyBack: 415 mL    IV PiggyBack: 200 mL    IV PiggyBack: 25 mL  Total IN: 2015 mL    OUT:    Bulb (mL): 40 mL    Indwelling Catheter - Urethral (mL): 450 mL    Nasogastric/Oral tube (mL): 150 mL  Total OUT: 640 mL    Total NET: 1375 mL          Daily     Daily     LABS:                        9.1    12.89 )-----------( 251      ( 04 Jul 2022 06:55 )             28.8     07-04    139  |  103  |  11  ----------------------------<  74  3.3<L>   |  24  |  0.60    Ca    8.1<L>      04 Jul 2022 00:37  Phos  2.3     07-04  Mg     2.5     07-04    TPro  5.4<L>  /  Alb  3.1<L>  /  TBili  0.8  /  DBili  x   /  AST  22  /  ALT  24  /  AlkPhos  37<L>  07-04    PT/INR - ( 04 Jul 2022 06:55 )   PT: 16.9 sec;   INR: 1.46 ratio         PTT - ( 04 Jul 2022 06:55 )  PTT:41.2 sec      PHYSICAL EXAM:  Constitutional: well developed, well nourished, NAD, + NGT in place  Neck: supple  Respiratory: breathing comfortably on RA  Gastrointestinal: abdomen soft, nontender, nondistended. No obvious masses. No peritonitis, incision c/d/i   Psychiatric: oriented x 3; appropriate

## 2022-07-05 NOTE — PROGRESS NOTE ADULT - ASSESSMENT
37F c hx OF pancreatitis, PUD/duodenal ulcer. Patient now s/p Laparoscopic evette patch repair of perforated duodenal ulcer 7/3. s/p  2 U PRBC + pressors intraoperatively. Pt extubated 7/3 and off pressors. 7/5 transferred to the floor.     - Continue IV abx- Fluconazole + Zosyn  - F/U cultures  - will likely need an UGIS before advancing of diet    ACS [id number]

## 2022-07-05 NOTE — DIETITIAN INITIAL EVALUATION ADULT - CONTINUE CURRENT NUTRITION CARE PLAN
- defer diet initiation to medical team; consider low fiber diet as needed. when diet resumed, consider promote shake x2/day./yes

## 2022-07-05 NOTE — DIETITIAN INITIAL EVALUATION ADULT - PERTINENT MEDS FT
MEDICATIONS  (STANDING):  acetaminophen   IVPB .. 1000 milliGRAM(s) IV Intermittent every 6 hours  chlorhexidine 2% Cloths 1 Application(s) Topical <User Schedule>  dextrose 5% + lactated ringers. 1000 milliLiter(s) (125 mL/Hr) IV Continuous <Continuous>  enoxaparin Injectable 40 milliGRAM(s) SubCutaneous every 24 hours  fluconAZOLE IVPB 400 milliGRAM(s) IV Intermittent every 24 hours  melatonin 5 milliGRAM(s) Oral at bedtime  pantoprazole  Injectable 40 milliGRAM(s) IV Push every 12 hours  piperacillin/tazobactam IVPB.. 3.375 Gram(s) IV Intermittent every 8 hours  potassium chloride  10 mEq/50 mL IVPB 10 milliEquivalent(s) IV Intermittent every 1 hour  sodium phosphate IVPB 30 milliMole(s) IV Intermittent once    MEDICATIONS  (PRN):  benzocaine 15 mG/menthol 3.6 mG Lozenge 1 Lozenge Oral every 2 hours PRN Sore Throat  HYDROmorphone  Injectable 0.5 milliGRAM(s) IV Push every 3 hours PRN Severe Breakthrough Pain  tetracaine/benzocaine/butamben Spray 1 Spray(s) Topical three times a day PRN sore throat

## 2022-07-05 NOTE — DIETITIAN INITIAL EVALUATION ADULT - ORAL INTAKE PTA/DIET HISTORY
Unable to assess po intake PTA, pt answered some questions, seemed uncomfortable RD did not complete the interview at this time. RD will follow up/ with family as able.

## 2022-07-05 NOTE — DIETITIAN INITIAL EVALUATION ADULT - PERTINENT LABORATORY DATA
07-05    138  |  104  |  11  ----------------------------<  129<H>  3.3<L>   |  27  |  0.57    Ca    8.4      05 Jul 2022 11:35  Phos  1.8     07-05  Mg     2.3     07-05    TPro  5.7<L>  /  Alb  3.0<L>  /  TBili  0.6  /  DBili  x   /  AST  27  /  ALT  27  /  AlkPhos  53  07-05 07-05 @ 11:35: Na 138, BUN 11, Cr 0.57, <H>, K+ 3.3<L>, Phos 1.8<L>, Mg 2.3, Alk Phos 53, ALT/SGPT 27, AST/SGOT 27, HbA1c --

## 2022-07-05 NOTE — DIETITIAN INITIAL EVALUATION ADULT - NSFNSPHYEXAMSKINFT_GEN_A_CORE
Pt states her UBW ~165 pounds   150% IBW ( pounds)  Skin: no noted pressure injuries as per flowsheets

## 2022-07-05 NOTE — DIETITIAN INITIAL EVALUATION ADULT - NSFNSGIIOFT_GEN_A_CORE
07-04-22 @ 07:01  -  07-05-22 @ 07:00  --------------------------------------------------------  OUT:    Nasogastric/Oral tube (mL): 150 mL  Total OUT: 150 mL    Total NET: -150 mL

## 2022-07-05 NOTE — DIETITIAN INITIAL EVALUATION ADULT - ADD RECOMMEND
- noted K+ 3.3<L>, Phos 1.8<L>; defer to team to replenish PRN  - monitor BM   - Nutrition care plan to remain consistent with pt goals of care   - RD remains available to review diet education and adjust diet recommendations as needed.

## 2022-07-05 NOTE — CHART NOTE - NSCHARTNOTEFT_GEN_A_CORE
Per RN void was not measured, but patient did indeed void "a lot," RN confident void was over 200cc. Passed trial of void.

## 2022-07-06 LAB
ANION GAP SERPL CALC-SCNC: 10 MMOL/L — SIGNIFICANT CHANGE UP (ref 5–17)
BUN SERPL-MCNC: 8 MG/DL — SIGNIFICANT CHANGE UP (ref 7–23)
CALCIUM SERPL-MCNC: 8.9 MG/DL — SIGNIFICANT CHANGE UP (ref 8.4–10.5)
CHLORIDE SERPL-SCNC: 104 MMOL/L — SIGNIFICANT CHANGE UP (ref 96–108)
CO2 SERPL-SCNC: 25 MMOL/L — SIGNIFICANT CHANGE UP (ref 22–31)
CREAT SERPL-MCNC: 0.62 MG/DL — SIGNIFICANT CHANGE UP (ref 0.5–1.3)
EGFR: 116 ML/MIN/1.73M2 — SIGNIFICANT CHANGE UP
GLUCOSE SERPL-MCNC: 82 MG/DL — SIGNIFICANT CHANGE UP (ref 70–99)
HCT VFR BLD CALC: 31.5 % — LOW (ref 34.5–45)
HGB BLD-MCNC: 9.9 G/DL — LOW (ref 11.5–15.5)
MAGNESIUM SERPL-MCNC: 2 MG/DL — SIGNIFICANT CHANGE UP (ref 1.6–2.6)
MCHC RBC-ENTMCNC: 23.1 PG — LOW (ref 27–34)
MCHC RBC-ENTMCNC: 31.4 GM/DL — LOW (ref 32–36)
MCV RBC AUTO: 73.6 FL — LOW (ref 80–100)
NRBC # BLD: 0 /100 WBCS — SIGNIFICANT CHANGE UP (ref 0–0)
PHOSPHATE SERPL-MCNC: 2.8 MG/DL — SIGNIFICANT CHANGE UP (ref 2.5–4.5)
PLATELET # BLD AUTO: 318 K/UL — SIGNIFICANT CHANGE UP (ref 150–400)
POTASSIUM SERPL-MCNC: 3.6 MMOL/L — SIGNIFICANT CHANGE UP (ref 3.5–5.3)
POTASSIUM SERPL-SCNC: 3.6 MMOL/L — SIGNIFICANT CHANGE UP (ref 3.5–5.3)
RBC # BLD: 4.28 M/UL — SIGNIFICANT CHANGE UP (ref 3.8–5.2)
RBC # FLD: 20.2 % — HIGH (ref 10.3–14.5)
SODIUM SERPL-SCNC: 139 MMOL/L — SIGNIFICANT CHANGE UP (ref 135–145)
WBC # BLD: 11.62 K/UL — HIGH (ref 3.8–10.5)
WBC # FLD AUTO: 11.62 K/UL — HIGH (ref 3.8–10.5)

## 2022-07-06 RX ORDER — ACETAMINOPHEN 500 MG
1000 TABLET ORAL EVERY 6 HOURS
Refills: 0 | Status: COMPLETED | OUTPATIENT
Start: 2022-07-06 | End: 2022-07-07

## 2022-07-06 RX ADMIN — Medication 400 MILLIGRAM(S): at 23:15

## 2022-07-06 RX ADMIN — HYDROMORPHONE HYDROCHLORIDE 0.5 MILLIGRAM(S): 2 INJECTION INTRAMUSCULAR; INTRAVENOUS; SUBCUTANEOUS at 21:54

## 2022-07-06 RX ADMIN — PIPERACILLIN AND TAZOBACTAM 25 GRAM(S): 4; .5 INJECTION, POWDER, LYOPHILIZED, FOR SOLUTION INTRAVENOUS at 13:01

## 2022-07-06 RX ADMIN — HYDROMORPHONE HYDROCHLORIDE 0.5 MILLIGRAM(S): 2 INJECTION INTRAMUSCULAR; INTRAVENOUS; SUBCUTANEOUS at 10:15

## 2022-07-06 RX ADMIN — FLUCONAZOLE 100 MILLIGRAM(S): 150 TABLET ORAL at 23:16

## 2022-07-06 RX ADMIN — FLUCONAZOLE 100 MILLIGRAM(S): 150 TABLET ORAL at 00:15

## 2022-07-06 RX ADMIN — HYDROMORPHONE HYDROCHLORIDE 0.5 MILLIGRAM(S): 2 INJECTION INTRAMUSCULAR; INTRAVENOUS; SUBCUTANEOUS at 00:19

## 2022-07-06 RX ADMIN — PIPERACILLIN AND TAZOBACTAM 25 GRAM(S): 4; .5 INJECTION, POWDER, LYOPHILIZED, FOR SOLUTION INTRAVENOUS at 06:12

## 2022-07-06 RX ADMIN — PANTOPRAZOLE SODIUM 40 MILLIGRAM(S): 20 TABLET, DELAYED RELEASE ORAL at 06:13

## 2022-07-06 RX ADMIN — PANTOPRAZOLE SODIUM 40 MILLIGRAM(S): 20 TABLET, DELAYED RELEASE ORAL at 17:31

## 2022-07-06 RX ADMIN — HYDROMORPHONE HYDROCHLORIDE 0.5 MILLIGRAM(S): 2 INJECTION INTRAMUSCULAR; INTRAVENOUS; SUBCUTANEOUS at 22:54

## 2022-07-06 RX ADMIN — HYDROMORPHONE HYDROCHLORIDE 0.5 MILLIGRAM(S): 2 INJECTION INTRAMUSCULAR; INTRAVENOUS; SUBCUTANEOUS at 13:02

## 2022-07-06 RX ADMIN — HYDROMORPHONE HYDROCHLORIDE 0.5 MILLIGRAM(S): 2 INJECTION INTRAMUSCULAR; INTRAVENOUS; SUBCUTANEOUS at 03:38

## 2022-07-06 RX ADMIN — ENOXAPARIN SODIUM 40 MILLIGRAM(S): 100 INJECTION SUBCUTANEOUS at 06:12

## 2022-07-06 RX ADMIN — HYDROMORPHONE HYDROCHLORIDE 0.5 MILLIGRAM(S): 2 INJECTION INTRAMUSCULAR; INTRAVENOUS; SUBCUTANEOUS at 13:15

## 2022-07-06 RX ADMIN — HYDROMORPHONE HYDROCHLORIDE 0.5 MILLIGRAM(S): 2 INJECTION INTRAMUSCULAR; INTRAVENOUS; SUBCUTANEOUS at 00:34

## 2022-07-06 RX ADMIN — PIPERACILLIN AND TAZOBACTAM 25 GRAM(S): 4; .5 INJECTION, POWDER, LYOPHILIZED, FOR SOLUTION INTRAVENOUS at 21:55

## 2022-07-06 RX ADMIN — HYDROMORPHONE HYDROCHLORIDE 0.5 MILLIGRAM(S): 2 INJECTION INTRAMUSCULAR; INTRAVENOUS; SUBCUTANEOUS at 06:28

## 2022-07-06 RX ADMIN — HYDROMORPHONE HYDROCHLORIDE 0.5 MILLIGRAM(S): 2 INJECTION INTRAMUSCULAR; INTRAVENOUS; SUBCUTANEOUS at 06:13

## 2022-07-06 RX ADMIN — Medication 400 MILLIGRAM(S): at 17:31

## 2022-07-06 RX ADMIN — HYDROMORPHONE HYDROCHLORIDE 0.5 MILLIGRAM(S): 2 INJECTION INTRAMUSCULAR; INTRAVENOUS; SUBCUTANEOUS at 22:00

## 2022-07-06 RX ADMIN — HYDROMORPHONE HYDROCHLORIDE 0.5 MILLIGRAM(S): 2 INJECTION INTRAMUSCULAR; INTRAVENOUS; SUBCUTANEOUS at 09:50

## 2022-07-06 RX ADMIN — HYDROMORPHONE HYDROCHLORIDE 0.5 MILLIGRAM(S): 2 INJECTION INTRAMUSCULAR; INTRAVENOUS; SUBCUTANEOUS at 03:23

## 2022-07-06 NOTE — PROGRESS NOTE ADULT - SUBJECTIVE AND OBJECTIVE BOX
ACS SURGERY DAILY PROGRESS NOTE:     SUBJECTIVE/ROS:   7/6: HOD4 POD3 : Patient seen and examined,   Denies nausea, vomiting, chest pain, shortness of breath, is passing flatus, no bm       MEDICATIONS  (STANDING):  acetaminophen   IVPB .. 1000 milliGRAM(s) IV Intermittent every 6 hours  chlorhexidine 2% Cloths 1 Application(s) Topical <User Schedule>  dextrose 5% + lactated ringers. 1000 milliLiter(s) (125 mL/Hr) IV Continuous <Continuous>  enoxaparin Injectable 40 milliGRAM(s) SubCutaneous every 24 hours  fluconAZOLE IVPB 400 milliGRAM(s) IV Intermittent every 24 hours  melatonin 5 milliGRAM(s) Oral at bedtime  pantoprazole  Injectable 40 milliGRAM(s) IV Push every 12 hours  piperacillin/tazobactam IVPB.. 3.375 Gram(s) IV Intermittent every 8 hours    MEDICATIONS  (PRN):  HYDROmorphone  Injectable 0.5 milliGRAM(s) IV Push every 3 hours PRN Severe Breakthrough Pain  tetracaine/benzocaine/butamben Spray 1 Spray(s) Topical three times a day PRN sore throat      OBJECTIVE:    Vital Signs Last 24 Hrs  T(C): 36.7 (05 Jul 2022 04:39), Max: 36.9 (04 Jul 2022 11:00)  T(F): 98 (05 Jul 2022 04:39), Max: 98.4 (04 Jul 2022 11:00)  HR: 75 (05 Jul 2022 04:39) (66 - 87)  BP: 140/88 (05 Jul 2022 04:39) (116/80 - 140/88)  BP(mean): --  RR: 20 (05 Jul 2022 04:39) (16 - 26)  SpO2: 95% (05 Jul 2022 04:39) (93% - 100%)        I&O's Detail    04 Jul 2022 07:01  -  05 Jul 2022 07:00  --------------------------------------------------------  IN:    dextrose 5% + lactated ringers: 1375 mL    IV PiggyBack: 415 mL    IV PiggyBack: 200 mL    IV PiggyBack: 25 mL  Total IN: 2015 mL    OUT:    Bulb (mL): 40 mL    Indwelling Catheter - Urethral (mL): 450 mL    Nasogastric/Oral tube (mL): 150 mL  Total OUT: 640 mL    Total NET: 1375 mL          Daily     Daily     LABS:                        9.1    12.89 )-----------( 251      ( 04 Jul 2022 06:55 )             28.8     07-04    139  |  103  |  11  ----------------------------<  74  3.3<L>   |  24  |  0.60    Ca    8.1<L>      04 Jul 2022 00:37  Phos  2.3     07-04  Mg     2.5     07-04    TPro  5.4<L>  /  Alb  3.1<L>  /  TBili  0.8  /  DBili  x   /  AST  22  /  ALT  24  /  AlkPhos  37<L>  07-04    PT/INR - ( 04 Jul 2022 06:55 )   PT: 16.9 sec;   INR: 1.46 ratio         PTT - ( 04 Jul 2022 06:55 )  PTT:41.2 sec      PHYSICAL EXAM:  Constitutional: well developed, well nourished, NAD, + NGT in place  Neck: supple  Respiratory: breathing comfortably on RA  Gastrointestinal: abdomen soft, nontender, nondistended. No obvious masses. No peritonitis, incision c/d/i   Psychiatric: oriented x 3; appropriate         ACS SURGERY DAILY PROGRESS NOTE:     SUBJECTIVE/ROS:   7/6: HOD4 POD3 : Patient seen and examined on AM rounds.    Denies nausea, vomiting, chest pain, shortness of breath, is passing flatus, no bm.        MEDICATIONS  (STANDING):  acetaminophen   IVPB .. 1000 milliGRAM(s) IV Intermittent every 6 hours  chlorhexidine 2% Cloths 1 Application(s) Topical <User Schedule>  dextrose 5% + lactated ringers. 1000 milliLiter(s) (125 mL/Hr) IV Continuous <Continuous>  enoxaparin Injectable 40 milliGRAM(s) SubCutaneous every 24 hours  fluconAZOLE IVPB 400 milliGRAM(s) IV Intermittent every 24 hours  melatonin 5 milliGRAM(s) Oral at bedtime  pantoprazole  Injectable 40 milliGRAM(s) IV Push every 12 hours  piperacillin/tazobactam IVPB.. 3.375 Gram(s) IV Intermittent every 8 hours    MEDICATIONS  (PRN):  HYDROmorphone  Injectable 0.5 milliGRAM(s) IV Push every 3 hours PRN Severe Breakthrough Pain  tetracaine/benzocaine/butamben Spray 1 Spray(s) Topical three times a day PRN sore throat      OBJECTIVE:    Vital Signs Last 24 Hrs  T(C): 36.7 (05 Jul 2022 04:39), Max: 36.9 (04 Jul 2022 11:00)  T(F): 98 (05 Jul 2022 04:39), Max: 98.4 (04 Jul 2022 11:00)  HR: 75 (05 Jul 2022 04:39) (66 - 87)  BP: 140/88 (05 Jul 2022 04:39) (116/80 - 140/88)  BP(mean): --  RR: 20 (05 Jul 2022 04:39) (16 - 26)  SpO2: 95% (05 Jul 2022 04:39) (93% - 100%)        I&O's Detail    04 Jul 2022 07:01  -  05 Jul 2022 07:00  --------------------------------------------------------  IN:    dextrose 5% + lactated ringers: 1375 mL    IV PiggyBack: 415 mL    IV PiggyBack: 200 mL    IV PiggyBack: 25 mL  Total IN: 2015 mL    OUT:    Bulb (mL): 40 mL    Indwelling Catheter - Urethral (mL): 450 mL    Nasogastric/Oral tube (mL): 150 mL  Total OUT: 640 mL    Total NET: 1375 mL          Daily     LABS:                        9.1    12.89 )-----------( 251      ( 04 Jul 2022 06:55 )             28.8     07-04    139  |  103  |  11  ----------------------------<  74  3.3<L>   |  24  |  0.60    Ca    8.1<L>      04 Jul 2022 00:37  Phos  2.3     07-04  Mg     2.5     07-04    TPro  5.4<L>  /  Alb  3.1<L>  /  TBili  0.8  /  DBili  x   /  AST  22  /  ALT  24  /  AlkPhos  37<L>  07-04    PT/INR - ( 04 Jul 2022 06:55 )   PT: 16.9 sec;   INR: 1.46 ratio         PTT - ( 04 Jul 2022 06:55 )  PTT:41.2 sec      PHYSICAL EXAM:  Constitutional: well developed, well nourished, NAD, + NGT in place  Neck: supple  Respiratory: breathing comfortably on RA  Gastrointestinal: abdomen soft, nontender, nondistended. No obvious masses. No peritonitis, incision c/d/i   Psychiatric: oriented x 3; appropriate         ACS SURGERY DAILY PROGRESS NOTE:     SUBJECTIVE/ROS:   7/6: HOD4 POD3 : Patient seen and examined on AM rounds.    Denies nausea, vomiting, chest pain, shortness of breath, is passing flatus + bowel movement        MEDICATIONS  (STANDING):  acetaminophen   IVPB .. 1000 milliGRAM(s) IV Intermittent every 6 hours  chlorhexidine 2% Cloths 1 Application(s) Topical <User Schedule>  dextrose 5% + lactated ringers. 1000 milliLiter(s) (125 mL/Hr) IV Continuous <Continuous>  enoxaparin Injectable 40 milliGRAM(s) SubCutaneous every 24 hours  fluconAZOLE IVPB 400 milliGRAM(s) IV Intermittent every 24 hours  melatonin 5 milliGRAM(s) Oral at bedtime  pantoprazole  Injectable 40 milliGRAM(s) IV Push every 12 hours  piperacillin/tazobactam IVPB.. 3.375 Gram(s) IV Intermittent every 8 hours    MEDICATIONS  (PRN):  HYDROmorphone  Injectable 0.5 milliGRAM(s) IV Push every 3 hours PRN Severe Breakthrough Pain  tetracaine/benzocaine/butamben Spray 1 Spray(s) Topical three times a day PRN sore throat      OBJECTIVE:    Vital Signs Last 24 Hrs  T(C): 36.7 (05 Jul 2022 04:39), Max: 36.9 (04 Jul 2022 11:00)  T(F): 98 (05 Jul 2022 04:39), Max: 98.4 (04 Jul 2022 11:00)  HR: 75 (05 Jul 2022 04:39) (66 - 87)  BP: 140/88 (05 Jul 2022 04:39) (116/80 - 140/88)  BP(mean): --  RR: 20 (05 Jul 2022 04:39) (16 - 26)  SpO2: 95% (05 Jul 2022 04:39) (93% - 100%)        I&O's Detail    04 Jul 2022 07:01  -  05 Jul 2022 07:00  --------------------------------------------------------  IN:    dextrose 5% + lactated ringers: 1375 mL    IV PiggyBack: 415 mL    IV PiggyBack: 200 mL    IV PiggyBack: 25 mL  Total IN: 2015 mL    OUT:    Bulb (mL): 40 mL    Indwelling Catheter - Urethral (mL): 450 mL    Nasogastric/Oral tube (mL): 150 mL  Total OUT: 640 mL    Total NET: 1375 mL          Daily     LABS:                        9.1    12.89 )-----------( 251      ( 04 Jul 2022 06:55 )             28.8     07-04    139  |  103  |  11  ----------------------------<  74  3.3<L>   |  24  |  0.60    Ca    8.1<L>      04 Jul 2022 00:37  Phos  2.3     07-04  Mg     2.5     07-04    TPro  5.4<L>  /  Alb  3.1<L>  /  TBili  0.8  /  DBili  x   /  AST  22  /  ALT  24  /  AlkPhos  37<L>  07-04    PT/INR - ( 04 Jul 2022 06:55 )   PT: 16.9 sec;   INR: 1.46 ratio         PTT - ( 04 Jul 2022 06:55 )  PTT:41.2 sec      PHYSICAL EXAM:  Constitutional: well developed, well nourished, NAD, + NGT in place  Neck: supple  Respiratory: breathing comfortably on RA  Gastrointestinal: abdomen soft, nontender, nondistended. No obvious masses. No peritonitis, incision c/d/i   Psychiatric: oriented x 3; appropriate

## 2022-07-06 NOTE — PROGRESS NOTE ADULT - ASSESSMENT
37F c hx OF pancreatitis, PUD/duodenal ulcer. Patient now s/p Laparoscopic evette patch repair of perforated duodenal ulcer 7/3. s/p  2 U PRBC + pressors intraoperatively. Pt extubated 7/3 and off pressors. 7/5 transferred to the floor.     - Continue IV abx- Fluconazole + Zosyn  - F/U cultures  - will likely need an UGIS before advancing of diet    ACS [id number] 37F c hx OF pancreatitis, PUD/duodenal ulcer. Patient now s/p Laparoscopic evette patch repair of perforated duodenal ulcer 7/3. s/p  2 U PRBC + pressors intraoperatively. Pt extubated 7/3 and off pressors. 7/5 transferred to the floor.     - DVT ppx   - monitor NGT output   - Continue IV abx- Fluconazole + Zosyn  - F/U cultures  - will likely need an UGIS before advancing of diet on POD 5    ACS 9000

## 2022-07-06 NOTE — PROVIDER CONTACT NOTE (OTHER) - ASSESSMENT
Pt resting in bed. VSS on 2L NC. Pt refusing blood draws from night RN and phlebotomy. Pt educated on importance of blood draws. VIK Servin made aware.
pts NG tube fluids changed from dark brown/ green to blood tinged.

## 2022-07-06 NOTE — PROVIDER CONTACT NOTE (OTHER) - ACTION/TREATMENT ORDERED:
Oneil, aware. Md was up to bedside within minutes. safety maintained. will continue to monitor. Lior, aware. Md was up to bedside within minutes. will continue to monitor. not too worried- will continue to monitor. let lior know if there any change in vital/ pain and consistency

## 2022-07-07 LAB
ANION GAP SERPL CALC-SCNC: 12 MMOL/L — SIGNIFICANT CHANGE UP (ref 5–17)
BUN SERPL-MCNC: 5 MG/DL — LOW (ref 7–23)
CALCIUM SERPL-MCNC: 8.9 MG/DL — SIGNIFICANT CHANGE UP (ref 8.4–10.5)
CHLORIDE SERPL-SCNC: 100 MMOL/L — SIGNIFICANT CHANGE UP (ref 96–108)
CO2 SERPL-SCNC: 24 MMOL/L — SIGNIFICANT CHANGE UP (ref 22–31)
CREAT SERPL-MCNC: 0.53 MG/DL — SIGNIFICANT CHANGE UP (ref 0.5–1.3)
EGFR: 121 ML/MIN/1.73M2 — SIGNIFICANT CHANGE UP
GLUCOSE SERPL-MCNC: 96 MG/DL — SIGNIFICANT CHANGE UP (ref 70–99)
HCT VFR BLD CALC: 28.6 % — LOW (ref 34.5–45)
HGB BLD-MCNC: 9 G/DL — LOW (ref 11.5–15.5)
MAGNESIUM SERPL-MCNC: 1.5 MG/DL — LOW (ref 1.6–2.6)
MCHC RBC-ENTMCNC: 22.8 PG — LOW (ref 27–34)
MCHC RBC-ENTMCNC: 31.5 GM/DL — LOW (ref 32–36)
MCV RBC AUTO: 72.4 FL — LOW (ref 80–100)
NRBC # BLD: 0 /100 WBCS — SIGNIFICANT CHANGE UP (ref 0–0)
PHOSPHATE SERPL-MCNC: 3.7 MG/DL — SIGNIFICANT CHANGE UP (ref 2.5–4.5)
PLATELET # BLD AUTO: 269 K/UL — SIGNIFICANT CHANGE UP (ref 150–400)
POTASSIUM SERPL-MCNC: 3.4 MMOL/L — LOW (ref 3.5–5.3)
POTASSIUM SERPL-SCNC: 3.4 MMOL/L — LOW (ref 3.5–5.3)
RBC # BLD: 3.95 M/UL — SIGNIFICANT CHANGE UP (ref 3.8–5.2)
RBC # FLD: 20.2 % — HIGH (ref 10.3–14.5)
SODIUM SERPL-SCNC: 136 MMOL/L — SIGNIFICANT CHANGE UP (ref 135–145)
WBC # BLD: 8.44 K/UL — SIGNIFICANT CHANGE UP (ref 3.8–10.5)
WBC # FLD AUTO: 8.44 K/UL — SIGNIFICANT CHANGE UP (ref 3.8–10.5)

## 2022-07-07 RX ORDER — POTASSIUM CHLORIDE 20 MEQ
40 PACKET (EA) ORAL EVERY 4 HOURS
Refills: 0 | Status: DISCONTINUED | OUTPATIENT
Start: 2022-07-07 | End: 2022-07-07

## 2022-07-07 RX ORDER — MAGNESIUM SULFATE 500 MG/ML
2 VIAL (ML) INJECTION ONCE
Refills: 0 | Status: COMPLETED | OUTPATIENT
Start: 2022-07-07 | End: 2022-07-07

## 2022-07-07 RX ORDER — POTASSIUM CHLORIDE 20 MEQ
10 PACKET (EA) ORAL
Refills: 0 | Status: COMPLETED | OUTPATIENT
Start: 2022-07-07 | End: 2022-07-07

## 2022-07-07 RX ADMIN — Medication 1000 MILLIGRAM(S): at 06:30

## 2022-07-07 RX ADMIN — PIPERACILLIN AND TAZOBACTAM 25 GRAM(S): 4; .5 INJECTION, POWDER, LYOPHILIZED, FOR SOLUTION INTRAVENOUS at 13:13

## 2022-07-07 RX ADMIN — Medication 400 MILLIGRAM(S): at 05:32

## 2022-07-07 RX ADMIN — HYDROMORPHONE HYDROCHLORIDE 0.5 MILLIGRAM(S): 2 INJECTION INTRAMUSCULAR; INTRAVENOUS; SUBCUTANEOUS at 10:05

## 2022-07-07 RX ADMIN — HYDROMORPHONE HYDROCHLORIDE 0.5 MILLIGRAM(S): 2 INJECTION INTRAMUSCULAR; INTRAVENOUS; SUBCUTANEOUS at 09:30

## 2022-07-07 RX ADMIN — HYDROMORPHONE HYDROCHLORIDE 0.5 MILLIGRAM(S): 2 INJECTION INTRAMUSCULAR; INTRAVENOUS; SUBCUTANEOUS at 07:00

## 2022-07-07 RX ADMIN — HYDROMORPHONE HYDROCHLORIDE 0.5 MILLIGRAM(S): 2 INJECTION INTRAMUSCULAR; INTRAVENOUS; SUBCUTANEOUS at 17:55

## 2022-07-07 RX ADMIN — HYDROMORPHONE HYDROCHLORIDE 0.5 MILLIGRAM(S): 2 INJECTION INTRAMUSCULAR; INTRAVENOUS; SUBCUTANEOUS at 18:25

## 2022-07-07 RX ADMIN — ENOXAPARIN SODIUM 40 MILLIGRAM(S): 100 INJECTION SUBCUTANEOUS at 05:32

## 2022-07-07 RX ADMIN — HYDROMORPHONE HYDROCHLORIDE 0.5 MILLIGRAM(S): 2 INJECTION INTRAMUSCULAR; INTRAVENOUS; SUBCUTANEOUS at 06:35

## 2022-07-07 RX ADMIN — Medication 100 MILLIEQUIVALENT(S): at 14:29

## 2022-07-07 RX ADMIN — HYDROMORPHONE HYDROCHLORIDE 0.5 MILLIGRAM(S): 2 INJECTION INTRAMUSCULAR; INTRAVENOUS; SUBCUTANEOUS at 20:58

## 2022-07-07 RX ADMIN — PIPERACILLIN AND TAZOBACTAM 25 GRAM(S): 4; .5 INJECTION, POWDER, LYOPHILIZED, FOR SOLUTION INTRAVENOUS at 05:32

## 2022-07-07 RX ADMIN — Medication 400 MILLIGRAM(S): at 12:05

## 2022-07-07 RX ADMIN — Medication 1000 MILLIGRAM(S): at 13:00

## 2022-07-07 RX ADMIN — HYDROMORPHONE HYDROCHLORIDE 0.5 MILLIGRAM(S): 2 INJECTION INTRAMUSCULAR; INTRAVENOUS; SUBCUTANEOUS at 01:45

## 2022-07-07 RX ADMIN — PANTOPRAZOLE SODIUM 40 MILLIGRAM(S): 20 TABLET, DELAYED RELEASE ORAL at 17:07

## 2022-07-07 RX ADMIN — Medication 25 GRAM(S): at 11:23

## 2022-07-07 RX ADMIN — Medication 1000 MILLIGRAM(S): at 00:15

## 2022-07-07 RX ADMIN — PANTOPRAZOLE SODIUM 40 MILLIGRAM(S): 20 TABLET, DELAYED RELEASE ORAL at 05:32

## 2022-07-07 RX ADMIN — PIPERACILLIN AND TAZOBACTAM 25 GRAM(S): 4; .5 INJECTION, POWDER, LYOPHILIZED, FOR SOLUTION INTRAVENOUS at 20:58

## 2022-07-07 RX ADMIN — FLUCONAZOLE 100 MILLIGRAM(S): 150 TABLET ORAL at 23:09

## 2022-07-07 RX ADMIN — Medication 100 MILLIEQUIVALENT(S): at 15:45

## 2022-07-07 RX ADMIN — HYDROMORPHONE HYDROCHLORIDE 0.5 MILLIGRAM(S): 2 INJECTION INTRAMUSCULAR; INTRAVENOUS; SUBCUTANEOUS at 13:23

## 2022-07-07 RX ADMIN — HYDROMORPHONE HYDROCHLORIDE 0.5 MILLIGRAM(S): 2 INJECTION INTRAMUSCULAR; INTRAVENOUS; SUBCUTANEOUS at 13:50

## 2022-07-07 RX ADMIN — HYDROMORPHONE HYDROCHLORIDE 0.5 MILLIGRAM(S): 2 INJECTION INTRAMUSCULAR; INTRAVENOUS; SUBCUTANEOUS at 02:45

## 2022-07-07 RX ADMIN — Medication 100 MILLIEQUIVALENT(S): at 13:13

## 2022-07-07 NOTE — CHART NOTE - NSCHARTNOTEFT_GEN_A_CORE
RN alerted me that patient's NG tube seemed to be draining blood. On examination, patient was not in distress, not experiencing pain above her baseline, and had no other complaints. Per my examination, NG tube fluid was somewhat reddish, likely due to gastric irritation, and most of fluid was dark green.     Checked in with RN on call at present, and pt has continued to have minimal green fluid from NG tube, no bloody fluid noted.

## 2022-07-07 NOTE — PROGRESS NOTE ADULT - ASSESSMENT
37F c hx OF pancreatitis, PUD/duodenal ulcer. Patient now s/p Laparoscopic evette patch repair of perforated duodenal ulcer 7/3. s/p  2 U PRBC + pressors intraoperatively. Pt extubated 7/3 and off pressors. 7/5 transferred to the floor.     - DVT ppx   - monitor NGT output   - Continue IV abx- Fluconazole + Zosyn  - F/U cultures  - will likely need an UGIS before advancing of diet on POD 5, likely tomorrow.     ACS [id number]

## 2022-07-07 NOTE — PROGRESS NOTE ADULT - SUBJECTIVE AND OBJECTIVE BOX
TRAUMA SURGERY DAILY PROGRESS NOTE:     SUBJECTIVE/ROS: Patient seen and examined on AM rounds.    Denies nausea, vomiting, chest pain, shortness of breath, is passing flatus + bowel movement      MEDICATIONS  (STANDING):  acetaminophen   IVPB .. 1000 milliGRAM(s) IV Intermittent every 6 hours  acetaminophen   IVPB .. 1000 milliGRAM(s) IV Intermittent every 6 hours  chlorhexidine 2% Cloths 1 Application(s) Topical <User Schedule>  dextrose 5% + lactated ringers. 1000 milliLiter(s) (125 mL/Hr) IV Continuous <Continuous>  enoxaparin Injectable 40 milliGRAM(s) SubCutaneous every 24 hours  fluconAZOLE IVPB 400 milliGRAM(s) IV Intermittent every 24 hours  melatonin 5 milliGRAM(s) Oral at bedtime  pantoprazole  Injectable 40 milliGRAM(s) IV Push every 12 hours  piperacillin/tazobactam IVPB.. 3.375 Gram(s) IV Intermittent every 8 hours    MEDICATIONS  (PRN):  benzocaine 15 mG/menthol 3.6 mG Lozenge 1 Lozenge Oral every 2 hours PRN Sore Throat  HYDROmorphone  Injectable 0.5 milliGRAM(s) IV Push every 3 hours PRN Severe Breakthrough Pain  tetracaine/benzocaine/butamben Spray 1 Spray(s) Topical three times a day PRN sore throat      OBJECTIVE:    Vital Signs Last 24 Hrs  T(C): 36.6 (07 Jul 2022 04:32), Max: 37.1 (06 Jul 2022 20:26)  T(F): 97.9 (07 Jul 2022 04:32), Max: 98.8 (06 Jul 2022 20:26)  HR: 86 (07 Jul 2022 04:32) (64 - 90)  BP: 145/97 (07 Jul 2022 04:32) (118/82 - 146/94)  BP(mean): --  RR: 16 (07 Jul 2022 04:32) (16 - 18)  SpO2: 97% (07 Jul 2022 04:32) (93% - 97%)        I&O's Detail    06 Jul 2022 07:01  -  07 Jul 2022 07:00  --------------------------------------------------------  IN:    dextrose 5% + lactated ringers: 1375 mL    IV PiggyBack: 200 mL    IV PiggyBack: 200 mL    IV PiggyBack: 200 mL  Total IN: 1975 mL    OUT:    Bulb (mL): 60 mL    Nasogastric/Oral tube (mL): 270 mL    Oral Fluid: 0 mL  Total OUT: 330 mL    Total NET: 1645 mL          Daily     Daily     LABS:                        9.9    11.62 )-----------( 318      ( 06 Jul 2022 09:56 )             31.5     07-06    139  |  104  |  8   ----------------------------<  82  3.6   |  25  |  0.62    Ca    8.9      06 Jul 2022 09:56  Phos  2.8     07-06  Mg     2.0     07-06    TPro  5.7<L>  /  Alb  3.0<L>  /  TBili  0.6  /  DBili  x   /  AST  27  /  ALT  27  /  AlkPhos  53  07-05              PHYSICAL EXAM:  Constitutional: well developed, well nourished, NAD, + NGT in place  Neck: supple  Respiratory: breathing comfortably on RA  Gastrointestinal: abdomen soft, nontender, nondistended. No obvious masses. No peritonitis, incision c/d/i   Psychiatric: oriented x 3; appropriate

## 2022-07-08 LAB
ANION GAP SERPL CALC-SCNC: 10 MMOL/L — SIGNIFICANT CHANGE UP (ref 5–17)
BUN SERPL-MCNC: <4 MG/DL — LOW (ref 7–23)
CALCIUM SERPL-MCNC: 9.1 MG/DL — SIGNIFICANT CHANGE UP (ref 8.4–10.5)
CHLORIDE SERPL-SCNC: 101 MMOL/L — SIGNIFICANT CHANGE UP (ref 96–108)
CO2 SERPL-SCNC: 27 MMOL/L — SIGNIFICANT CHANGE UP (ref 22–31)
CREAT SERPL-MCNC: 0.52 MG/DL — SIGNIFICANT CHANGE UP (ref 0.5–1.3)
CULTURE RESULTS: SIGNIFICANT CHANGE UP
EGFR: 121 ML/MIN/1.73M2 — SIGNIFICANT CHANGE UP
GLUCOSE SERPL-MCNC: 119 MG/DL — HIGH (ref 70–99)
HCT VFR BLD CALC: 29.1 % — LOW (ref 34.5–45)
HGB BLD-MCNC: 9.3 G/DL — LOW (ref 11.5–15.5)
MAGNESIUM SERPL-MCNC: 1.5 MG/DL — LOW (ref 1.6–2.6)
MCHC RBC-ENTMCNC: 22.7 PG — LOW (ref 27–34)
MCHC RBC-ENTMCNC: 32 GM/DL — SIGNIFICANT CHANGE UP (ref 32–36)
MCV RBC AUTO: 71 FL — LOW (ref 80–100)
NRBC # BLD: 0 /100 WBCS — SIGNIFICANT CHANGE UP (ref 0–0)
PHOSPHATE SERPL-MCNC: 3.1 MG/DL — SIGNIFICANT CHANGE UP (ref 2.5–4.5)
PLATELET # BLD AUTO: 315 K/UL — SIGNIFICANT CHANGE UP (ref 150–400)
POTASSIUM SERPL-MCNC: 3.3 MMOL/L — LOW (ref 3.5–5.3)
POTASSIUM SERPL-SCNC: 3.3 MMOL/L — LOW (ref 3.5–5.3)
RBC # BLD: 4.1 M/UL — SIGNIFICANT CHANGE UP (ref 3.8–5.2)
RBC # FLD: 19.8 % — HIGH (ref 10.3–14.5)
SODIUM SERPL-SCNC: 138 MMOL/L — SIGNIFICANT CHANGE UP (ref 135–145)
SPECIMEN SOURCE: SIGNIFICANT CHANGE UP
WBC # BLD: 7.69 K/UL — SIGNIFICANT CHANGE UP (ref 3.8–10.5)
WBC # FLD AUTO: 7.69 K/UL — SIGNIFICANT CHANGE UP (ref 3.8–10.5)

## 2022-07-08 PROCEDURE — 74240 X-RAY XM UPR GI TRC 1CNTRST: CPT | Mod: 26

## 2022-07-08 RX ORDER — POTASSIUM CHLORIDE 20 MEQ
10 PACKET (EA) ORAL
Refills: 0 | Status: COMPLETED | OUTPATIENT
Start: 2022-07-08 | End: 2022-07-08

## 2022-07-08 RX ORDER — MAGNESIUM SULFATE 500 MG/ML
2 VIAL (ML) INJECTION
Refills: 0 | Status: DISCONTINUED | OUTPATIENT
Start: 2022-07-08 | End: 2022-07-08

## 2022-07-08 RX ORDER — POTASSIUM CHLORIDE 20 MEQ
10 PACKET (EA) ORAL
Refills: 0 | Status: DISCONTINUED | OUTPATIENT
Start: 2022-07-08 | End: 2022-07-08

## 2022-07-08 RX ORDER — DEXTROSE MONOHYDRATE, SODIUM CHLORIDE, AND POTASSIUM CHLORIDE 50; .745; 4.5 G/1000ML; G/1000ML; G/1000ML
1000 INJECTION, SOLUTION INTRAVENOUS
Refills: 0 | Status: DISCONTINUED | OUTPATIENT
Start: 2022-07-08 | End: 2022-07-09

## 2022-07-08 RX ORDER — MAGNESIUM SULFATE 500 MG/ML
2 VIAL (ML) INJECTION
Refills: 0 | Status: COMPLETED | OUTPATIENT
Start: 2022-07-08 | End: 2022-07-08

## 2022-07-08 RX ADMIN — HYDROMORPHONE HYDROCHLORIDE 0.5 MILLIGRAM(S): 2 INJECTION INTRAMUSCULAR; INTRAVENOUS; SUBCUTANEOUS at 21:56

## 2022-07-08 RX ADMIN — HYDROMORPHONE HYDROCHLORIDE 0.5 MILLIGRAM(S): 2 INJECTION INTRAMUSCULAR; INTRAVENOUS; SUBCUTANEOUS at 09:22

## 2022-07-08 RX ADMIN — HYDROMORPHONE HYDROCHLORIDE 0.5 MILLIGRAM(S): 2 INJECTION INTRAMUSCULAR; INTRAVENOUS; SUBCUTANEOUS at 18:16

## 2022-07-08 RX ADMIN — HYDROMORPHONE HYDROCHLORIDE 0.5 MILLIGRAM(S): 2 INJECTION INTRAMUSCULAR; INTRAVENOUS; SUBCUTANEOUS at 03:12

## 2022-07-08 RX ADMIN — HYDROMORPHONE HYDROCHLORIDE 0.5 MILLIGRAM(S): 2 INJECTION INTRAMUSCULAR; INTRAVENOUS; SUBCUTANEOUS at 00:12

## 2022-07-08 RX ADMIN — HYDROMORPHONE HYDROCHLORIDE 0.5 MILLIGRAM(S): 2 INJECTION INTRAMUSCULAR; INTRAVENOUS; SUBCUTANEOUS at 04:35

## 2022-07-08 RX ADMIN — HYDROMORPHONE HYDROCHLORIDE 0.5 MILLIGRAM(S): 2 INJECTION INTRAMUSCULAR; INTRAVENOUS; SUBCUTANEOUS at 23:00

## 2022-07-08 RX ADMIN — Medication 100 MILLIEQUIVALENT(S): at 16:51

## 2022-07-08 RX ADMIN — Medication 100 MILLIEQUIVALENT(S): at 18:16

## 2022-07-08 RX ADMIN — HYDROMORPHONE HYDROCHLORIDE 0.5 MILLIGRAM(S): 2 INJECTION INTRAMUSCULAR; INTRAVENOUS; SUBCUTANEOUS at 15:20

## 2022-07-08 RX ADMIN — PIPERACILLIN AND TAZOBACTAM 25 GRAM(S): 4; .5 INJECTION, POWDER, LYOPHILIZED, FOR SOLUTION INTRAVENOUS at 13:00

## 2022-07-08 RX ADMIN — HYDROMORPHONE HYDROCHLORIDE 0.5 MILLIGRAM(S): 2 INJECTION INTRAMUSCULAR; INTRAVENOUS; SUBCUTANEOUS at 01:16

## 2022-07-08 RX ADMIN — HYDROMORPHONE HYDROCHLORIDE 0.5 MILLIGRAM(S): 2 INJECTION INTRAMUSCULAR; INTRAVENOUS; SUBCUTANEOUS at 18:31

## 2022-07-08 RX ADMIN — HYDROMORPHONE HYDROCHLORIDE 0.5 MILLIGRAM(S): 2 INJECTION INTRAMUSCULAR; INTRAVENOUS; SUBCUTANEOUS at 15:05

## 2022-07-08 RX ADMIN — HYDROMORPHONE HYDROCHLORIDE 0.5 MILLIGRAM(S): 2 INJECTION INTRAMUSCULAR; INTRAVENOUS; SUBCUTANEOUS at 09:17

## 2022-07-08 RX ADMIN — PANTOPRAZOLE SODIUM 40 MILLIGRAM(S): 20 TABLET, DELAYED RELEASE ORAL at 05:06

## 2022-07-08 RX ADMIN — HYDROMORPHONE HYDROCHLORIDE 0.5 MILLIGRAM(S): 2 INJECTION INTRAMUSCULAR; INTRAVENOUS; SUBCUTANEOUS at 07:00

## 2022-07-08 RX ADMIN — PIPERACILLIN AND TAZOBACTAM 25 GRAM(S): 4; .5 INJECTION, POWDER, LYOPHILIZED, FOR SOLUTION INTRAVENOUS at 05:07

## 2022-07-08 RX ADMIN — PANTOPRAZOLE SODIUM 40 MILLIGRAM(S): 20 TABLET, DELAYED RELEASE ORAL at 17:06

## 2022-07-08 RX ADMIN — ENOXAPARIN SODIUM 40 MILLIGRAM(S): 100 INJECTION SUBCUTANEOUS at 05:06

## 2022-07-08 RX ADMIN — HYDROMORPHONE HYDROCHLORIDE 0.5 MILLIGRAM(S): 2 INJECTION INTRAMUSCULAR; INTRAVENOUS; SUBCUTANEOUS at 06:26

## 2022-07-08 RX ADMIN — Medication 25 GRAM(S): at 20:11

## 2022-07-08 RX ADMIN — Medication 25 GRAM(S): at 17:06

## 2022-07-08 RX ADMIN — Medication 100 MILLIEQUIVALENT(S): at 20:11

## 2022-07-08 NOTE — PROGRESS NOTE ADULT - ASSESSMENT
37F c hx OF pancreatitis, PUD/duodenal ulcer. Patient now s/p Laparoscopic evette patch repair of perforated duodenal ulcer 7/3. s/p  2 U PRBC + pressors intraoperatively. Pt extubated 7/3 and off pressors. 7/5 transferred to the floor.     - UGI series today, if ok likely dc NGT and trial of clears  - DVT ppx   - monitor NGT output   - Continue IV abx- Fluconazole + Zosyn  - F/U cultures  - AM labs    ACS [id number]

## 2022-07-08 NOTE — PROGRESS NOTE ADULT - SUBJECTIVE AND OBJECTIVE BOX
SURGERY DAILY PROGRESS NOTE    STATUS POST:  Laparoscopic repair of perforated viscus        SUBJECTIVE: Pt seen and examined at bedside. PAtient has no complaints. Denies chest pain, SOB, palpitations, HA, fever, chills, N/V. Passing flatus.      OBJECTIVE:  Vital Signs Last 24 Hrs  T(C): 37.1 (08 Jul 2022 04:34), Max: 37.6 (07 Jul 2022 23:47)  T(F): 98.8 (08 Jul 2022 04:34), Max: 99.6 (07 Jul 2022 23:47)  HR: 87 (08 Jul 2022 04:34) (67 - 92)  BP: 155/97 (08 Jul 2022 04:34) (136/89 - 160/99)  BP(mean): --  RR: 18 (08 Jul 2022 04:34) (18 - 18)  SpO2: 95% (08 Jul 2022 04:34) (93% - 98%)    Parameters below as of 08 Jul 2022 04:34  Patient On (Oxygen Delivery Method): room air        I&O's Summary    07 Jul 2022 07:01  -  08 Jul 2022 07:00  --------------------------------------------------------  IN: 1525 mL / OUT: 1222 mL / NET: 303 mL      PHYSICAL EXAM:  Constitutional: well developed, well nourished, NAD, + NGT in place  Neck: supple  Respiratory: breathing comfortably on RA  Gastrointestinal: abdomen soft, nontender, nondistended. No obvious masses. No peritonitis, incision c/d/i   Psychiatric: oriented x 3; appropriate          LABS:                        9.0    8.44  )-----------( 269      ( 07 Jul 2022 10:02 )             28.6     07-07    136  |  100  |  5<L>  ----------------------------<  96  3.4<L>   |  24  |  0.53    Ca    8.9      07 Jul 2022 10:02  Phos  3.7     07-07  Mg     1.5     07-07            RADIOLOGY & ADDITIONAL STUDIES:

## 2022-07-09 LAB
ANION GAP SERPL CALC-SCNC: 10 MMOL/L — SIGNIFICANT CHANGE UP (ref 5–17)
BUN SERPL-MCNC: <4 MG/DL — LOW (ref 7–23)
CALCIUM SERPL-MCNC: 9 MG/DL — SIGNIFICANT CHANGE UP (ref 8.4–10.5)
CHLORIDE SERPL-SCNC: 103 MMOL/L — SIGNIFICANT CHANGE UP (ref 96–108)
CO2 SERPL-SCNC: 27 MMOL/L — SIGNIFICANT CHANGE UP (ref 22–31)
CREAT SERPL-MCNC: 0.5 MG/DL — SIGNIFICANT CHANGE UP (ref 0.5–1.3)
EGFR: 122 ML/MIN/1.73M2 — SIGNIFICANT CHANGE UP
GLUCOSE SERPL-MCNC: 105 MG/DL — HIGH (ref 70–99)
HCT VFR BLD CALC: 28.9 % — LOW (ref 34.5–45)
HGB BLD-MCNC: 9 G/DL — LOW (ref 11.5–15.5)
MAGNESIUM SERPL-MCNC: 1.9 MG/DL — SIGNIFICANT CHANGE UP (ref 1.6–2.6)
MCHC RBC-ENTMCNC: 22.6 PG — LOW (ref 27–34)
MCHC RBC-ENTMCNC: 31.1 GM/DL — LOW (ref 32–36)
MCV RBC AUTO: 72.6 FL — LOW (ref 80–100)
NRBC # BLD: 0 /100 WBCS — SIGNIFICANT CHANGE UP (ref 0–0)
PHOSPHATE SERPL-MCNC: 3.2 MG/DL — SIGNIFICANT CHANGE UP (ref 2.5–4.5)
PLATELET # BLD AUTO: 354 K/UL — SIGNIFICANT CHANGE UP (ref 150–400)
POTASSIUM SERPL-MCNC: 3.6 MMOL/L — SIGNIFICANT CHANGE UP (ref 3.5–5.3)
POTASSIUM SERPL-SCNC: 3.6 MMOL/L — SIGNIFICANT CHANGE UP (ref 3.5–5.3)
RBC # BLD: 3.98 M/UL — SIGNIFICANT CHANGE UP (ref 3.8–5.2)
RBC # FLD: 20.2 % — HIGH (ref 10.3–14.5)
SARS-COV-2 RNA SPEC QL NAA+PROBE: SIGNIFICANT CHANGE UP
SODIUM SERPL-SCNC: 140 MMOL/L — SIGNIFICANT CHANGE UP (ref 135–145)
WBC # BLD: 8.84 K/UL — SIGNIFICANT CHANGE UP (ref 3.8–10.5)
WBC # FLD AUTO: 8.84 K/UL — SIGNIFICANT CHANGE UP (ref 3.8–10.5)

## 2022-07-09 RX ORDER — FERROUS SULFATE 325(65) MG
325 TABLET ORAL DAILY
Refills: 0 | Status: DISCONTINUED | OUTPATIENT
Start: 2022-07-09 | End: 2022-07-12

## 2022-07-09 RX ORDER — HYDROMORPHONE HYDROCHLORIDE 2 MG/ML
0.25 INJECTION INTRAMUSCULAR; INTRAVENOUS; SUBCUTANEOUS
Refills: 0 | Status: DISCONTINUED | OUTPATIENT
Start: 2022-07-09 | End: 2022-07-11

## 2022-07-09 RX ADMIN — HYDROMORPHONE HYDROCHLORIDE 0.25 MILLIGRAM(S): 2 INJECTION INTRAMUSCULAR; INTRAVENOUS; SUBCUTANEOUS at 22:21

## 2022-07-09 RX ADMIN — PANTOPRAZOLE SODIUM 40 MILLIGRAM(S): 20 TABLET, DELAYED RELEASE ORAL at 17:30

## 2022-07-09 RX ADMIN — HYDROMORPHONE HYDROCHLORIDE 0.25 MILLIGRAM(S): 2 INJECTION INTRAMUSCULAR; INTRAVENOUS; SUBCUTANEOUS at 18:14

## 2022-07-09 RX ADMIN — Medication 325 MILLIGRAM(S): at 17:28

## 2022-07-09 RX ADMIN — HYDROMORPHONE HYDROCHLORIDE 0.5 MILLIGRAM(S): 2 INJECTION INTRAMUSCULAR; INTRAVENOUS; SUBCUTANEOUS at 10:57

## 2022-07-09 RX ADMIN — HYDROMORPHONE HYDROCHLORIDE 0.5 MILLIGRAM(S): 2 INJECTION INTRAMUSCULAR; INTRAVENOUS; SUBCUTANEOUS at 05:30

## 2022-07-09 RX ADMIN — HYDROMORPHONE HYDROCHLORIDE 0.5 MILLIGRAM(S): 2 INJECTION INTRAMUSCULAR; INTRAVENOUS; SUBCUTANEOUS at 15:09

## 2022-07-09 RX ADMIN — HYDROMORPHONE HYDROCHLORIDE 0.25 MILLIGRAM(S): 2 INJECTION INTRAMUSCULAR; INTRAVENOUS; SUBCUTANEOUS at 21:51

## 2022-07-09 RX ADMIN — PANTOPRAZOLE SODIUM 40 MILLIGRAM(S): 20 TABLET, DELAYED RELEASE ORAL at 04:29

## 2022-07-09 RX ADMIN — HYDROMORPHONE HYDROCHLORIDE 0.5 MILLIGRAM(S): 2 INJECTION INTRAMUSCULAR; INTRAVENOUS; SUBCUTANEOUS at 01:01

## 2022-07-09 RX ADMIN — DEXTROSE MONOHYDRATE, SODIUM CHLORIDE, AND POTASSIUM CHLORIDE 75 MILLILITER(S): 50; .745; 4.5 INJECTION, SOLUTION INTRAVENOUS at 16:51

## 2022-07-09 RX ADMIN — HYDROMORPHONE HYDROCHLORIDE 0.25 MILLIGRAM(S): 2 INJECTION INTRAMUSCULAR; INTRAVENOUS; SUBCUTANEOUS at 18:34

## 2022-07-09 RX ADMIN — HYDROMORPHONE HYDROCHLORIDE 0.5 MILLIGRAM(S): 2 INJECTION INTRAMUSCULAR; INTRAVENOUS; SUBCUTANEOUS at 11:20

## 2022-07-09 RX ADMIN — ENOXAPARIN SODIUM 40 MILLIGRAM(S): 100 INJECTION SUBCUTANEOUS at 04:30

## 2022-07-09 RX ADMIN — HYDROMORPHONE HYDROCHLORIDE 0.5 MILLIGRAM(S): 2 INJECTION INTRAMUSCULAR; INTRAVENOUS; SUBCUTANEOUS at 02:00

## 2022-07-09 RX ADMIN — Medication 5 MILLIGRAM(S): at 21:51

## 2022-07-09 RX ADMIN — HYDROMORPHONE HYDROCHLORIDE 0.5 MILLIGRAM(S): 2 INJECTION INTRAMUSCULAR; INTRAVENOUS; SUBCUTANEOUS at 15:30

## 2022-07-09 RX ADMIN — HYDROMORPHONE HYDROCHLORIDE 0.5 MILLIGRAM(S): 2 INJECTION INTRAMUSCULAR; INTRAVENOUS; SUBCUTANEOUS at 04:29

## 2022-07-09 NOTE — CHART NOTE - NSCHARTNOTEFT_GEN_A_CORE
Per RN, patient had eaten plenty of solid food brought to her from outside of hospital during day shift. Pt's diet advanced to regular.

## 2022-07-09 NOTE — PROGRESS NOTE ADULT - ASSESSMENT
37F s/p Lap evette patch repair POD 6    regular diet  cont erich  off abx  pain control  discharge planning

## 2022-07-09 NOTE — PROGRESS NOTE ADULT - SUBJECTIVE AND OBJECTIVE BOX
GENERAL SURGERY PROGRESS NOTE    no issues overnight  UGI yesterday negative, started clears and advanced  erich 65cc serous    10-point review of systems completed and negative except as noted above.      OBJECTIVE    MEDICATIONS  acetaminophen   IVPB .. 1000 milliGRAM(s) IV Intermittent every 6 hours  benzocaine 15 mG/menthol 3.6 mG Lozenge 1 Lozenge Oral every 2 hours PRN  chlorhexidine 2% Cloths 1 Application(s) Topical <User Schedule>  dextrose 5% + sodium chloride 0.45% with potassium chloride 20 mEq/L 1000 milliLiter(s) IV Continuous <Continuous>  enoxaparin Injectable 40 milliGRAM(s) SubCutaneous every 24 hours  HYDROmorphone  Injectable 0.5 milliGRAM(s) IV Push every 3 hours PRN  melatonin 5 milliGRAM(s) Oral at bedtime  pantoprazole  Injectable 40 milliGRAM(s) IV Push every 12 hours  tetracaine/benzocaine/butamben Spray 1 Spray(s) Topical three times a day PRN      PHYSICAL EXAM  T(C): 37 (07-09-22 @ 13:43), Max: 37 (07-09-22 @ 13:43)  HR: 81 (07-09-22 @ 13:43) (81 - 90)  BP: 133/87 (07-09-22 @ 13:43) (131/80 - 144/98)  RR: 18 (07-09-22 @ 13:43) (18 - 18)  SpO2: 98% (07-09-22 @ 13:43) (96% - 100%)    07-08-22 @ 07:01  -  07-09-22 @ 07:00  --------------------------------------------------------  IN: 750 mL / OUT: 65 mL / NET: 685 mL    07-09-22 @ 07:01  -  07-09-22 @ 14:50  --------------------------------------------------------  IN: 240 mL / OUT: 20 mL / NET: 220 mL        General: Appears well, NAD  Neuro: AAOx3  CHEST: Clear to auscultation bilaterally  CV: Regular rate and rhythm  Abdomen: soft, incisional tenderness, erich serous  Extremities: Grossly symmetric    LABS                        9.0    8.84  )-----------( 354      ( 09 Jul 2022 08:41 )             28.9     07-09    140  |  103  |  <4<L>  ----------------------------<  105<H>  3.6   |  27  |  0.50    Ca    9.0      09 Jul 2022 08:40  Phos  3.2     07-09  Mg     1.9     07-09            RADIOLOGY & ADDITIONAL STUDIES

## 2022-07-10 LAB
ALBUMIN SERPL ELPH-MCNC: 3 G/DL — LOW (ref 3.3–5)
ALP SERPL-CCNC: 65 U/L — SIGNIFICANT CHANGE UP (ref 40–120)
ALT FLD-CCNC: 17 U/L — SIGNIFICANT CHANGE UP (ref 10–45)
ANION GAP SERPL CALC-SCNC: 10 MMOL/L — SIGNIFICANT CHANGE UP (ref 5–17)
AST SERPL-CCNC: 12 U/L — SIGNIFICANT CHANGE UP (ref 10–40)
BILIRUB SERPL-MCNC: 0.2 MG/DL — SIGNIFICANT CHANGE UP (ref 0.2–1.2)
BUN SERPL-MCNC: 4 MG/DL — LOW (ref 7–23)
CALCIUM SERPL-MCNC: 8.9 MG/DL — SIGNIFICANT CHANGE UP (ref 8.4–10.5)
CHLORIDE SERPL-SCNC: 104 MMOL/L — SIGNIFICANT CHANGE UP (ref 96–108)
CO2 SERPL-SCNC: 25 MMOL/L — SIGNIFICANT CHANGE UP (ref 22–31)
CREAT SERPL-MCNC: 0.56 MG/DL — SIGNIFICANT CHANGE UP (ref 0.5–1.3)
EGFR: 119 ML/MIN/1.73M2 — SIGNIFICANT CHANGE UP
GLUCOSE SERPL-MCNC: 96 MG/DL — SIGNIFICANT CHANGE UP (ref 70–99)
HCT VFR BLD CALC: 28.8 % — LOW (ref 34.5–45)
HGB BLD-MCNC: 9.1 G/DL — LOW (ref 11.5–15.5)
MAGNESIUM SERPL-MCNC: 1.7 MG/DL — SIGNIFICANT CHANGE UP (ref 1.6–2.6)
MCHC RBC-ENTMCNC: 23.2 PG — LOW (ref 27–34)
MCHC RBC-ENTMCNC: 31.6 GM/DL — LOW (ref 32–36)
MCV RBC AUTO: 73.5 FL — LOW (ref 80–100)
NRBC # BLD: 0 /100 WBCS — SIGNIFICANT CHANGE UP (ref 0–0)
PHOSPHATE SERPL-MCNC: 3.5 MG/DL — SIGNIFICANT CHANGE UP (ref 2.5–4.5)
PLATELET # BLD AUTO: 440 K/UL — HIGH (ref 150–400)
POTASSIUM SERPL-MCNC: 4.2 MMOL/L — SIGNIFICANT CHANGE UP (ref 3.5–5.3)
POTASSIUM SERPL-SCNC: 4.2 MMOL/L — SIGNIFICANT CHANGE UP (ref 3.5–5.3)
PROT SERPL-MCNC: 6 G/DL — SIGNIFICANT CHANGE UP (ref 6–8.3)
RBC # BLD: 3.92 M/UL — SIGNIFICANT CHANGE UP (ref 3.8–5.2)
RBC # FLD: 20.4 % — HIGH (ref 10.3–14.5)
SODIUM SERPL-SCNC: 139 MMOL/L — SIGNIFICANT CHANGE UP (ref 135–145)
WBC # BLD: 8.58 K/UL — SIGNIFICANT CHANGE UP (ref 3.8–10.5)
WBC # FLD AUTO: 8.58 K/UL — SIGNIFICANT CHANGE UP (ref 3.8–10.5)

## 2022-07-10 PROCEDURE — 74177 CT ABD & PELVIS W/CONTRAST: CPT | Mod: 26

## 2022-07-10 PROCEDURE — 99232 SBSQ HOSP IP/OBS MODERATE 35: CPT | Mod: GC

## 2022-07-10 RX ADMIN — Medication 325 MILLIGRAM(S): at 12:16

## 2022-07-10 RX ADMIN — HYDROMORPHONE HYDROCHLORIDE 0.25 MILLIGRAM(S): 2 INJECTION INTRAMUSCULAR; INTRAVENOUS; SUBCUTANEOUS at 15:44

## 2022-07-10 RX ADMIN — CHLORHEXIDINE GLUCONATE 1 APPLICATION(S): 213 SOLUTION TOPICAL at 06:39

## 2022-07-10 RX ADMIN — ENOXAPARIN SODIUM 40 MILLIGRAM(S): 100 INJECTION SUBCUTANEOUS at 06:19

## 2022-07-10 RX ADMIN — PANTOPRAZOLE SODIUM 40 MILLIGRAM(S): 20 TABLET, DELAYED RELEASE ORAL at 17:25

## 2022-07-10 RX ADMIN — HYDROMORPHONE HYDROCHLORIDE 0.25 MILLIGRAM(S): 2 INJECTION INTRAMUSCULAR; INTRAVENOUS; SUBCUTANEOUS at 08:20

## 2022-07-10 RX ADMIN — HYDROMORPHONE HYDROCHLORIDE 0.25 MILLIGRAM(S): 2 INJECTION INTRAMUSCULAR; INTRAVENOUS; SUBCUTANEOUS at 01:55

## 2022-07-10 RX ADMIN — HYDROMORPHONE HYDROCHLORIDE 0.25 MILLIGRAM(S): 2 INJECTION INTRAMUSCULAR; INTRAVENOUS; SUBCUTANEOUS at 16:12

## 2022-07-10 RX ADMIN — PANTOPRAZOLE SODIUM 40 MILLIGRAM(S): 20 TABLET, DELAYED RELEASE ORAL at 06:18

## 2022-07-10 RX ADMIN — HYDROMORPHONE HYDROCHLORIDE 0.25 MILLIGRAM(S): 2 INJECTION INTRAMUSCULAR; INTRAVENOUS; SUBCUTANEOUS at 12:40

## 2022-07-10 RX ADMIN — HYDROMORPHONE HYDROCHLORIDE 0.25 MILLIGRAM(S): 2 INJECTION INTRAMUSCULAR; INTRAVENOUS; SUBCUTANEOUS at 22:42

## 2022-07-10 RX ADMIN — HYDROMORPHONE HYDROCHLORIDE 0.25 MILLIGRAM(S): 2 INJECTION INTRAMUSCULAR; INTRAVENOUS; SUBCUTANEOUS at 07:57

## 2022-07-10 RX ADMIN — HYDROMORPHONE HYDROCHLORIDE 0.25 MILLIGRAM(S): 2 INJECTION INTRAMUSCULAR; INTRAVENOUS; SUBCUTANEOUS at 23:12

## 2022-07-10 RX ADMIN — HYDROMORPHONE HYDROCHLORIDE 0.25 MILLIGRAM(S): 2 INJECTION INTRAMUSCULAR; INTRAVENOUS; SUBCUTANEOUS at 12:21

## 2022-07-10 RX ADMIN — HYDROMORPHONE HYDROCHLORIDE 0.25 MILLIGRAM(S): 2 INJECTION INTRAMUSCULAR; INTRAVENOUS; SUBCUTANEOUS at 01:25

## 2022-07-10 RX ADMIN — Medication 5 MILLIGRAM(S): at 22:42

## 2022-07-10 NOTE — PROGRESS NOTE ADULT - SUBJECTIVE AND OBJECTIVE BOX
SUBJECTIVE: Patient seen at beside and feels well. Denies F/C/N/V.  Pain is well controlled.     Vital Signs Last 24 Hrs  T(C): 37 (10 Jul 2022 09:19), Max: 37.2 (09 Jul 2022 23:32)  T(F): 98.6 (10 Jul 2022 09:19), Max: 98.9 (09 Jul 2022 23:32)  HR: 82 (10 Jul 2022 09:19) (79 - 86)  BP: 143/88 (10 Jul 2022 09:19) (133/91 - 143/88)  BP(mean): --  RR: 18 (10 Jul 2022 09:19) (18 - 18)  SpO2: 96% (10 Jul 2022 09:19) (96% - 98%)    Parameters below as of 10 Jul 2022 09:19  Patient On (Oxygen Delivery Method): room air          PAST MEDICAL & SURGICAL HISTORY:  TB (pulmonary tuberculosis)  S/P INH age 12      Pancreatitis, chronic      Anemia      Anxiety      Helicobacter pylori gastritis      PUD (peptic ulcer disease)      H/O tubal ligation          Escobar:  NGT:  ALYSSA:    General Appearance: Appears well, NAD  Neck: Supple  Chest: Equal expansion bilaterally, equal breath sounds  CV: Pulse regular presently  Abdomen: Soft, nontense, appropriate incisional tenderness, dressings clean and dry and intact  Extremities: Grossly symmetric    I&O's Summary    09 Jul 2022 07:01  -  10 Jul 2022 07:00  --------------------------------------------------------  IN: 840 mL / OUT: 50 mL / NET: 790 mL    10 Jul 2022 07:01  -  10 Jul 2022 19:35  --------------------------------------------------------  IN: 240 mL / OUT: 0 mL / NET: 240 mL      I&O's Detail    09 Jul 2022 07:01  -  10 Jul 2022 07:00  --------------------------------------------------------  IN:    Oral Fluid: 840 mL  Total IN: 840 mL    OUT:    Bulb (mL): 50 mL  Total OUT: 50 mL    Total NET: 790 mL      10 Jul 2022 07:01  -  10 Jul 2022 19:35  --------------------------------------------------------  IN:    Oral Fluid: 240 mL  Total IN: 240 mL    OUT:  Total OUT: 0 mL    Total NET: 240 mL          MEDICATIONS  (STANDING):  acetaminophen   IVPB .. 1000 milliGRAM(s) IV Intermittent every 6 hours  chlorhexidine 2% Cloths 1 Application(s) Topical <User Schedule>  enoxaparin Injectable 40 milliGRAM(s) SubCutaneous every 24 hours  ferrous    sulfate 325 milliGRAM(s) Oral daily  melatonin 5 milliGRAM(s) Oral at bedtime  pantoprazole  Injectable 40 milliGRAM(s) IV Push every 12 hours    MEDICATIONS  (PRN):  benzocaine 15 mG/menthol 3.6 mG Lozenge 1 Lozenge Oral every 2 hours PRN Sore Throat  HYDROmorphone  Injectable 0.25 milliGRAM(s) IV Push every 3 hours PRN Pain  tetracaine/benzocaine/butamben Spray 1 Spray(s) Topical three times a day PRN sore throat      LABS:                        9.1    8.58  )-----------( 440      ( 10 Jul 2022 12:05 )             28.8     07-10    139  |  104  |  4<L>  ----------------------------<  96  4.2   |  25  |  0.56    Ca    8.9      10 Jul 2022 12:05  Phos  3.5     07-10  Mg     1.7     07-10    TPro  6.0  /  Alb  3.0<L>  /  TBili  0.2  /  DBili  x   /  AST  12  /  ALT  17  /  AlkPhos  65  07-10          RADIOLOGY & ADDITIONAL STUDIES:

## 2022-07-10 NOTE — PROGRESS NOTE ADULT - ASSESSMENT
37F s/p Lap evette patch repair POD 7    f/u CTAP tonight  regular diet  cont erich  off abx  pain control  discharge planning    I have discussed the patient and plan with my attending who is in agreement with the above plan.     ACS, Trauma Team  Pager [phone]

## 2022-07-11 LAB
ALBUMIN SERPL ELPH-MCNC: 3.4 G/DL — SIGNIFICANT CHANGE UP (ref 3.3–5)
ALP SERPL-CCNC: 70 U/L — SIGNIFICANT CHANGE UP (ref 40–120)
ALT FLD-CCNC: 16 U/L — SIGNIFICANT CHANGE UP (ref 10–45)
ANION GAP SERPL CALC-SCNC: 12 MMOL/L — SIGNIFICANT CHANGE UP (ref 5–17)
AST SERPL-CCNC: 15 U/L — SIGNIFICANT CHANGE UP (ref 10–40)
BILIRUB SERPL-MCNC: 0.2 MG/DL — SIGNIFICANT CHANGE UP (ref 0.2–1.2)
BUN SERPL-MCNC: <4 MG/DL — LOW (ref 7–23)
CALCIUM SERPL-MCNC: 9.7 MG/DL — SIGNIFICANT CHANGE UP (ref 8.4–10.5)
CHLORIDE SERPL-SCNC: 101 MMOL/L — SIGNIFICANT CHANGE UP (ref 96–108)
CO2 SERPL-SCNC: 24 MMOL/L — SIGNIFICANT CHANGE UP (ref 22–31)
CREAT SERPL-MCNC: 0.53 MG/DL — SIGNIFICANT CHANGE UP (ref 0.5–1.3)
EGFR: 121 ML/MIN/1.73M2 — SIGNIFICANT CHANGE UP
GLUCOSE SERPL-MCNC: 101 MG/DL — HIGH (ref 70–99)
HCT VFR BLD CALC: 31.9 % — LOW (ref 34.5–45)
HGB BLD-MCNC: 10 G/DL — LOW (ref 11.5–15.5)
MAGNESIUM SERPL-MCNC: 1.7 MG/DL — SIGNIFICANT CHANGE UP (ref 1.6–2.6)
MCHC RBC-ENTMCNC: 22.6 PG — LOW (ref 27–34)
MCHC RBC-ENTMCNC: 31.3 GM/DL — LOW (ref 32–36)
MCV RBC AUTO: 72.2 FL — LOW (ref 80–100)
NRBC # BLD: 0 /100 WBCS — SIGNIFICANT CHANGE UP (ref 0–0)
PHOSPHATE SERPL-MCNC: 3.2 MG/DL — SIGNIFICANT CHANGE UP (ref 2.5–4.5)
PLATELET # BLD AUTO: 565 K/UL — HIGH (ref 150–400)
POTASSIUM SERPL-MCNC: 4.1 MMOL/L — SIGNIFICANT CHANGE UP (ref 3.5–5.3)
POTASSIUM SERPL-SCNC: 4.1 MMOL/L — SIGNIFICANT CHANGE UP (ref 3.5–5.3)
PROT SERPL-MCNC: 6.7 G/DL — SIGNIFICANT CHANGE UP (ref 6–8.3)
RBC # BLD: 4.42 M/UL — SIGNIFICANT CHANGE UP (ref 3.8–5.2)
RBC # FLD: 20.3 % — HIGH (ref 10.3–14.5)
SODIUM SERPL-SCNC: 137 MMOL/L — SIGNIFICANT CHANGE UP (ref 135–145)
WBC # BLD: 9.41 K/UL — SIGNIFICANT CHANGE UP (ref 3.8–10.5)
WBC # FLD AUTO: 9.41 K/UL — SIGNIFICANT CHANGE UP (ref 3.8–10.5)

## 2022-07-11 RX ORDER — ENOXAPARIN SODIUM 100 MG/ML
40 INJECTION SUBCUTANEOUS EVERY 24 HOURS
Refills: 0 | Status: DISCONTINUED | OUTPATIENT
Start: 2022-07-11 | End: 2022-07-12

## 2022-07-11 RX ORDER — OXYCODONE HYDROCHLORIDE 5 MG/1
10 TABLET ORAL EVERY 4 HOURS
Refills: 0 | Status: DISCONTINUED | OUTPATIENT
Start: 2022-07-11 | End: 2022-07-12

## 2022-07-11 RX ORDER — OXYCODONE HYDROCHLORIDE 5 MG/1
5 TABLET ORAL EVERY 4 HOURS
Refills: 0 | Status: DISCONTINUED | OUTPATIENT
Start: 2022-07-11 | End: 2022-07-12

## 2022-07-11 RX ADMIN — HYDROMORPHONE HYDROCHLORIDE 0.25 MILLIGRAM(S): 2 INJECTION INTRAMUSCULAR; INTRAVENOUS; SUBCUTANEOUS at 13:28

## 2022-07-11 RX ADMIN — OXYCODONE HYDROCHLORIDE 10 MILLIGRAM(S): 5 TABLET ORAL at 23:19

## 2022-07-11 RX ADMIN — HYDROMORPHONE HYDROCHLORIDE 0.25 MILLIGRAM(S): 2 INJECTION INTRAMUSCULAR; INTRAVENOUS; SUBCUTANEOUS at 02:05

## 2022-07-11 RX ADMIN — HYDROMORPHONE HYDROCHLORIDE 0.25 MILLIGRAM(S): 2 INJECTION INTRAMUSCULAR; INTRAVENOUS; SUBCUTANEOUS at 05:21

## 2022-07-11 RX ADMIN — HYDROMORPHONE HYDROCHLORIDE 0.25 MILLIGRAM(S): 2 INJECTION INTRAMUSCULAR; INTRAVENOUS; SUBCUTANEOUS at 05:51

## 2022-07-11 RX ADMIN — HYDROMORPHONE HYDROCHLORIDE 0.25 MILLIGRAM(S): 2 INJECTION INTRAMUSCULAR; INTRAVENOUS; SUBCUTANEOUS at 02:35

## 2022-07-11 RX ADMIN — PANTOPRAZOLE SODIUM 40 MILLIGRAM(S): 20 TABLET, DELAYED RELEASE ORAL at 17:35

## 2022-07-11 RX ADMIN — PANTOPRAZOLE SODIUM 40 MILLIGRAM(S): 20 TABLET, DELAYED RELEASE ORAL at 05:21

## 2022-07-11 RX ADMIN — OXYCODONE HYDROCHLORIDE 10 MILLIGRAM(S): 5 TABLET ORAL at 17:31

## 2022-07-11 RX ADMIN — ENOXAPARIN SODIUM 40 MILLIGRAM(S): 100 INJECTION SUBCUTANEOUS at 05:21

## 2022-07-11 RX ADMIN — HYDROMORPHONE HYDROCHLORIDE 0.25 MILLIGRAM(S): 2 INJECTION INTRAMUSCULAR; INTRAVENOUS; SUBCUTANEOUS at 09:46

## 2022-07-11 RX ADMIN — OXYCODONE HYDROCHLORIDE 10 MILLIGRAM(S): 5 TABLET ORAL at 23:49

## 2022-07-11 RX ADMIN — HYDROMORPHONE HYDROCHLORIDE 0.25 MILLIGRAM(S): 2 INJECTION INTRAMUSCULAR; INTRAVENOUS; SUBCUTANEOUS at 10:16

## 2022-07-11 RX ADMIN — Medication 325 MILLIGRAM(S): at 17:32

## 2022-07-11 RX ADMIN — Medication 5 MILLIGRAM(S): at 23:19

## 2022-07-11 NOTE — CHART NOTE - NSCHARTNOTEFT_GEN_A_CORE
Nutrition Follow Up Note  Patient seen for: initial malnutrition follow up   Chart reviewed, events noted.    "37F s/p Lap evette patch repair POD 8 c/b abdominal abscess referred to IR for drainage. "    - diet advanced from NPO-> clear liquid-> regular   Source: [x] Patient       [x] EMR        [] RN        [] Family at bedside       [] Other:    -If unable to interview patient: [] Trach/Vent/BiPAP  [] Disoriented/confused/inappropriate to interview    Diet Order:   Diet, Regular (07-11-22)    - Is current order appropriate/adequate? [x] Yes  []  No:     - PO intake :   [] >75%  Adequate    [] 50-75%  Fair       [] <50%  Poor  - RD observed ~50% meal consumed     - Nutrition-related concerns:  - cramps and complains of diarrhea/soft stool per pt; affecting her PO intake     GI:  Last BM 7/11 Bowel Regimen? [] Yes   [x] No  - pt reports pasty stool "worsening"     Weights:   Daily N/A  Drug Dosing Weight  Height (cm): 162.6 (02 Jul 2022 19:57)  Weight (kg): 81.6 (02 Jul 2022 19:57)  BMI (kg/m2): 30.9 (02 Jul 2022 19:57)  BSA (m2): 1.87 (02 Jul 2022 19:57)    Nutritionally Pertinent MEDICATIONS  (STANDING):  ferrous    sulfate  pantoprazole  Injectable    Pertinent Labs: 07-11 @ 10:42: Na 137, BUN <4<L>, Cr 0.53, <H>, K+ 4.1, Phos 3.2, Mg 1.7, Alk Phos 70, ALT/SGPT 16, AST/SGOT 15, HbA1c --    Skin per nursing documentation: no noted pressure injuries as per flowsheets   Edema: +2 bilateral hands as per flowsheets     Estimated Needs:   [x] no change since previous assessment  [] recalculated:   4152-6483 kcal/day (25-30 kcal/kg/day)  65-82 g/pro/day (0.8-1 g/pro/kg/day)  based on 81.5 kg    Previous Nutrition Diagnosis: acute severe protein calorie malnutrition  Nutrition Diagnosis is: [x] ongoing  [] resolved [] not applicable     New Nutrition Diagnosis: [x] Not applicable    Nutrition Care Plan:  [x] In Progress  [] Achieved  [] Not applicable    Nutrition Interventions:     Education Provided:       [] Yes:  [] No: Encouraged adequate PO intake for meeting nutritional needs, improving nutritional status and healing and for preventing wt loss; discussed reducing consumption of high fiber / dairy foods at this time per complains of soft stool/ diarrhea, until improvement/resolved        Recommendations:         - Continue current diet order     - If not medically contraindicated, recommend Multivitamin daily      - Monitor BM per complains of cramps/ soft stool; consider banatrol PRN          - RD remains available to review diet education and adjust diet recommendations as needed.     Monitoring and Evaluation:   Continue to monitor nutritional intake, tolerance to diet prescription, weights, labs, skin integrity      RD remains available upon request and will follow up per protocol  Mariam Fang RD CDN #[id number]

## 2022-07-11 NOTE — PROGRESS NOTE ADULT - ASSESSMENT
37F s/p Lap evette patch repair POD 8.     Regular diet  Pain control  CT abdpelvis reviewed  Dispo: Home tomorrow,ALYSSA drain to be removed PRIOR to discharge*      ACS, Trauma Team  Pager [phone]

## 2022-07-11 NOTE — PROGRESS NOTE ADULT - SUBJECTIVE AND OBJECTIVE BOX
SURGERY DAILY PROGRESS NOTE:     24h events: CT abd/pelvis performed.     SUBJECTIVE/ROS: Patient feels well this AM. Reports she is feeing much better than yesterday. Tolerating diet. ambulating well. Pain controlled.  passing gas, having bowel movements.   Denies nausea, vomiting, chest pain, shortness of breath     MEDICATIONS  (STANDING):  acetaminophen   IVPB .. 1000 milliGRAM(s) IV Intermittent every 6 hours  chlorhexidine 2% Cloths 1 Application(s) Topical <User Schedule>  enoxaparin Injectable 40 milliGRAM(s) SubCutaneous every 24 hours  ferrous    sulfate 325 milliGRAM(s) Oral daily  melatonin 5 milliGRAM(s) Oral at bedtime  pantoprazole  Injectable 40 milliGRAM(s) IV Push every 12 hours    MEDICATIONS  (PRN):  benzocaine 15 mG/menthol 3.6 mG Lozenge 1 Lozenge Oral every 2 hours PRN Sore Throat  HYDROmorphone  Injectable 0.25 milliGRAM(s) IV Push every 3 hours PRN Pain  tetracaine/benzocaine/butamben Spray 1 Spray(s) Topical three times a day PRN sore throat      OBJECTIVE:  Vital Signs Last 24 Hrs  T(C): 36.9 (11 Jul 2022 04:56), Max: 37 (10 Jul 2022 09:19)  T(F): 98.5 (11 Jul 2022 04:56), Max: 98.6 (10 Jul 2022 09:19)  HR: 84 (11 Jul 2022 04:56) (82 - 98)  BP: 135/92 (11 Jul 2022 04:56) (126/83 - 158/92)  BP(mean): --  RR: 18 (11 Jul 2022 04:56) (18 - 18)  SpO2: 96% (11 Jul 2022 04:56) (96% - 98%)    Parameters below as of 11 Jul 2022 04:56  Patient On (Oxygen Delivery Method): room air      I&O's Detail  10 Jul 2022 07:01  -  11 Jul 2022 07:00  --------------------------------------------------------  IN:    Oral Fluid: 640 mL  Total IN: 640 mL    OUT:    Bulb (mL): 30 mL    Voided (mL): 2150 mL  Total OUT: 2180 mL  Total NET: -1540 mL    LABS:                        9.1    8.58  )-----------( 440      ( 10 Jul 2022 12:05 )             28.8     07-10    139  |  104  |  4<L>  ----------------------------<  96  4.2   |  25  |  0.56    Ca    8.9      10 Jul 2022 12:05  Phos  3.5     07-10  Mg     1.7     07-10    TPro  6.0  /  Alb  3.0<L>  /  TBili  0.2  /  DBili  x   /  AST  12  /  ALT  17  /  AlkPhos  65  07-10        PHYSICAL EXAM:  General Appearance: Appears well, NAD  Neck: Supple  Chest: Equal expansion bilaterally, equal breath sounds  CV: Pulse regular presently  Abdomen: Soft, nontense, appropriate incisional tenderness, dressings clean and dry and intact 1 drain present   Extremities: Grossly symmetric

## 2022-07-11 NOTE — CONSULT NOTE ADULT - SUBJECTIVE AND OBJECTIVE BOX
Interventional Radiology    Evaluate for Procedure: abdominal drain placement    HPI: 37F s/p Lap evette patch repair POD 8 c/b abdominal abscess referred to IR for drainage.     Allergies: NDKA  Medications (Abx/Cardiac/Anticoagulation/Blood Products)  enoxaparin Injectable: 40 milliGRAM(s) SubCutaneous (07-11 @ 05:21)    Data:  T(C): 36.9  HR: 89  BP: 126/83  RR: 17  SpO2: 97%    -WBC 9.41 / HgB 10.0 / Hct 31.9 / Plt 565  -Na 137 / Cl 101 / BUN <4 / Glucose 101  -K 4.1 / CO2 24 / Cr 0.53  -ALT 16 / Alk Phos 70 / T.Bili 0.2  -INR 1.46 / PTT 41.2    Radiology: right    Assessment/Plan: 37F s/p Lap evette patch repair POD 8 c/b abdominal abscess referred to IR for drainage.     - case reviewed with IR attending Dr. Henderson  - no safe percutaneous window for drainage  - continue conservative management  - d/w primary team  - please reconsults prn    PAIGE Muse-BC  Available on Teams  Interventional Radiology    Evaluate for Procedure: abdominal drain placement    HPI: 37F s/p Lap evette patch repair POD 8 c/b abdominal abscess referred to IR for drainage.     Allergies: NDKA  Medications (Abx/Cardiac/Anticoagulation/Blood Products)  enoxaparin Injectable: 40 milliGRAM(s) SubCutaneous (07-11 @ 05:21)    Data:  T(C): 36.9  HR: 89  BP: 126/83  RR: 17  SpO2: 97%    -WBC 9.41 / HgB 10.0 / Hct 31.9 / Plt 565  -Na 137 / Cl 101 / BUN <4 / Glucose 101  -K 4.1 / CO2 24 / Cr 0.53  -ALT 16 / Alk Phos 70 / T.Bili 0.2  -INR 1.46 / PTT 41.2    Radiology: reviewed    Assessment/Plan: 37F s/p Lap evette patch repair POD 8 c/b abdominal abscess referred to IR for drainage.     - case reviewed with IR attending Dr. Henderson  - no safe percutaneous window for drainage  - continue conservative management  - d/w primary team  - please reconsults prn    PAIGE Muse-BC  Available on Teams

## 2022-07-12 ENCOUNTER — TRANSCRIPTION ENCOUNTER (OUTPATIENT)
Age: 37
End: 2022-07-12

## 2022-07-12 VITALS
SYSTOLIC BLOOD PRESSURE: 112 MMHG | DIASTOLIC BLOOD PRESSURE: 72 MMHG | RESPIRATION RATE: 18 BRPM | HEART RATE: 83 BPM | TEMPERATURE: 98 F | OXYGEN SATURATION: 98 %

## 2022-07-12 LAB
ALBUMIN SERPL ELPH-MCNC: 3.7 G/DL — SIGNIFICANT CHANGE UP (ref 3.3–5)
ALP SERPL-CCNC: 69 U/L — SIGNIFICANT CHANGE UP (ref 40–120)
ALT FLD-CCNC: 21 U/L — SIGNIFICANT CHANGE UP (ref 10–45)
ANION GAP SERPL CALC-SCNC: 10 MMOL/L — SIGNIFICANT CHANGE UP (ref 5–17)
ANISOCYTOSIS BLD QL: SLIGHT — SIGNIFICANT CHANGE UP
AST SERPL-CCNC: 16 U/L — SIGNIFICANT CHANGE UP (ref 10–40)
BASOPHILS # BLD AUTO: 0 K/UL — SIGNIFICANT CHANGE UP (ref 0–0.2)
BASOPHILS NFR BLD AUTO: 0 % — SIGNIFICANT CHANGE UP (ref 0–2)
BILIRUB SERPL-MCNC: 0.2 MG/DL — SIGNIFICANT CHANGE UP (ref 0.2–1.2)
BUN SERPL-MCNC: 9 MG/DL — SIGNIFICANT CHANGE UP (ref 7–23)
CALCIUM SERPL-MCNC: 9.6 MG/DL — SIGNIFICANT CHANGE UP (ref 8.4–10.5)
CHLORIDE SERPL-SCNC: 102 MMOL/L — SIGNIFICANT CHANGE UP (ref 96–108)
CO2 SERPL-SCNC: 25 MMOL/L — SIGNIFICANT CHANGE UP (ref 22–31)
CREAT SERPL-MCNC: 0.58 MG/DL — SIGNIFICANT CHANGE UP (ref 0.5–1.3)
EGFR: 118 ML/MIN/1.73M2 — SIGNIFICANT CHANGE UP
EOSINOPHIL # BLD AUTO: 0.48 K/UL — SIGNIFICANT CHANGE UP (ref 0–0.5)
EOSINOPHIL NFR BLD AUTO: 5.2 % — SIGNIFICANT CHANGE UP (ref 0–6)
GLUCOSE SERPL-MCNC: 116 MG/DL — HIGH (ref 70–99)
H PYLORI AG STL QL: POSITIVE
HCT VFR BLD CALC: 31.7 % — LOW (ref 34.5–45)
HGB BLD-MCNC: 9.9 G/DL — LOW (ref 11.5–15.5)
HYPOCHROMIA BLD QL: SLIGHT — SIGNIFICANT CHANGE UP
LYMPHOCYTES # BLD AUTO: 2.96 K/UL — SIGNIFICANT CHANGE UP (ref 1–3.3)
LYMPHOCYTES # BLD AUTO: 32.2 % — SIGNIFICANT CHANGE UP (ref 13–44)
MAGNESIUM SERPL-MCNC: 1.7 MG/DL — SIGNIFICANT CHANGE UP (ref 1.6–2.6)
MANUAL SMEAR VERIFICATION: SIGNIFICANT CHANGE UP
MCHC RBC-ENTMCNC: 22.8 PG — LOW (ref 27–34)
MCHC RBC-ENTMCNC: 31.2 GM/DL — LOW (ref 32–36)
MCV RBC AUTO: 72.9 FL — LOW (ref 80–100)
MONOCYTES # BLD AUTO: 0.48 K/UL — SIGNIFICANT CHANGE UP (ref 0–0.9)
MONOCYTES NFR BLD AUTO: 5.2 % — SIGNIFICANT CHANGE UP (ref 2–14)
NEUTROPHILS # BLD AUTO: 4.95 K/UL — SIGNIFICANT CHANGE UP (ref 1.8–7.4)
NEUTROPHILS NFR BLD AUTO: 53.9 % — SIGNIFICANT CHANGE UP (ref 43–77)
PHOSPHATE SERPL-MCNC: 3.9 MG/DL — SIGNIFICANT CHANGE UP (ref 2.5–4.5)
PLAT MORPH BLD: NORMAL — SIGNIFICANT CHANGE UP
PLATELET # BLD AUTO: 608 K/UL — HIGH (ref 150–400)
POIKILOCYTOSIS BLD QL AUTO: SLIGHT — SIGNIFICANT CHANGE UP
POLYCHROMASIA BLD QL SMEAR: SLIGHT — SIGNIFICANT CHANGE UP
POTASSIUM SERPL-MCNC: 4.4 MMOL/L — SIGNIFICANT CHANGE UP (ref 3.5–5.3)
POTASSIUM SERPL-SCNC: 4.4 MMOL/L — SIGNIFICANT CHANGE UP (ref 3.5–5.3)
PROT SERPL-MCNC: 7 G/DL — SIGNIFICANT CHANGE UP (ref 6–8.3)
RBC # BLD: 4.35 M/UL — SIGNIFICANT CHANGE UP (ref 3.8–5.2)
RBC # FLD: 20.6 % — HIGH (ref 10.3–14.5)
RBC BLD AUTO: ABNORMAL
SCHISTOCYTES BLD QL AUTO: SIGNIFICANT CHANGE UP
SODIUM SERPL-SCNC: 137 MMOL/L — SIGNIFICANT CHANGE UP (ref 135–145)
TARGETS BLD QL SMEAR: SLIGHT — SIGNIFICANT CHANGE UP
VARIANT LYMPHS # BLD: 3.5 % — SIGNIFICANT CHANGE UP (ref 0–6)
WBC # BLD: 9.19 K/UL — SIGNIFICANT CHANGE UP (ref 3.8–10.5)
WBC # FLD AUTO: 9.19 K/UL — SIGNIFICANT CHANGE UP (ref 3.8–10.5)

## 2022-07-12 RX ORDER — METRONIDAZOLE 500 MG
1 TABLET ORAL
Qty: 28 | Refills: 0
Start: 2022-07-12 | End: 2022-07-25

## 2022-07-12 RX ORDER — CLARITHROMYCIN 500 MG
1 TABLET ORAL
Qty: 28 | Refills: 0
Start: 2022-07-12 | End: 2022-07-25

## 2022-07-12 RX ORDER — PANTOPRAZOLE SODIUM 20 MG/1
1 TABLET, DELAYED RELEASE ORAL
Qty: 28 | Refills: 0
Start: 2022-07-12 | End: 2022-07-25

## 2022-07-12 RX ADMIN — OXYCODONE HYDROCHLORIDE 10 MILLIGRAM(S): 5 TABLET ORAL at 06:42

## 2022-07-12 RX ADMIN — Medication 325 MILLIGRAM(S): at 12:33

## 2022-07-12 RX ADMIN — OXYCODONE HYDROCHLORIDE 10 MILLIGRAM(S): 5 TABLET ORAL at 06:12

## 2022-07-12 RX ADMIN — OXYCODONE HYDROCHLORIDE 10 MILLIGRAM(S): 5 TABLET ORAL at 10:36

## 2022-07-12 RX ADMIN — OXYCODONE HYDROCHLORIDE 10 MILLIGRAM(S): 5 TABLET ORAL at 11:19

## 2022-07-12 RX ADMIN — ENOXAPARIN SODIUM 40 MILLIGRAM(S): 100 INJECTION SUBCUTANEOUS at 06:12

## 2022-07-12 RX ADMIN — PANTOPRAZOLE SODIUM 40 MILLIGRAM(S): 20 TABLET, DELAYED RELEASE ORAL at 06:13

## 2022-07-12 RX ADMIN — PANTOPRAZOLE SODIUM 40 MILLIGRAM(S): 20 TABLET, DELAYED RELEASE ORAL at 17:19

## 2022-07-12 NOTE — DISCHARGE NOTE NURSING/CASE MANAGEMENT/SOCIAL WORK - NSDCPEFALRISK_GEN_ALL_CORE
For information on Fall & Injury Prevention, visit: [email or web address] OR  [email or web address] OR  https://www.cdc.gov/steadi/patient.html

## 2022-07-12 NOTE — DISCHARGE NOTE PROVIDER - CARE PROVIDER_API CALL
Una Sanchez)  Surgery  [address], [address]  [place] [address]  Phone: [phone]  Fax: [phone]  Follow Up Time:

## 2022-07-12 NOTE — DISCHARGE NOTE NURSING/CASE MANAGEMENT/SOCIAL WORK - NSDCFUADDAPPT_GEN_ALL_CORE_FT
Please call to follow up with surgery, gastroenterology and your private gyn.    GASTROENTEROLOGY  [address], [address]  [place], [place] [id number]  Phone: [phone]  Fax: [phone]

## 2022-07-12 NOTE — PROGRESS NOTE ADULT - ASSESSMENT
37F s/p Lap evette patch repair POD 9.     - Regular diet  - Pain control  - VTE ppx   - ALYSSA removed 7/11  - d/c likely today        ACS, Trauma Team  Pager [phone]

## 2022-07-12 NOTE — DISCHARGE NOTE PROVIDER - NSDCMRMEDTOKEN_GEN_ALL_CORE_FT
MiraLax oral powder for reconstitution: orally once a day  Protonix 40 mg oral delayed release tablet: 1 tab(s) orally 2 times a day   sucralfate 1 g/10 mL oral suspension: 10 milliliter(s) orally 3 times a day (with meals)    clarithromycin 500 mg oral tablet: 1 tab(s) orally 2 times a day   metroNIDAZOLE 500 mg oral tablet: 1 tab(s) orally 2 times a day   MiraLax oral powder for reconstitution: orally once a day  Protonix 40 mg oral delayed release tablet: 1 tab(s) orally 2 times a day

## 2022-07-12 NOTE — PROGRESS NOTE ADULT - NUTRITIONAL ASSESSMENT
This patient has been assessed with a concern for Malnutrition and has been determined to have a diagnosis/diagnoses of Severe protein-calorie malnutrition.    This patient is being managed with:   Diet Regular-  Entered: Jul 8 2022 11:45PM    
This patient has been assessed with a concern for Malnutrition and has been determined to have a diagnosis/diagnoses of Severe protein-calorie malnutrition.    This patient is being managed with:   Diet NPO-  Entered: Jul  3 2022  4:02AM    
This patient has been assessed with a concern for Malnutrition and has been determined to have a diagnosis/diagnoses of Severe protein-calorie malnutrition.    This patient is being managed with:   Diet Regular-  Entered: Jul 11 2022  2:41PM    
This patient has been assessed with a concern for Malnutrition and has been determined to have a diagnosis/diagnoses of Severe protein-calorie malnutrition.    This patient is being managed with:   Diet Regular-  Entered: Jul 8 2022 11:45PM

## 2022-07-12 NOTE — DISCHARGE NOTE PROVIDER - NSDCFUADDAPPT_GEN_ALL_CORE_FT
Please call to follow up with surgery, gastroenterology and your private gyn.    GASTROENTEROLOGY  [address], [address]  [place], [place] [id number]  Phone: [phone]  Fax: [phone]

## 2022-07-12 NOTE — DISCHARGE NOTE PROVIDER - NSDCCPCAREPLAN_GEN_ALL_CORE_FT
PRINCIPAL DISCHARGE DIAGNOSIS  Diagnosis: Perforated abdominal viscus  Assessment and Plan of Treatment: You underwent a evette patch repair.   WOUND CARE: ***Staples will be removed at follow up office visit.    BATHING: Please do not submerge wound underwater. You may shower and/or sponge bathe.  ACTIVITY: No heavy lifting anything more than 10-15lbs or straining. Otherwise, you may return to your usual level of physical activity. If you are taking narcotic pain medication (such as oxycodone), do NOT drive a car, operate machinery or make important decisions.  DIET: Low fiber diet  NOTIFY YOUR SURGEON IF: You have any bleeding that does not stop, any pus draining from your wound, any fever (over 100.4 F) or chills, persistent nausea/vomiting with inability to tolerate food or liquids, persistent diarrhea, or if your pain is not controlled on your discharge pain medications.  FOLLOW-UP:  1. Please call to make a follow-up appointment within one week of discharge with one of the ACS attendings.       PRINCIPAL DISCHARGE DIAGNOSIS  Diagnosis: Perforated abdominal viscus  Assessment and Plan of Treatment: You underwent a evette patch repair.   WOUND CARE: ***Staples will be removed at follow up office visit.    BATHING: Please do not submerge wound underwater. You may shower and/or sponge bathe.  ACTIVITY: No heavy lifting anything more than 10-15lbs or straining. Otherwise, you may return to your usual level of physical activity. If you are taking narcotic pain medication (such as oxycodone), do NOT drive a car, operate machinery or make important decisions.  DIET: Low fiber diet  NOTIFY YOUR SURGEON IF: You have any bleeding that does not stop, any pus draining from your wound, any fever (over 100.4 F) or chills, persistent nausea/vomiting with inability to tolerate food or liquids, persistent diarrhea, or if your pain is not controlled on your discharge pain medications.  FOLLOW-UP:  1. Please call to make a follow-up appointment within one week of discharge with one of the ACS attendings.  Please take H. pylori medication for 14 days.

## 2022-07-12 NOTE — PROGRESS NOTE ADULT - REASON FOR ADMISSION
Perforated ulcer

## 2022-07-12 NOTE — PROGRESS NOTE ADULT - SUBJECTIVE AND OBJECTIVE BOX
SURGERY DAILY PROGRESS NOTE:       SUBJECTIVE/ROS: Patient seen and evaluated on AM rounds.   Denies nausea, vomiting, chest pain, shortness of breath       OBJECTIVE:    Vital Signs Last 24 Hrs  T(C): 36.8 (12 Jul 2022 00:00), Max: 36.9 (11 Jul 2022 08:31)  T(F): 98.3 (12 Jul 2022 00:00), Max: 98.5 (11 Jul 2022 08:31)  HR: 78 (12 Jul 2022 00:00) (78 - 98)  BP: 126/81 (12 Jul 2022 00:00) (126/81 - 138/91)  BP(mean): --  RR: 18 (12 Jul 2022 00:00) (17 - 18)  SpO2: 98% (12 Jul 2022 00:00) (97% - 98%)    Parameters below as of 12 Jul 2022 00:00  Patient On (Oxygen Delivery Method): room air      I&O's Detail    11 Jul 2022 07:01  -  12 Jul 2022 07:00  --------------------------------------------------------  IN:    Oral Fluid: 1160 mL  Total IN: 1160 mL    OUT:  Total OUT: 0 mL    Total NET: 1160 mL        Daily     Daily   MEDICATIONS  (STANDING):  acetaminophen   IVPB .. 1000 milliGRAM(s) IV Intermittent every 6 hours  chlorhexidine 2% Cloths 1 Application(s) Topical <User Schedule>  enoxaparin Injectable 40 milliGRAM(s) SubCutaneous every 24 hours  ferrous    sulfate 325 milliGRAM(s) Oral daily  melatonin 5 milliGRAM(s) Oral at bedtime  pantoprazole  Injectable 40 milliGRAM(s) IV Push every 12 hours    MEDICATIONS  (PRN):  benzocaine 15 mG/menthol 3.6 mG Lozenge 1 Lozenge Oral every 2 hours PRN Sore Throat  oxyCODONE    IR 5 milliGRAM(s) Oral every 4 hours PRN Moderate Pain (4 - 6)  oxyCODONE    IR 10 milliGRAM(s) Oral every 4 hours PRN Severe Pain (7 - 10)  tetracaine/benzocaine/butamben Spray 1 Spray(s) Topical three times a day PRN sore throat      LABS:                        10.0   9.41  )-----------( 565      ( 11 Jul 2022 10:42 )             31.9     07-11    137  |  101  |  <4<L>  ----------------------------<  101<H>  4.1   |  24  |  0.53    Ca    9.7      11 Jul 2022 10:42  Phos  3.2     07-11  Mg     1.7     07-11    TPro  6.7  /  Alb  3.4  /  TBili  0.2  /  DBili  x   /  AST  15  /  ALT  16  /  AlkPhos  70  07-11                PHYSICAL EXAM:  General Appearance: Appears well, NAD  Neck: Supple  Chest: Equal expansion bilaterally, equal breath sounds  CV: Pulse regular presently  Abdomen: Soft, nontender, appropriate incisional tenderness, dressings clean and dry and intact 1 drain present   Extremities: Grossly symmetric SURGERY DAILY PROGRESS NOTE:       SUBJECTIVE/ROS: Patient seen and evaluated on AM rounds. Tolerating a diet, +/+. OOB ambulating.   Denies nausea, vomiting, chest pain, shortness of breath       OBJECTIVE:    Vital Signs Last 24 Hrs  T(C): 36.8 (12 Jul 2022 00:00), Max: 36.9 (11 Jul 2022 08:31)  T(F): 98.3 (12 Jul 2022 00:00), Max: 98.5 (11 Jul 2022 08:31)  HR: 78 (12 Jul 2022 00:00) (78 - 98)  BP: 126/81 (12 Jul 2022 00:00) (126/81 - 138/91)  BP(mean): --  RR: 18 (12 Jul 2022 00:00) (17 - 18)  SpO2: 98% (12 Jul 2022 00:00) (97% - 98%)    Parameters below as of 12 Jul 2022 00:00  Patient On (Oxygen Delivery Method): room air      I&O's Detail    11 Jul 2022 07:01  -  12 Jul 2022 07:00  --------------------------------------------------------  IN:    Oral Fluid: 1160 mL  Total IN: 1160 mL    OUT:  Total OUT: 0 mL    Total NET: 1160 mL        Daily     Daily   MEDICATIONS  (STANDING):  acetaminophen   IVPB .. 1000 milliGRAM(s) IV Intermittent every 6 hours  chlorhexidine 2% Cloths 1 Application(s) Topical <User Schedule>  enoxaparin Injectable 40 milliGRAM(s) SubCutaneous every 24 hours  ferrous    sulfate 325 milliGRAM(s) Oral daily  melatonin 5 milliGRAM(s) Oral at bedtime  pantoprazole  Injectable 40 milliGRAM(s) IV Push every 12 hours    MEDICATIONS  (PRN):  benzocaine 15 mG/menthol 3.6 mG Lozenge 1 Lozenge Oral every 2 hours PRN Sore Throat  oxyCODONE    IR 5 milliGRAM(s) Oral every 4 hours PRN Moderate Pain (4 - 6)  oxyCODONE    IR 10 milliGRAM(s) Oral every 4 hours PRN Severe Pain (7 - 10)  tetracaine/benzocaine/butamben Spray 1 Spray(s) Topical three times a day PRN sore throat      LABS:                        10.0   9.41  )-----------( 565      ( 11 Jul 2022 10:42 )             31.9     07-11    137  |  101  |  <4<L>  ----------------------------<  101<H>  4.1   |  24  |  0.53    Ca    9.7      11 Jul 2022 10:42  Phos  3.2     07-11  Mg     1.7     07-11    TPro  6.7  /  Alb  3.4  /  TBili  0.2  /  DBili  x   /  AST  15  /  ALT  16  /  AlkPhos  70  07-11                PHYSICAL EXAM:  General Appearance: Appears well, NAD  Neck: Supple  Chest: Equal expansion bilaterally, equal breath sounds  CV: Pulse regular presently  Abdomen: Soft, nontender, appropriate incisional tenderness, dressings clean and dry and intact 1 drain present   Extremities: Grossly symmetric

## 2022-07-12 NOTE — PROGRESS NOTE ADULT - ATTENDING COMMENTS
continues to have a stable post operative recover
no leak on UGIS  starting oral diet
Looks well  some abdominal discomfort with diet  advised to stay on clears for now  encouraging mobilization  consider CT scan
ATTENDING ATTESTATION  I have seen and examined this patient with the resident housestaftf. I have reviewed all labs, imaging and reports. I have participated in formulating the plan, and have read and agree with the history, ROS, exam, assessment and plan as stated above.     POD 9 from lap evette patch.   Doing well, eating, having normal bowel function.   Discussed finding of small pelvic fluid collection (non-rim enhancing, no air, no evidence of infection). No abx as WBC is normal and patient is clinically well. I told her that there is a possibility that an abscess could form, and she should return to the ED or call our office if she has fevers or worsening abd pain.   Has a history of incomplete treatment for h.pylori. Will discharge with triple thx for h.pylori.   We discussed follow up with ACS, with GI (will give info for the GI MD near her, as well as a referral to our house GI team), and importance of completing h.pylori treatment.     Una Sanchez M.D., M.S.  Dept of Trauma, Emergency General Surgery and Critical Care
stable post operatively   NPO, NGT, IVF  plan as above

## 2022-07-12 NOTE — DISCHARGE NOTE NURSING/CASE MANAGEMENT/SOCIAL WORK - PATIENT PORTAL LINK FT
You can access the FollowMyHealth Patient Portal offered by [facility] by registering at the following website: [email or web address]. By joining [employer]’s FollowMyHealth portal, you will also be able to view your health information using other applications (apps) compatible with our system.

## 2022-07-12 NOTE — DISCHARGE NOTE PROVIDER - HOSPITAL COURSE
37F c hx anxiety not on any meds, pancreatitis, PUD/duodenal ulcer, frequent ED visits for abd pain and hematemesis, pw abd pain. Pt had history of H.Pylori however never was able to complete the treatment course. Admitted for hematemesis in May 2022, UGI showed nonbleeding GI DU. Today pt presents with sever pain which was going on for a few days and today got worse. Pt denies chest pain, SOB, N/V, diarrhea.     In ED, Pt is tachy, and ct is consistent with gastric perforation.    Patient was admitted to ACS service for further management.  Patient went emergently to the OR for Diagnostic laparoscopy. Succus in all 4 quadrants. Perforated duodenal ulcer noted in D1. All four quadrants irrigated and suctioned. Qamar patch repair performed. ALYSSA drain left around repair site.  Intraoperatively patient received 2 units PRBC and required pressor support.  SICU was consulted for hemodynamic monitoring post operatively.  Patient was successfully extubated and weaned off pressors.  NGT was maintained to suction. On post op day 5, patient underwent upper GI series for evaluation prior to starting trial of clears. No leak was found, NGT was removed and patient was started on clears.  Diet was advanced as tolerated.  CT A/P was performed on POD 8 for evaluation of possible collect vs. obstruction.  CT showed loculated rim-enhancing collection in the pelvis, may represent abscess in the appropriate clinical setting.  Interventional Radiology was consulted for drainage of abdominal collection, however intervention was deferred given lack of safe window.     On day of discharge, patient was tolerating regular diet, pain was controlled, and patient was ambulatory.  ALYSSA was removed prior to discharge.    Patient to follow up outpatient with ACS in 1-2 weeks.    37F c hx anxiety not on any meds, pancreatitis, PUD/duodenal ulcer, frequent ED visits for abd pain and hematemesis, pw abd pain. Pt had history of H.Pylori however never was able to complete the treatment course. Admitted for hematemesis in May 2022, UGI showed nonbleeding GI DU. Today pt presents with sever pain which was going on for a few days and today got worse. Pt denies chest pain, SOB, N/V, diarrhea.     In ED, Pt is tachy, and ct is consistent with gastric perforation.    Patient was admitted to ACS service for further management.  Patient went emergently to the OR for Diagnostic laparoscopy. Succus in all 4 quadrants. Perforated duodenal ulcer noted in D1. All four quadrants irrigated and suctioned. Qamar patch repair performed. ALYSSA drain left around repair site.  Intraoperatively patient received 2 units PRBC and required pressor support.  SICU was consulted for hemodynamic monitoring post operatively.  Patient was successfully extubated and weaned off pressors.  NGT was maintained to suction. On post op day 5, patient underwent upper GI series for evaluation prior to starting trial of clears. No leak was found, NGT was removed and patient was started on clears.  Diet was advanced as tolerated.  CT A/P was performed on POD 8 for evaluation of possible collect vs. obstruction.  CT showed loculated rim-enhancing collection in the pelvis, may represent abscess in the appropriate clinical setting.  Interventional Radiology was consulted for drainage of abdominal collection, however intervention was deferred given lack of safe window.     On day of discharge, patient was tolerating regular diet, pain was controlled, and patient was ambulatory.  ALYSSA was removed prior to discharge. Patient sent home on triple therapy for treatment for H. Pylori.     Patient to follow up outpatient with ACS in 1-2 weeks.

## 2022-07-12 NOTE — DISCHARGE NOTE NURSING/CASE MANAGEMENT/SOCIAL WORK - NSDCVIVACCINE_GEN_ALL_CORE_FT
Tdap; 22-May-2019 15:13; Meghan Castro (BILLY); [employer]; [id number] (Exp. Date: 12-Mar-2021); IntraMuscular; Deltoid Left.; 0.5 milliLiter(s); VIS (VIS Published: 09-May-2013, VIS Presented: 22-May-2019);

## 2022-07-27 ENCOUNTER — APPOINTMENT (OUTPATIENT)
Dept: GASTROENTEROLOGY | Facility: CLINIC | Age: 37
End: 2022-07-27

## 2022-07-27 VITALS
DIASTOLIC BLOOD PRESSURE: 76 MMHG | HEART RATE: 89 BPM | SYSTOLIC BLOOD PRESSURE: 130 MMHG | OXYGEN SATURATION: 98 % | HEIGHT: 65 IN | WEIGHT: 125 LBS | BODY MASS INDEX: 20.83 KG/M2 | TEMPERATURE: 97.4 F

## 2022-07-27 DIAGNOSIS — K29.70 GASTRITIS, UNSPECIFIED, W/OUT BLEEDING: ICD-10-CM

## 2022-07-27 DIAGNOSIS — K26.5 CHRONIC OR UNSPECIFIED DUODENAL ULCER WITH PERFORATION: ICD-10-CM

## 2022-07-27 DIAGNOSIS — D50.9 IRON DEFICIENCY ANEMIA, UNSPECIFIED: ICD-10-CM

## 2022-07-27 DIAGNOSIS — B96.81 GASTRITIS, UNSPECIFIED, W/OUT BLEEDING: ICD-10-CM

## 2022-07-27 DIAGNOSIS — Z86.11 PERSONAL HISTORY OF TUBERCULOSIS: ICD-10-CM

## 2022-07-27 DIAGNOSIS — K26.7 CHRONIC DUODENAL ULCER W/OUT HEMORRHAGE OR PERFORATION: ICD-10-CM

## 2022-07-27 PROCEDURE — 99204 OFFICE O/P NEW MOD 45 MIN: CPT

## 2022-07-27 RX ORDER — ERYTHROMYCIN 5 MG/G
5 OINTMENT OPHTHALMIC
Qty: 4 | Refills: 0 | Status: ACTIVE | COMMUNITY
Start: 2022-03-20

## 2022-07-27 RX ORDER — CLARITHROMYCIN 500 MG/1
500 TABLET, FILM COATED ORAL
Qty: 28 | Refills: 0 | Status: ACTIVE | COMMUNITY
Start: 2022-07-12

## 2022-07-27 RX ORDER — CEPHALEXIN 500 MG/1
500 CAPSULE ORAL
Qty: 16 | Refills: 0 | Status: ACTIVE | COMMUNITY
Start: 2022-02-10

## 2022-07-27 RX ORDER — METRONIDAZOLE 500 MG/1
500 TABLET ORAL
Qty: 28 | Refills: 0 | Status: ACTIVE | COMMUNITY
Start: 2022-07-12

## 2022-07-27 RX ORDER — FAMOTIDINE 40 MG/1
40 TABLET, FILM COATED ORAL
Qty: 30 | Refills: 3 | Status: ACTIVE | COMMUNITY
Start: 2022-07-27 | End: 1900-01-01

## 2022-07-28 ENCOUNTER — RESULT REVIEW (OUTPATIENT)
Age: 37
End: 2022-07-28

## 2022-08-01 NOTE — PHYSICAL THERAPY INITIAL EVALUATION ADULT - RANGE OF MOTION EXAMINATION, REHAB EVAL
regular rate and rhythm
bilateral upper extremity ROM was WFL (within functional limits)/bilateral lower extremity ROM was WFL (within functional limits)

## 2022-08-03 LAB
CULTURE RESULTS: SIGNIFICANT CHANGE UP
SPECIMEN SOURCE: SIGNIFICANT CHANGE UP

## 2022-08-05 ENCOUNTER — OUTPATIENT (OUTPATIENT)
Dept: OUTPATIENT SERVICES | Facility: HOSPITAL | Age: 37
LOS: 1 days | End: 2022-08-05
Payer: MEDICAID

## 2022-08-05 ENCOUNTER — APPOINTMENT (OUTPATIENT)
Dept: CT IMAGING | Facility: CLINIC | Age: 37
End: 2022-08-05

## 2022-08-05 DIAGNOSIS — K26.5 CHRONIC OR UNSPECIFIED DUODENAL ULCER WITH PERFORATION: ICD-10-CM

## 2022-08-05 DIAGNOSIS — Z00.8 ENCOUNTER FOR OTHER GENERAL EXAMINATION: ICD-10-CM

## 2022-08-05 DIAGNOSIS — K29.70 GASTRITIS, UNSPECIFIED, WITHOUT BLEEDING: ICD-10-CM

## 2022-08-05 DIAGNOSIS — Z98.51 TUBAL LIGATION STATUS: Chronic | ICD-10-CM

## 2022-08-05 PROCEDURE — 74177 CT ABD & PELVIS W/CONTRAST: CPT | Mod: 26

## 2022-08-05 PROCEDURE — 74177 CT ABD & PELVIS W/CONTRAST: CPT

## 2022-08-07 NOTE — REVIEW OF SYSTEMS
[Recent Weight Loss (___ Lbs)] : recent [unfilled] ~Ulb weight loss [As Noted in HPI] : as noted in HPI [Negative] : Heme/Lymph [FreeTextEntry2] : Thin

## 2022-08-07 NOTE — ASSESSMENT
[FreeTextEntry1] : Patient with chronic duodenal ulcer with history of hematemesis and pancreatitis in the past.  H. pylori has been positive but she has been noncompliant with treatment and it has not been eradicated.  She presented to the hospital on 7/1 with perforated duodenal ulcer that was repaired by patch.\par She is doing better.  She does have chronic iron deficiency anemia and she is on iron supplements..  When she is finished with her H. pylori treatment, a breath test will be done.  She will continue Prilosec 40 mg in the morning.  Famotidine 40 mg was added in the evening.\par Blood work including serum gastrin was drawn.\par CAT scan prior to discharge revealed a possible collection in the cul-de-sac.  A follow-up CAT scan will be done in several weeks.\par We will continue to monitor her weight.

## 2022-08-07 NOTE — HISTORY OF PRESENT ILLNESS
[FreeTextEntry1] : Patient is a 37-year-old female with history of chronic duodenal ulcer that she has had for about 10 years.  She has been H. pylori positive but has never completed any treatments for H. pylori.  She did start some treatments but was not compliant.  She has been complaining of epigastric pain chronically for many years and has taken acid reducing medication.  She has had multiple endoscopies that revealed acute and chronic duodenal ulcer with H. pylori positivity.  Last May she had an episode of hematemesis and had an upper endoscopy that revealed a duodenal ulcer.  H. pylori was positive.  Patient also had a hiatus hernia and gastritis.\par She presented to the emergency room on 7/1 with severe abdominal pain and was found to have a perforated duodenal ulcer, hypotension, and tachycardia.  She was taken to the operating room and had a patch repair of the perforated duodenal ulcer.  She was in the intensive care unit for 48 hours.  Subsequently, she was moved to a regular floor and did well.  She started taking diet p.o. and was discharged on 7/12.  Blood work on discharge revealed H/H 9.9/31.7.  She does have a history of chronic anemia.  MCV 72.9, total protein 6.7, albumin 3.4, LFTs were normal, BUN 9, creatinine 1.58.\par She had a CAT scan of the abdomen and pelvis prior to discharge which did not reveal any leak.  She did have a rim-enhancing collection in the cul-de-sac which may be a loculated collection.\par Since being home, her appetite has improved.  She is on clarithromycin, pantoprazole, and metronidazole for H. pylori gastritis treatment.  She will finish a 10-day course.\par Patient claims to have had pancreatitis in 2021.\par Patient has lost about 40 pounds over the past few years.  Her appetite is now better than it had been before.  She is on iron supplements.

## 2022-08-11 PROCEDURE — 84132 ASSAY OF SERUM POTASSIUM: CPT

## 2022-08-11 PROCEDURE — 85025 COMPLETE CBC W/AUTO DIFF WBC: CPT

## 2022-08-11 PROCEDURE — 99285 EMERGENCY DEPT VISIT HI MDM: CPT | Mod: 25

## 2022-08-11 PROCEDURE — 87102 FUNGUS ISOLATION CULTURE: CPT

## 2022-08-11 PROCEDURE — 86901 BLOOD TYPING SEROLOGIC RH(D): CPT

## 2022-08-11 PROCEDURE — U0005: CPT

## 2022-08-11 PROCEDURE — 96375 TX/PRO/DX INJ NEW DRUG ADDON: CPT

## 2022-08-11 PROCEDURE — 82803 BLOOD GASES ANY COMBINATION: CPT

## 2022-08-11 PROCEDURE — 84702 CHORIONIC GONADOTROPIN TEST: CPT

## 2022-08-11 PROCEDURE — 74177 CT ABD & PELVIS W/CONTRAST: CPT

## 2022-08-11 PROCEDURE — 82565 ASSAY OF CREATININE: CPT

## 2022-08-11 PROCEDURE — P9045: CPT

## 2022-08-11 PROCEDURE — 80048 BASIC METABOLIC PNL TOTAL CA: CPT

## 2022-08-11 PROCEDURE — 85610 PROTHROMBIN TIME: CPT

## 2022-08-11 PROCEDURE — 86850 RBC ANTIBODY SCREEN: CPT

## 2022-08-11 PROCEDURE — C1889: CPT

## 2022-08-11 PROCEDURE — 96365 THER/PROPH/DIAG IV INF INIT: CPT

## 2022-08-11 PROCEDURE — 85027 COMPLETE CBC AUTOMATED: CPT

## 2022-08-11 PROCEDURE — 83605 ASSAY OF LACTIC ACID: CPT

## 2022-08-11 PROCEDURE — 84134 ASSAY OF PREALBUMIN: CPT

## 2022-08-11 PROCEDURE — 85730 THROMBOPLASTIN TIME PARTIAL: CPT

## 2022-08-11 PROCEDURE — 82330 ASSAY OF CALCIUM: CPT

## 2022-08-11 PROCEDURE — 97161 PT EVAL LOW COMPLEX 20 MIN: CPT

## 2022-08-11 PROCEDURE — P9016: CPT

## 2022-08-11 PROCEDURE — 86900 BLOOD TYPING SEROLOGIC ABO: CPT

## 2022-08-11 PROCEDURE — U0003: CPT

## 2022-08-11 PROCEDURE — 94003 VENT MGMT INPAT SUBQ DAY: CPT

## 2022-08-11 PROCEDURE — 87070 CULTURE OTHR SPECIMN AEROBIC: CPT

## 2022-08-11 PROCEDURE — 97535 SELF CARE MNGMENT TRAINING: CPT

## 2022-08-11 PROCEDURE — 84478 ASSAY OF TRIGLYCERIDES: CPT

## 2022-08-11 PROCEDURE — 87338 HPYLORI STOOL AG IA: CPT

## 2022-08-11 PROCEDURE — 97116 GAIT TRAINING THERAPY: CPT

## 2022-08-11 PROCEDURE — 82435 ASSAY OF BLOOD CHLORIDE: CPT

## 2022-08-11 PROCEDURE — 84295 ASSAY OF SERUM SODIUM: CPT

## 2022-08-11 PROCEDURE — 83690 ASSAY OF LIPASE: CPT

## 2022-08-11 PROCEDURE — 80053 COMPREHEN METABOLIC PANEL: CPT

## 2022-08-11 PROCEDURE — 84100 ASSAY OF PHOSPHORUS: CPT

## 2022-08-11 PROCEDURE — 36415 COLL VENOUS BLD VENIPUNCTURE: CPT

## 2022-08-11 PROCEDURE — 71045 X-RAY EXAM CHEST 1 VIEW: CPT

## 2022-08-11 PROCEDURE — 74240 X-RAY XM UPR GI TRC 1CNTRST: CPT

## 2022-08-11 PROCEDURE — 85018 HEMOGLOBIN: CPT

## 2022-08-11 PROCEDURE — 87075 CULTR BACTERIA EXCEPT BLOOD: CPT

## 2022-08-11 PROCEDURE — 84484 ASSAY OF TROPONIN QUANT: CPT

## 2022-08-11 PROCEDURE — 82947 ASSAY GLUCOSE BLOOD QUANT: CPT

## 2022-08-11 PROCEDURE — 85014 HEMATOCRIT: CPT

## 2022-08-11 PROCEDURE — 97166 OT EVAL MOD COMPLEX 45 MIN: CPT

## 2022-08-11 PROCEDURE — 97530 THERAPEUTIC ACTIVITIES: CPT

## 2022-08-11 PROCEDURE — 86923 COMPATIBILITY TEST ELECTRIC: CPT

## 2022-08-11 PROCEDURE — 87205 SMEAR GRAM STAIN: CPT

## 2022-08-11 PROCEDURE — 83735 ASSAY OF MAGNESIUM: CPT

## 2022-08-22 ENCOUNTER — APPOINTMENT (OUTPATIENT)
Dept: GASTROENTEROLOGY | Facility: CLINIC | Age: 37
End: 2022-08-22

## 2022-08-29 ENCOUNTER — APPOINTMENT (OUTPATIENT)
Dept: GASTROENTEROLOGY | Facility: CLINIC | Age: 37
End: 2022-08-29

## 2022-09-13 NOTE — PROGRESS NOTE ADULT - VASCULAR
Equal and normal pulses (carotid, femoral, dorsalis pedis) Clofazimine Counseling:  I discussed with the patient the risks of clofazimine including but not limited to skin and eye pigmentation, liver damage, nausea/vomiting, gastrointestinal bleeding and allergy.

## 2022-09-19 ENCOUNTER — APPOINTMENT (OUTPATIENT)
Dept: GASTROENTEROLOGY | Facility: CLINIC | Age: 37
End: 2022-09-19

## 2022-10-19 NOTE — H&P ADULT - SKIN/BREAST
Impression: Dry eye syndrome of bilateral lacrimal glands: H04.123. Plan: Recommend use of artificial tears as needed. Okay to use multiple times daily in both eyes. Patient can also try hot compresses for 5-10 minutes at a time, twice daily. negative

## 2022-11-08 NOTE — PATIENT PROFILE ADULT. - NSNUTRITIONRISK_GI_A_CORE
No indicators present Complex Repair And Tissue Cultured Epidermal Autograft Text: The defect edges were debeveled with a #15 scalpel blade.  The primary defect was closed partially with a complex linear closure.  Given the location of the defect, shape of the defect and the proximity to free margins an tissue cultured epidermal autograft was deemed most appropriate to repair the remaining defect.  The graft was trimmed to fit the size of the remaining defect.  The graft was then placed in the primary defect, oriented appropriately, and sutured into place.

## 2022-12-08 NOTE — ED ADULT NURSE NOTE - NS ED NURSE LEVEL OF CONSCIOUSNESS SPEECH
Pt states she needs Thyroids orders put in to recheck Thyroid levels.  Last checked in September and were normal. [facility]
Speaking Coherently

## 2023-01-02 NOTE — PATIENT PROFILE ADULT - PRO INTERPRETER NEED 2
VSS. Pain controlled with PO. Repositioned for comfort and pt remains on specialty bed. Purewick replaced and complete bed change. Pt voids adequately. NV intact and sling on ad aligned. Boot for oob. Bed alarm set. Call light in reach. Will continue to monitor. English

## 2023-01-03 NOTE — DISCHARGE NOTE OB - BREASTFEEDING PROVIDES MATERNAL HEALTH BENEFITS, DECREASED PREMENOPAUSAL BREAST CANCER, OVARIAN CANCER AND TYPE II DIABETES MELLITUS
Interventional Radiology Post Procedure    Date: 1/3/2023    Physician: Estefany Key MD    Pre-op Diagnosis: cervical radiculopathy    Post-op Diagnosis: same    Variation from Planned Procedure: None       Findings: successful C6-7 DINO    Patient condition: Stable    Estimated Blood Loss: 1 cc    Specimens:  none      Signed,  Moe Perkins MD  9:23 AM  1/3/2023 Statement Selected

## 2023-06-08 NOTE — ED PROVIDER NOTE - NS ED ROS FT
Yes CONSTITUTIONAL:  No weight loss, fever, chills, weakness or fatigue.  HEENT: No visual loss,  No hearing loss, sneezing, congestion, runny nose or sore throat.  SKIN:  No rash or itching.  CARDIOVASCULAR:  No chest pain, chest pressure or chest discomfort. No palpitations.  RESPIRATORY:  No shortness of breath, cough or sputum.  GASTROINTESTINAL: + nausea, +vomiting, +abdominal pain   GENITOURINARY:  Denies hematuria, dysuria.   NEUROLOGICAL:  No headache, dizziness, numbness or tingling in the extremities. No change in bowel or bladder control.  MUSCULOSKELETAL:  No muscle, back pain, joint pain or stiffness.  ENDOCRINOLOGIC:  No reports of sweating, cold or heat intolerance. No polyuria or polydipsia.

## 2023-06-26 NOTE — CONSULT NOTE ADULT - CONSULT REASON
Patient called saying the handicapped parking permit form was mailed back to her but the physician portion was completely blank    She will bring in to have us complete
epigastric pain, nausea, vomiting, anemia

## 2023-08-09 NOTE — BEHAVIORAL HEALTH ASSESSMENT NOTE - NS ED BHA COCAINE
Pt presents to the ER s/p fall in backyard PTA. Pt on Plavix. Denies LOC and hitting her head. C/o right shoulder and elbow pain. No obvious deformity noted.     EMS gave 50mcg Fentanyl intranasal en route None known

## 2023-10-12 NOTE — DISCHARGE NOTE PROVIDER - NSDCDCMDCOMP_GEN_ALL_CORE
PATIENT INFORMATION    Wart Aftercare Instructions               You have just been treated with liquid nitrogen for the removal of warts. The pain and burning sensation will last about 20-60 minutes. Redness and a blister will then follow. The blister will be either clear or red/purple. The blister will often be larger than the treated area, and if several adjacent areas are treated, the blister may combine to include all of the areas. Do not be concerned.      Once the blister occurs, clean the area with soap and water. Carefully pop the blister if feeling uncomfortable, and then wash the area again with soap and water. This is to help prevent spreading of the wart.      Once the blister is popped and cleaned, please clean the wound twice daily with hydrogen peroxide and apply polysporin (an over the counter antibiotic ointment) and a band-aid. Please avoid covering the area before the blister has been popped and cleaned.     Remember that warts often take multiple treatments to resolve, as the virus is often resistant to treatment.      It is important to follow up with your next appointment because if the wart is not completely resolved, it is best to treat again approximately 1 month after the first treatment.      A small amount of clear yellow drainage and peripheral pinkness is part of the normal healing process. However, if a persistent purulent foul smelling drainage and redness occurs (especially if followed by a fever) or severe redness and itching occurs, please call our office immediately, because an infection or allergic reaction may be present.       Anticipatory guidance discussed  Bicycle helmets  Chores and other responsibilities  Discipline issues: limit-setting, positive reinforcement  Fluoride supplementation if unfluoridated water supply  Importance of regular dental care  Importance of regular exercise  Importance of varied diet  Library card; limit TV, media violence  Minimize junk  food  Safe storage of any firearms in the home  Seat belts; don't put in front seat  Skim or lowfat milk best  Smoke detectors; home fire drills  Teach child how to deal with strangers  Teaching pedestrian safety    Follow-Up  - Return for wart recheck in 2-4 weeks    7-8 years old Health and Safety Tips - The following hyperlinks are available to access via [employer]    Parent Education from [employer]    Educación para padres sobre niños sanos    Additional Educational Resources:  For additional resources regarding your symptoms, diagnosis, or further health information, please visit the Discover a Healthier You section on [email or web address] or the Online Health Resources section in [employer].   This document is complete and the patient is ready for discharge.

## 2023-10-28 NOTE — H&P ADULT - FAMILY HISTORY
Father  Still living? Unknown  Family history of alcohol abuse, Age at diagnosis: Age Unknown
Cigarettes

## 2023-11-16 NOTE — ED ADULT NURSE NOTE - NS ED NURSE LEVEL OF CONSCIOUSNESS SPEECH
History and Physical    Date of Service:  11/16/2023  Primary Care Provider: Angel Gonzalez MD  Source of information: The patient and Chart review    Chief Complaint: Shortness of Breath      History of Presenting Illness:   Sam Camarena is a 60 y.o. male with ESRD on MWF HD via R chest wall [facility] ([address] [place] [address] Nephrology), chronic HFrEF/cardiomyopathy, cocaine abuse, COPD, T2DM, h/o IVDA, hepatitis C, HTN, neuropathy, h/o sciatica, and s/p posterior foraminotomy left C7-T1 with fusion instrumentation C6-T1 (9/28/2023) who presents with SOB, chest discomfort/pain, BUE neuropathic pain since his neck surgery. Pt states he has been going to HD but he is above his dry weight and c/o SOB. Pt has been reportedly unable to tolerate sitting in the HD chair TIW since his neck surgery. Pt reports he went to HD yesterday. He denies current drug use. Cardiology and Renal were consulted in the ED. REVIEW OF SYSTEMS:  A comprehensive review of systems was negative except for that written in the History of Present Illness.      Past Medical History:   Diagnosis Date    Acute systolic heart failure ([address]) 2/21/2020    Cardiomyopathy ([address]) 2/21/2020    Chronic kidney disease     Cocaine abuse ([address])     COPD (chronic obstructive pulmonary disease) ([address])     Diabetes ([address])     Drug abuse, IV ([address])     Encounter to establish care with new doctor 10/11/2018    Hepatitis C 1998    Hypertension     MRSA (methicillin resistant staph aureus) culture positive 2020    Neuropathy     Sciatica     Substance abuse ([address]) 2/21/2020      Past Surgical History:   Procedure Laterality Date    CERVICAL FUSION N/A 9/28/2023    POSTERIOR FORAMINOTOMY C7-T1 LEFT WITH FUSION INSTRUSTMENTATION C6-T1 (O-ARM) performed by Zeina Ibarra MD at [address]      IR NONTUNNELED VASCULAR CATHETER  2/3/2022    IR NONTUNNELED VASCULAR CATHETER 2/3/2022 [facility]IO IR    IR NONTUNNELED VASCULAR CATHETER  2/3/2022    IR TUNNELED
Speaking Coherently

## 2023-11-24 NOTE — PROGRESS NOTE ADULT - SUBJECTIVE AND OBJECTIVE BOX
Chief complaint:pt  c/o 2 weks  hx  of persistant  nausea  and  vomiting  not  able to tolerate  po liquids  at  home  . CT  abd  and  pelvis  normal ,  K 2.5  supplemented .    HPI:      REVIEW OF SYSTEMS:    CONSTITUTIONAL: No fever, weight loss, or fatigue  NECK: No pain or stiffness  RESPIRATORY: No cough, wheezing, chills or hemoptysis; No shortness of breath  CARDIOVASCULAR: No chest pain, palpitations, dizziness, or leg swelling  GASTROINTESTINAL: No abdominal or epigastric pain. No nausea, vomiting, or hematemesis; No diarrhea or constipation. No melena or hematochezia.  GENITOURINARY: No dysuria, frequency, hematuria, or incontinence  NEUROLOGICAL: No headaches, memory loss, loss of strength, numbness, or tremors  SKIN: No itching, burning, rashes, or lesions   LYMPH NODES: No enlarged glands  MUSCULOSKELETAL: No joint pain or swelling; No muscle, back, or extremity pain  HEME/LYMPH: No easy bruising, or bleeding gums    MEDICATIONS  (STANDING):  influenza   Vaccine 0.5 milliLiter(s) IntraMuscular once  potassium chloride  10 mEq/100 mL IVPB 10 milliEquivalent(s) IV Intermittent every 1 hour  sodium chloride 0.9%. 1000 milliLiter(s) (125 mL/Hr) IV Continuous <Continuous>    MEDICATIONS  (PRN):  morphine  - Injectable 4 milliGRAM(s) IV Push once PRN Severe Pain (7 - 10)      Allergies    No Known Allergies    Intolerances        Vital Signs Last 24 Hrs  T(C): 37.1 (23 May 2018 08:02), Max: 37.2 (22 May 2018 23:30)  T(F): 98.7 (23 May 2018 08:02), Max: 98.9 (22 May 2018 23:30)  HR: 75 (23 May 2018 08:02) (66 - 83)  BP: 113/66 (23 May 2018 08:02) (104/64 - 131/85)  BP(mean): --  RR: 16 (23 May 2018 08:02) (16 - 22)  SpO2: 99% (23 May 2018 08:02) (98% - 100%)    PHYSICAL EXAM:    GENERAL: NAD, well-groomed, well-developed  HEAD:  Atraumatic, Normocephalic  EYES: EOMI, PERRLA, conjunctiva and sclera clear  ENMT: No tonsillar erythema, exudates, or enlargement; Moist mucous membranes, Good dentition, No lesions  NECK: Supple, No JVD, Normal thyroid  NERVOUS SYSTEM:  Alert & Oriented X3, Good concentration; Motor Strength 5/5 B/L upper and lower extremities; DTRs 2+ intact and symmetric  CHEST/LUNG: Clear to percussion bilaterally; No rales, rhonchi, wheezing, or rubs  HEART: Regular rate and rhythm; No murmurs, rubs, or gallops  ABDOMEN: Soft, Nontender, Nondistended; Bowel sounds present  EXTREMITIES:  2+ Peripheral Pulses, No clubbing, cyanosis, or edema  LYMPH: No lymphadenopathy noted  SKIN: No rashes or lesions      LABS:                        11.3   9.5   )-----------( 446      ( 23 May 2018 00:21 )             36.2     05-23    139  |  104  |  7   ----------------------------<  82  3.3<L>   |  25  |  0.79    Ca    8.6      23 May 2018 07:42  Mg     2.0     05-23    TPro  8.4<H>  /  Alb  4.9  /  TBili  0.5  /  DBili  x   /  AST  23  /  ALT  15  /  AlkPhos  57  05-23      Urinalysis Basic - ( 23 May 2018 04:23 )    Color: Yellow / Appearance: Clear / SG: >1.030 / pH: x  Gluc: x / Ketone: Large  / Bili: Negative / Urobili: Negative   Blood: x / Protein: 150 mg/dL / Nitrite: Negative   Leuk Esterase: Negative / RBC: 10-25 /HPF / WBC 0-2 /HPF   Sq Epi: x / Non Sq Epi: Occasional /HPF / Bacteria: x        RADIOLOGY & ADDITIONAL STUDIES:
Chief complaint:pt  no N/V  no abdominal  pain  , positive  cannabis  in urine .    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 The patient states that she's had milder issues with her bowels in the past, but since her father passed away in late January, her levels of anxiety and stomach issues worsened. Over the last month she said she lost 20 lbs of weight, unable to tolerate anything besides some liquids, has not had a BM in 2.5 weeks (not eating solids). Because of the extensive vomiting, the patient states that she's developed severe abdominal pain that radiates to her back. Her PMD recently who started her on amitriptyline and clonazepam, and she was due to an outpatient EGD today. In the ED, vitals were: T 98.9, HR 83, /85, RR 22, 98% RA. She was given 3 liter of IVF (LR), morphine 4mg IV x3, KCl IV and PO supplementation, zofran 4mg IV, protonix 40mg IV.  CT abd was normal Admitted to medicine for further care. (23 May 2018 12:55)      REVIEW OF SYSTEMS:    CONSTITUTIONAL: No fever, weight loss, or fatigue  NECK: No pain or stiffness  RESPIRATORY: No cough, wheezing, chills or hemoptysis; No shortness of breath  CARDIOVASCULAR: No chest pain, palpitations, dizziness, or leg swelling  GASTROINTESTINAL: No abdominal or epigastric pain. No nausea, vomiting, or hematemesis; No diarrhea or constipation. No melena or hematochezia.  GENITOURINARY: No dysuria, frequency, hematuria, or incontinence  NEUROLOGICAL: No headaches, memory loss, loss of strength, numbness, or tremors  SKIN: No itching, burning, rashes, or lesions   LYMPH NODES: No enlarged glands  MUSCULOSKELETAL: No joint pain or swelling; No muscle, back, or extremity pain  HEME/LYMPH: No easy bruising, or bleeding gums    MEDICATIONS  (STANDING):  amitriptyline 10 milliGRAM(s) Oral at bedtime  clonazePAM Tablet 0.5 milliGRAM(s) Oral daily  influenza   Vaccine 0.5 milliLiter(s) IntraMuscular once  magnesium oxide 400 milliGRAM(s) Oral every 4 hours  ondansetron Injectable 4 milliGRAM(s) IV Push every 6 hours  pantoprazole  Injectable 40 milliGRAM(s) IV Push daily  potassium acid phosphate/sodium acid phosphate tablet (K-PHOS No. 2) 1 Tablet(s) Oral every 4 hours  sodium chloride 0.9%. 1000 milliLiter(s) (125 mL/Hr) IV Continuous <Continuous>    MEDICATIONS  (PRN):  morphine  - Injectable 2 milliGRAM(s) IV Push every 4 hours PRN Severe Pain (7 - 10)      Allergies    No Known Allergies    Intolerances        Vital Signs Last 24 Hrs  T(C): 37.1 (24 May 2018 08:34), Max: 37.1 (24 May 2018 08:34)  T(F): 98.7 (24 May 2018 08:34), Max: 98.7 (24 May 2018 08:34)  HR: 79 (24 May 2018 08:34) (69 - 79)  BP: 136/80 (24 May 2018 08:34) (102/56 - 136/80)  BP(mean): --  RR: 18 (24 May 2018 08:34) (17 - 18)  SpO2: 100% (24 May 2018 08:34) (98% - 100%)    PHYSICAL EXAM:    GENERAL: NAD, well-groomed, well-developed  HEAD:  Atraumatic, Normocephalic  EYES: EOMI, PERRLA, conjunctiva and sclera clear  ENMT: No tonsillar erythema, exudates, or enlargement; Moist mucous membranes, Good dentition, No lesions  NECK: Supple, No JVD, Normal thyroid  NERVOUS SYSTEM:  Alert & Oriented X3, Good concentration; Motor Strength 5/5 B/L upper and lower extremities; DTRs 2+ intact and symmetric  CHEST/LUNG: Clear to percussion bilaterally; No rales, rhonchi, wheezing, or rubs  HEART: Regular rate and rhythm; No murmurs, rubs, or gallops  ABDOMEN: Soft, Nontender, Nondistended; Bowel sounds present  EXTREMITIES:  2+ Peripheral Pulses, No clubbing, cyanosis, or edema  LYMPH: No lymphadenopathy noted  SKIN: No rashes or lesions      LABS:                        11.3   9.5   )-----------( 446      ( 23 May 2018 00:21 )             36.2     05-24    139  |  107  |  4<L>  ----------------------------<  82  3.4<L>   |  26  |  0.73    Ca    8.3<L>      24 May 2018 07:03  Phos  2.3     05-24  Mg     1.6     05-24    TPro  8.4<H>  /  Alb  4.9  /  TBili  0.5  /  DBili  x   /  AST  23  /  ALT  15  /  AlkPhos  57  05-23      Urinalysis Basic - ( 23 May 2018 04:23 )    Color: Yellow / Appearance: Clear / SG: >1.030 / pH: x  Gluc: x / Ketone: Large  / Bili: Negative / Urobili: Negative   Blood: x / Protein: 150 mg/dL / Nitrite: Negative   Leuk Esterase: Negative / RBC: 10-25 /HPF / WBC 0-2 /HPF   Sq Epi: x / Non Sq Epi: Occasional /HPF / Bacteria: x        RADIOLOGY & ADDITIONAL STUDIES:
Chief complaint:pt  tolrating  po liquids . upper endoscopy  with  esophagitis  and  non bleeding  DU  Hb  9.0  IN  iron infusion  was  given     HPI:  32yo Female with PMH of TB as a child, chronic pancreatitis, anemia (microcytic), current smoker, presents from home with severe abdominal pain, and vomiting. The patient states that she's had milder issues with her bowels in the past, but since her father passed away in late January, her levels of anxiety and stomach issues worsened. Over the last month she said she lost 20 lbs of weight, unable to tolerate anything besides some liquids, has not had a BM in 2.5 weeks (not eating solids). Because of the extensive vomiting, the patient states that she's developed severe abdominal pain that radiates to her back. Her PMD recently who started her on amitriptyline and clonazepam, and she was due to an outpatient EGD today. In the ED, vitals were: T 98.9, HR 83, /85, RR 22, 98% RA. She was given 3 liter of IVF (LR), morphine 4mg IV x3, KCl IV and PO supplementation, zofran 4mg IV, protonix 40mg IV.  CT abd was normal Admitted to medicine for further care. (23 May 2018 12:55)      REVIEW OF SYSTEMS:    CONSTITUTIONAL: No fever, weight loss, or fatigue  NECK: No pain or stiffness  RESPIRATORY: No cough, wheezing, chills or hemoptysis; No shortness of breath  CARDIOVASCULAR: No chest pain, palpitations, dizziness, or leg swelling  GASTROINTESTINAL: No abdominal or epigastric pain. No nausea, vomiting, or hematemesis; No diarrhea or constipation. No melena or hematochezia.  GENITOURINARY: No dysuria, frequency, hematuria, or incontinence  NEUROLOGICAL: No headaches, memory loss, loss of strength, numbness, or tremors  SKIN: No itching, burning, rashes, or lesions   LYMPH NODES: No enlarged glands  MUSCULOSKELETAL: No joint pain or swelling; No muscle, back, or extremity pain  HEME/LYMPH: No easy bruising, or bleeding gums    MEDICATIONS  (STANDING):  amitriptyline 10 milliGRAM(s) Oral at bedtime  clonazePAM Tablet 0.5 milliGRAM(s) Oral daily  influenza   Vaccine 0.5 milliLiter(s) IntraMuscular once  iron sucrose IVPB 200 milliGRAM(s) IV Intermittent once  ondansetron Injectable 4 milliGRAM(s) IV Push every 6 hours  pantoprazole  Injectable 40 milliGRAM(s) IV Push every 12 hours  sodium chloride 0.9%. 1000 milliLiter(s) (125 mL/Hr) IV Continuous <Continuous>    MEDICATIONS  (PRN):  morphine  - Injectable 2 milliGRAM(s) IV Push every 4 hours PRN Severe Pain (7 - 10)      Allergies    No Known Allergies    Intolerances        Vital Signs Last 24 Hrs  T(C): 37.2 (25 May 2018 04:37), Max: 37.3 (24 May 2018 20:40)  T(F): 98.9 (25 May 2018 04:37), Max: 99.1 (24 May 2018 20:40)  HR: 69 (25 May 2018 04:37) (67 - 85)  BP: 111/71 (25 May 2018 04:37) (111/71 - 135/71)  BP(mean): --  RR: 18 (25 May 2018 04:37) (18 - 18)  SpO2: 98% (25 May 2018 04:37) (98% - 100%)    PHYSICAL EXAM:    GENERAL: NAD, well-groomed, well-developed  HEAD:  Atraumatic, Normocephalic  EYES: EOMI, PERRLA, conjunctiva and sclera clear  ENMT: No tonsillar erythema, exudates, or enlargement; Moist mucous membranes, Good dentition, No lesions  NECK: Supple, No JVD, Normal thyroid  NERVOUS SYSTEM:  Alert & Oriented X3, Good concentration; Motor Strength 5/5 B/L upper and lower extremities; DTRs 2+ intact and symmetric  CHEST/LUNG: Clear to percussion bilaterally; No rales, rhonchi, wheezing, or rubs  HEART: Regular rate and rhythm; No murmurs, rubs, or gallops  ABDOMEN: Soft, Nontender, Nondistended; Bowel sounds present  EXTREMITIES:  2+ Peripheral Pulses, No clubbing, cyanosis, or edema  LYMPH: No lymphadenopathy noted  SKIN: No rashes or lesions      LABS:                        9.0    7.6   )-----------( 318      ( 25 May 2018 06:22 )             29.3     05-25    139  |  103  |  <4<L>  ----------------------------<  78  3.3<L>   |  25  |  0.73    Ca    8.5      25 May 2018 06:22  Phos  2.3     05-24  Mg     1.8     05-25            RADIOLOGY & ADDITIONAL STUDIES:
Chief complaint:tolerating  sveta weel  , mild epigastric  tenderness  , , Hb pilory  positive     HPI:  34yo Female with PMH of TB as a child, chronic pancreatitis, anemia (microcytic), current smoker, presents from home with severe abdominal pain, and vomiting. The patient states that she's had milder issues with her bowels in the past, but since her father passed away in late January, her levels of anxiety and stomach issues worsened. Over the last month she said she lost 20 lbs of weight, unable to tolerate anything besides some liquids, has not had a BM in 2.5 weeks (not eating solids). Because of the extensive vomiting, the patient states that she's developed severe abdominal pain that radiates to her back. Her PMD recently who started her on amitriptyline and clonazepam, and she was due to an outpatient EGD today. In the ED, vitals were: T 98.9, HR 83, /85, RR 22, 98% RA. She was given 3 liter of IVF (LR), morphine 4mg IV x3, KCl IV and PO supplementation, zofran 4mg IV, protonix 40mg IV.  CT abd was normal Admitted to medicine for further care. (23 May 2018 12:55)      REVIEW OF SYSTEMS:    CONSTITUTIONAL: No fever, weight loss, or fatigue  NECK: No pain or stiffness  RESPIRATORY: No cough, wheezing, chills or hemoptysis; No shortness of breath  CARDIOVASCULAR: No chest pain, palpitations, dizziness, or leg swelling  GASTROINTESTINAL: No abdominal or epigastric pain. No nausea, vomiting, or hematemesis; No diarrhea or constipation. No melena or hematochezia.  GENITOURINARY: No dysuria, frequency, hematuria, or incontinence  NEUROLOGICAL: No headaches, memory loss, loss of strength, numbness, or tremors  SKIN: No itching, burning, rashes, or lesions   LYMPH NODES: No enlarged glands  MUSCULOSKELETAL: No joint pain or swelling; No muscle, back, or extremity pain  HEME/LYMPH: No easy bruising, or bleeding gums    MEDICATIONS  (STANDING):  amitriptyline 10 milliGRAM(s) Oral at bedtime  clonazePAM Tablet 0.5 milliGRAM(s) Oral daily  docusate sodium 100 milliGRAM(s) Oral two times a day  ondansetron Injectable 4 milliGRAM(s) IV Push every 6 hours  pantoprazole  Injectable 40 milliGRAM(s) IV Push every 12 hours  polyethylene glycol 3350 17 Gram(s) Oral daily  senna 2 Tablet(s) Oral at bedtime  sodium chloride 0.9%. 1000 milliLiter(s) (75 mL/Hr) IV Continuous <Continuous>    MEDICATIONS  (PRN):  morphine  - Injectable 2 milliGRAM(s) IV Push every 4 hours PRN Severe Pain (7 - 10)      Allergies    No Known Allergies    Intolerances        Vital Signs Last 24 Hrs  T(C): 36.9 (26 May 2018 05:53), Max: 37.2 (25 May 2018 12:29)  T(F): 98.5 (26 May 2018 05:53), Max: 98.9 (25 May 2018 12:29)  HR: 66 (26 May 2018 05:53) (66 - 84)  BP: 109/76 (26 May 2018 05:53) (109/76 - 122/81)  BP(mean): --  RR: 18 (26 May 2018 05:53) (18 - 18)  SpO2: 97% (26 May 2018 05:53) (97% - 100%)    PHYSICAL EXAM:    GENERAL: NAD, well-groomed, well-developed  HEAD:  Atraumatic, Normocephalic  EYES: EOMI, PERRLA, conjunctiva and sclera clear  ENMT: No tonsillar erythema, exudates, or enlargement; Moist mucous membranes, Good dentition, No lesions  NECK: Supple, No JVD, Normal thyroid  NERVOUS SYSTEM:  Alert & Oriented X3, Good concentration; Motor Strength 5/5 B/L upper and lower extremities; DTRs 2+ intact and symmetric  CHEST/LUNG: Clear to percussion bilaterally; No rales, rhonchi, wheezing, or rubs  HEART: Regular rate and rhythm; No murmurs, rubs, or gallops  ABDOMEN: Soft, Nontender, Nondistended; Bowel sounds present  EXTREMITIES:  2+ Peripheral Pulses, No clubbing, cyanosis, or edema  LYMPH: No lymphadenopathy noted  SKIN: No rashes or lesions      LABS:                        9.0    7.6   )-----------( 318      ( 25 May 2018 06:22 )             29.3     05-26    138  |  103  |  5<L>  ----------------------------<  81  4.1   |  25  |  0.75    Ca    9.0      26 May 2018 06:16  Mg     1.8     05-25            RADIOLOGY & ADDITIONAL STUDIES:
Procedure Note: Upper Endoscopy    After discussion of indication, procedure, risks, benefits, alternatives, and limitations, the patient gave informed consent for the EGD.    Sedation: MAC with IV propofol    Findings: See report for details    Imp: Duodenal bulb ulcer just past the pylorus, clean-based. This certainly could be a contributory factor in the patient's chronic abdominal pain and vomiting.    Plan:  Increase the pantoprazole to 40 mg BID (IV for now, change to oral once able to eat)           Check path for H. pylori (and for malignancy, though duodenal ulcers are typically benign)           Advance to clear liquids tonight, consider full liquid diet in AM
SUBJECTIVE:  Abdominal pain improved, though still generally sore. Tolerating clears without N/V. No BMs.  _____________________________________________________  OBJECTIVE:    T(C): 37.2 (05-25-18 @ 04:37), Max: 37.3 (05-24-18 @ 20:40)  HR: 69 (05-25-18 @ 04:37)  BP: 111/71 (05-25-18 @ 04:37)  RR: 18 (05-25-18 @ 04:37)  SpO2: 98% (05-25-18 @ 04:37)  Wt(kg): --    General = Comfortable-appearing, no acute distress  Abdomen = Non-distended, normal active bowel sounds, soft, mildly tender in epigastrium/RUQ without rebound/guarding, no palpable mass or organomegaly, no bruit  _____________________________________________________  LABS:                        9.0    7.6   )-----------( 318      ( 25 May 2018 06:22 )             29.3     05-25    139  |  103  |  <4<L>  ----------------------------<  78  3.3<L>   |  25  |  0.73    Ca    8.5      25 May 2018 06:22  Phos  2.3     05-24  Mg     1.8     05-25          _____________________________________________________  ACTIVE MEDS:  MEDICATIONS  (STANDING):  amitriptyline 10 milliGRAM(s) Oral at bedtime  clonazePAM Tablet 0.5 milliGRAM(s) Oral daily  influenza   Vaccine 0.5 milliLiter(s) IntraMuscular once  iron sucrose IVPB 200 milliGRAM(s) IV Intermittent once  ondansetron Injectable 4 milliGRAM(s) IV Push every 6 hours  pantoprazole  Injectable 40 milliGRAM(s) IV Push every 12 hours  sodium chloride 0.9%. 1000 milliLiter(s) (125 mL/Hr) IV Continuous <Continuous>    MEDICATIONS  (PRN):  morphine  - Injectable 2 milliGRAM(s) IV Push every 4 hours PRN Severe Pain (7 - 10)    _____________________________________________________  ASSESSMENT:  33yFemale admitted for exacerbation of chronic abdominal pain and vomiting, found to have a clean-based duodenal bulb ulcer just distal to the pylorus. Symptomatically improving.    PLAN:  Continue Protonix 40 mg BID (IV now, but change to oral when able to tolerate food)  Advance to full liquid diet  As patient is tolerating oral liquids, should consider reducing IVF rate  Await biopsies to check for H. pylori  IV Venofer 200 mg x 1 for iron deficiency anemia, then for IV Injectafer as outpatient    Rory Anaya M.D.  (O) [phone]  (C) [phone]
SUBJECTIVE:  Wants to eat.  Abdomen does not hurt to eat, but  to touch.  _____________________________________________________  OBJECTIVE:    T(C): 36.9 (05-26-18 @ 05:53), Max: 37.2 (05-25-18 @ 12:29)  HR: 66 (05-26-18 @ 05:53)  BP: 109/76 (05-26-18 @ 05:53)  RR: 18 (05-26-18 @ 05:53)  SpO2: 97% (05-26-18 @ 05:53)  Wt(kg): --    General = Comfortable-appearing, no acute distress  Abdomen = Non-distended, normal active bowel sounds, soft, tender in the epigastric/RUQ region, no palpable mass or organomegaly  _____________________________________________________  LABS:                        9.0    7.6   )-----------( 318      ( 25 May 2018 06:22 )             29.3     05-26    138  |  103  |  5<L>  ----------------------------<  81  4.1   |  25  |  0.75    Ca    9.0      26 May 2018 06:16  Mg     1.8     05-25    Path:  Benign ulcer, H. pylori positive.  _____________________________________________________  ACTIVE MEDS:  MEDICATIONS  (STANDING):  amitriptyline 10 milliGRAM(s) Oral at bedtime  clonazePAM Tablet 0.5 milliGRAM(s) Oral daily  docusate sodium 100 milliGRAM(s) Oral two times a day  ondansetron Injectable 4 milliGRAM(s) IV Push every 6 hours  pantoprazole  Injectable 40 milliGRAM(s) IV Push every 12 hours  polyethylene glycol 3350 17 Gram(s) Oral daily  senna 2 Tablet(s) Oral at bedtime  sodium chloride 0.9%. 1000 milliLiter(s) (75 mL/Hr) IV Continuous <Continuous>    MEDICATIONS  (PRN):  morphine  - Injectable 2 milliGRAM(s) IV Push every 4 hours PRN Severe Pain (7 - 10)    _____________________________________________________  ASSESSMENT:  32 yo Female with abdominal pain and EGD on May 24 finding a duodenal ulcer.  Pathology is H pylori positive -- this was reviewed with the patient in detail.    PLAN:  - Patient may advance her diet, but was advised to stay on soft, bland foods and smaller meals to prevent aggravation of the duodenal ulcer.  - Patient advised to stop smoking (she is a smoker, but has not smoked for the duration of her hospitalization), avoid alcohol, no NSAIDs (tylenol is OK), and to start H pylori treatment.  - I gave the patient rx for Prevpac treatment:  amoxicillin 1000 mg po bid, biaxin 500 mg po bid, prevacid 30 mg po BID for ten days.  - The patient's first post-hospitalization doctor visit can be with her internist, Dr. Willard.  Her second doctor visit should be with Dr. Anaya.  I advised that she schedule Dr. Anaya's visit about 4 weeks or more after completion of the H pylori regimen, so that she can take an H pylori breath test to verify eradication of the H pylori.  The patient will need to be fasting at least one hour prior to the H pylori breath test.    From my standpoint, she is stable for discharge to home today.    Fiorella Suarez M.D.  (o) [phone]
n/a

## 2023-12-04 NOTE — H&P ADULT - ASSESSMENT
37F c hx anxiety not on any meds, pancreatitis, PUD/duodenal ulcer, frequent ED visits for abd pain and hematemesis, pw abd pain and found to have gastric perforation.     - Admit to [facility]  - pt is booked to go to OR  - NPO   - Start Zosyn and Fluconazole  - IVF: LR +LR bolus   - Repeat labs   - Covid test   - Escobar and strict I&O   - Consent in the chart      Discussed with Dr. Sheth   ACS [id number]    no

## 2024-01-09 NOTE — PATIENT PROFILE ADULT. - PAIN SCALE PREFERRED, PROFILE
This RN spoke to mom of pt and moved up pt's appt. No other questions or concerns from parent at this time.     numerical 0-10

## 2024-03-25 NOTE — PATIENT PROFILE OB - POST PARTUM DEPRESSION SCREEN OB 4
"Subjective:      Patient ID: Bambi Carrera is a 61 y.o. female.    Vitals:  height is 5' 5" (1.651 m) and weight is 135.6 kg (299 lb). Her oral temperature is 97.4 °F (36.3 °C). Her blood pressure is 147/89 (abnormal) and her pulse is 80. Her respiration is 16.     Chief Complaint: Hip Pain    61-year-old female presents to clinic for evaluation of nontraumatic right hip pain.  She was initially seen in clinic on 02/16/2024 for the same complaint, after she returned from a trip to [place].  She received a Toradol injection, followed by muscle relaxer and anti-inflammatories.  She reports temporary relief after Toradol injection.  Then followed with PCP 4 days later, started on Medrol Dosepak.  She states that she had little to no relief.  She continues to have right groin pain, and now right lower back pain.  She denies any injury or fall.  She is awake and alert, behavior appropriate to situation, no acute distress noted on today's visit.    Hip Pain   The incident occurred more than 1 week ago. Incident location: none. There was no injury mechanism. The pain is present in the right hip. The quality of the pain is described as stabbing and aching. The pain is at a severity of 10/10. The pain has been Fluctuating since onset. Associated symptoms include an inability to bear weight. Pertinent negatives include no numbness. She reports no foreign bodies present. The symptoms are aggravated by movement and weight bearing. She has tried NSAIDs for the symptoms.       Constitution: Negative for activity change, appetite change, chills, sweating, fatigue and fever.   HENT:  Negative for sore throat.    Cardiovascular:  Negative for chest pain.   Gastrointestinal:  Negative for nausea and vomiting.   Musculoskeletal:  Positive for joint pain and back pain. Negative for trauma and joint swelling.   Skin:  Negative for erythema.   Neurological:  Negative for numbness and tingling.      Objective:     Physical Exam "   Constitutional: She is oriented to person, place, and time. She appears well-developed.  Non-toxic appearance. She does not appear ill.   HENT:   Head: Normocephalic and atraumatic. Head is without abrasion, without contusion and without laceration.   Ears:   Right Ear: External ear normal.   Left Ear: External ear normal.   Mouth/Throat: Oropharynx is clear and moist and mucous membranes are normal.   Eyes: Conjunctivae, EOM and lids are normal.   Neck: Trachea normal and phonation normal.   Cardiovascular: Normal rate.   Pulmonary/Chest: Effort normal. No respiratory distress.   Abdominal: Normal appearance.   Musculoskeletal: Normal range of motion.         General: Tenderness present. No swelling, deformity or signs of injury. Normal range of motion.      Lumbar back: She exhibits tenderness.        Back:         Legs:    Neurological: She is alert and oriented to person, place, and time.   Skin: Skin is warm, dry, intact and no rash. No abrasion, No burn, No bruising, No erythema and No ecchymosis   Psychiatric: Her speech is normal and behavior is normal. Judgment and thought content normal.   Nursing note and vitals reviewed.    X-Ray Lumbar Spine AP And Lateral    Result Date: 3/25/2024  EXAMINATION: XR LUMBAR SPINE AP AND LATERAL CLINICAL HISTORY: Lumbago with sciatica, right side TECHNIQUE: AP, lateral and spot images were performed of the lumbar spine. COMPARISON: 02/23/2021 FINDINGS: The alignment demonstrates a subtle grade 1 anterolisthesis of L3 relative to L4 and L4 relative to L5. The vertebral body heights are well maintained. Mild disc space narrowing L3-4. Small anterior and lateral marginal osteophytes noted at L2 through L5.. Mild sclerosis and osseous hypertrophy of the facet joints at L3-4 and L4-5. No fracture, no osseous lesions. The sacroiliac joints appear symmetrical on the AP view. The soft tissues appear normal.     Spondylosis of the lumbar spine, no definite acute process seen.  Electronically signed by: Elinor Sanders MD Date:    03/25/2024 Time:    13:59    X-Ray Hip 2 or 3 views Right (with Pelvis when performed)    Result Date: 3/25/2024  EXAMINATION: XR HIP WITH PELVIS WHEN PERFORMED, 2 OR 3  VIEWS RIGHT CLINICAL HISTORY: Right lower quadrant pain TECHNIQUE: AP view of the pelvis and frog leg lateral view of the right hip were performed. COMPARISON: None FINDINGS: Moderate right hip joint space narrowing.  Normal right femoral head contour.  Minimal osteophyte at the bilateral acetabular margin. No acute fracture, no osseous lesions. The bilateral sacroiliac joints appear normal. The soft tissues appear normal.     No acute fracture identified Electronically signed by: Elinor Sanders MD Date:    03/25/2024 Time:    13:58      Assessment:     1. Acute right-sided low back pain with right-sided sciatica    2. Right groin pain        Plan:       Acute right-sided low back pain with right-sided sciatica  -     ketorolac injection 30 mg  -     X-Ray Lumbar Spine AP And Lateral; Future; Expected date: 03/25/2024  -     traMADoL (ULTRAM) 50 mg tablet; Take 1 tablet (50 mg total) by mouth every evening. for 10 days  Dispense: 10 tablet; Refill: 0    Right groin pain  -     X-Ray Hip 2 or 3 views Right (with Pelvis when performed); Future; Expected date: 03/25/2024  -     meloxicam (MOBIC) 15 MG tablet; Take 1 tablet (15 mg total) by mouth once daily. for 14 days  Dispense: 14 tablet; Refill: 0  -     Discontinue: cyclobenzaprine (FLEXERIL) 5 MG tablet; Take 1-2 tablets (5-10 mg total) by mouth 3 (three) times daily as needed for Muscle spasms.  Dispense: 30 tablet; Refill: 0  -     methocarbamoL (ROBAXIN) 500 MG Tab; Take 1 tablet (500 mg total) by mouth nightly as needed (muscle spasms).  Dispense: 30 tablet; Refill: 0      - no acute fracture dislocation seen on x-rays, suspect sciatica.  Advised follow-up with PCP.  Given pain after multiple tramadol at bedtime for severe pain.   Stressed follow-up.  Patient verbalized understanding and is in agreement with plan.    Patient Instructions   - Follow up with your PCP or specialty clinic as directed in the next 1-2 weeks if not improved or as needed.  You can call [phone] to schedule an appointment with the appropriate provider.    - Go to the ER or seek medical attention immediately if you develop new or worsening symptoms.    - You must understand that you have received an Urgent Care treatment only and that you may be released before all of your medical problems are known or treated.   - You, the patient, will arrange for follow up care as instructed.   - If your condition worsens or fails to improve we recommend that you receive another evaluation at the ER immediately or contact your PCP to discuss your concerns or return here.                      yes

## 2024-06-07 NOTE — DIETITIAN INITIAL EVALUATION ADULT. - ORAL INTAKE PTA
There are no Wet Read(s) to document.
Pt reports decreased appetite/intake since onset of gastric ulcers 5/2018, pt stated since this time she will consume 1 meal per day and several snacks spaced throughout the day as well, within the past 72 hours just prior to admission pt stated her intake declined further to where she was consuming only water due to persistent abdominal pain N+V/poor

## 2024-06-19 ENCOUNTER — INPATIENT (INPATIENT)
Facility: HOSPITAL | Age: 39
LOS: 1 days | Discharge: HOME | DRG: 379 | End: 2024-06-21
Attending: INTERNAL MEDICINE | Admitting: INTERNAL MEDICINE
Payer: COMMERCIAL

## 2024-06-19 VITALS
TEMPERATURE: 99 F | DIASTOLIC BLOOD PRESSURE: 98 MMHG | HEART RATE: 82 BPM | OXYGEN SATURATION: 100 % | HEIGHT: 64 IN | RESPIRATION RATE: 22 BRPM | SYSTOLIC BLOOD PRESSURE: 151 MMHG

## 2024-06-19 DIAGNOSIS — K92.2 GASTROINTESTINAL HEMORRHAGE, UNSPECIFIED: ICD-10-CM

## 2024-06-19 DIAGNOSIS — Z98.51 TUBAL LIGATION STATUS: Chronic | ICD-10-CM

## 2024-06-19 LAB
ALBUMIN SERPL ELPH-MCNC: 4.8 G/DL — SIGNIFICANT CHANGE UP (ref 3.3–5)
ALP SERPL-CCNC: 69 U/L — SIGNIFICANT CHANGE UP (ref 40–120)
ALT FLD-CCNC: 9 U/L — LOW (ref 10–45)
ANION GAP SERPL CALC-SCNC: 18 MMOL/L — HIGH (ref 5–17)
ANISOCYTOSIS BLD QL: SLIGHT — SIGNIFICANT CHANGE UP
APPEARANCE UR: CLEAR — SIGNIFICANT CHANGE UP
APTT BLD: 49.5 SEC — HIGH (ref 24.5–35.6)
AST SERPL-CCNC: 11 U/L — SIGNIFICANT CHANGE UP (ref 10–40)
BACTERIA # UR AUTO: NEGATIVE /HPF — SIGNIFICANT CHANGE UP
BASE EXCESS BLDV CALC-SCNC: -0.3 MMOL/L — SIGNIFICANT CHANGE UP (ref -2–3)
BASOPHILS # BLD AUTO: 0.08 K/UL — SIGNIFICANT CHANGE UP (ref 0–0.2)
BASOPHILS NFR BLD AUTO: 0.9 % — SIGNIFICANT CHANGE UP (ref 0–2)
BILIRUB SERPL-MCNC: 0.2 MG/DL — SIGNIFICANT CHANGE UP (ref 0.2–1.2)
BILIRUB UR-MCNC: NEGATIVE — SIGNIFICANT CHANGE UP
BUN SERPL-MCNC: 10 MG/DL — SIGNIFICANT CHANGE UP (ref 7–23)
CA-I SERPL-SCNC: 1.38 MMOL/L — HIGH (ref 1.15–1.33)
CALCIUM SERPL-MCNC: 10.6 MG/DL — HIGH (ref 8.4–10.5)
CAST: 0 /LPF — SIGNIFICANT CHANGE UP (ref 0–4)
CHLORIDE BLDV-SCNC: 104 MMOL/L — SIGNIFICANT CHANGE UP (ref 96–108)
CHLORIDE SERPL-SCNC: 100 MMOL/L — SIGNIFICANT CHANGE UP (ref 96–108)
CO2 BLDV-SCNC: 27 MMOL/L — HIGH (ref 22–26)
CO2 SERPL-SCNC: 20 MMOL/L — LOW (ref 22–31)
COLOR SPEC: YELLOW — SIGNIFICANT CHANGE UP
CREAT SERPL-MCNC: 0.7 MG/DL — SIGNIFICANT CHANGE UP (ref 0.5–1.3)
DACRYOCYTES BLD QL SMEAR: SLIGHT — SIGNIFICANT CHANGE UP
DIFF PNL FLD: ABNORMAL
EGFR: 111 ML/MIN/1.73M2 — SIGNIFICANT CHANGE UP
EOSINOPHIL # BLD AUTO: 0.24 K/UL — SIGNIFICANT CHANGE UP (ref 0–0.5)
EOSINOPHIL NFR BLD AUTO: 2.6 % — SIGNIFICANT CHANGE UP (ref 0–6)
GAS PNL BLDV: 137 MMOL/L — SIGNIFICANT CHANGE UP (ref 136–145)
GAS PNL BLDV: SIGNIFICANT CHANGE UP
GAS PNL BLDV: SIGNIFICANT CHANGE UP
GLUCOSE BLDV-MCNC: 105 MG/DL — HIGH (ref 70–99)
GLUCOSE SERPL-MCNC: 110 MG/DL — HIGH (ref 70–99)
GLUCOSE UR QL: NEGATIVE MG/DL — SIGNIFICANT CHANGE UP
HCO3 BLDV-SCNC: 25 MMOL/L — SIGNIFICANT CHANGE UP (ref 22–29)
HCT VFR BLD CALC: 26.9 % — LOW (ref 34.5–45)
HCT VFR BLD CALC: 34.7 % — SIGNIFICANT CHANGE UP (ref 34.5–45)
HCT VFR BLDA CALC: 34 % — LOW (ref 34.5–46.5)
HCV AB S/CO SERPL IA: 0.77 S/CO — SIGNIFICANT CHANGE UP (ref 0–0.99)
HCV AB SERPL-IMP: SIGNIFICANT CHANGE UP
HGB BLD CALC-MCNC: 11.2 G/DL — LOW (ref 11.7–16.1)
HGB BLD-MCNC: 10.6 G/DL — LOW (ref 11.5–15.5)
HGB BLD-MCNC: 8.7 G/DL — LOW (ref 11.5–15.5)
HIV 1 & 2 AB SERPL IA.RAPID: SIGNIFICANT CHANGE UP
HYPOCHROMIA BLD QL: SLIGHT — SIGNIFICANT CHANGE UP
INR BLD: 1.08 RATIO — SIGNIFICANT CHANGE UP (ref 0.85–1.18)
KETONES UR-MCNC: 15 MG/DL
LACTATE BLDV-MCNC: 2.2 MMOL/L — HIGH (ref 0.5–2)
LEUKOCYTE ESTERASE UR-ACNC: NEGATIVE — SIGNIFICANT CHANGE UP
LIDOCAIN IGE QN: 21 U/L — SIGNIFICANT CHANGE UP (ref 7–60)
LYMPHOCYTES # BLD AUTO: 3.16 K/UL — SIGNIFICANT CHANGE UP (ref 1–3.3)
LYMPHOCYTES # BLD AUTO: 34.5 % — SIGNIFICANT CHANGE UP (ref 13–44)
MACROCYTES BLD QL: SLIGHT — SIGNIFICANT CHANGE UP
MANUAL SMEAR VERIFICATION: SIGNIFICANT CHANGE UP
MCHC RBC-ENTMCNC: 22.5 PG — LOW (ref 27–34)
MCHC RBC-ENTMCNC: 23 PG — LOW (ref 27–34)
MCHC RBC-ENTMCNC: 30.5 GM/DL — LOW (ref 32–36)
MCHC RBC-ENTMCNC: 32.3 GM/DL — SIGNIFICANT CHANGE UP (ref 32–36)
MCV RBC AUTO: 71.2 FL — LOW (ref 80–100)
MCV RBC AUTO: 73.5 FL — LOW (ref 80–100)
MICROCYTES BLD QL: SLIGHT — SIGNIFICANT CHANGE UP
MONOCYTES # BLD AUTO: 0.79 K/UL — SIGNIFICANT CHANGE UP (ref 0–0.9)
MONOCYTES NFR BLD AUTO: 8.6 % — SIGNIFICANT CHANGE UP (ref 2–14)
NEUTROPHILS # BLD AUTO: 4.89 K/UL — SIGNIFICANT CHANGE UP (ref 1.8–7.4)
NEUTROPHILS NFR BLD AUTO: 53.4 % — SIGNIFICANT CHANGE UP (ref 43–77)
NITRITE UR-MCNC: NEGATIVE — SIGNIFICANT CHANGE UP
NRBC # BLD: 0 /100 WBCS — SIGNIFICANT CHANGE UP (ref 0–0)
OVALOCYTES BLD QL SMEAR: SLIGHT — SIGNIFICANT CHANGE UP
PCO2 BLDV: 45 MMHG — HIGH (ref 39–42)
PH BLDV: 7.36 — SIGNIFICANT CHANGE UP (ref 7.32–7.43)
PH UR: 7.5 — SIGNIFICANT CHANGE UP (ref 5–8)
PLAT MORPH BLD: NORMAL — SIGNIFICANT CHANGE UP
PLATELET # BLD AUTO: 376 K/UL — SIGNIFICANT CHANGE UP (ref 150–400)
PLATELET # BLD AUTO: 529 K/UL — HIGH (ref 150–400)
PO2 BLDV: 41 MMHG — SIGNIFICANT CHANGE UP (ref 25–45)
POIKILOCYTOSIS BLD QL AUTO: SLIGHT — SIGNIFICANT CHANGE UP
POTASSIUM BLDV-SCNC: 4.1 MMOL/L — SIGNIFICANT CHANGE UP (ref 3.5–5.1)
POTASSIUM SERPL-MCNC: 3.9 MMOL/L — SIGNIFICANT CHANGE UP (ref 3.5–5.3)
POTASSIUM SERPL-SCNC: 3.9 MMOL/L — SIGNIFICANT CHANGE UP (ref 3.5–5.3)
PROT SERPL-MCNC: 8.2 G/DL — SIGNIFICANT CHANGE UP (ref 6–8.3)
PROT UR-MCNC: NEGATIVE MG/DL — SIGNIFICANT CHANGE UP
PROTHROM AB SERPL-ACNC: 11.9 SEC — SIGNIFICANT CHANGE UP (ref 9.5–13)
RBC # BLD: 3.78 M/UL — LOW (ref 3.8–5.2)
RBC # BLD: 4.72 M/UL — SIGNIFICANT CHANGE UP (ref 3.8–5.2)
RBC # FLD: 19 % — HIGH (ref 10.3–14.5)
RBC # FLD: 19.4 % — HIGH (ref 10.3–14.5)
RBC BLD AUTO: ABNORMAL
RBC CASTS # UR COMP ASSIST: 28 /HPF — HIGH (ref 0–4)
SAO2 % BLDV: 64.1 % — LOW (ref 67–88)
SCHISTOCYTES BLD QL AUTO: SLIGHT — SIGNIFICANT CHANGE UP
SODIUM SERPL-SCNC: 138 MMOL/L — SIGNIFICANT CHANGE UP (ref 135–145)
SP GR SPEC: >1.03 — HIGH (ref 1–1.03)
SQUAMOUS # UR AUTO: 1 /HPF — SIGNIFICANT CHANGE UP (ref 0–5)
TARGETS BLD QL SMEAR: SIGNIFICANT CHANGE UP
UROBILINOGEN FLD QL: 0.2 MG/DL — SIGNIFICANT CHANGE UP (ref 0.2–1)
WBC # BLD: 7.49 K/UL — SIGNIFICANT CHANGE UP (ref 3.8–10.5)
WBC # BLD: 9.16 K/UL — SIGNIFICANT CHANGE UP (ref 3.8–10.5)
WBC # FLD AUTO: 7.49 K/UL — SIGNIFICANT CHANGE UP (ref 3.8–10.5)
WBC # FLD AUTO: 9.16 K/UL — SIGNIFICANT CHANGE UP (ref 3.8–10.5)
WBC UR QL: 0 /HPF — SIGNIFICANT CHANGE UP (ref 0–5)

## 2024-06-19 PROCEDURE — 74018 RADEX ABDOMEN 1 VIEW: CPT | Mod: 26

## 2024-06-19 PROCEDURE — 74177 CT ABD & PELVIS W/CONTRAST: CPT | Mod: 26,MC

## 2024-06-19 PROCEDURE — 74183 MRI ABD W/O CNTR FLWD CNTR: CPT | Mod: 26

## 2024-06-19 PROCEDURE — 99223 1ST HOSP IP/OBS HIGH 75: CPT

## 2024-06-19 PROCEDURE — 71045 X-RAY EXAM CHEST 1 VIEW: CPT | Mod: 26

## 2024-06-19 PROCEDURE — 99285 EMERGENCY DEPT VISIT HI MDM: CPT | Mod: 25

## 2024-06-19 RX ORDER — MAGNESIUM, ALUMINUM HYDROXIDE 400-400
30 TABLET,CHEWABLE ORAL EVERY 4 HOURS
Refills: 0 | Status: DISCONTINUED | OUTPATIENT
Start: 2024-06-19 | End: 2024-06-21

## 2024-06-19 RX ORDER — DEXTROSE MONOHYDRATE AND SODIUM CHLORIDE 5; .3 G/100ML; G/100ML
1000 INJECTION, SOLUTION INTRAVENOUS ONCE
Refills: 0 | Status: COMPLETED | OUTPATIENT
Start: 2024-06-19 | End: 2024-06-19

## 2024-06-19 RX ORDER — MORPHINE SULFATE 100 MG/1
4 TABLET, EXTENDED RELEASE ORAL ONCE
Refills: 0 | Status: DISCONTINUED | OUTPATIENT
Start: 2024-06-19 | End: 2024-06-19

## 2024-06-19 RX ORDER — PANTOPRAZOLE SODIUM 40 MG/10ML
40 INJECTION, POWDER, FOR SOLUTION INTRAVENOUS
Refills: 0 | Status: DISCONTINUED | OUTPATIENT
Start: 2024-06-19 | End: 2024-06-21

## 2024-06-19 RX ORDER — HYDROMORPHONE HCL 0.2 MG/ML
0.5 INJECTION, SOLUTION INTRAVENOUS ONCE
Refills: 0 | Status: DISCONTINUED | OUTPATIENT
Start: 2024-06-19 | End: 2024-06-19

## 2024-06-19 RX ORDER — NICOTINE POLACRILEX 2 MG/1
1 LOZENGE ORAL EVERY 24 HOURS
Refills: 0 | Status: DISCONTINUED | OUTPATIENT
Start: 2024-06-19 | End: 2024-06-21

## 2024-06-19 RX ORDER — ONDANSETRON HYDROCHLORIDE 2 MG/ML
4 INJECTION INTRAMUSCULAR; INTRAVENOUS EVERY 8 HOURS
Refills: 0 | Status: DISCONTINUED | OUTPATIENT
Start: 2024-06-19 | End: 2024-06-21

## 2024-06-19 RX ORDER — ACETAMINOPHEN 325 MG
650 TABLET ORAL EVERY 6 HOURS
Refills: 0 | Status: DISCONTINUED | OUTPATIENT
Start: 2024-06-19 | End: 2024-06-21

## 2024-06-19 RX ORDER — PIPERACILLIN SODIUM AND TAZOBACTAM SODIUM 3; .375 G/15ML; G/15ML
3.38 INJECTION, POWDER, LYOPHILIZED, FOR SOLUTION INTRAVENOUS ONCE
Refills: 0 | Status: COMPLETED | OUTPATIENT
Start: 2024-06-19 | End: 2024-06-19

## 2024-06-19 RX ORDER — ONDANSETRON HYDROCHLORIDE 2 MG/ML
4 INJECTION INTRAMUSCULAR; INTRAVENOUS ONCE
Refills: 0 | Status: COMPLETED | OUTPATIENT
Start: 2024-06-19 | End: 2024-06-19

## 2024-06-19 RX ORDER — VANCOMYCIN HYDROCHLORIDE 50 MG/ML
1000 KIT ORAL ONCE
Refills: 0 | Status: COMPLETED | OUTPATIENT
Start: 2024-06-19 | End: 2024-06-19

## 2024-06-19 RX ORDER — PANTOPRAZOLE SODIUM 40 MG/10ML
80 INJECTION, POWDER, FOR SOLUTION INTRAVENOUS ONCE
Refills: 0 | Status: COMPLETED | OUTPATIENT
Start: 2024-06-19 | End: 2024-06-19

## 2024-06-19 RX ADMIN — MORPHINE SULFATE 4 MILLIGRAM(S): 100 TABLET, EXTENDED RELEASE ORAL at 07:50

## 2024-06-19 RX ADMIN — HYDROMORPHONE HCL 0.5 MILLIGRAM(S): 0.2 INJECTION, SOLUTION INTRAVENOUS at 17:21

## 2024-06-19 RX ADMIN — PIPERACILLIN SODIUM AND TAZOBACTAM SODIUM 3.38 GRAM(S): 3; .375 INJECTION, POWDER, LYOPHILIZED, FOR SOLUTION INTRAVENOUS at 09:30

## 2024-06-19 RX ADMIN — PANTOPRAZOLE SODIUM 80 MILLIGRAM(S): 40 INJECTION, POWDER, FOR SOLUTION INTRAVENOUS at 07:49

## 2024-06-19 RX ADMIN — HYDROMORPHONE HCL 0.5 MILLIGRAM(S): 0.2 INJECTION, SOLUTION INTRAVENOUS at 11:55

## 2024-06-19 RX ADMIN — HYDROMORPHONE HCL 0.5 MILLIGRAM(S): 0.2 INJECTION, SOLUTION INTRAVENOUS at 22:58

## 2024-06-19 RX ADMIN — NICOTINE POLACRILEX 1 PATCH: 2 LOZENGE ORAL at 19:30

## 2024-06-19 RX ADMIN — NICOTINE POLACRILEX 1 PATCH: 2 LOZENGE ORAL at 15:13

## 2024-06-19 RX ADMIN — VANCOMYCIN HYDROCHLORIDE 1000 MILLIGRAM(S): KIT at 10:35

## 2024-06-19 RX ADMIN — HYDROMORPHONE HCL 0.5 MILLIGRAM(S): 0.2 INJECTION, SOLUTION INTRAVENOUS at 17:50

## 2024-06-19 RX ADMIN — VANCOMYCIN HYDROCHLORIDE 250 MILLIGRAM(S): KIT at 09:33

## 2024-06-19 RX ADMIN — MORPHINE SULFATE 4 MILLIGRAM(S): 100 TABLET, EXTENDED RELEASE ORAL at 08:00

## 2024-06-19 RX ADMIN — DEXTROSE MONOHYDRATE AND SODIUM CHLORIDE 1000 MILLILITER(S): 5; .3 INJECTION, SOLUTION INTRAVENOUS at 09:00

## 2024-06-19 RX ADMIN — Medication 3 MILLIGRAM(S): at 22:04

## 2024-06-19 RX ADMIN — HYDROMORPHONE HCL 0.5 MILLIGRAM(S): 0.2 INJECTION, SOLUTION INTRAVENOUS at 23:28

## 2024-06-19 RX ADMIN — PIPERACILLIN SODIUM AND TAZOBACTAM SODIUM 200 GRAM(S): 3; .375 INJECTION, POWDER, LYOPHILIZED, FOR SOLUTION INTRAVENOUS at 08:58

## 2024-06-19 RX ADMIN — DEXTROSE MONOHYDRATE AND SODIUM CHLORIDE 1000 MILLILITER(S): 5; .3 INJECTION, SOLUTION INTRAVENOUS at 14:06

## 2024-06-19 RX ADMIN — PANTOPRAZOLE SODIUM 40 MILLIGRAM(S): 40 INJECTION, POWDER, FOR SOLUTION INTRAVENOUS at 21:42

## 2024-06-19 RX ADMIN — HYDROMORPHONE HCL 0.5 MILLIGRAM(S): 0.2 INJECTION, SOLUTION INTRAVENOUS at 12:15

## 2024-06-19 RX ADMIN — ONDANSETRON HYDROCHLORIDE 4 MILLIGRAM(S): 2 INJECTION INTRAMUSCULAR; INTRAVENOUS at 07:49

## 2024-06-19 RX ADMIN — DEXTROSE MONOHYDRATE AND SODIUM CHLORIDE 1000 MILLILITER(S): 5; .3 INJECTION, SOLUTION INTRAVENOUS at 08:09

## 2024-06-20 DIAGNOSIS — K27.0 ACUTE PEPTIC ULCER, SITE UNSPECIFIED, WITH HEMORRHAGE: ICD-10-CM

## 2024-06-20 LAB
ALBUMIN SERPL ELPH-MCNC: 4 G/DL — SIGNIFICANT CHANGE UP (ref 3.3–5)
ALP SERPL-CCNC: 55 U/L — SIGNIFICANT CHANGE UP (ref 40–120)
ALT FLD-CCNC: 10 U/L — SIGNIFICANT CHANGE UP (ref 10–45)
ANION GAP SERPL CALC-SCNC: 16 MMOL/L — SIGNIFICANT CHANGE UP (ref 5–17)
AST SERPL-CCNC: 10 U/L — SIGNIFICANT CHANGE UP (ref 10–40)
BASOPHILS # BLD AUTO: 0.04 K/UL — SIGNIFICANT CHANGE UP (ref 0–0.2)
BASOPHILS NFR BLD AUTO: 0.5 % — SIGNIFICANT CHANGE UP (ref 0–2)
BILIRUB SERPL-MCNC: 0.4 MG/DL — SIGNIFICANT CHANGE UP (ref 0.2–1.2)
BUN SERPL-MCNC: 12 MG/DL — SIGNIFICANT CHANGE UP (ref 7–23)
CALCIUM SERPL-MCNC: 9.7 MG/DL — SIGNIFICANT CHANGE UP (ref 8.4–10.5)
CHLORIDE SERPL-SCNC: 102 MMOL/L — SIGNIFICANT CHANGE UP (ref 96–108)
CO2 SERPL-SCNC: 20 MMOL/L — LOW (ref 22–31)
CREAT SERPL-MCNC: 0.82 MG/DL — SIGNIFICANT CHANGE UP (ref 0.5–1.3)
CULTURE RESULTS: SIGNIFICANT CHANGE UP
EGFR: 92 ML/MIN/1.73M2 — SIGNIFICANT CHANGE UP
EOSINOPHIL # BLD AUTO: 0.27 K/UL — SIGNIFICANT CHANGE UP (ref 0–0.5)
EOSINOPHIL NFR BLD AUTO: 3.3 % — SIGNIFICANT CHANGE UP (ref 0–6)
GLUCOSE SERPL-MCNC: 67 MG/DL — LOW (ref 70–99)
HCT VFR BLD CALC: 29.9 % — LOW (ref 34.5–45)
HGB BLD-MCNC: 9 G/DL — LOW (ref 11.5–15.5)
IMM GRANULOCYTES NFR BLD AUTO: 0.2 % — SIGNIFICANT CHANGE UP (ref 0–0.9)
LYMPHOCYTES # BLD AUTO: 3.07 K/UL — SIGNIFICANT CHANGE UP (ref 1–3.3)
LYMPHOCYTES # BLD AUTO: 37.3 % — SIGNIFICANT CHANGE UP (ref 13–44)
MCHC RBC-ENTMCNC: 22.2 PG — LOW (ref 27–34)
MCHC RBC-ENTMCNC: 30.1 GM/DL — LOW (ref 32–36)
MCV RBC AUTO: 73.6 FL — LOW (ref 80–100)
MONOCYTES # BLD AUTO: 0.6 K/UL — SIGNIFICANT CHANGE UP (ref 0–0.9)
MONOCYTES NFR BLD AUTO: 7.3 % — SIGNIFICANT CHANGE UP (ref 2–14)
NEUTROPHILS # BLD AUTO: 4.22 K/UL — SIGNIFICANT CHANGE UP (ref 1.8–7.4)
NEUTROPHILS NFR BLD AUTO: 51.4 % — SIGNIFICANT CHANGE UP (ref 43–77)
NRBC # BLD: 0 /100 WBCS — SIGNIFICANT CHANGE UP (ref 0–0)
PLATELET # BLD AUTO: 392 K/UL — SIGNIFICANT CHANGE UP (ref 150–400)
POTASSIUM SERPL-MCNC: 3.7 MMOL/L — SIGNIFICANT CHANGE UP (ref 3.5–5.3)
POTASSIUM SERPL-SCNC: 3.7 MMOL/L — SIGNIFICANT CHANGE UP (ref 3.5–5.3)
PROT SERPL-MCNC: 6.9 G/DL — SIGNIFICANT CHANGE UP (ref 6–8.3)
RBC # BLD: 4.06 M/UL — SIGNIFICANT CHANGE UP (ref 3.8–5.2)
RBC # FLD: 19.1 % — HIGH (ref 10.3–14.5)
SODIUM SERPL-SCNC: 138 MMOL/L — SIGNIFICANT CHANGE UP (ref 135–145)
SPECIMEN SOURCE: SIGNIFICANT CHANGE UP
WBC # BLD: 8.22 K/UL — SIGNIFICANT CHANGE UP (ref 3.8–10.5)
WBC # FLD AUTO: 8.22 K/UL — SIGNIFICANT CHANGE UP (ref 3.8–10.5)

## 2024-06-20 PROCEDURE — 43239 EGD BIOPSY SINGLE/MULTIPLE: CPT

## 2024-06-20 PROCEDURE — 88305 TISSUE EXAM BY PATHOLOGIST: CPT | Mod: 26

## 2024-06-20 PROCEDURE — 88342 IMHCHEM/IMCYTCHM 1ST ANTB: CPT | Mod: 26

## 2024-06-20 RX ORDER — SODIUM CHLORIDE 0.9 % (FLUSH) 0.9 %
1000 SYRINGE (ML) INJECTION
Refills: 0 | Status: DISCONTINUED | OUTPATIENT
Start: 2024-06-20 | End: 2024-06-21

## 2024-06-20 RX ADMIN — PANTOPRAZOLE SODIUM 40 MILLIGRAM(S): 40 INJECTION, POWDER, FOR SOLUTION INTRAVENOUS at 17:35

## 2024-06-20 RX ADMIN — Medication 60 MILLILITER(S): at 11:56

## 2024-06-20 RX ADMIN — PANTOPRAZOLE SODIUM 40 MILLIGRAM(S): 40 INJECTION, POWDER, FOR SOLUTION INTRAVENOUS at 09:14

## 2024-06-21 ENCOUNTER — TRANSCRIPTION ENCOUNTER (OUTPATIENT)
Age: 39
End: 2024-06-21

## 2024-06-21 VITALS
DIASTOLIC BLOOD PRESSURE: 81 MMHG | HEART RATE: 84 BPM | OXYGEN SATURATION: 100 % | RESPIRATION RATE: 19 BRPM | TEMPERATURE: 98 F | SYSTOLIC BLOOD PRESSURE: 119 MMHG

## 2024-06-21 PROCEDURE — 74018 RADEX ABDOMEN 1 VIEW: CPT

## 2024-06-21 PROCEDURE — 99231 SBSQ HOSP IP/OBS SF/LOW 25: CPT

## 2024-06-21 PROCEDURE — 82947 ASSAY GLUCOSE BLOOD QUANT: CPT

## 2024-06-21 PROCEDURE — 85027 COMPLETE CBC AUTOMATED: CPT

## 2024-06-21 PROCEDURE — 85014 HEMATOCRIT: CPT

## 2024-06-21 PROCEDURE — 96375 TX/PRO/DX INJ NEW DRUG ADDON: CPT

## 2024-06-21 PROCEDURE — 84295 ASSAY OF SERUM SODIUM: CPT

## 2024-06-21 PROCEDURE — 85025 COMPLETE CBC W/AUTO DIFF WBC: CPT

## 2024-06-21 PROCEDURE — 96374 THER/PROPH/DIAG INJ IV PUSH: CPT

## 2024-06-21 PROCEDURE — 84132 ASSAY OF SERUM POTASSIUM: CPT

## 2024-06-21 PROCEDURE — 80053 COMPREHEN METABOLIC PANEL: CPT

## 2024-06-21 PROCEDURE — 36415 COLL VENOUS BLD VENIPUNCTURE: CPT

## 2024-06-21 PROCEDURE — 83605 ASSAY OF LACTIC ACID: CPT

## 2024-06-21 PROCEDURE — 71045 X-RAY EXAM CHEST 1 VIEW: CPT

## 2024-06-21 PROCEDURE — 74183 MRI ABD W/O CNTR FLWD CNTR: CPT | Mod: MC

## 2024-06-21 PROCEDURE — 85018 HEMOGLOBIN: CPT

## 2024-06-21 PROCEDURE — 86901 BLOOD TYPING SEROLOGIC RH(D): CPT

## 2024-06-21 PROCEDURE — 84702 CHORIONIC GONADOTROPIN TEST: CPT

## 2024-06-21 PROCEDURE — 83690 ASSAY OF LIPASE: CPT

## 2024-06-21 PROCEDURE — 86703 HIV-1/HIV-2 1 RESULT ANTBDY: CPT

## 2024-06-21 PROCEDURE — 82435 ASSAY OF BLOOD CHLORIDE: CPT

## 2024-06-21 PROCEDURE — 86900 BLOOD TYPING SEROLOGIC ABO: CPT

## 2024-06-21 PROCEDURE — 81001 URINALYSIS AUTO W/SCOPE: CPT

## 2024-06-21 PROCEDURE — 86850 RBC ANTIBODY SCREEN: CPT

## 2024-06-21 PROCEDURE — 85610 PROTHROMBIN TIME: CPT

## 2024-06-21 PROCEDURE — 86803 HEPATITIS C AB TEST: CPT

## 2024-06-21 PROCEDURE — 88342 IMHCHEM/IMCYTCHM 1ST ANTB: CPT

## 2024-06-21 PROCEDURE — 87086 URINE CULTURE/COLONY COUNT: CPT

## 2024-06-21 PROCEDURE — A9585: CPT

## 2024-06-21 PROCEDURE — 85730 THROMBOPLASTIN TIME PARTIAL: CPT

## 2024-06-21 PROCEDURE — 88305 TISSUE EXAM BY PATHOLOGIST: CPT

## 2024-06-21 PROCEDURE — 74177 CT ABD & PELVIS W/CONTRAST: CPT | Mod: MC

## 2024-06-21 PROCEDURE — 99285 EMERGENCY DEPT VISIT HI MDM: CPT | Mod: 25

## 2024-06-21 PROCEDURE — 82803 BLOOD GASES ANY COMBINATION: CPT

## 2024-06-21 PROCEDURE — 82330 ASSAY OF CALCIUM: CPT

## 2024-06-21 RX ORDER — POLYETHYLENE GLYCOL 3350 1 G/G
17 POWDER ORAL DAILY
Refills: 0 | Status: DISCONTINUED | OUTPATIENT
Start: 2024-06-22 | End: 2024-06-21

## 2024-06-21 RX ORDER — PANTOPRAZOLE SODIUM 40 MG/10ML
40 INJECTION, POWDER, FOR SOLUTION INTRAVENOUS
Refills: 0 | Status: DISCONTINUED | OUTPATIENT
Start: 2024-06-21 | End: 2024-06-21

## 2024-06-21 RX ORDER — PANTOPRAZOLE SODIUM 40 MG/10ML
1 INJECTION, POWDER, FOR SOLUTION INTRAVENOUS
Qty: 60 | Refills: 0
Start: 2024-06-21 | End: 2024-07-20

## 2024-06-21 RX ORDER — MAGNESIUM CITRATE 1.75 G/29.6ML
296 LIQUID ORAL ONCE
Refills: 0 | Status: COMPLETED | OUTPATIENT
Start: 2024-06-21 | End: 2024-06-21

## 2024-06-21 RX ADMIN — Medication 650 MILLIGRAM(S): at 15:58

## 2024-06-21 RX ADMIN — NICOTINE POLACRILEX 1 PATCH: 2 LOZENGE ORAL at 18:48

## 2024-06-21 RX ADMIN — PANTOPRAZOLE SODIUM 40 MILLIGRAM(S): 40 INJECTION, POWDER, FOR SOLUTION INTRAVENOUS at 17:40

## 2024-06-21 RX ADMIN — PANTOPRAZOLE SODIUM 40 MILLIGRAM(S): 40 INJECTION, POWDER, FOR SOLUTION INTRAVENOUS at 06:44

## 2024-06-21 RX ADMIN — Medication 650 MILLIGRAM(S): at 08:07

## 2024-06-21 RX ADMIN — Medication 3 MILLIGRAM(S): at 00:59

## 2024-06-21 RX ADMIN — MAGNESIUM CITRATE 296 MILLILITER(S): 1.75 LIQUID ORAL at 12:34

## 2024-06-24 LAB — SURGICAL PATHOLOGY STUDY: SIGNIFICANT CHANGE UP

## 2024-06-27 ENCOUNTER — APPOINTMENT (OUTPATIENT)
Dept: GASTROENTEROLOGY | Facility: CLINIC | Age: 39
End: 2024-06-27

## 2024-07-08 NOTE — ED ADULT NURSE NOTE - NSFALLRSKOUTCOME_ED_ALL_ED
Markos HAAS @ [facility] called to get pt scheduled as soon as possible as her BS monitor had been taken off as it is not working. Pt has an appt on 08/08 at 11:30am for Return in about 4 months (around 8/16/2024) for Type 2 DM . Can pt be seen sooner? No availability at this time due to pt care. Please contact pt.   
Universal Safety Interventions

## 2024-08-08 NOTE — PRE-ANESTHESIA EVALUATION ADULT - NSANTHAPLANRD_GEN_ALL_CORE
pregnant.    monitored anesthesia care (MAC)

## 2024-10-15 NOTE — PROGRESS NOTE BEHAVIORAL HEALTH - SUMMARY
Hpi Title: Evaluation of a Skin Lesion
33 y/o AA female single, with a boyfriend, on medical disability since May 2018 for chronic gastritis sx, with 4 dependents (currently being cared for by patient's mother), domiciled in an apt in [place], with no past psychiatric history, inpatient hospitalizations, substance abuse significant for THC use (reportedly last use was 3 months ago, however Utox on  positive for THC), no suicide attempts or SIB. with PMHx known distal oesophagitis, gastritis in May 2018, on this admission, post partum 1 day via vaginal delivery and tubal ligation. Patient reports that her symptoms have worsened since his father recent passing away, with patient with resentment toward her two siblings with patient predominantly caring for her father (who apparently succumbed to ethanol related issues) by herself  for the past 3 years, reports has not been compliant with psychiatric medications since finding out that she is pregnant.  Psychiatry consulted to assess for management of anxiety.  Patient seen and evaluated, awake and alert oriented, reports worsening anxiety with panic attacks since self discontinuing her psychiatric medications upon finding out that she was pregnant.  Unclear what medications she had self discontinued, at first states it was Klonopin and then later states it was amitriptyline.  Reports panic attacks at least once a week, reports anxiety around the passing of her father one year ago.  Denies worsening mood symptoms, denies S/H/I/I/P, A/V/H, denies violent thoughts to harm baby, reports she is not planning to breast feed.  Denies impairments in sleep.  19- Psychiatry reconsulted to assess for psychosis, at first extremely anxious, perseverating on CPS report for her marijuana use during pregnancy as her  was positive for THC, reports "that you're trying to take my kids away from me" stating reason for her marijuana use was to alleviate her sx of gastritis.  Patient denies S/H/I/I/P, A/V/H, denies having intrusive thoughts to harm  or other people, reports overwhelming feelings of different providers coming in and out of the room to talk to her about her marijuana use reports feeling judged and overwhelmed by the thought of her children being taken away  by people "who think I'm crazy or a crack head", compares this to feeling "like the devil is on top of me", reports being a very spiritual person.  At this time, not exhibiting any psychosis sx, clearly overwhelmed and anxious over CPS case and the potential for losing her children.  Is motivated for outpt psychiatric follow up, calmer once paper work and outpt referral was clarified.  Does not meet criteria for inpatient psychiatric hospitalizations.

## 2024-10-22 NOTE — ED ADULT NURSE NOTE - CHIEF COMPLAINT QUOTE
CMP and fasting lipids ordered for Maurilio. Please let him know.     Julia New MD  10/21/2024    
Pt calling to verify ifhe needs labs completed prior to appt, no oreders placed at this time , please advise.   
Pt notified  
abd pain and vomiting seen here yesterday

## 2024-12-20 NOTE — ED ADULT NURSE NOTE - NS ED NURSE PATIENT LEFT UNIT TIME
duplex left peroneal DVT 12/09- started on lovenox 60 mg daily considering hyperbilirubinemia and synthetic liver dysfunction.  12/10 changed Lovenox to 60 mg BID. 07:12

## 2025-04-08 NOTE — DISCHARGE NOTE NURSING/CASE MANAGEMENT/SOCIAL WORK - NSDPDISTO_GEN_ALL_CORE
Home Gen - NAD, Comfortable  HEENT - NCAT, EOMI  Pulm - CTAB  Cardiovascular - RRR, S1S2  Abdomen - Soft, NT/ND, +BS  Extremities - No C/C/E, No calf tenderness  Neuro-     Cognitive - AAOx3 when given yes/no choices, follows commands     Communication - non-fluent aphasia (able to say yes/no), comprehension intact, repetition intact     Cranial Nerves - EOMI, tongue midline, slight right facial weakness     Motor -                     LEFT    UE - ShAB 5/5, EF 5/5, EE 5/5, WE 5/5,  5/5                    RIGHT UE - ShAB 4/5, EF 4/5, EE 4/5, WE 5/5,  4/5                    LEFT    LE - HF 2/5, KE 5/5, DF 5/5, PF 5/5                    RIGHT LE - HF 2/5, KE 5/5, DF 5/5, PF 5/5        Sensory - Intact to LT     Coordination - FTN intact     Tone - normal  Psychiatric - Mood stable, Affect WNL  Skin: IAD to sacrum, right posterior thigh ecchymosis, forehead scab s/p biopsy,  left lateral ankle scar with redness towards heel

## 2025-04-11 NOTE — ED ADULT NURSE NOTE - NSHOSCREENINGQ1_ED_ALL_ED
Notes by [facility]:  Mr Lozano 
No

## 2025-05-13 NOTE — DIETITIAN INITIAL EVALUATION ADULT. - NUTRITION INTERVENTION
[ ***ASSESSMENT*** .. pt w hx tiffanie, pres w tiffanie, found w ....]   84F PMH CVA (residual gait disturbance and LLE weakness, ambulates with walker), HTN, HLD, hypothyroidism, chronic UTI p/w L sided hip pain and non bloody emesis episode x1 admitted for abdominal pain diagnosis and management    Hospital course (by problem):  # Left lower quadrant pain.   Reports intermittent LLQ pain for past month and 1x vomiting yesterday, Has noted constipation recently for which she tried metamucil. Exam ND, + mild epigastric and LLQ TTP, +BS  CT A/P with IV contrast shows diverticulosis, stool in rectal vault.   LLQ pain likely 2/2 constipation vs diverticulosis  - give dulcolax suppository and start on miralax BID   - pt currently denies nausea, trial diet   - monitor BMs  - She reports chronic pain for which she takes Amitriptyline 25mg (QTc 437).    #Asymptomatic bacteriuria.   Reports history of recurrent UTI takes ellura (cranberry extract) outpatient. Reports 1x episode of mild dysuria. UA spec grav 1.063, protein 30, nitrite positive, small blood bacteria numerous WBC 3  Pt currently denies dysuria, suprapubic tenderness. Meets 0/4 SIRS criteria  - for now will monitor off antibiotics     #CVA (cerebrovascular accident).    History of CVA. ncCTH shows Mild periventricular white matter ischemia.  Old infarction in the RIGHT cerebellum. Reported history of LLE weakness however currently without any focal deficits   - continue to monitor.    #Lumbar compression fracture.    CT A/P shows Mild L1 compression fracture which patient reports is known. Currently without back pain  - pending ortho recs and PT eval  - patient ambulates with walker; denies recent falls     # Hypothyroidism.   - cw home synthroid 88 mcg    #: Hypertension.   - cw home lisinopril 5mg qd  Hyperlipidemia.    - cw home rosuvastatin 20 qd.    # GERD (gastroesophageal reflux disease).    - cw famotidine 20mg qd.      New medications:   Changes to old medications:  Items to follow up outpatient:  Physical Exam at time of Discharge:  ****   Nutrition Education/Meals and Snack   84F PMH CVA (residual gait disturbance and LLE weakness, ambulates with walker), HTN, HLD, hypothyroidism, chronic UTI p/w L sided hip pain and non bloody emesis episode x1 admitted for abdominal pain diagnosis and management    Hospital course (by problem):  # Left lower quadrant pain.   Reports intermittent LLQ pain for past month and 1x vomiting yesterday, Has noted constipation recently for which she tried metamucil. Exam ND, + mild epigastric and LLQ TTP, +BS  CT A/P with IV contrast shows diverticulosis, stool in rectal vault.   LLQ pain likely 2/2 constipation vs diverticulosis  - give dulcolax suppository and start on miralax BID   - pt currently denies nausea, trial diet   - monitor BMs  - She reports chronic pain for which she takes Amitriptyline 25mg (QTc 437). Upon further med rec, pt has not been prescribed amitriptilyne since 2023 however she may be taking irregularly. Pt found to be retaining urine with suprapubic pain likely from complex uro/gyn hx with use of amitriptilyne.     Plan  -stop amitripyline use   -f/u with uro/gyn within 1 week of discharge.     #Asymptomatic bacteriuria.   Reports history of recurrent UTI takes ellura (cranberry extract) outpatient. Reports 1x episode of mild dysuria. UA spec grav 1.063, protein 30, nitrite positive, small blood bacteria numerous WBC 3  Pt currently denies dysuria, suprapubic tenderness. Meets 0/4 SIRS criteria  - for now will monitor off antibiotics     #CVA (cerebrovascular accident).    History of CVA. ncCTH shows Mild periventricular white matter ischemia.  Old infarction in the RIGHT cerebellum. Reported history of LLE weakness however currently without any focal deficits   - continue to monitor.    #Lumbar compression fracture.    CT A/P shows Mild L1 compression fracture which patient reports is known. Currently without back pain  - pending ortho recs and PT eval  - patient ambulates with walker; denies recent falls     # Hypothyroidism.   - cw home synthroid 88 mcg    #: Hypertension.   - cw home lisinopril 5mg qd  Hyperlipidemia.    - cw home rosuvastatin 20 qd.    # GERD (gastroesophageal reflux disease).    - cw famotidine 20mg qd.      New medications: none  Changes to old medications: stop amitriptyline   Items to follow up outpatient:  Follow up with Dr. Garcia (PCP) and Dr. Rodrigues (uro/gyn) within 1 week of discharge   Physical Exam at time of Discharge:  General: NAD  HEENT: PERRL, EOM intact, sclera anicteric, MMM  Cardiovascular: RRR; no MRG; no JVD  Respiratory: CTAB; no WRR  GI/: soft; NTND; BS x4  Extremities: WWP; 2+ peripheral pulses bilaterally; no LE edema  Skin: normal color & turgor; no rash  Neurologic: aox3; no focal deficits     84F PMH CVA (residual gait disturbance and LLE weakness, ambulates with walker), HTN, HLD, hypothyroidism, chronic UTI p/w L sided hip pain and non bloody emesis episode x1 admitted for abdominal pain diagnosis and management    Hospital course (by problem):  # Left lower quadrant pain.   Reports intermittent LLQ pain for past month and 1x vomiting yesterday, Has noted constipation recently for which she tried metamucil. Exam ND, + mild epigastric and LLQ TTP, +BS  CT A/P with IV contrast shows diverticulosis, stool in rectal vault.   LLQ pain likely 2/2 constipation vs diverticulosis  - give dulcolax suppository and start on miralax BID   - pt currently denies nausea, trial diet   - monitor BMs  - She reports chronic pain for which she takes Amitriptyline 25mg (QTc 437). Upon further med rec, pt has not been prescribed amitriptilyne since 2023 however she may be taking irregularly. Pt found to be retaining urine with suprapubic pain likely from complex uro/gyn hx with use of amitriptilyne.     Plan  -stop amitripyline use   -f/u with uro/gyn within 1 week of discharge.     #Asymptomatic bacteriuria.   Reports history of recurrent UTI takes ellura (cranberry extract) outpatient. Reports 1x episode of mild dysuria. UA spec grav 1.063, protein 30, nitrite positive, small blood bacteria numerous WBC 3  Pt currently denies dysuria, suprapubic tenderness. Meets 0/4 SIRS criteria  - for now will monitor off antibiotics     #CVA (cerebrovascular accident).    History of CVA. ncCTH shows Mild periventricular white matter ischemia.  Old infarction in the RIGHT cerebellum. Reported history of LLE weakness however currently without any focal deficits   - continue to monitor.    #Lumbar compression fracture.    CT A/P shows Mild L1 compression fracture which patient reports is known. Currently without back pain  - pending ortho recs and PT eval  - patient ambulates with walker; denies recent falls     # Hypothyroidism.   - cw home synthroid 88 mcg    #: Hypertension.   - cw home lisinopril 5mg qd  Hyperlipidemia.    - cw home rosuvastatin 20 qd.    # GERD (gastroesophageal reflux disease).    - cw famotidine 20mg qd.      New medications: none  Changes to old medications: stop amitriptyline   Items to follow up outpatient:  Follow up with Dr. Garcia (PCP) and Dr. Rodrigues (uro/gyn) within 1 week of discharge     Physical Exam at time of Discharge:  General: NAD  HEENT: PERRL, EOM intact, sclera anicteric, MMM  Cardiovascular: RRR; no MRG; no JVD  Respiratory: CTAB; no WRR  GI/: soft; NTND; BS x4  Extremities: WWP; 2+ peripheral pulses bilaterally; no LE edema  Skin: normal color & turgor; no rash  Neurologic: aox3; no focal deficits

## 2025-05-20 ENCOUNTER — INPATIENT (INPATIENT)
Facility: HOSPITAL | Age: 40
LOS: 3 days | Discharge: HOME | DRG: 381 | End: 2025-05-24
Attending: INTERNAL MEDICINE | Admitting: INTERNAL MEDICINE
Payer: MEDICAID

## 2025-05-20 VITALS
TEMPERATURE: 98 F | HEIGHT: 64 IN | HEART RATE: 102 BPM | DIASTOLIC BLOOD PRESSURE: 73 MMHG | RESPIRATION RATE: 16 BRPM | WEIGHT: 169.98 LBS | OXYGEN SATURATION: 99 % | SYSTOLIC BLOOD PRESSURE: 133 MMHG

## 2025-05-20 DIAGNOSIS — Z98.51 TUBAL LIGATION STATUS: Chronic | ICD-10-CM

## 2025-05-20 LAB
ALBUMIN SERPL ELPH-MCNC: 4.7 G/DL — SIGNIFICANT CHANGE UP (ref 3.3–5)
ALP SERPL-CCNC: 74 U/L — SIGNIFICANT CHANGE UP (ref 40–120)
ALT FLD-CCNC: 16 U/L — SIGNIFICANT CHANGE UP (ref 10–45)
ANION GAP SERPL CALC-SCNC: 18 MMOL/L — HIGH (ref 5–17)
ANISOCYTOSIS BLD QL: SLIGHT — SIGNIFICANT CHANGE UP
APTT BLD: 88.8 SEC — HIGH (ref 26.1–36.8)
AST SERPL-CCNC: 16 U/L — SIGNIFICANT CHANGE UP (ref 10–40)
BASOPHILS # BLD AUTO: 0 K/UL — SIGNIFICANT CHANGE UP (ref 0–0.2)
BASOPHILS NFR BLD AUTO: 0 % — SIGNIFICANT CHANGE UP (ref 0–2)
BILIRUB SERPL-MCNC: 0.4 MG/DL — SIGNIFICANT CHANGE UP (ref 0.2–1.2)
BLD GP AB SCN SERPL QL: NEGATIVE — SIGNIFICANT CHANGE UP
BUN SERPL-MCNC: 9 MG/DL — SIGNIFICANT CHANGE UP (ref 7–23)
CALCIUM SERPL-MCNC: 11.1 MG/DL — HIGH (ref 8.4–10.5)
CHLORIDE SERPL-SCNC: 94 MMOL/L — LOW (ref 96–108)
CO2 SERPL-SCNC: 26 MMOL/L — SIGNIFICANT CHANGE UP (ref 22–31)
CREAT SERPL-MCNC: 0.97 MG/DL — SIGNIFICANT CHANGE UP (ref 0.5–1.3)
EGFR: 75 ML/MIN/1.73M2 — SIGNIFICANT CHANGE UP
EGFR: 75 ML/MIN/1.73M2 — SIGNIFICANT CHANGE UP
ELLIPTOCYTES BLD QL SMEAR: SLIGHT — SIGNIFICANT CHANGE UP
EOSINOPHIL # BLD AUTO: 0 K/UL — SIGNIFICANT CHANGE UP (ref 0–0.5)
EOSINOPHIL NFR BLD AUTO: 0 % — SIGNIFICANT CHANGE UP (ref 0–6)
GAS PNL BLDV: SIGNIFICANT CHANGE UP
GLUCOSE SERPL-MCNC: 131 MG/DL — HIGH (ref 70–99)
HCG SERPL-ACNC: <2 MIU/ML — SIGNIFICANT CHANGE UP
HCT VFR BLD CALC: 33.8 % — LOW (ref 34.5–45)
HGB BLD-MCNC: 10 G/DL — LOW (ref 11.5–15.5)
HYPOCHROMIA BLD QL: SLIGHT — SIGNIFICANT CHANGE UP
INR BLD: 1.16 RATIO — SIGNIFICANT CHANGE UP (ref 0.85–1.16)
LIDOCAIN IGE QN: 20 U/L — SIGNIFICANT CHANGE UP (ref 7–60)
LYMPHOCYTES # BLD AUTO: 3.04 K/UL — SIGNIFICANT CHANGE UP (ref 1–3.3)
LYMPHOCYTES # BLD AUTO: 31.6 % — SIGNIFICANT CHANGE UP (ref 13–44)
MACROCYTES BLD QL: SLIGHT — SIGNIFICANT CHANGE UP
MANUAL SMEAR VERIFICATION: SIGNIFICANT CHANGE UP
MCHC RBC-ENTMCNC: 19.3 PG — LOW (ref 27–34)
MCHC RBC-ENTMCNC: 29.6 G/DL — LOW (ref 32–36)
MCV RBC AUTO: 65.4 FL — LOW (ref 80–100)
MICROCYTES BLD QL: SLIGHT — SIGNIFICANT CHANGE UP
MONOCYTES # BLD AUTO: 1.18 K/UL — HIGH (ref 0–0.9)
MONOCYTES NFR BLD AUTO: 12.3 % — SIGNIFICANT CHANGE UP (ref 2–14)
NEUTROPHILS # BLD AUTO: 5.39 K/UL — SIGNIFICANT CHANGE UP (ref 1.8–7.4)
NEUTROPHILS NFR BLD AUTO: 56.1 % — SIGNIFICANT CHANGE UP (ref 43–77)
OVALOCYTES BLD QL SMEAR: SLIGHT — SIGNIFICANT CHANGE UP
PLAT MORPH BLD: NORMAL — SIGNIFICANT CHANGE UP
PLATELET # BLD AUTO: 657 K/UL — HIGH (ref 150–400)
POIKILOCYTOSIS BLD QL AUTO: SLIGHT — SIGNIFICANT CHANGE UP
POTASSIUM SERPL-MCNC: 3.4 MMOL/L — LOW (ref 3.5–5.3)
POTASSIUM SERPL-SCNC: 3.4 MMOL/L — LOW (ref 3.5–5.3)
PROT SERPL-MCNC: 8.3 G/DL — SIGNIFICANT CHANGE UP (ref 6–8.3)
PROTHROM AB SERPL-ACNC: 13.2 SEC — SIGNIFICANT CHANGE UP (ref 9.9–13.4)
RBC # BLD: 5.17 M/UL — SIGNIFICANT CHANGE UP (ref 3.8–5.2)
RBC # FLD: 20.6 % — HIGH (ref 10.3–14.5)
RBC BLD AUTO: ABNORMAL
RH IG SCN BLD-IMP: POSITIVE — SIGNIFICANT CHANGE UP
SCHISTOCYTES BLD QL AUTO: SLIGHT — SIGNIFICANT CHANGE UP
SODIUM SERPL-SCNC: 138 MMOL/L — SIGNIFICANT CHANGE UP (ref 135–145)
TROPONIN T, HIGH SENSITIVITY RESULT: <6 NG/L — SIGNIFICANT CHANGE UP (ref 0–51)
WBC # BLD: 9.61 K/UL — SIGNIFICANT CHANGE UP (ref 3.8–10.5)
WBC # FLD AUTO: 9.61 K/UL — SIGNIFICANT CHANGE UP (ref 3.8–10.5)

## 2025-05-20 PROCEDURE — 99285 EMERGENCY DEPT VISIT HI MDM: CPT

## 2025-05-20 PROCEDURE — 71045 X-RAY EXAM CHEST 1 VIEW: CPT | Mod: 26

## 2025-05-20 PROCEDURE — 93010 ELECTROCARDIOGRAM REPORT: CPT

## 2025-05-20 PROCEDURE — 74177 CT ABD & PELVIS W/CONTRAST: CPT | Mod: 26

## 2025-05-20 PROCEDURE — 76705 ECHO EXAM OF ABDOMEN: CPT | Mod: 26

## 2025-05-20 RX ORDER — ONDANSETRON HCL/PF 4 MG/2 ML
4 VIAL (ML) INJECTION ONCE
Refills: 0 | Status: COMPLETED | OUTPATIENT
Start: 2025-05-20 | End: 2025-05-20

## 2025-05-20 RX ADMIN — Medication 4 MILLIGRAM(S): at 20:32

## 2025-05-20 RX ADMIN — Medication 4 MILLIGRAM(S): at 19:51

## 2025-05-20 RX ADMIN — Medication 80 MILLIGRAM(S): at 19:51

## 2025-05-20 RX ADMIN — Medication 1000 MILLILITER(S): at 19:59

## 2025-05-20 RX ADMIN — Medication 20 MILLIGRAM(S): at 19:51

## 2025-05-21 DIAGNOSIS — K27.9 PEPTIC ULCER, SITE UNSPECIFIED, UNSPECIFIED AS ACUTE OR CHRONIC, WITHOUT HEMORRHAGE OR PERFORATION: ICD-10-CM

## 2025-05-21 LAB — GAS PNL BLDV: SIGNIFICANT CHANGE UP

## 2025-05-21 PROCEDURE — 99223 1ST HOSP IP/OBS HIGH 75: CPT

## 2025-05-21 PROCEDURE — 74176 CT ABD & PELVIS W/O CONTRAST: CPT | Mod: 26

## 2025-05-21 RX ORDER — OMEPRAZOLE 20 MG/1
1 CAPSULE, DELAYED RELEASE ORAL
Refills: 0 | DISCHARGE

## 2025-05-21 RX ORDER — SODIUM CHLORIDE 9 G/1000ML
1000 INJECTION, SOLUTION INTRAVENOUS
Refills: 0 | Status: DISCONTINUED | OUTPATIENT
Start: 2025-05-21 | End: 2025-05-22

## 2025-05-21 RX ORDER — PIPERACILLIN-TAZO-DEXTROSE,ISO 3.375G/5
3.38 IV SOLUTION, PIGGYBACK PREMIX FROZEN(ML) INTRAVENOUS ONCE
Refills: 0 | Status: COMPLETED | OUTPATIENT
Start: 2025-05-21 | End: 2025-05-21

## 2025-05-21 RX ORDER — PIPERACILLIN-TAZO-DEXTROSE,ISO 3.375G/5
3.38 IV SOLUTION, PIGGYBACK PREMIX FROZEN(ML) INTRAVENOUS EVERY 8 HOURS
Refills: 0 | Status: DISCONTINUED | OUTPATIENT
Start: 2025-05-22 | End: 2025-05-24

## 2025-05-21 RX ADMIN — Medication 4 MILLIGRAM(S): at 13:16

## 2025-05-21 RX ADMIN — Medication 4 MILLIGRAM(S): at 18:32

## 2025-05-21 RX ADMIN — Medication 200 GRAM(S): at 18:02

## 2025-05-21 RX ADMIN — SODIUM CHLORIDE 75 MILLILITER(S): 9 INJECTION, SOLUTION INTRAVENOUS at 03:04

## 2025-05-21 RX ADMIN — Medication 4 MILLIGRAM(S): at 06:27

## 2025-05-21 RX ADMIN — Medication 4 MILLIGRAM(S): at 18:02

## 2025-05-21 RX ADMIN — Medication 4 MILLIGRAM(S): at 12:15

## 2025-05-21 RX ADMIN — Medication 40 MILLIEQUIVALENT(S): at 00:48

## 2025-05-21 RX ADMIN — Medication 25 GRAM(S): at 21:23

## 2025-05-21 RX ADMIN — SODIUM CHLORIDE 75 MILLILITER(S): 9 INJECTION, SOLUTION INTRAVENOUS at 12:15

## 2025-05-21 RX ADMIN — Medication 4 MILLIGRAM(S): at 03:04

## 2025-05-21 NOTE — BEHAVIORAL HEALTH ASSESSMENT NOTE - ABUSE / TRAUMA HISTORY
Fit, dispensed and given patient instructions on left comfort cool thumb cmc restriction splint--medium+.  Patient demonstrated correct way to don/doff the brace and verbalized comfort.    Yes

## 2025-05-22 LAB
ADD ON TEST-SPECIMEN IN LAB: SIGNIFICANT CHANGE UP
ALBUMIN SERPL ELPH-MCNC: 3.9 G/DL — SIGNIFICANT CHANGE UP (ref 3.3–5)
ALP SERPL-CCNC: 57 U/L — SIGNIFICANT CHANGE UP (ref 40–120)
ALT FLD-CCNC: 11 U/L — SIGNIFICANT CHANGE UP (ref 10–45)
AMYLASE P1 CFR SERPL: 85 U/L — SIGNIFICANT CHANGE UP (ref 25–125)
ANION GAP SERPL CALC-SCNC: 17 MMOL/L — SIGNIFICANT CHANGE UP (ref 5–17)
APTT BLD: 78 SEC — HIGH (ref 26.1–36.8)
AST SERPL-CCNC: 12 U/L — SIGNIFICANT CHANGE UP (ref 10–40)
BILIRUB SERPL-MCNC: 0.4 MG/DL — SIGNIFICANT CHANGE UP (ref 0.2–1.2)
BUN SERPL-MCNC: 9 MG/DL — SIGNIFICANT CHANGE UP (ref 7–23)
CALCIUM SERPL-MCNC: 9.9 MG/DL — SIGNIFICANT CHANGE UP (ref 8.4–10.5)
CHLORIDE SERPL-SCNC: 98 MMOL/L — SIGNIFICANT CHANGE UP (ref 96–108)
CO2 SERPL-SCNC: 22 MMOL/L — SIGNIFICANT CHANGE UP (ref 22–31)
CREAT SERPL-MCNC: 0.79 MG/DL — SIGNIFICANT CHANGE UP (ref 0.5–1.3)
EGFR: 96 ML/MIN/1.73M2 — SIGNIFICANT CHANGE UP
EGFR: 96 ML/MIN/1.73M2 — SIGNIFICANT CHANGE UP
GLUCOSE SERPL-MCNC: 76 MG/DL — SIGNIFICANT CHANGE UP (ref 70–99)
HCT VFR BLD CALC: 28.7 % — LOW (ref 34.5–45)
HCT VFR BLD CALC: 30.1 % — LOW (ref 34.5–45)
HGB BLD-MCNC: 8.4 G/DL — LOW (ref 11.5–15.5)
HGB BLD-MCNC: 8.5 G/DL — LOW (ref 11.5–15.5)
INR BLD: 1.04 RATIO — SIGNIFICANT CHANGE UP (ref 0.85–1.16)
LIDOCAIN IGE QN: 68 U/L — HIGH (ref 7–60)
MAGNESIUM SERPL-MCNC: 1.9 MG/DL — SIGNIFICANT CHANGE UP (ref 1.6–2.6)
MCHC RBC-ENTMCNC: 18.8 PG — LOW (ref 27–34)
MCHC RBC-ENTMCNC: 19.8 PG — LOW (ref 27–34)
MCHC RBC-ENTMCNC: 28.2 G/DL — LOW (ref 32–36)
MCHC RBC-ENTMCNC: 29.3 G/DL — LOW (ref 32–36)
MCV RBC AUTO: 66.6 FL — LOW (ref 80–100)
MCV RBC AUTO: 67.5 FL — LOW (ref 80–100)
NRBC BLD AUTO-RTO: 0 /100 WBCS — SIGNIFICANT CHANGE UP (ref 0–0)
NRBC BLD AUTO-RTO: 0 /100 WBCS — SIGNIFICANT CHANGE UP (ref 0–0)
PHOSPHATE SERPL-MCNC: 2.7 MG/DL — SIGNIFICANT CHANGE UP (ref 2.5–4.5)
PLATELET # BLD AUTO: 494 K/UL — HIGH (ref 150–400)
PLATELET # BLD AUTO: 507 K/UL — HIGH (ref 150–400)
POTASSIUM SERPL-MCNC: 3.5 MMOL/L — SIGNIFICANT CHANGE UP (ref 3.5–5.3)
POTASSIUM SERPL-SCNC: 3.5 MMOL/L — SIGNIFICANT CHANGE UP (ref 3.5–5.3)
PROT SERPL-MCNC: 6.7 G/DL — SIGNIFICANT CHANGE UP (ref 6–8.3)
PROTHROM AB SERPL-ACNC: 12 SEC — SIGNIFICANT CHANGE UP (ref 9.9–13.4)
RBC # BLD: 4.25 M/UL — SIGNIFICANT CHANGE UP (ref 3.8–5.2)
RBC # BLD: 4.52 M/UL — SIGNIFICANT CHANGE UP (ref 3.8–5.2)
RBC # FLD: 20.3 % — HIGH (ref 10.3–14.5)
RBC # FLD: 20.4 % — HIGH (ref 10.3–14.5)
SODIUM SERPL-SCNC: 137 MMOL/L — SIGNIFICANT CHANGE UP (ref 135–145)
WBC # BLD: 6.58 K/UL — SIGNIFICANT CHANGE UP (ref 3.8–10.5)
WBC # BLD: 7.7 K/UL — SIGNIFICANT CHANGE UP (ref 3.8–10.5)
WBC # FLD AUTO: 6.58 K/UL — SIGNIFICANT CHANGE UP (ref 3.8–10.5)
WBC # FLD AUTO: 7.7 K/UL — SIGNIFICANT CHANGE UP (ref 3.8–10.5)

## 2025-05-22 PROCEDURE — 99232 SBSQ HOSP IP/OBS MODERATE 35: CPT

## 2025-05-22 PROCEDURE — G0545: CPT

## 2025-05-22 PROCEDURE — 99223 1ST HOSP IP/OBS HIGH 75: CPT

## 2025-05-22 RX ORDER — SODIUM CHLORIDE 9 G/1000ML
1000 INJECTION, SOLUTION INTRAVENOUS
Refills: 0 | Status: DISCONTINUED | OUTPATIENT
Start: 2025-05-22 | End: 2025-05-24

## 2025-05-22 RX ORDER — ACETAMINOPHEN 500 MG/5ML
1000 LIQUID (ML) ORAL ONCE
Refills: 0 | Status: COMPLETED | OUTPATIENT
Start: 2025-05-22 | End: 2025-05-22

## 2025-05-22 RX ADMIN — Medication 40 MILLIGRAM(S): at 05:59

## 2025-05-22 RX ADMIN — Medication 4 MILLIGRAM(S): at 12:49

## 2025-05-22 RX ADMIN — Medication 4 MILLIGRAM(S): at 00:47

## 2025-05-22 RX ADMIN — Medication 25 GRAM(S): at 15:44

## 2025-05-22 RX ADMIN — Medication 25 GRAM(S): at 05:26

## 2025-05-22 RX ADMIN — Medication 4 MILLIGRAM(S): at 12:19

## 2025-05-22 RX ADMIN — Medication 4 MILLIGRAM(S): at 05:59

## 2025-05-22 RX ADMIN — Medication 40 MILLIGRAM(S): at 21:29

## 2025-05-22 RX ADMIN — Medication 4 MILLIGRAM(S): at 21:20

## 2025-05-22 RX ADMIN — Medication 4 MILLIGRAM(S): at 06:29

## 2025-05-22 RX ADMIN — Medication 1000 MILLIGRAM(S): at 18:21

## 2025-05-22 RX ADMIN — Medication 4 MILLIGRAM(S): at 18:49

## 2025-05-22 RX ADMIN — Medication 4 MILLIGRAM(S): at 00:17

## 2025-05-22 RX ADMIN — Medication 25 GRAM(S): at 21:29

## 2025-05-22 RX ADMIN — SODIUM CHLORIDE 75 MILLILITER(S): 9 INJECTION, SOLUTION INTRAVENOUS at 15:45

## 2025-05-22 RX ADMIN — Medication 400 MILLIGRAM(S): at 16:25

## 2025-05-23 LAB
ALBUMIN SERPL ELPH-MCNC: 3.2 G/DL — LOW (ref 3.3–5)
ALP SERPL-CCNC: 51 U/L — SIGNIFICANT CHANGE UP (ref 40–120)
ALT FLD-CCNC: 9 U/L — LOW (ref 10–45)
AMYLASE P1 CFR SERPL: 73 U/L — SIGNIFICANT CHANGE UP (ref 25–125)
ANION GAP SERPL CALC-SCNC: 13 MMOL/L — SIGNIFICANT CHANGE UP (ref 5–17)
AST SERPL-CCNC: 12 U/L — SIGNIFICANT CHANGE UP (ref 10–40)
BILIRUB SERPL-MCNC: 0.2 MG/DL — SIGNIFICANT CHANGE UP (ref 0.2–1.2)
BLD GP AB SCN SERPL QL: NEGATIVE — SIGNIFICANT CHANGE UP
BUN SERPL-MCNC: 11 MG/DL — SIGNIFICANT CHANGE UP (ref 7–23)
CALCIUM SERPL-MCNC: 9 MG/DL — SIGNIFICANT CHANGE UP (ref 8.4–10.5)
CHLORIDE SERPL-SCNC: 102 MMOL/L — SIGNIFICANT CHANGE UP (ref 96–108)
CO2 SERPL-SCNC: 23 MMOL/L — SIGNIFICANT CHANGE UP (ref 22–31)
CREAT SERPL-MCNC: 0.88 MG/DL — SIGNIFICANT CHANGE UP (ref 0.5–1.3)
EGFR: 84 ML/MIN/1.73M2 — SIGNIFICANT CHANGE UP
EGFR: 84 ML/MIN/1.73M2 — SIGNIFICANT CHANGE UP
GLUCOSE SERPL-MCNC: 113 MG/DL — HIGH (ref 70–99)
HCT VFR BLD CALC: 27.1 % — LOW (ref 34.5–45)
HCT VFR BLD CALC: 27.6 % — LOW (ref 34.5–45)
HGB BLD-MCNC: 7.9 G/DL — LOW (ref 11.5–15.5)
HGB BLD-MCNC: 7.9 G/DL — LOW (ref 11.5–15.5)
LIDOCAIN IGE QN: 20 U/L — SIGNIFICANT CHANGE UP (ref 7–60)
MCHC RBC-ENTMCNC: 19.2 PG — LOW (ref 27–34)
MCHC RBC-ENTMCNC: 19.3 PG — LOW (ref 27–34)
MCHC RBC-ENTMCNC: 28.6 G/DL — LOW (ref 32–36)
MCHC RBC-ENTMCNC: 29.2 G/DL — LOW (ref 32–36)
MCV RBC AUTO: 66.3 FL — LOW (ref 80–100)
MCV RBC AUTO: 67.2 FL — LOW (ref 80–100)
NRBC BLD AUTO-RTO: 0 /100 WBCS — SIGNIFICANT CHANGE UP (ref 0–0)
NRBC BLD AUTO-RTO: 0 /100 WBCS — SIGNIFICANT CHANGE UP (ref 0–0)
PLATELET # BLD AUTO: 477 K/UL — HIGH (ref 150–400)
PLATELET # BLD AUTO: 495 K/UL — HIGH (ref 150–400)
POTASSIUM SERPL-MCNC: 3.4 MMOL/L — LOW (ref 3.5–5.3)
POTASSIUM SERPL-SCNC: 3.4 MMOL/L — LOW (ref 3.5–5.3)
PROT SERPL-MCNC: 5.8 G/DL — LOW (ref 6–8.3)
RBC # BLD: 4.09 M/UL — SIGNIFICANT CHANGE UP (ref 3.8–5.2)
RBC # BLD: 4.11 M/UL — SIGNIFICANT CHANGE UP (ref 3.8–5.2)
RBC # FLD: 20 % — HIGH (ref 10.3–14.5)
RBC # FLD: 20.2 % — HIGH (ref 10.3–14.5)
RH IG SCN BLD-IMP: POSITIVE — SIGNIFICANT CHANGE UP
SODIUM SERPL-SCNC: 138 MMOL/L — SIGNIFICANT CHANGE UP (ref 135–145)
WBC # BLD: 4.77 K/UL — SIGNIFICANT CHANGE UP (ref 3.8–10.5)
WBC # BLD: 4.99 K/UL — SIGNIFICANT CHANGE UP (ref 3.8–10.5)
WBC # FLD AUTO: 4.77 K/UL — SIGNIFICANT CHANGE UP (ref 3.8–10.5)
WBC # FLD AUTO: 4.99 K/UL — SIGNIFICANT CHANGE UP (ref 3.8–10.5)

## 2025-05-23 PROCEDURE — 99232 SBSQ HOSP IP/OBS MODERATE 35: CPT

## 2025-05-23 PROCEDURE — G0545: CPT

## 2025-05-23 PROCEDURE — 99231 SBSQ HOSP IP/OBS SF/LOW 25: CPT

## 2025-05-23 RX ORDER — MELATONIN 5 MG
5 TABLET ORAL AT BEDTIME
Refills: 0 | Status: DISCONTINUED | OUTPATIENT
Start: 2025-05-23 | End: 2025-05-24

## 2025-05-23 RX ORDER — ACETAMINOPHEN 500 MG/5ML
1000 LIQUID (ML) ORAL ONCE
Refills: 0 | Status: COMPLETED | OUTPATIENT
Start: 2025-05-23 | End: 2025-05-23

## 2025-05-23 RX ADMIN — Medication 25 GRAM(S): at 06:07

## 2025-05-23 RX ADMIN — Medication 400 MILLIGRAM(S): at 00:43

## 2025-05-23 RX ADMIN — Medication 25 GRAM(S): at 21:46

## 2025-05-23 RX ADMIN — Medication 4 MILLIGRAM(S): at 05:59

## 2025-05-23 RX ADMIN — Medication 40 MILLIGRAM(S): at 06:00

## 2025-05-23 RX ADMIN — Medication 1000 MILLIGRAM(S): at 23:43

## 2025-05-23 RX ADMIN — Medication 4 MILLIGRAM(S): at 12:09

## 2025-05-23 RX ADMIN — Medication 4 MILLIGRAM(S): at 06:29

## 2025-05-23 RX ADMIN — Medication 400 MILLIGRAM(S): at 22:43

## 2025-05-23 RX ADMIN — Medication 4 MILLIGRAM(S): at 11:39

## 2025-05-23 RX ADMIN — Medication 4 MILLIGRAM(S): at 17:46

## 2025-05-23 RX ADMIN — Medication 1000 MILLIGRAM(S): at 02:32

## 2025-05-23 RX ADMIN — Medication 40 MILLIEQUIVALENT(S): at 17:45

## 2025-05-23 RX ADMIN — Medication 5 MILLIGRAM(S): at 22:45

## 2025-05-23 RX ADMIN — Medication 4 MILLIGRAM(S): at 18:30

## 2025-05-23 RX ADMIN — Medication 40 MILLIGRAM(S): at 17:46

## 2025-05-23 RX ADMIN — Medication 25 GRAM(S): at 13:01

## 2025-05-24 ENCOUNTER — TRANSCRIPTION ENCOUNTER (OUTPATIENT)
Age: 40
End: 2025-05-24

## 2025-05-24 VITALS
RESPIRATION RATE: 18 BRPM | TEMPERATURE: 98 F | DIASTOLIC BLOOD PRESSURE: 80 MMHG | HEART RATE: 80 BPM | SYSTOLIC BLOOD PRESSURE: 129 MMHG | OXYGEN SATURATION: 96 %

## 2025-05-24 LAB
ANION GAP SERPL CALC-SCNC: 12 MMOL/L — SIGNIFICANT CHANGE UP (ref 5–17)
BUN SERPL-MCNC: 9 MG/DL — SIGNIFICANT CHANGE UP (ref 7–23)
CALCIUM SERPL-MCNC: 9.4 MG/DL — SIGNIFICANT CHANGE UP (ref 8.4–10.5)
CHLORIDE SERPL-SCNC: 103 MMOL/L — SIGNIFICANT CHANGE UP (ref 96–108)
CO2 SERPL-SCNC: 24 MMOL/L — SIGNIFICANT CHANGE UP (ref 22–31)
CREAT SERPL-MCNC: 0.8 MG/DL — SIGNIFICANT CHANGE UP (ref 0.5–1.3)
EGFR: 94 ML/MIN/1.73M2 — SIGNIFICANT CHANGE UP
EGFR: 94 ML/MIN/1.73M2 — SIGNIFICANT CHANGE UP
GLUCOSE SERPL-MCNC: 77 MG/DL — SIGNIFICANT CHANGE UP (ref 70–99)
HCT VFR BLD CALC: 28.9 % — LOW (ref 34.5–45)
HGB BLD-MCNC: 8.2 G/DL — LOW (ref 11.5–15.5)
LIDOCAIN IGE QN: 17 U/L — SIGNIFICANT CHANGE UP (ref 7–60)
MAGNESIUM SERPL-MCNC: 2 MG/DL — SIGNIFICANT CHANGE UP (ref 1.6–2.6)
MCHC RBC-ENTMCNC: 19.1 PG — LOW (ref 27–34)
MCHC RBC-ENTMCNC: 28.4 G/DL — LOW (ref 32–36)
MCV RBC AUTO: 67.4 FL — LOW (ref 80–100)
NRBC BLD AUTO-RTO: 0 /100 WBCS — SIGNIFICANT CHANGE UP (ref 0–0)
PHOSPHATE SERPL-MCNC: 2.3 MG/DL — LOW (ref 2.5–4.5)
PLATELET # BLD AUTO: 479 K/UL — HIGH (ref 150–400)
POTASSIUM SERPL-MCNC: 3.9 MMOL/L — SIGNIFICANT CHANGE UP (ref 3.5–5.3)
POTASSIUM SERPL-SCNC: 3.9 MMOL/L — SIGNIFICANT CHANGE UP (ref 3.5–5.3)
RBC # BLD: 4.29 M/UL — SIGNIFICANT CHANGE UP (ref 3.8–5.2)
RBC # FLD: 20.3 % — HIGH (ref 10.3–14.5)
SODIUM SERPL-SCNC: 139 MMOL/L — SIGNIFICANT CHANGE UP (ref 135–145)
WBC # BLD: 6.49 K/UL — SIGNIFICANT CHANGE UP (ref 3.8–10.5)
WBC # FLD AUTO: 6.49 K/UL — SIGNIFICANT CHANGE UP (ref 3.8–10.5)

## 2025-05-24 PROCEDURE — 84484 ASSAY OF TROPONIN QUANT: CPT

## 2025-05-24 PROCEDURE — 83735 ASSAY OF MAGNESIUM: CPT

## 2025-05-24 PROCEDURE — 80048 BASIC METABOLIC PNL TOTAL CA: CPT

## 2025-05-24 PROCEDURE — 86900 BLOOD TYPING SEROLOGIC ABO: CPT

## 2025-05-24 PROCEDURE — 82435 ASSAY OF BLOOD CHLORIDE: CPT

## 2025-05-24 PROCEDURE — 85730 THROMBOPLASTIN TIME PARTIAL: CPT

## 2025-05-24 PROCEDURE — 96376 TX/PRO/DX INJ SAME DRUG ADON: CPT

## 2025-05-24 PROCEDURE — 84295 ASSAY OF SERUM SODIUM: CPT

## 2025-05-24 PROCEDURE — 93005 ELECTROCARDIOGRAM TRACING: CPT

## 2025-05-24 PROCEDURE — 85018 HEMOGLOBIN: CPT

## 2025-05-24 PROCEDURE — 99232 SBSQ HOSP IP/OBS MODERATE 35: CPT

## 2025-05-24 PROCEDURE — 96374 THER/PROPH/DIAG INJ IV PUSH: CPT

## 2025-05-24 PROCEDURE — 82947 ASSAY GLUCOSE BLOOD QUANT: CPT

## 2025-05-24 PROCEDURE — 86901 BLOOD TYPING SEROLOGIC RH(D): CPT

## 2025-05-24 PROCEDURE — 86850 RBC ANTIBODY SCREEN: CPT

## 2025-05-24 PROCEDURE — 76705 ECHO EXAM OF ABDOMEN: CPT

## 2025-05-24 PROCEDURE — 82803 BLOOD GASES ANY COMBINATION: CPT

## 2025-05-24 PROCEDURE — 85014 HEMATOCRIT: CPT

## 2025-05-24 PROCEDURE — 87040 BLOOD CULTURE FOR BACTERIA: CPT

## 2025-05-24 PROCEDURE — 80053 COMPREHEN METABOLIC PANEL: CPT

## 2025-05-24 PROCEDURE — 99285 EMERGENCY DEPT VISIT HI MDM: CPT | Mod: 25

## 2025-05-24 PROCEDURE — 85610 PROTHROMBIN TIME: CPT

## 2025-05-24 PROCEDURE — 84702 CHORIONIC GONADOTROPIN TEST: CPT

## 2025-05-24 PROCEDURE — 85025 COMPLETE CBC W/AUTO DIFF WBC: CPT

## 2025-05-24 PROCEDURE — 96375 TX/PRO/DX INJ NEW DRUG ADDON: CPT

## 2025-05-24 PROCEDURE — 74176 CT ABD & PELVIS W/O CONTRAST: CPT

## 2025-05-24 PROCEDURE — 83605 ASSAY OF LACTIC ACID: CPT

## 2025-05-24 PROCEDURE — 82150 ASSAY OF AMYLASE: CPT

## 2025-05-24 PROCEDURE — 74177 CT ABD & PELVIS W/CONTRAST: CPT

## 2025-05-24 PROCEDURE — 83690 ASSAY OF LIPASE: CPT

## 2025-05-24 PROCEDURE — 85027 COMPLETE CBC AUTOMATED: CPT

## 2025-05-24 PROCEDURE — 82330 ASSAY OF CALCIUM: CPT

## 2025-05-24 PROCEDURE — 84132 ASSAY OF SERUM POTASSIUM: CPT

## 2025-05-24 PROCEDURE — 84100 ASSAY OF PHOSPHORUS: CPT

## 2025-05-24 PROCEDURE — 71045 X-RAY EXAM CHEST 1 VIEW: CPT

## 2025-05-24 PROCEDURE — 36415 COLL VENOUS BLD VENIPUNCTURE: CPT

## 2025-05-24 RX ORDER — AMOXICILLIN AND CLAVULANATE POTASSIUM 500; 125 MG/1; MG/1
1 TABLET, FILM COATED ORAL
Qty: 28 | Refills: 0
Start: 2025-05-24 | End: 2025-06-06

## 2025-05-24 RX ADMIN — Medication 25 GRAM(S): at 05:46

## 2025-05-24 RX ADMIN — Medication 4 MILLIGRAM(S): at 05:38

## 2025-05-24 RX ADMIN — Medication 40 MILLIGRAM(S): at 05:38

## 2025-05-26 NOTE — ED ADULT NURSE NOTE - PAIN: NONVERBAL INDICATORS
REMINDER: Please complete labs within 1 week of appointment.   Please complete satisfaction survey when received. Thank you.    Jayme Aguilar,     If you are due for any health screening(s) below please notify me so we can arrange them to be ordered and scheduled. Most healthy patients at your age complete them, but you are free to accept or refuse.     If you can't do it, I'll definitely understand. If you can, I'd certainly appreciate it!    Tests to Keep You Healthy    Last Blood Pressure <= 139/89 (3/5/2025): Yes  Tobacco Cessation: NO      Were here to help you quit smoking     Our records indicated that you are still smoking. One of the best things you can do for your health is to stop smoking and we are here to help.     Talk with your provider about our Smoking Cessation Program and how we can support you on your journey.                   anxious/guarding/moaning/restless

## 2025-05-27 LAB
CULTURE RESULTS: SIGNIFICANT CHANGE UP
CULTURE RESULTS: SIGNIFICANT CHANGE UP
SPECIMEN SOURCE: SIGNIFICANT CHANGE UP
SPECIMEN SOURCE: SIGNIFICANT CHANGE UP

## 2025-06-14 NOTE — PROGRESS NOTE BEHAVIORAL HEALTH - ORIENTED TO PERSON
Urinary retention, Sepsis due to UTI, Uncontrolled DM2 Urinary retention, Sepsis due to UTI, Uncontrolled DM2 Urinary retention, Sepsis due to UTI, Uncontrolled DM2 Urinary retention, Sepsis due to UTI, Uncontrolled DM2 Yes

## 2025-06-17 ENCOUNTER — APPOINTMENT (OUTPATIENT)
Dept: GASTROENTEROLOGY | Facility: CLINIC | Age: 40
End: 2025-06-17

## 2025-06-18 NOTE — PROGRESS NOTE BEHAVIORAL HEALTH - MUSCLE TONE / STRENGTH
Attempted to contact [facility] yesterday and failed to connect. Will re-attempt today.   Normal muscle tone/strength

## 2025-08-01 ENCOUNTER — INPATIENT (INPATIENT)
Facility: HOSPITAL | Age: 40
LOS: 3 days | Discharge: HOME | DRG: 392 | End: 2025-08-05
Attending: INTERNAL MEDICINE | Admitting: INTERNAL MEDICINE
Payer: MEDICAID

## 2025-08-01 VITALS
WEIGHT: 139.99 LBS | DIASTOLIC BLOOD PRESSURE: 111 MMHG | RESPIRATION RATE: 20 BRPM | SYSTOLIC BLOOD PRESSURE: 157 MMHG | OXYGEN SATURATION: 97 % | HEART RATE: 86 BPM | HEIGHT: 64 IN | TEMPERATURE: 99 F

## 2025-08-01 DIAGNOSIS — R10.9 UNSPECIFIED ABDOMINAL PAIN: ICD-10-CM

## 2025-08-01 DIAGNOSIS — Z98.51 TUBAL LIGATION STATUS: Chronic | ICD-10-CM

## 2025-08-01 LAB
ACANTHOCYTES BLD QL SMEAR: SLIGHT — SIGNIFICANT CHANGE UP
ALBUMIN SERPL ELPH-MCNC: 4.9 G/DL — SIGNIFICANT CHANGE UP (ref 3.3–5)
ALP SERPL-CCNC: 82 U/L — SIGNIFICANT CHANGE UP (ref 40–120)
ALT FLD-CCNC: 13 U/L — SIGNIFICANT CHANGE UP (ref 10–45)
ANION GAP SERPL CALC-SCNC: 18 MMOL/L — HIGH (ref 5–17)
ANISOCYTOSIS BLD QL: SLIGHT — SIGNIFICANT CHANGE UP
AST SERPL-CCNC: 20 U/L — SIGNIFICANT CHANGE UP (ref 10–40)
BASOPHILS # BLD AUTO: 0.06 K/UL — SIGNIFICANT CHANGE UP (ref 0–0.2)
BASOPHILS # BLD MANUAL: 0.08 K/UL — SIGNIFICANT CHANGE UP (ref 0–0.2)
BASOPHILS NFR BLD AUTO: 0.7 % — SIGNIFICANT CHANGE UP (ref 0–2)
BASOPHILS NFR BLD MANUAL: 0.9 % — SIGNIFICANT CHANGE UP (ref 0–2)
BILIRUB SERPL-MCNC: 0.5 MG/DL — SIGNIFICANT CHANGE UP (ref 0.2–1.2)
BLD GP AB SCN SERPL QL: NEGATIVE — SIGNIFICANT CHANGE UP
BUN SERPL-MCNC: 7 MG/DL — SIGNIFICANT CHANGE UP (ref 7–23)
CALCIUM SERPL-MCNC: 10.7 MG/DL — HIGH (ref 8.4–10.5)
CHLORIDE SERPL-SCNC: 99 MMOL/L — SIGNIFICANT CHANGE UP (ref 96–108)
CO2 SERPL-SCNC: 20 MMOL/L — LOW (ref 22–31)
CREAT SERPL-MCNC: 0.59 MG/DL — SIGNIFICANT CHANGE UP (ref 0.5–1.3)
DACRYOCYTES BLD QL SMEAR: SLIGHT — SIGNIFICANT CHANGE UP
EGFR: 115 ML/MIN/1.73M2 — SIGNIFICANT CHANGE UP
EGFR: 115 ML/MIN/1.73M2 — SIGNIFICANT CHANGE UP
EOSINOPHIL # BLD AUTO: 0.18 K/UL — SIGNIFICANT CHANGE UP (ref 0–0.5)
EOSINOPHIL # BLD MANUAL: 0.22 K/UL — SIGNIFICANT CHANGE UP (ref 0–0.5)
EOSINOPHIL NFR BLD AUTO: 2.1 % — SIGNIFICANT CHANGE UP (ref 0–6)
EOSINOPHIL NFR BLD MANUAL: 2.6 % — SIGNIFICANT CHANGE UP (ref 0–6)
GIANT PLATELETS BLD QL SMEAR: PRESENT
GLUCOSE SERPL-MCNC: 90 MG/DL — SIGNIFICANT CHANGE UP (ref 70–99)
HCG SERPL-ACNC: <2 MIU/ML — SIGNIFICANT CHANGE UP
HCT VFR BLD CALC: 33.9 % — LOW (ref 34.5–45)
HGB BLD-MCNC: 9.5 G/DL — LOW (ref 11.5–15.5)
IMM GRANULOCYTES # BLD AUTO: 0.02 K/UL — SIGNIFICANT CHANGE UP (ref 0–0.07)
IMM GRANULOCYTES NFR BLD AUTO: 0.2 % — SIGNIFICANT CHANGE UP (ref 0–0.9)
LIDOCAIN IGE QN: 17 U/L — SIGNIFICANT CHANGE UP (ref 7–60)
LYMPHOCYTES # BLD AUTO: 2.94 K/UL — SIGNIFICANT CHANGE UP (ref 1–3.3)
LYMPHOCYTES # BLD MANUAL: 3.49 K/UL — HIGH (ref 1–3.3)
LYMPHOCYTES NFR BLD AUTO: 34.9 % — SIGNIFICANT CHANGE UP (ref 13–44)
LYMPHOCYTES NFR BLD MANUAL: 41.4 % — SIGNIFICANT CHANGE UP (ref 13–44)
MACROCYTES BLD QL: SLIGHT — SIGNIFICANT CHANGE UP
MCHC RBC-ENTMCNC: 18.6 PG — LOW (ref 27–34)
MCHC RBC-ENTMCNC: 28 G/DL — LOW (ref 32–36)
MCV RBC AUTO: 66.3 FL — LOW (ref 80–100)
MICROCYTES BLD QL: SLIGHT — SIGNIFICANT CHANGE UP
MONOCYTES # BLD AUTO: 0.83 K/UL — SIGNIFICANT CHANGE UP (ref 0–0.9)
MONOCYTES # BLD MANUAL: 0.51 K/UL — SIGNIFICANT CHANGE UP (ref 0–0.9)
MONOCYTES NFR BLD AUTO: 9.9 % — SIGNIFICANT CHANGE UP (ref 2–14)
MONOCYTES NFR BLD MANUAL: 6 % — SIGNIFICANT CHANGE UP (ref 2–14)
NEUTROPHILS # BLD AUTO: 4.39 K/UL — SIGNIFICANT CHANGE UP (ref 1.8–7.4)
NEUTROPHILS # BLD MANUAL: 4.13 K/UL — SIGNIFICANT CHANGE UP (ref 1.8–7.4)
NEUTROPHILS NFR BLD AUTO: 52.2 % — SIGNIFICANT CHANGE UP (ref 43–77)
NEUTROPHILS NFR BLD MANUAL: 49.1 % — SIGNIFICANT CHANGE UP (ref 43–77)
NRBC # BLD AUTO: 0 K/UL — SIGNIFICANT CHANGE UP (ref 0–0)
NRBC # FLD: 0 K/UL — SIGNIFICANT CHANGE UP (ref 0–0)
NRBC BLD AUTO-RTO: 0 /100 WBCS — SIGNIFICANT CHANGE UP (ref 0–0)
OVALOCYTES BLD QL SMEAR: SLIGHT — SIGNIFICANT CHANGE UP
PLAT MORPH BLD: ABNORMAL
PLATELET # BLD AUTO: 502 K/UL — HIGH (ref 150–400)
PMV BLD: 8.5 FL — SIGNIFICANT CHANGE UP (ref 7–13)
POIKILOCYTOSIS BLD QL AUTO: SLIGHT — SIGNIFICANT CHANGE UP
POLYCHROMASIA BLD QL SMEAR: SLIGHT — SIGNIFICANT CHANGE UP
POTASSIUM SERPL-MCNC: 3.5 MMOL/L — SIGNIFICANT CHANGE UP (ref 3.5–5.3)
POTASSIUM SERPL-SCNC: 3.5 MMOL/L — SIGNIFICANT CHANGE UP (ref 3.5–5.3)
PROT SERPL-MCNC: 8.4 G/DL — HIGH (ref 6–8.3)
RBC # BLD: 5.11 M/UL — SIGNIFICANT CHANGE UP (ref 3.8–5.2)
RBC # FLD: 19.7 % — HIGH (ref 10.3–14.5)
RBC BLD AUTO: ABNORMAL
RH IG SCN BLD-IMP: POSITIVE — SIGNIFICANT CHANGE UP
SODIUM SERPL-SCNC: 137 MMOL/L — SIGNIFICANT CHANGE UP (ref 135–145)
TROPONIN T, HIGH SENSITIVITY RESULT: <6 NG/L — SIGNIFICANT CHANGE UP (ref 0–51)
WBC # BLD: 8.42 K/UL — SIGNIFICANT CHANGE UP (ref 3.8–10.5)
WBC # FLD AUTO: 8.42 K/UL — SIGNIFICANT CHANGE UP (ref 3.8–10.5)

## 2025-08-01 PROCEDURE — 86850 RBC ANTIBODY SCREEN: CPT

## 2025-08-01 PROCEDURE — 85025 COMPLETE CBC W/AUTO DIFF WBC: CPT

## 2025-08-01 PROCEDURE — 74177 CT ABD & PELVIS W/CONTRAST: CPT

## 2025-08-01 PROCEDURE — 86900 BLOOD TYPING SEROLOGIC ABO: CPT

## 2025-08-01 PROCEDURE — 86901 BLOOD TYPING SEROLOGIC RH(D): CPT

## 2025-08-01 PROCEDURE — 84702 CHORIONIC GONADOTROPIN TEST: CPT

## 2025-08-01 PROCEDURE — 74177 CT ABD & PELVIS W/CONTRAST: CPT | Mod: 26

## 2025-08-01 PROCEDURE — 80053 COMPREHEN METABOLIC PANEL: CPT

## 2025-08-01 PROCEDURE — 36415 COLL VENOUS BLD VENIPUNCTURE: CPT

## 2025-08-01 PROCEDURE — 84484 ASSAY OF TROPONIN QUANT: CPT

## 2025-08-01 PROCEDURE — 99285 EMERGENCY DEPT VISIT HI MDM: CPT

## 2025-08-01 PROCEDURE — 83690 ASSAY OF LIPASE: CPT

## 2025-08-01 RX ORDER — ACETAMINOPHEN 500 MG/5ML
1000 LIQUID (ML) ORAL ONCE
Refills: 0 | Status: COMPLETED | OUTPATIENT
Start: 2025-08-01 | End: 2025-08-02

## 2025-08-01 RX ORDER — BISACODYL 5 MG
5 TABLET, DELAYED RELEASE (ENTERIC COATED) ORAL DAILY
Refills: 0 | Status: DISCONTINUED | OUTPATIENT
Start: 2025-08-01 | End: 2025-08-05

## 2025-08-01 RX ORDER — SENNA 187 MG
2 TABLET ORAL AT BEDTIME
Refills: 0 | Status: DISCONTINUED | OUTPATIENT
Start: 2025-08-01 | End: 2025-08-05

## 2025-08-01 RX ORDER — ACETAMINOPHEN 500 MG/5ML
650 LIQUID (ML) ORAL EVERY 6 HOURS
Refills: 0 | Status: DISCONTINUED | OUTPATIENT
Start: 2025-08-01 | End: 2025-08-05

## 2025-08-01 RX ORDER — POLYETHYLENE GLYCOL 3350 17 G/17G
17 POWDER, FOR SOLUTION ORAL DAILY
Refills: 0 | Status: DISCONTINUED | OUTPATIENT
Start: 2025-08-01 | End: 2025-08-05

## 2025-08-01 RX ORDER — NALOXONE HYDROCHLORIDE 0.4 MG/ML
0.4 INJECTION, SOLUTION INTRAMUSCULAR; INTRAVENOUS; SUBCUTANEOUS ONCE
Refills: 0 | Status: DISCONTINUED | OUTPATIENT
Start: 2025-08-01 | End: 2025-08-05

## 2025-08-01 RX ORDER — ONDANSETRON HCL/PF 4 MG/2 ML
4 VIAL (ML) INJECTION ONCE
Refills: 0 | Status: COMPLETED | OUTPATIENT
Start: 2025-08-01 | End: 2025-08-01

## 2025-08-01 RX ORDER — ACETAMINOPHEN 500 MG/5ML
1000 LIQUID (ML) ORAL ONCE
Refills: 0 | Status: COMPLETED | OUTPATIENT
Start: 2025-08-01 | End: 2025-08-01

## 2025-08-01 RX ORDER — MELATONIN 5 MG
3 TABLET ORAL AT BEDTIME
Refills: 0 | Status: DISCONTINUED | OUTPATIENT
Start: 2025-08-01 | End: 2025-08-05

## 2025-08-01 RX ADMIN — Medication 400 MILLIGRAM(S): at 18:36

## 2025-08-01 RX ADMIN — Medication 2 MILLIGRAM(S): at 20:00

## 2025-08-01 RX ADMIN — Medication 2 MILLIGRAM(S): at 18:35

## 2025-08-01 RX ADMIN — Medication 1000 MILLIGRAM(S): at 19:00

## 2025-08-01 RX ADMIN — Medication 2 MILLIGRAM(S): at 22:58

## 2025-08-01 RX ADMIN — Medication 2 MILLIGRAM(S): at 21:31

## 2025-08-01 RX ADMIN — Medication 4 MILLIGRAM(S): at 18:33

## 2025-08-01 RX ADMIN — Medication 80 MILLIGRAM(S): at 18:27

## 2025-08-01 RX ADMIN — Medication 1000 MILLIGRAM(S): at 20:00

## 2025-08-02 DIAGNOSIS — E87.6 HYPOKALEMIA: ICD-10-CM

## 2025-08-02 DIAGNOSIS — K92.2 GASTROINTESTINAL HEMORRHAGE, UNSPECIFIED: ICD-10-CM

## 2025-08-02 DIAGNOSIS — F17.200 NICOTINE DEPENDENCE, UNSPECIFIED, UNCOMPLICATED: ICD-10-CM

## 2025-08-02 DIAGNOSIS — R93.89 ABNORMAL FINDINGS ON DIAGNOSTIC IMAGING OF OTHER SPECIFIED BODY STRUCTURES: ICD-10-CM

## 2025-08-02 LAB
A1C WITH ESTIMATED AVERAGE GLUCOSE RESULT: 5.2 % — SIGNIFICANT CHANGE UP (ref 4–5.6)
ANION GAP SERPL CALC-SCNC: 16 MMOL/L — SIGNIFICANT CHANGE UP (ref 5–17)
ANION GAP SERPL CALC-SCNC: 17 MMOL/L — SIGNIFICANT CHANGE UP (ref 5–17)
BUN SERPL-MCNC: 7 MG/DL — SIGNIFICANT CHANGE UP (ref 7–23)
BUN SERPL-MCNC: 9 MG/DL — SIGNIFICANT CHANGE UP (ref 7–23)
CALCIUM SERPL-MCNC: 10.5 MG/DL — SIGNIFICANT CHANGE UP (ref 8.4–10.5)
CALCIUM SERPL-MCNC: 9.9 MG/DL — SIGNIFICANT CHANGE UP (ref 8.4–10.5)
CHLORIDE SERPL-SCNC: 101 MMOL/L — SIGNIFICANT CHANGE UP (ref 96–108)
CHLORIDE SERPL-SCNC: 99 MMOL/L — SIGNIFICANT CHANGE UP (ref 96–108)
CHOLEST SERPL-MCNC: 146 MG/DL — SIGNIFICANT CHANGE UP
CO2 SERPL-SCNC: 21 MMOL/L — LOW (ref 22–31)
CO2 SERPL-SCNC: 21 MMOL/L — LOW (ref 22–31)
CREAT SERPL-MCNC: 0.68 MG/DL — SIGNIFICANT CHANGE UP (ref 0.5–1.3)
CREAT SERPL-MCNC: 0.72 MG/DL — SIGNIFICANT CHANGE UP (ref 0.5–1.3)
EGFR: 107 ML/MIN/1.73M2 — SIGNIFICANT CHANGE UP
EGFR: 107 ML/MIN/1.73M2 — SIGNIFICANT CHANGE UP
EGFR: 111 ML/MIN/1.73M2 — SIGNIFICANT CHANGE UP
EGFR: 111 ML/MIN/1.73M2 — SIGNIFICANT CHANGE UP
ESTIMATED AVERAGE GLUCOSE: 103 MG/DL — SIGNIFICANT CHANGE UP (ref 68–114)
GLUCOSE SERPL-MCNC: 70 MG/DL — SIGNIFICANT CHANGE UP (ref 70–99)
GLUCOSE SERPL-MCNC: 95 MG/DL — SIGNIFICANT CHANGE UP (ref 70–99)
HCT VFR BLD CALC: 29.9 % — LOW (ref 34.5–45)
HCT VFR BLD CALC: 33.1 % — LOW (ref 34.5–45)
HDLC SERPL-MCNC: 40 MG/DL — LOW
HGB BLD-MCNC: 8.3 G/DL — LOW (ref 11.5–15.5)
HGB BLD-MCNC: 9.2 G/DL — LOW (ref 11.5–15.5)
LDLC SERPL-MCNC: 89 MG/DL — SIGNIFICANT CHANGE UP
LIPID PNL WITH DIRECT LDL SERPL: 89 MG/DL — SIGNIFICANT CHANGE UP
MAGNESIUM SERPL-MCNC: 2.1 MG/DL — SIGNIFICANT CHANGE UP (ref 1.6–2.6)
MCHC RBC-ENTMCNC: 18.3 PG — LOW (ref 27–34)
MCHC RBC-ENTMCNC: 18.6 PG — LOW (ref 27–34)
MCHC RBC-ENTMCNC: 27.8 G/DL — LOW (ref 32–36)
MCHC RBC-ENTMCNC: 27.8 G/DL — LOW (ref 32–36)
MCV RBC AUTO: 65.7 FL — LOW (ref 80–100)
MCV RBC AUTO: 66.9 FL — LOW (ref 80–100)
NONHDLC SERPL-MCNC: 107 MG/DL — SIGNIFICANT CHANGE UP
NRBC # BLD AUTO: 0 K/UL — SIGNIFICANT CHANGE UP (ref 0–0)
NRBC # BLD AUTO: 0 K/UL — SIGNIFICANT CHANGE UP (ref 0–0)
NRBC # FLD: 0 K/UL — SIGNIFICANT CHANGE UP (ref 0–0)
NRBC # FLD: 0 K/UL — SIGNIFICANT CHANGE UP (ref 0–0)
NRBC BLD AUTO-RTO: 0 /100 WBCS — SIGNIFICANT CHANGE UP (ref 0–0)
NRBC BLD AUTO-RTO: 0 /100 WBCS — SIGNIFICANT CHANGE UP (ref 0–0)
PHOSPHATE SERPL-MCNC: 3.2 MG/DL — SIGNIFICANT CHANGE UP (ref 2.5–4.5)
PLATELET # BLD AUTO: 425 K/UL — HIGH (ref 150–400)
PLATELET # BLD AUTO: 521 K/UL — HIGH (ref 150–400)
PMV BLD: 8.6 FL — SIGNIFICANT CHANGE UP (ref 7–13)
PMV BLD: 8.8 FL — SIGNIFICANT CHANGE UP (ref 7–13)
POTASSIUM SERPL-MCNC: 3.4 MMOL/L — LOW (ref 3.5–5.3)
POTASSIUM SERPL-MCNC: 3.5 MMOL/L — SIGNIFICANT CHANGE UP (ref 3.5–5.3)
POTASSIUM SERPL-SCNC: 3.4 MMOL/L — LOW (ref 3.5–5.3)
POTASSIUM SERPL-SCNC: 3.5 MMOL/L — SIGNIFICANT CHANGE UP (ref 3.5–5.3)
RBC # BLD: 4.47 M/UL — SIGNIFICANT CHANGE UP (ref 3.8–5.2)
RBC # BLD: 5.04 M/UL — SIGNIFICANT CHANGE UP (ref 3.8–5.2)
RBC # FLD: 19.5 % — HIGH (ref 10.3–14.5)
RBC # FLD: 20 % — HIGH (ref 10.3–14.5)
SODIUM SERPL-SCNC: 137 MMOL/L — SIGNIFICANT CHANGE UP (ref 135–145)
SODIUM SERPL-SCNC: 138 MMOL/L — SIGNIFICANT CHANGE UP (ref 135–145)
TRIGL SERPL-MCNC: 95 MG/DL — SIGNIFICANT CHANGE UP
WBC # BLD: 12.99 K/UL — HIGH (ref 3.8–10.5)
WBC # BLD: 7.18 K/UL — SIGNIFICANT CHANGE UP (ref 3.8–10.5)
WBC # FLD AUTO: 12.99 K/UL — HIGH (ref 3.8–10.5)
WBC # FLD AUTO: 7.18 K/UL — SIGNIFICANT CHANGE UP (ref 3.8–10.5)

## 2025-08-02 PROCEDURE — 99222 1ST HOSP IP/OBS MODERATE 55: CPT | Mod: GC

## 2025-08-02 PROCEDURE — 84484 ASSAY OF TROPONIN QUANT: CPT

## 2025-08-02 PROCEDURE — 83690 ASSAY OF LIPASE: CPT

## 2025-08-02 PROCEDURE — 80053 COMPREHEN METABOLIC PANEL: CPT

## 2025-08-02 PROCEDURE — 85025 COMPLETE CBC W/AUTO DIFF WBC: CPT

## 2025-08-02 PROCEDURE — 84702 CHORIONIC GONADOTROPIN TEST: CPT

## 2025-08-02 PROCEDURE — 85027 COMPLETE CBC AUTOMATED: CPT

## 2025-08-02 PROCEDURE — 83735 ASSAY OF MAGNESIUM: CPT

## 2025-08-02 PROCEDURE — 36415 COLL VENOUS BLD VENIPUNCTURE: CPT

## 2025-08-02 PROCEDURE — 83036 HEMOGLOBIN GLYCOSYLATED A1C: CPT

## 2025-08-02 PROCEDURE — 84100 ASSAY OF PHOSPHORUS: CPT

## 2025-08-02 PROCEDURE — 86901 BLOOD TYPING SEROLOGIC RH(D): CPT

## 2025-08-02 PROCEDURE — 74183 MRI ABD W/O CNTR FLWD CNTR: CPT | Mod: 26

## 2025-08-02 PROCEDURE — 80061 LIPID PANEL: CPT

## 2025-08-02 PROCEDURE — 74177 CT ABD & PELVIS W/CONTRAST: CPT

## 2025-08-02 PROCEDURE — 80048 BASIC METABOLIC PNL TOTAL CA: CPT

## 2025-08-02 PROCEDURE — 74183 MRI ABD W/O CNTR FLWD CNTR: CPT

## 2025-08-02 PROCEDURE — 99223 1ST HOSP IP/OBS HIGH 75: CPT

## 2025-08-02 PROCEDURE — 86850 RBC ANTIBODY SCREEN: CPT

## 2025-08-02 PROCEDURE — A9581: CPT

## 2025-08-02 PROCEDURE — 86900 BLOOD TYPING SEROLOGIC ABO: CPT

## 2025-08-02 RX ORDER — ONDANSETRON HCL/PF 4 MG/2 ML
4 VIAL (ML) INJECTION ONCE
Refills: 0 | Status: COMPLETED | OUTPATIENT
Start: 2025-08-02 | End: 2025-08-02

## 2025-08-02 RX ORDER — ONDANSETRON HCL/PF 4 MG/2 ML
4 VIAL (ML) INJECTION EVERY 6 HOURS
Refills: 0 | Status: DISCONTINUED | OUTPATIENT
Start: 2025-08-02 | End: 2025-08-05

## 2025-08-02 RX ORDER — SODIUM CHLORIDE 9 G/1000ML
1000 INJECTION, SOLUTION INTRAVENOUS
Refills: 0 | Status: DISCONTINUED | OUTPATIENT
Start: 2025-08-02 | End: 2025-08-04

## 2025-08-02 RX ORDER — NICOTINE POLACRILEX 4 MG/1
1 GUM, CHEWING ORAL DAILY
Refills: 0 | Status: DISCONTINUED | OUTPATIENT
Start: 2025-08-02 | End: 2025-08-05

## 2025-08-02 RX ADMIN — Medication 1000 MILLIGRAM(S): at 01:12

## 2025-08-02 RX ADMIN — Medication 4 MILLIGRAM(S): at 05:17

## 2025-08-02 RX ADMIN — NICOTINE POLACRILEX 1 PATCH: 4 GUM, CHEWING ORAL at 19:00

## 2025-08-02 RX ADMIN — Medication 4 MILLIGRAM(S): at 04:38

## 2025-08-02 RX ADMIN — Medication 40 MILLIGRAM(S): at 17:04

## 2025-08-02 RX ADMIN — Medication 650 MILLIGRAM(S): at 15:08

## 2025-08-02 RX ADMIN — Medication 2 MILLIGRAM(S): at 17:39

## 2025-08-02 RX ADMIN — SODIUM CHLORIDE 75 MILLILITER(S): 9 INJECTION, SOLUTION INTRAVENOUS at 04:25

## 2025-08-02 RX ADMIN — Medication 2 TABLET(S): at 21:42

## 2025-08-02 RX ADMIN — Medication 100 MILLIEQUIVALENT(S): at 05:44

## 2025-08-02 RX ADMIN — Medication 4 MILLIGRAM(S): at 08:42

## 2025-08-02 RX ADMIN — Medication 2 MILLIGRAM(S): at 00:10

## 2025-08-02 RX ADMIN — NICOTINE POLACRILEX 1 PATCH: 4 GUM, CHEWING ORAL at 17:07

## 2025-08-02 RX ADMIN — Medication 2 MILLIGRAM(S): at 01:12

## 2025-08-02 RX ADMIN — Medication 2 MILLIGRAM(S): at 21:41

## 2025-08-02 RX ADMIN — Medication 2 MILLIGRAM(S): at 22:15

## 2025-08-02 RX ADMIN — Medication 4 MILLIGRAM(S): at 04:25

## 2025-08-02 RX ADMIN — Medication 100 MILLIEQUIVALENT(S): at 07:44

## 2025-08-02 RX ADMIN — Medication 40 MILLIGRAM(S): at 04:25

## 2025-08-02 RX ADMIN — Medication 400 MILLIGRAM(S): at 00:10

## 2025-08-03 PROCEDURE — 99232 SBSQ HOSP IP/OBS MODERATE 35: CPT | Mod: GC

## 2025-08-03 RX ADMIN — Medication 4 MILLIGRAM(S): at 09:46

## 2025-08-03 RX ADMIN — Medication 4 MILLIGRAM(S): at 17:00

## 2025-08-03 RX ADMIN — Medication 4 MILLIGRAM(S): at 10:30

## 2025-08-03 RX ADMIN — Medication 2 TABLET(S): at 22:09

## 2025-08-03 RX ADMIN — NICOTINE POLACRILEX 1 PATCH: 4 GUM, CHEWING ORAL at 12:34

## 2025-08-03 RX ADMIN — SODIUM CHLORIDE 75 MILLILITER(S): 9 INJECTION, SOLUTION INTRAVENOUS at 05:10

## 2025-08-03 RX ADMIN — Medication 4 MILLIGRAM(S): at 02:00

## 2025-08-03 RX ADMIN — POLYETHYLENE GLYCOL 3350 17 GRAM(S): 17 POWDER, FOR SOLUTION ORAL at 12:34

## 2025-08-03 RX ADMIN — Medication 40 MILLIGRAM(S): at 16:59

## 2025-08-03 RX ADMIN — Medication 4 MILLIGRAM(S): at 01:25

## 2025-08-03 RX ADMIN — NICOTINE POLACRILEX 1 PATCH: 4 GUM, CHEWING ORAL at 07:04

## 2025-08-03 RX ADMIN — NICOTINE POLACRILEX 1 PATCH: 4 GUM, CHEWING ORAL at 19:30

## 2025-08-03 RX ADMIN — Medication 4 MILLIGRAM(S): at 05:09

## 2025-08-03 RX ADMIN — NICOTINE POLACRILEX 1 PATCH: 4 GUM, CHEWING ORAL at 16:18

## 2025-08-03 RX ADMIN — Medication 4 MILLIGRAM(S): at 05:30

## 2025-08-03 RX ADMIN — SODIUM CHLORIDE 75 MILLILITER(S): 9 INJECTION, SOLUTION INTRAVENOUS at 09:46

## 2025-08-03 RX ADMIN — Medication 40 MILLIGRAM(S): at 05:09

## 2025-08-04 LAB
ANION GAP SERPL CALC-SCNC: 14 MMOL/L — SIGNIFICANT CHANGE UP (ref 5–17)
BUN SERPL-MCNC: 7 MG/DL — SIGNIFICANT CHANGE UP (ref 7–23)
CALCIUM SERPL-MCNC: 9.5 MG/DL — SIGNIFICANT CHANGE UP (ref 8.4–10.5)
CHLORIDE SERPL-SCNC: 103 MMOL/L — SIGNIFICANT CHANGE UP (ref 96–108)
CO2 SERPL-SCNC: 21 MMOL/L — LOW (ref 22–31)
CREAT SERPL-MCNC: 0.66 MG/DL — SIGNIFICANT CHANGE UP (ref 0.5–1.3)
EGFR: 112 ML/MIN/1.73M2 — SIGNIFICANT CHANGE UP
EGFR: 112 ML/MIN/1.73M2 — SIGNIFICANT CHANGE UP
GLUCOSE SERPL-MCNC: 134 MG/DL — HIGH (ref 70–99)
HCT VFR BLD CALC: 28.3 % — LOW (ref 34.5–45)
HGB BLD-MCNC: 8 G/DL — LOW (ref 11.5–15.5)
MCHC RBC-ENTMCNC: 18.8 PG — LOW (ref 27–34)
MCHC RBC-ENTMCNC: 28.3 G/DL — LOW (ref 32–36)
MCV RBC AUTO: 66.6 FL — LOW (ref 80–100)
NRBC # BLD AUTO: 0 K/UL — SIGNIFICANT CHANGE UP (ref 0–0)
NRBC # FLD: 0 K/UL — SIGNIFICANT CHANGE UP (ref 0–0)
NRBC BLD AUTO-RTO: 0 /100 WBCS — SIGNIFICANT CHANGE UP (ref 0–0)
PLATELET # BLD AUTO: 385 K/UL — SIGNIFICANT CHANGE UP (ref 150–400)
PMV BLD: 8.8 FL — SIGNIFICANT CHANGE UP (ref 7–13)
POTASSIUM SERPL-MCNC: 3.2 MMOL/L — LOW (ref 3.5–5.3)
POTASSIUM SERPL-SCNC: 3.2 MMOL/L — LOW (ref 3.5–5.3)
RBC # BLD: 4.25 M/UL — SIGNIFICANT CHANGE UP (ref 3.8–5.2)
RBC # FLD: 19.2 % — HIGH (ref 10.3–14.5)
SODIUM SERPL-SCNC: 138 MMOL/L — SIGNIFICANT CHANGE UP (ref 135–145)
WBC # BLD: 5.99 K/UL — SIGNIFICANT CHANGE UP (ref 3.8–10.5)
WBC # FLD AUTO: 5.99 K/UL — SIGNIFICANT CHANGE UP (ref 3.8–10.5)

## 2025-08-04 RX ORDER — OXYCODONE HYDROCHLORIDE 30 MG/1
5 TABLET ORAL EVERY 6 HOURS
Refills: 0 | Status: DISCONTINUED | OUTPATIENT
Start: 2025-08-04 | End: 2025-08-05

## 2025-08-04 RX ORDER — B1/B2/B3/B5/B6/B12/VIT C/FOLIC 500-0.5 MG
1 TABLET ORAL DAILY
Refills: 0 | Status: DISCONTINUED | OUTPATIENT
Start: 2025-08-04 | End: 2025-08-05

## 2025-08-04 RX ADMIN — Medication 1 MILLIGRAM(S): at 20:10

## 2025-08-04 RX ADMIN — Medication 3 MILLIGRAM(S): at 23:18

## 2025-08-04 RX ADMIN — OXYCODONE HYDROCHLORIDE 5 MILLIGRAM(S): 30 TABLET ORAL at 00:18

## 2025-08-04 RX ADMIN — SODIUM CHLORIDE 75 MILLILITER(S): 9 INJECTION, SOLUTION INTRAVENOUS at 10:31

## 2025-08-04 RX ADMIN — OXYCODONE HYDROCHLORIDE 5 MILLIGRAM(S): 30 TABLET ORAL at 10:31

## 2025-08-04 RX ADMIN — Medication 4 MILLIGRAM(S): at 06:15

## 2025-08-04 RX ADMIN — NICOTINE POLACRILEX 1 PATCH: 4 GUM, CHEWING ORAL at 12:05

## 2025-08-04 RX ADMIN — Medication 40 MILLIGRAM(S): at 17:49

## 2025-08-04 RX ADMIN — Medication 1 MILLIGRAM(S): at 19:55

## 2025-08-04 RX ADMIN — Medication 40 MILLIGRAM(S): at 05:10

## 2025-08-04 RX ADMIN — OXYCODONE HYDROCHLORIDE 5 MILLIGRAM(S): 30 TABLET ORAL at 23:18

## 2025-08-04 RX ADMIN — OXYCODONE HYDROCHLORIDE 5 MILLIGRAM(S): 30 TABLET ORAL at 16:44

## 2025-08-04 RX ADMIN — Medication 4 MILLIGRAM(S): at 05:15

## 2025-08-04 RX ADMIN — Medication 20 MILLIEQUIVALENT(S): at 16:44

## 2025-08-04 RX ADMIN — OXYCODONE HYDROCHLORIDE 5 MILLIGRAM(S): 30 TABLET ORAL at 11:10

## 2025-08-04 RX ADMIN — POLYETHYLENE GLYCOL 3350 17 GRAM(S): 17 POWDER, FOR SOLUTION ORAL at 12:05

## 2025-08-04 RX ADMIN — Medication 4 MILLIGRAM(S): at 00:47

## 2025-08-04 RX ADMIN — NICOTINE POLACRILEX 1 PATCH: 4 GUM, CHEWING ORAL at 01:48

## 2025-08-04 RX ADMIN — Medication 20 MILLIEQUIVALENT(S): at 17:49

## 2025-08-04 RX ADMIN — NICOTINE POLACRILEX 1 PATCH: 4 GUM, CHEWING ORAL at 23:41

## 2025-08-04 RX ADMIN — Medication 4 MILLIGRAM(S): at 01:15

## 2025-08-04 RX ADMIN — Medication 2 TABLET(S): at 22:12

## 2025-08-05 ENCOUNTER — TRANSCRIPTION ENCOUNTER (OUTPATIENT)
Age: 40
End: 2025-08-05

## 2025-08-05 VITALS
TEMPERATURE: 98 F | RESPIRATION RATE: 18 BRPM | DIASTOLIC BLOOD PRESSURE: 86 MMHG | SYSTOLIC BLOOD PRESSURE: 122 MMHG | HEART RATE: 76 BPM | OXYGEN SATURATION: 98 %

## 2025-08-05 LAB
ALBUMIN SERPL ELPH-MCNC: 3.8 G/DL — SIGNIFICANT CHANGE UP (ref 3.3–5)
ALP SERPL-CCNC: 54 U/L — SIGNIFICANT CHANGE UP (ref 40–120)
ALT FLD-CCNC: 8 U/L — LOW (ref 10–45)
ANION GAP SERPL CALC-SCNC: 15 MMOL/L — SIGNIFICANT CHANGE UP (ref 5–17)
AST SERPL-CCNC: 11 U/L — SIGNIFICANT CHANGE UP (ref 10–40)
BILIRUB SERPL-MCNC: 0.2 MG/DL — SIGNIFICANT CHANGE UP (ref 0.2–1.2)
BUN SERPL-MCNC: 8 MG/DL — SIGNIFICANT CHANGE UP (ref 7–23)
CALCIUM SERPL-MCNC: 9.6 MG/DL — SIGNIFICANT CHANGE UP (ref 8.4–10.5)
CHLORIDE SERPL-SCNC: 105 MMOL/L — SIGNIFICANT CHANGE UP (ref 96–108)
CO2 SERPL-SCNC: 20 MMOL/L — LOW (ref 22–31)
CREAT SERPL-MCNC: 0.69 MG/DL — SIGNIFICANT CHANGE UP (ref 0.5–1.3)
EGFR: 111 ML/MIN/1.73M2 — SIGNIFICANT CHANGE UP
EGFR: 111 ML/MIN/1.73M2 — SIGNIFICANT CHANGE UP
FERRITIN SERPL-MCNC: 7 NG/ML — LOW (ref 15–150)
GLUCOSE SERPL-MCNC: 86 MG/DL — SIGNIFICANT CHANGE UP (ref 70–99)
HCT VFR BLD CALC: 27 % — LOW (ref 34.5–45)
HGB BLD-MCNC: 7.7 G/DL — LOW (ref 11.5–15.5)
MAGNESIUM SERPL-MCNC: 1.8 MG/DL — SIGNIFICANT CHANGE UP (ref 1.6–2.6)
MCHC RBC-ENTMCNC: 18.8 PG — LOW (ref 27–34)
MCHC RBC-ENTMCNC: 28.5 G/DL — LOW (ref 32–36)
MCV RBC AUTO: 65.9 FL — LOW (ref 80–100)
NRBC # BLD AUTO: 0 K/UL — SIGNIFICANT CHANGE UP (ref 0–0)
NRBC # FLD: 0 K/UL — SIGNIFICANT CHANGE UP (ref 0–0)
NRBC BLD AUTO-RTO: 0 /100 WBCS — SIGNIFICANT CHANGE UP (ref 0–0)
PLATELET # BLD AUTO: 371 K/UL — SIGNIFICANT CHANGE UP (ref 150–400)
PMV BLD: 9 FL — SIGNIFICANT CHANGE UP (ref 7–13)
POTASSIUM SERPL-MCNC: 4 MMOL/L — SIGNIFICANT CHANGE UP (ref 3.5–5.3)
POTASSIUM SERPL-SCNC: 4 MMOL/L — SIGNIFICANT CHANGE UP (ref 3.5–5.3)
PROT SERPL-MCNC: 6.3 G/DL — SIGNIFICANT CHANGE UP (ref 6–8.3)
RBC # BLD: 4.1 M/UL — SIGNIFICANT CHANGE UP (ref 3.8–5.2)
RBC # FLD: 19.5 % — HIGH (ref 10.3–14.5)
SODIUM SERPL-SCNC: 140 MMOL/L — SIGNIFICANT CHANGE UP (ref 135–145)
WBC # BLD: 5.81 K/UL — SIGNIFICANT CHANGE UP (ref 3.8–10.5)
WBC # FLD AUTO: 5.81 K/UL — SIGNIFICANT CHANGE UP (ref 3.8–10.5)

## 2025-08-05 PROCEDURE — 80053 COMPREHEN METABOLIC PANEL: CPT

## 2025-08-05 PROCEDURE — 83036 HEMOGLOBIN GLYCOSYLATED A1C: CPT

## 2025-08-05 PROCEDURE — 85027 COMPLETE CBC AUTOMATED: CPT

## 2025-08-05 PROCEDURE — 82728 ASSAY OF FERRITIN: CPT

## 2025-08-05 PROCEDURE — 74183 MRI ABD W/O CNTR FLWD CNTR: CPT

## 2025-08-05 PROCEDURE — 96375 TX/PRO/DX INJ NEW DRUG ADDON: CPT

## 2025-08-05 PROCEDURE — 82607 VITAMIN B-12: CPT

## 2025-08-05 PROCEDURE — 86900 BLOOD TYPING SEROLOGIC ABO: CPT

## 2025-08-05 PROCEDURE — 99285 EMERGENCY DEPT VISIT HI MDM: CPT

## 2025-08-05 PROCEDURE — 86850 RBC ANTIBODY SCREEN: CPT

## 2025-08-05 PROCEDURE — 80048 BASIC METABOLIC PNL TOTAL CA: CPT

## 2025-08-05 PROCEDURE — 83690 ASSAY OF LIPASE: CPT

## 2025-08-05 PROCEDURE — 84484 ASSAY OF TROPONIN QUANT: CPT

## 2025-08-05 PROCEDURE — 86901 BLOOD TYPING SEROLOGIC RH(D): CPT

## 2025-08-05 PROCEDURE — 83735 ASSAY OF MAGNESIUM: CPT

## 2025-08-05 PROCEDURE — 80061 LIPID PANEL: CPT

## 2025-08-05 PROCEDURE — 36415 COLL VENOUS BLD VENIPUNCTURE: CPT

## 2025-08-05 PROCEDURE — 84702 CHORIONIC GONADOTROPIN TEST: CPT

## 2025-08-05 PROCEDURE — 96374 THER/PROPH/DIAG INJ IV PUSH: CPT

## 2025-08-05 PROCEDURE — 96376 TX/PRO/DX INJ SAME DRUG ADON: CPT

## 2025-08-05 PROCEDURE — 84100 ASSAY OF PHOSPHORUS: CPT

## 2025-08-05 PROCEDURE — A9581: CPT

## 2025-08-05 PROCEDURE — 82746 ASSAY OF FOLIC ACID SERUM: CPT

## 2025-08-05 PROCEDURE — 85025 COMPLETE CBC W/AUTO DIFF WBC: CPT

## 2025-08-05 PROCEDURE — 74177 CT ABD & PELVIS W/CONTRAST: CPT

## 2025-08-05 RX ORDER — BISACODYL 5 MG
10 TABLET, DELAYED RELEASE (ENTERIC COATED) ORAL ONCE
Refills: 0 | Status: COMPLETED | OUTPATIENT
Start: 2025-08-05 | End: 2025-08-05

## 2025-08-05 RX ORDER — OXYCODONE HYDROCHLORIDE 30 MG/1
1 TABLET ORAL
Qty: 20 | Refills: 0
Start: 2025-08-05 | End: 2025-08-09

## 2025-08-05 RX ORDER — NALOXONE HYDROCHLORIDE 0.4 MG/ML
1 INJECTION, SOLUTION INTRAMUSCULAR; INTRAVENOUS; SUBCUTANEOUS
Qty: 1 | Refills: 0
Start: 2025-08-05 | End: 2025-08-05

## 2025-08-05 RX ORDER — POLYETHYLENE GLYCOL 3350 17 G/17G
17 POWDER, FOR SOLUTION ORAL
Qty: 510 | Refills: 0
Start: 2025-08-05 | End: 2025-09-03

## 2025-08-05 RX ORDER — B1/B2/B3/B5/B6/B12/VIT C/FOLIC 500-0.5 MG
1 TABLET ORAL
Qty: 0 | Refills: 0 | DISCHARGE
Start: 2025-08-05

## 2025-08-05 RX ADMIN — Medication 1 TABLET(S): at 11:42

## 2025-08-05 RX ADMIN — NICOTINE POLACRILEX 1 PATCH: 4 GUM, CHEWING ORAL at 11:48

## 2025-08-05 RX ADMIN — NICOTINE POLACRILEX 1 PATCH: 4 GUM, CHEWING ORAL at 15:34

## 2025-08-05 RX ADMIN — Medication 10 MILLIGRAM(S): at 13:25

## 2025-08-05 RX ADMIN — OXYCODONE HYDROCHLORIDE 5 MILLIGRAM(S): 30 TABLET ORAL at 05:52

## 2025-08-05 RX ADMIN — OXYCODONE HYDROCHLORIDE 5 MILLIGRAM(S): 30 TABLET ORAL at 14:00

## 2025-08-05 RX ADMIN — NICOTINE POLACRILEX 1 PATCH: 4 GUM, CHEWING ORAL at 11:00

## 2025-08-05 RX ADMIN — OXYCODONE HYDROCHLORIDE 5 MILLIGRAM(S): 30 TABLET ORAL at 06:52

## 2025-08-05 RX ADMIN — OXYCODONE HYDROCHLORIDE 5 MILLIGRAM(S): 30 TABLET ORAL at 13:21

## 2025-08-05 RX ADMIN — Medication 40 MILLIGRAM(S): at 05:44

## 2025-08-05 RX ADMIN — Medication 650 MILLIGRAM(S): at 09:32

## 2025-08-05 RX ADMIN — POLYETHYLENE GLYCOL 3350 17 GRAM(S): 17 POWDER, FOR SOLUTION ORAL at 11:42

## 2025-08-05 RX ADMIN — Medication 650 MILLIGRAM(S): at 10:05

## 2025-08-06 LAB
FOLATE SERPL-MCNC: 4.8 NG/ML — SIGNIFICANT CHANGE UP
VIT B12 SERPL-MCNC: 686 PG/ML — SIGNIFICANT CHANGE UP (ref 232–1245)

## (undated) DEVICE — PREP CHLORAPREP HI-LITE ORANGE 26ML

## (undated) DEVICE — BITE BLOCK ADULT 20 X 27MM (GREEN)

## (undated) DEVICE — Device

## (undated) DEVICE — ELCTR CORD FOOTSWITCH 1PLR LAPSCP 10FT

## (undated) DEVICE — SUCTION YANKAUER NO CONTROL VENT

## (undated) DEVICE — CATH IV SAFE BC 20G X 1.16" (PINK)

## (undated) DEVICE — DRAPE INSTRUMENT POUCH 6.75" X 11"

## (undated) DEVICE — TUBING IV SET GRAVITY 3Y 100" MACRO

## (undated) DEVICE — SYR ALLIANCE II INFLATION 60ML

## (undated) DEVICE — DRSG OPSITE 13.75 X 4"

## (undated) DEVICE — ENDOCATCH 10MM SPECIMEN POUCH

## (undated) DEVICE — BIOPSY FORCEP RADIAL JAW 4 STANDARD WITH NEEDLE

## (undated) DEVICE — FOLEY HOLDER STATLOCK 2 WAY ADULT

## (undated) DEVICE — STAPLER SKIN VISI-STAT 35 WIDE

## (undated) DEVICE — PACK MAJOR ABDOMINAL SUPINE

## (undated) DEVICE — TUBING IRRIGATION DAVOL SYSTEM X STREAM

## (undated) DEVICE — STAPLER COVIDIEN ENDO GIA STANDARD HANDLE

## (undated) DEVICE — WARMING BLANKET LOWER ADULT

## (undated) DEVICE — PACK BASIN SPECIAL PROCEDURE

## (undated) DEVICE — FOLEY TRAY 16FR 5CC LTX UMETER CLOSED

## (undated) DEVICE — BLADE SCALPEL SAFETYLOCK #15

## (undated) DEVICE — CATH IV SAFE BC 22G X 1" (BLUE)

## (undated) DEVICE — TUBING SUCTION 20FT

## (undated) DEVICE — GOWN TRIMAX LG

## (undated) DEVICE — BRUSH COLONOSCOPY CYTOLOGY

## (undated) DEVICE — FORCEP MULTIBITE MULTIPLE SAMPLE 2.4MM 2.8MM 240CM ORANGE DISP

## (undated) DEVICE — SOL IRR POUR NS 0.9% 500ML

## (undated) DEVICE — FORCEP RADIAL JAW 4 JUMBO 2.8MM 3.2MM 240CM ORANGE DISP

## (undated) DEVICE — SPECIMEN CONTAINER 100ML

## (undated) DEVICE — CLAMP BX HOT RAD JAW 3

## (undated) DEVICE — TUBING SUCTION CONN 6FT STERILE

## (undated) DEVICE — GLV 7.5 PROTEXIS (WHITE)

## (undated) DEVICE — DRAPE TOWEL BLUE 17" X 24"

## (undated) DEVICE — SOL INJ NS 0.9% 500ML 2 PORT

## (undated) DEVICE — SENSOR O2 FINGER ADULT

## (undated) DEVICE — VALVE YELLOW PORT SEAL PLUS 5MM

## (undated) DEVICE — LIGASURE MARYLAND 37CM

## (undated) DEVICE — GLV 8 PROTEXIS (WHITE)

## (undated) DEVICE — WARMING BLANKET UPPER ADULT

## (undated) DEVICE — BALLOON US ENDO

## (undated) DEVICE — VENODYNE/SCD SLEEVE CALF LARGE

## (undated) DEVICE — SOL IRR POUR H2O 250ML

## (undated) DEVICE — LIGASURE IMPACT

## (undated) DEVICE — PACK IV START WITH CHG

## (undated) DEVICE — INSUFFLATION NDL COVIDIEN STEP 14G SHORT FOR STEP/VERSASTEP

## (undated) DEVICE — TUBING INSUFFLATION LAP FILTER 10FT

## (undated) DEVICE — TROCAR ETHICON ENDOPATH XCEL BLADELESS 5MM X 100MM STABILITY

## (undated) DEVICE — TROCAR APPLIED MEDICAL KII BALLOON BLUNT TIP 12MM X 100MM

## (undated) DEVICE — TROCAR COVIDIEN VERSASTEP PLUS 12MM STANDARD

## (undated) DEVICE — GLV 7 PROTEXIS (WHITE)

## (undated) DEVICE — DRAPE MAYO STAND 30"

## (undated) DEVICE — GLV 6.5 PROTEXIS (WHITE)

## (undated) DEVICE — SUT SOFSILK 3-0 18" V-20 (POP-OFF)

## (undated) DEVICE — DRAPE GENERAL ENDOSCOPY

## (undated) DEVICE — DRAIN JACKSON PRATT 10MM FLAT FULL NO TROCAR

## (undated) DEVICE — TROCAR COVIDIEN BLUNT TIP HASSAN 10MM

## (undated) DEVICE — MEDICATION LABELS W MARKER

## (undated) DEVICE — SYR LUER LOK 50CC

## (undated) DEVICE — GLV 8.5 PROTEXIS (WHITE)

## (undated) DEVICE — BLADE SCALPEL SAFETYLOCK #10

## (undated) DEVICE — SHEARS COVIDIEN ENDO SHEAR 5MM X 31CM W UNIPOLAR CAUTERY

## (undated) DEVICE — POSITIONER FOAM EGG CRATE ULNAR 2PCS (PINK)

## (undated) DEVICE — DRAIN RESERVOIR FOR JACKSON PRATT 100CC CARDINAL

## (undated) DEVICE — IRRIGATOR BIO SHIELD

## (undated) DEVICE — DRSG STERISTRIPS 0.5 X 4"